# Patient Record
Sex: FEMALE | Race: WHITE | NOT HISPANIC OR LATINO
[De-identification: names, ages, dates, MRNs, and addresses within clinical notes are randomized per-mention and may not be internally consistent; named-entity substitution may affect disease eponyms.]

---

## 2017-02-13 ENCOUNTER — RX ONLY (OUTPATIENT)
Age: 62
Setting detail: RX ONLY
End: 2017-02-13

## 2017-02-20 ENCOUNTER — HOSPITAL ENCOUNTER (OUTPATIENT)
Facility: MEDICAL CENTER | Age: 62
End: 2017-02-20
Attending: OBSTETRICS & GYNECOLOGY
Payer: MEDICARE

## 2017-02-20 PROCEDURE — 87624 HPV HI-RISK TYP POOLED RSLT: CPT

## 2017-02-20 PROCEDURE — 88175 CYTOPATH C/V AUTO FLUID REDO: CPT

## 2017-02-22 LAB
CYTOLOGY REG CYTOL: NORMAL
HPV HR 12 DNA CVX QL NAA+PROBE: NEGATIVE
HPV16 DNA SPEC QL NAA+PROBE: NEGATIVE
HPV18 DNA SPEC QL NAA+PROBE: NEGATIVE
SPECIMEN SOURCE: NORMAL

## 2017-10-06 ENCOUNTER — HOSPITAL ENCOUNTER (OUTPATIENT)
Dept: RADIOLOGY | Facility: MEDICAL CENTER | Age: 62
End: 2017-10-06
Attending: OBSTETRICS & GYNECOLOGY
Payer: MEDICARE

## 2017-10-06 DIAGNOSIS — Z12.31 VISIT FOR SCREENING MAMMOGRAM: ICD-10-CM

## 2017-10-06 PROCEDURE — G0202 SCR MAMMO BI INCL CAD: HCPCS

## 2017-10-09 ENCOUNTER — APPOINTMENT (RX ONLY)
Dept: URBAN - METROPOLITAN AREA CLINIC 20 | Facility: CLINIC | Age: 62
Setting detail: DERMATOLOGY
End: 2017-10-09

## 2017-10-09 DIAGNOSIS — L81.4 OTHER MELANIN HYPERPIGMENTATION: ICD-10-CM

## 2017-10-09 DIAGNOSIS — L57.8 OTHER SKIN CHANGES DUE TO CHRONIC EXPOSURE TO NONIONIZING RADIATION: ICD-10-CM

## 2017-10-09 DIAGNOSIS — D22 MELANOCYTIC NEVI: ICD-10-CM

## 2017-10-09 DIAGNOSIS — L57.0 ACTINIC KERATOSIS: ICD-10-CM

## 2017-10-09 DIAGNOSIS — D18.0 HEMANGIOMA: ICD-10-CM

## 2017-10-09 DIAGNOSIS — L82.1 OTHER SEBORRHEIC KERATOSIS: ICD-10-CM

## 2017-10-09 PROBLEM — D22.5 MELANOCYTIC NEVI OF TRUNK: Status: ACTIVE | Noted: 2017-10-09

## 2017-10-09 PROBLEM — D18.01 HEMANGIOMA OF SKIN AND SUBCUTANEOUS TISSUE: Status: ACTIVE | Noted: 2017-10-09

## 2017-10-09 PROCEDURE — ? FOLLOW UP FOR NEXT VISIT

## 2017-10-09 PROCEDURE — ? COUNSELING

## 2017-10-09 PROCEDURE — 99213 OFFICE O/P EST LOW 20 MIN: CPT

## 2017-10-09 ASSESSMENT — LOCATION DETAILED DESCRIPTION DERM
LOCATION DETAILED: LEFT MEDIAL SUPERIOR CHEST
LOCATION DETAILED: RIGHT ANTERIOR DISTAL THIGH
LOCATION DETAILED: LEFT ANTERIOR PROXIMAL UPPER ARM
LOCATION DETAILED: RIGHT RADIAL DORSAL HAND
LOCATION DETAILED: RIGHT INFERIOR UPPER BACK
LOCATION DETAILED: RIGHT ANTERIOR PROXIMAL UPPER ARM
LOCATION DETAILED: LEFT ULNAR DORSAL HAND
LOCATION DETAILED: RIGHT SUPERIOR MEDIAL UPPER BACK
LOCATION DETAILED: RIGHT DISTAL DORSAL FOREARM
LOCATION DETAILED: LEFT PROXIMAL DORSAL FOREARM
LOCATION DETAILED: LEFT DISTAL POSTERIOR UPPER ARM
LOCATION DETAILED: RIGHT MID-UPPER BACK
LOCATION DETAILED: LEFT INFERIOR CENTRAL MALAR CHEEK
LOCATION DETAILED: LEFT ANTERIOR DISTAL THIGH
LOCATION DETAILED: RIGHT CENTRAL MALAR CHEEK
LOCATION DETAILED: NASAL DORSUM
LOCATION DETAILED: RIGHT PROXIMAL DORSAL FOREARM

## 2017-10-09 ASSESSMENT — LOCATION SIMPLE DESCRIPTION DERM
LOCATION SIMPLE: LEFT UPPER ARM
LOCATION SIMPLE: CHEST
LOCATION SIMPLE: RIGHT THIGH
LOCATION SIMPLE: RIGHT UPPER BACK
LOCATION SIMPLE: RIGHT FOREARM
LOCATION SIMPLE: LEFT THIGH
LOCATION SIMPLE: NOSE
LOCATION SIMPLE: LEFT POSTERIOR UPPER ARM
LOCATION SIMPLE: LEFT CHEEK
LOCATION SIMPLE: RIGHT HAND
LOCATION SIMPLE: LEFT HAND
LOCATION SIMPLE: RIGHT CHEEK
LOCATION SIMPLE: RIGHT UPPER ARM
LOCATION SIMPLE: LEFT FOREARM

## 2017-10-09 ASSESSMENT — LOCATION ZONE DERM
LOCATION ZONE: ARM
LOCATION ZONE: NOSE
LOCATION ZONE: FACE
LOCATION ZONE: TRUNK
LOCATION ZONE: LEG
LOCATION ZONE: HAND

## 2017-10-09 NOTE — PROCEDURE: FOLLOW UP FOR NEXT VISIT
Detail Level: Simple
Instructions (Optional): Use fluorouracil x 28 days
Instructions (Optional): Photo taken today. Call if any noticed changes.
Detail Level: Detailed

## 2017-11-15 ENCOUNTER — HOSPITAL ENCOUNTER (OUTPATIENT)
Dept: RADIOLOGY | Facility: MEDICAL CENTER | Age: 62
End: 2017-11-15
Attending: OBSTETRICS & GYNECOLOGY
Payer: MEDICARE

## 2017-11-15 DIAGNOSIS — N63.0 LUMP OR MASS IN BREAST: ICD-10-CM

## 2017-11-15 PROCEDURE — C8907 MRI W/O CONT, BREAST, BI: HCPCS

## 2017-11-28 ENCOUNTER — TELEPHONE (OUTPATIENT)
Dept: RADIOLOGY | Facility: MEDICAL CENTER | Age: 62
End: 2017-11-28

## 2017-11-29 ENCOUNTER — HOSPITAL ENCOUNTER (OUTPATIENT)
Dept: RADIOLOGY | Facility: MEDICAL CENTER | Age: 62
End: 2017-11-29
Attending: OBSTETRICS & GYNECOLOGY
Payer: MEDICARE

## 2017-11-29 DIAGNOSIS — R92.2 INCONCLUSIVE MAMMOGRAPHY: ICD-10-CM

## 2017-11-29 PROCEDURE — 76642 ULTRASOUND BREAST LIMITED: CPT | Mod: RT

## 2017-12-04 ENCOUNTER — HOSPITAL ENCOUNTER (OUTPATIENT)
Dept: RADIOLOGY | Facility: MEDICAL CENTER | Age: 62
End: 2017-12-04
Attending: NURSE PRACTITIONER
Payer: MEDICARE

## 2017-12-04 DIAGNOSIS — M25.551 RIGHT HIP PAIN: ICD-10-CM

## 2017-12-04 DIAGNOSIS — M54.5 LOW BACK PAIN, UNSPECIFIED BACK PAIN LATERALITY, UNSPECIFIED CHRONICITY, WITH SCIATICA PRESENCE UNSPECIFIED: ICD-10-CM

## 2017-12-04 PROCEDURE — 72100 X-RAY EXAM L-S SPINE 2/3 VWS: CPT

## 2017-12-04 PROCEDURE — 73501 X-RAY EXAM HIP UNI 1 VIEW: CPT | Mod: RT

## 2017-12-11 ENCOUNTER — APPOINTMENT (RX ONLY)
Dept: URBAN - METROPOLITAN AREA CLINIC 20 | Facility: CLINIC | Age: 62
Setting detail: DERMATOLOGY
End: 2017-12-11

## 2017-12-11 DIAGNOSIS — L57.0 ACTINIC KERATOSIS: ICD-10-CM

## 2017-12-11 DIAGNOSIS — B07.8 OTHER VIRAL WARTS: ICD-10-CM

## 2017-12-11 PROCEDURE — 99212 OFFICE O/P EST SF 10 MIN: CPT | Mod: 25

## 2017-12-11 PROCEDURE — ? PRESCRIPTION MEDICATION MANAGEMENT

## 2017-12-11 PROCEDURE — ? LIQUID NITROGEN (CM)

## 2017-12-11 PROCEDURE — 17110 DESTRUCTION B9 LES UP TO 14: CPT

## 2017-12-11 ASSESSMENT — LOCATION ZONE DERM
LOCATION ZONE: FACE
LOCATION ZONE: ARM
LOCATION ZONE: FINGER
LOCATION ZONE: HAND

## 2017-12-11 ASSESSMENT — LOCATION DETAILED DESCRIPTION DERM
LOCATION DETAILED: RIGHT RADIAL DORSAL HAND
LOCATION DETAILED: LEFT PROXIMAL DORSAL INDEX FINGER
LOCATION DETAILED: LEFT RADIAL DORSAL HAND
LOCATION DETAILED: LEFT ANTERIOR PROXIMAL UPPER ARM
LOCATION DETAILED: LEFT CENTRAL MANDIBULAR CHEEK
LOCATION DETAILED: LEFT CENTRAL ZYGOMA
LOCATION DETAILED: LEFT LATERAL MANDIBULAR CHEEK
LOCATION DETAILED: LEFT INFERIOR FOREHEAD

## 2017-12-11 ASSESSMENT — LOCATION SIMPLE DESCRIPTION DERM
LOCATION SIMPLE: LEFT UPPER ARM
LOCATION SIMPLE: LEFT HAND
LOCATION SIMPLE: LEFT INDEX FINGER
LOCATION SIMPLE: LEFT CHEEK
LOCATION SIMPLE: LEFT FOREHEAD
LOCATION SIMPLE: RIGHT HAND
LOCATION SIMPLE: LEFT ZYGOMA

## 2017-12-11 NOTE — PROCEDURE: LIQUID NITROGEN
Post-Care Instructions: I reviewed with the patient in detail post-care instructions. Patient is to wear sunprotection, and avoid picking at any of the treated lesions. Pt may apply Vaseline to crusted or scabbing areas.
Include Z78.9 (Other Specified Conditions Influencing Health Status) As An Associated Diagnosis?: No
Consent: The patient's consent was obtained including but not limited to risks of crusting, scabbing, blistering, scarring, darker or lighter pigmentary change, recurrence, incomplete removal and infection.
Medical Necessity Information: It is in your best interest to select a reason for this procedure from the list below. All of these items fulfill various CMS LCD requirements except the new and changing color options.
Medical Necessity Clause: This procedure was medically necessary because the lesions that were treated were:
Detail Level: Detailed
Duration Of Freeze Thaw-Cycle (Seconds): 0

## 2017-12-11 NOTE — HPI: FOLLOW-UP
What Is The Condition That You Are Returning For Follow-Up?: other
Additional History: Used 5FU, did not finish 28 days. Unsure of how many days actually used. No reaction from the 5FU.

## 2017-12-15 ENCOUNTER — HOSPITAL ENCOUNTER (OUTPATIENT)
Dept: RADIOLOGY | Facility: MEDICAL CENTER | Age: 62
End: 2017-12-15
Attending: NURSE PRACTITIONER
Payer: MEDICARE

## 2017-12-15 DIAGNOSIS — M12.9 ARTHRITIS INVOLVING MULTIPLE SITES: ICD-10-CM

## 2017-12-15 DIAGNOSIS — S33.100A SUBLUXATION OF LUMBAR VERTEBRA, INITIAL ENCOUNTER: ICD-10-CM

## 2017-12-15 PROCEDURE — 72100 X-RAY EXAM L-S SPINE 2/3 VWS: CPT

## 2018-03-26 ENCOUNTER — APPOINTMENT (RX ONLY)
Dept: URBAN - METROPOLITAN AREA CLINIC 4 | Facility: CLINIC | Age: 63
Setting detail: DERMATOLOGY
End: 2018-03-26

## 2018-03-26 DIAGNOSIS — L57.0 ACTINIC KERATOSIS: ICD-10-CM

## 2018-03-26 PROBLEM — E05.90 THYROTOXICOSIS, UNSPECIFIED WITHOUT THYROTOXIC CRISIS OR STORM: Status: ACTIVE | Noted: 2018-03-26

## 2018-03-26 PROCEDURE — 99212 OFFICE O/P EST SF 10 MIN: CPT

## 2018-03-26 PROCEDURE — ? COUNSELING

## 2018-03-26 PROCEDURE — ? PRESCRIPTION MEDICATION MANAGEMENT

## 2018-03-26 ASSESSMENT — LOCATION ZONE DERM
LOCATION ZONE: HAND
LOCATION ZONE: FACE
LOCATION ZONE: ARM

## 2018-03-26 ASSESSMENT — LOCATION DETAILED DESCRIPTION DERM
LOCATION DETAILED: 4TH WEB SPACE LEFT HAND
LOCATION DETAILED: LEFT INFERIOR CENTRAL MALAR CHEEK
LOCATION DETAILED: LEFT ANTERIOR PROXIMAL UPPER ARM
LOCATION DETAILED: LEFT RADIAL DORSAL HAND

## 2018-03-26 ASSESSMENT — LOCATION SIMPLE DESCRIPTION DERM
LOCATION SIMPLE: LEFT CHEEK
LOCATION SIMPLE: LEFT HAND
LOCATION SIMPLE: LEFT UPPER ARM

## 2018-06-01 ENCOUNTER — PATIENT OUTREACH (OUTPATIENT)
Dept: HEALTH INFORMATION MANAGEMENT | Facility: OTHER | Age: 63
End: 2018-06-01

## 2018-06-01 NOTE — PROGRESS NOTES
Outcome: Left Message    Please transfer to Patient Outreach Team at [phone] when patient returns call.      Attempt # 1

## 2018-06-13 NOTE — PROGRESS NOTES
1. Attempt #: 2    2. HealthConnect Verified: yes    3. Verify PCP: yes    4. Care Team Updated:       •   DME Company (gait device, O2, CPAP, etc.): N\A       •   Other Specialists (eye doctor, derm, GYN, cardiology, endo, etc): N\A    5.  Reviewed/Updated the following with patient:       •   Communication Preference Obtained? YES       •   Preferred Pharmacy? NO       •   Preferred Lab? NO       •   Family History (document living status of immediate family members and if + hx of cancer, diabetes, hypertension, hyperlipidemia, heart attack, stroke) NO    6. [employer] Activation: already active    7. Vanderd[employer] Carlitos: no    8. Annual Wellness Visit Scheduling  Scheduling Status:Not Scheduled. Patient states they have a visit not with PCP- Non [facility] PCP      9. Care Gap Scheduling (Attempt to Schedule EACH Overdue Care Gap!)     Health Maintenance Due   Topic Date Due   • Annual Wellness Visit  1955   • IMM DTaP/Tdap/Td Vaccine (1 - Tdap) 04/05/1974   • COLONOSCOPY  04/05/2005          10. Patient was NOT advised: “This is a free wellness visit. The provider will screen for medical conditions to help you stay healthy. If you have other concerns to address you may be asked to discuss these at a separate visit or there may be an additional fee.”     11. Patient was NOT informed to arrive 15 min prior to their scheduled appointment and bring in their medication bottles.

## 2018-08-28 ENCOUNTER — APPOINTMENT (RX ONLY)
Dept: URBAN - METROPOLITAN AREA CLINIC 20 | Facility: CLINIC | Age: 63
Setting detail: DERMATOLOGY
End: 2018-08-28

## 2018-08-28 DIAGNOSIS — L82.1 OTHER SEBORRHEIC KERATOSIS: ICD-10-CM

## 2018-08-28 DIAGNOSIS — D22 MELANOCYTIC NEVI: ICD-10-CM

## 2018-08-28 DIAGNOSIS — Z85.828 PERSONAL HISTORY OF OTHER MALIGNANT NEOPLASM OF SKIN: ICD-10-CM

## 2018-08-28 DIAGNOSIS — L81.4 OTHER MELANIN HYPERPIGMENTATION: ICD-10-CM

## 2018-08-28 DIAGNOSIS — Z87.2 PERSONAL HISTORY OF DISEASES OF THE SKIN AND SUBCUTANEOUS TISSUE: ICD-10-CM

## 2018-08-28 DIAGNOSIS — D18.0 HEMANGIOMA: ICD-10-CM

## 2018-08-28 DIAGNOSIS — L82.0 INFLAMED SEBORRHEIC KERATOSIS: ICD-10-CM

## 2018-08-28 DIAGNOSIS — L57.8 OTHER SKIN CHANGES DUE TO CHRONIC EXPOSURE TO NONIONIZING RADIATION: ICD-10-CM

## 2018-08-28 DIAGNOSIS — L57.0 ACTINIC KERATOSIS: ICD-10-CM

## 2018-08-28 PROBLEM — D18.01 HEMANGIOMA OF SKIN AND SUBCUTANEOUS TISSUE: Status: ACTIVE | Noted: 2018-08-28

## 2018-08-28 PROBLEM — D22.5 MELANOCYTIC NEVI OF TRUNK: Status: ACTIVE | Noted: 2018-08-28

## 2018-08-28 PROCEDURE — ? OBSERVATION

## 2018-08-28 PROCEDURE — ? COUNSELING

## 2018-08-28 PROCEDURE — ? LIQUID NITROGEN

## 2018-08-28 PROCEDURE — 99214 OFFICE O/P EST MOD 30 MIN: CPT | Mod: 25

## 2018-08-28 ASSESSMENT — LOCATION DETAILED DESCRIPTION DERM
LOCATION DETAILED: RIGHT ANTERIOR PROXIMAL THIGH
LOCATION DETAILED: LEFT SUPERIOR UPPER BACK
LOCATION DETAILED: RIGHT INFERIOR ANTERIOR NECK
LOCATION DETAILED: RIGHT RADIAL DORSAL HAND
LOCATION DETAILED: LEFT RADIAL DORSAL HAND
LOCATION DETAILED: LEFT INFERIOR UPPER BACK
LOCATION DETAILED: RIGHT INFERIOR MEDIAL UPPER BACK
LOCATION DETAILED: RIGHT SUPERIOR UPPER BACK
LOCATION DETAILED: RIGHT INFERIOR CENTRAL MALAR CHEEK
LOCATION DETAILED: LEFT CENTRAL EYEBROW
LOCATION DETAILED: LEFT PROXIMAL POSTERIOR UPPER ARM
LOCATION DETAILED: RIGHT POPLITEAL SKIN
LOCATION DETAILED: RIGHT CENTRAL ZYGOMA
LOCATION DETAILED: LEFT UPPER CUTANEOUS LIP

## 2018-08-28 ASSESSMENT — LOCATION ZONE DERM
LOCATION ZONE: NECK
LOCATION ZONE: ARM
LOCATION ZONE: LEG
LOCATION ZONE: FACE
LOCATION ZONE: HAND
LOCATION ZONE: TRUNK
LOCATION ZONE: LIP

## 2018-08-28 ASSESSMENT — LOCATION SIMPLE DESCRIPTION DERM
LOCATION SIMPLE: RIGHT UPPER BACK
LOCATION SIMPLE: RIGHT ZYGOMA
LOCATION SIMPLE: RIGHT HAND
LOCATION SIMPLE: RIGHT POPLITEAL SKIN
LOCATION SIMPLE: LEFT LIP
LOCATION SIMPLE: LEFT POSTERIOR UPPER ARM
LOCATION SIMPLE: LEFT HAND
LOCATION SIMPLE: RIGHT THIGH
LOCATION SIMPLE: RIGHT CHEEK
LOCATION SIMPLE: LEFT EYEBROW
LOCATION SIMPLE: LEFT UPPER BACK
LOCATION SIMPLE: RIGHT ANTERIOR NECK

## 2018-10-11 ENCOUNTER — PATIENT OUTREACH (OUTPATIENT)
Dept: HEALTH INFORMATION MANAGEMENT | Facility: OTHER | Age: 63
End: 2018-10-11

## 2018-10-11 NOTE — PROGRESS NOTES
Attempt #:Final    WebIZ Checked & Epic Updated: No records found in WEB IZ  HealthConnect Verified: yes  Verify PCP: Pt has an appt scheduled to establish care with Dr. Panchal.     Review Care Team: no/ Pt will complete info during her first appt.    Annual Wellness Visit Scheduling  1. Scheduling Status:Scheduled     Care Gap Scheduling (Attempt to Schedule EACH Overdue Care Gap!)  Health Maintenance Due   Topic Date Due   • Annual Wellness Visit  1955   • IMM DTaP/Tdap/Td Vaccine (1 - Tdap) 04/05/1974   • COLONOSCOPY  04/05/2005 / Pt stated that will bring records to her appt.   • IMM ZOSTER VACCINES (1 of 2) 04/05/2005   • IMM INFLUENZA (1) 09/01/2018 / Pt stated that will get vaccine at the pharmacy or, during appt.   • MAMMOGRAM  10/06/2018 / Will scheduled later on.       [employer] Activation: already active

## 2018-11-09 ENCOUNTER — HOSPITAL ENCOUNTER (OUTPATIENT)
Dept: RADIOLOGY | Facility: MEDICAL CENTER | Age: 63
End: 2018-11-09
Attending: INTERNAL MEDICINE
Payer: MEDICARE

## 2018-11-09 DIAGNOSIS — Z12.31 VISIT FOR SCREENING MAMMOGRAM: ICD-10-CM

## 2018-11-09 PROCEDURE — 77067 SCR MAMMO BI INCL CAD: CPT

## 2018-11-19 ENCOUNTER — OFFICE VISIT (OUTPATIENT)
Dept: MEDICAL GROUP | Facility: PHYSICIAN GROUP | Age: 63
End: 2018-11-19
Payer: MEDICARE

## 2018-11-19 VITALS
HEART RATE: 80 BPM | DIASTOLIC BLOOD PRESSURE: 82 MMHG | BODY MASS INDEX: 19.08 KG/M2 | RESPIRATION RATE: 12 BRPM | TEMPERATURE: 98.2 F | WEIGHT: 128.8 LBS | OXYGEN SATURATION: 100 % | HEIGHT: 69 IN | SYSTOLIC BLOOD PRESSURE: 110 MMHG

## 2018-11-19 DIAGNOSIS — M54.2 CERVICAL PAIN: ICD-10-CM

## 2018-11-19 DIAGNOSIS — E03.9 HYPOTHYROIDISM, UNSPECIFIED TYPE: ICD-10-CM

## 2018-11-19 DIAGNOSIS — J34.9 SINUS DISEASE: ICD-10-CM

## 2018-11-19 DIAGNOSIS — G25.81 RESTLESS LEGS: ICD-10-CM

## 2018-11-19 PROCEDURE — 99204 OFFICE O/P NEW MOD 45 MIN: CPT | Performed by: INTERNAL MEDICINE

## 2018-11-19 RX ORDER — CARISOPRODOL 350 MG/1
350 TABLET ORAL PRN
Refills: 3 | COMMUNITY
Start: 2018-10-22 | End: 2020-03-09 | Stop reason: SDUPTHER

## 2018-11-19 RX ORDER — LEVOTHYROXINE SODIUM 0.07 MG/1
TABLET ORAL
Refills: 3 | COMMUNITY
Start: 2018-10-18 | End: 2019-05-01

## 2018-11-19 RX ORDER — ROPINIROLE 0.5 MG/1
0.5 TABLET, FILM COATED ORAL DAILY
Refills: 0 | COMMUNITY
Start: 2018-10-01 | End: 2019-12-23

## 2018-11-19 RX ORDER — AZELASTINE 1 MG/ML
1 SPRAY, METERED NASAL 2 TIMES DAILY
Qty: 30 ML | Refills: 1 | Status: SHIPPED | OUTPATIENT
Start: 2018-11-19 | End: 2019-01-15 | Stop reason: SDUPTHER

## 2018-11-19 ASSESSMENT — PATIENT HEALTH QUESTIONNAIRE - PHQ9: CLINICAL INTERPRETATION OF PHQ2 SCORE: 0

## 2018-11-19 NOTE — ASSESSMENT & PLAN NOTE
Has rhinorrhea all day and then has plugging/congestion at night. Has tried sprays, saline, more hydration.

## 2018-11-19 NOTE — ASSESSMENT & PLAN NOTE
Had partial thyroidectomy in the 1970s and has been on replacement. Her gynecologist had palpated a mass so she had surgery, was non-cancerous.

## 2018-11-19 NOTE — PROGRESS NOTES
PRIMARY CARE CLINIC FOLLOW UP VISIT  Chief Complaint   Patient presents with   • Neck Pain     surgery 2000 and 2002   • Sinus Problem     x several yrs     History of Present Illness     Restless legs  Takes requip 0.5 mg at night, 1 tablet. Sometimes requires an additional dose.     Hypothyroidism  Had partial thyroidectomy in the 1970s and has been on replacement. Her gynecologist had palpated a mass so she had surgery, was non-cancerous.     Cervical pain  Takes 1/2 tablet of soma 350 mg tablet as needed. Taking gabapentin 300 mg capsule in the morning but doesn't take lunch or dinner doses consistently.     Sinus disease  Has rhinorrhea all day and then has plugging/congestion at night. Has tried sprays, saline, more hydration.     Current Outpatient Prescriptions   Medication Sig Dispense Refill   • Diclofenac Sodium 1 % Gel Apply  to skin as directed.     • ROPINIRole (REQUIP) 0.5 MG Tab Take 0.5 mg by mouth every day.  0   • azelastine (ASTELIN) 137 MCG/SPRAY nasal spray [place] 1 Spray in nose 2 times a day. 30 mL 1   • ibuprofen (MOTRIN) 600 MG TABS Take 600 mg by mouth every 6 hours as needed.     • gabapentin (NEURONTIN) 300 MG Cap Take  by mouth. 1 to 3 times a day     • multivitamin (THERAGRAN) TABS Take 1 Tab by mouth every day.     • POTASSIUM PO Take 1 Tab by mouth every day.     • docosahexanoic acid (OMEGA 3 FA) 1000 MG CAPS Take 1,000 mg by mouth every day.     • Calcium-Magnesium-Vitamin D (CALCIUM MAGNESIUM PO) Take 1 Tab by mouth every day.     • levothyroxine (SYNTHROID) 75 MCG Tab TAKE 1 TABLET BY MOUTH EVERY DAY FOR HYPOTHYROID DISEASE  3   • carisoprodol (SOMA) 350 MG Tab TAKE 1/2 TABLET ORALLY TWICE DAILY AS NEEDED FOR PAINFUL MUSCLE TENSION  3     No current facility-administered medications for this visit.      Past Medical History:   Diagnosis Date   • Cervical pain     neck   • Hypothyroidism 11/19/2018   • Restless legs 11/19/2018     Past Surgical History:   Procedure Laterality Date  "  • OTHER  2018    implant revision of bilateral breasts    • CATARACT PHACO WITH IOL  2014    Performed by Fadi Morales M.D. at SURGERY SAME DAY [facility] ORS   • PB ENLARGE BREAST WITH IMPLANT     • OTHER ORTHOPEDIC SURGERY  [id number]    neck fusion     Social History   Substance Use Topics   • Smoking status: Former Smoker     Years: 8.00   • Smokeless tobacco: Never Used      Comment: quit in her 20s    • Alcohol use Yes      Comment: 2-3     Social History     Social History Narrative    Retired dental hygiene      History reviewed. No pertinent family history.  Family Status   Relation Status   • Mo    • Fa Alive, age 89y     Allergies: Dilaudid [hydromorphone hcl]; Doxycycline; Fentanyl [fentanyl]; Other drug; Percocet [oxycodone-acetaminophen]; and Vicoprofen [hydrocodone-ibuprofen]    ROS  As per HPI above. All other systems reviewed and negative.        Objective   Blood pressure 110/82, pulse 80, temperature 36.8 °C (98.2 °F), temperature source Temporal, resp. rate 12, height 1.753 m (5' 9\"), weight 58.4 kg (128 lb 12.8 oz), SpO2 100 %, not currently breastfeeding. Body mass index is 19.02 kg/m².    General: alert and oriented, pleasant, cooperative  HEENT: Normocephalic, atraumatic. No thyroid masses. Oropharynx clear without exudate or injection.   Cardiovascular: regular rate and rhythm, normal S1/S2  Pulmonary: lungs clear to auscultation bilaterally  Gastrointestinal: no tenderness to palpation. No hepatosplenomegaly. Bowel sounds normoactive  Lymphatics: no cervical or supraclavicular lymphadenopathy   Skin: warm and dry, no lesions or rashes  Psychiatric: appropriate mood and affect. Good insight and appropriate judgment     Assessment and Plan   The following treatment plan was discussed     1. Restless legs  Taking requip 0.5 mg at night, sometimes an extra dose if needed. Check CBC for anemia as below.     2. Cervical pain  Taking soma and gabapentin as needed.     3. " Sinus disease  Will trial astelin and if no improvement then refer to an allergist.   - azelastine (ASTELIN) 137 MCG/SPRAY nasal spray; Spray 1 Spray in nose 2 times a day.  Dispense: 30 mL; Refill: 1    4. Hypothyroidism, unspecified type  Will check baseline labs, clinically stable on levothyroxine 75 mcg.   - COMP METABOLIC PANEL; Future  - CBC WITH DIFFERENTIAL; Future  - Lipid Profile; Future  - TSH WITH REFLEX TO FT4; Future      Healthcare maintenance     Health Maintenance Due   Topic Date Due   • Annual Wellness Visit  1955   • IMM DTaP/Tdap/Td Vaccine (1 - Tdap) 04/05/1974   • IMM ZOSTER VACCINES (2 of 3) 10/02/2013       Return in about 6 months (around 5/19/2019).    Lawrence Panchal MD  Internal Medicine  [facility]

## 2018-11-28 ENCOUNTER — APPOINTMENT (RX ONLY)
Dept: URBAN - METROPOLITAN AREA CLINIC 20 | Facility: CLINIC | Age: 63
Setting detail: DERMATOLOGY
End: 2018-11-28

## 2018-11-28 DIAGNOSIS — Z87.2 PERSONAL HISTORY OF DISEASES OF THE SKIN AND SUBCUTANEOUS TISSUE: ICD-10-CM

## 2018-11-28 DIAGNOSIS — L57.0 ACTINIC KERATOSIS: ICD-10-CM

## 2018-11-28 PROCEDURE — ? ADDITIONAL NOTES

## 2018-11-28 PROCEDURE — ? COUNSELING

## 2018-11-28 PROCEDURE — 99213 OFFICE O/P EST LOW 20 MIN: CPT

## 2018-11-28 ASSESSMENT — LOCATION SIMPLE DESCRIPTION DERM
LOCATION SIMPLE: RIGHT EYEBROW
LOCATION SIMPLE: LEFT LIP
LOCATION SIMPLE: CHEST
LOCATION SIMPLE: LEFT HAND
LOCATION SIMPLE: LEFT FOREHEAD
LOCATION SIMPLE: RIGHT HAND
LOCATION SIMPLE: RIGHT LIP

## 2018-11-28 ASSESSMENT — LOCATION DETAILED DESCRIPTION DERM
LOCATION DETAILED: LEFT UPPER CUTANEOUS LIP
LOCATION DETAILED: RIGHT MEDIAL SUPERIOR CHEST
LOCATION DETAILED: LEFT INFERIOR FOREHEAD
LOCATION DETAILED: RIGHT RADIAL DORSAL HAND
LOCATION DETAILED: RIGHT UPPER CUTANEOUS LIP
LOCATION DETAILED: LEFT ULNAR DORSAL HAND
LOCATION DETAILED: RIGHT LATERAL EYEBROW

## 2018-11-28 ASSESSMENT — LOCATION ZONE DERM
LOCATION ZONE: FACE
LOCATION ZONE: TRUNK
LOCATION ZONE: LIP
LOCATION ZONE: HAND

## 2018-11-28 NOTE — PROCEDURE: ADDITIONAL NOTES
Detail Level: Simple
Additional Notes: Patient will use 5FU on these sites. One area at a time. Keep sites covered from sun.

## 2019-01-15 DIAGNOSIS — J34.9 SINUS DISEASE: ICD-10-CM

## 2019-01-15 RX ORDER — AZELASTINE 1 MG/ML
1 SPRAY, METERED NASAL 2 TIMES DAILY
Qty: 3 BOTTLE | Refills: 3 | Status: SHIPPED | OUTPATIENT
Start: 2019-01-15 | End: 2020-02-28

## 2019-01-15 NOTE — TELEPHONE ENCOUNTER
*PHARMACY REQUESTING 90 DAYS SUPPLY*  Was the patient seen in the last year in this department? Yes    Does patient have an active prescription for medications requested? No     Received Request Via: Pharmacy      Pt met protocol?: Yes    LAST OV 11/19/2018

## 2019-01-18 ENCOUNTER — PATIENT OUTREACH (OUTPATIENT)
Dept: HEALTH INFORMATION MANAGEMENT | Facility: OTHER | Age: 64
End: 2019-01-18

## 2019-01-18 NOTE — PROGRESS NOTES
1. Attempt #:1    2. HealthConnect Verified: yes    3. Verify PCP: yes    4. Review Care Team: yes    5. WebIZ Checked & Epic Updated: Yes    6. Reviewed/Updated the following with patient:       •   Communication Preference Obtained? YES       •   Preferred Pharmacy? YES       •   Preferred Lab? YES       •   Family History (document living status of immediate family members and if + hx of cancer, diabetes, hypertension, hyperlipidemia, heart attack, stroke) YES    7. Annual Wellness Visit Scheduling  · Scheduling Status:Scheduled     8. Care Gap Scheduling (Attempt to Schedule EACH Overdue Care Gap!)     Health Maintenance Due   Topic Date Due   • Annual Wellness Visit  1955   • IMM DTaP/Tdap/Td Vaccine (1 - Tdap) 04/05/1974   • IMM ZOSTER VACCINES (2 of 3) 10/02/2013        Scheduled patient for Annual Wellness Visit     9. [employer] Activation: already active    10. [employer] Carlitos: no    11. Virtual Visits: no    12. Opt In to Text Messages: no    13. Patient was advised: “This is a free wellness visit. The provider will screen for medical conditions to help you stay healthy. If you have other concerns to address you may be asked to discuss these at a separate visit or there may be an additional fee.”     14. Patient was informed to arrive 15 min prior to their scheduled appointment and bring in their medication bottles.

## 2019-02-06 ENCOUNTER — RX ONLY (OUTPATIENT)
Age: 64
Setting detail: RX ONLY
End: 2019-02-06

## 2019-02-06 RX ORDER — FLUOROURACIL 50 MG/G
CREAM TOPICAL
Qty: 1 | Refills: 2 | Status: ERX | COMMUNITY
Start: 2019-02-06

## 2019-02-25 ENCOUNTER — TELEPHONE (OUTPATIENT)
Dept: MEDICAL GROUP | Facility: PHYSICIAN GROUP | Age: 64
End: 2019-02-25

## 2019-02-25 NOTE — TELEPHONE ENCOUNTER
Future Appointments       Provider Department Center    3/7/2019 9:15 AM Lawrence Panchal M.D.; [facility]        ANNUAL WELLNESS VISIT PRE-VISIT PLANNING WITH OUTREACH    1.  If any orders were placed at last visit, do we have Results/Consult Notes?        •  Labs - Labs ordered, NOT completed. Patient advised to complete prior to next appointment.       •  Imaging - Imaging was not ordered at last office visit.       •  Referrals - No referrals were ordered at last office visit.    2.  Immunizations were updated in Epic using WebIZ?:Yes       •  WebIZ Recommendations: TDAP and SHINGRIX (Shingles)       •  Is patient due for Tdap? NO       •  Is patient due for Shingrx? NO    3.  Patient has the following Care Path diagnoses on Problem List:  NONE    4.  Orders for overdue Health Maintenance topics pended in Pre-Charting? NO    2/25/2019- LVM for pt to get labs done.

## 2019-03-05 ENCOUNTER — HOSPITAL ENCOUNTER (OUTPATIENT)
Dept: LAB | Facility: MEDICAL CENTER | Age: 64
End: 2019-03-05
Attending: INTERNAL MEDICINE
Payer: MEDICARE

## 2019-03-05 DIAGNOSIS — E03.9 HYPOTHYROIDISM, UNSPECIFIED TYPE: ICD-10-CM

## 2019-03-05 LAB
ALBUMIN SERPL BCP-MCNC: 4.5 G/DL (ref 3.2–4.9)
ALBUMIN/GLOB SERPL: 1.9 G/DL
ALP SERPL-CCNC: 38 U/L (ref 30–99)
ALT SERPL-CCNC: 14 U/L (ref 2–50)
ANION GAP SERPL CALC-SCNC: 4 MMOL/L (ref 0–11.9)
AST SERPL-CCNC: 20 U/L (ref 12–45)
BASOPHILS # BLD AUTO: 2.1 % (ref 0–1.8)
BASOPHILS # BLD: 0.07 K/UL (ref 0–0.12)
BILIRUB SERPL-MCNC: 0.6 MG/DL (ref 0.1–1.5)
BUN SERPL-MCNC: 22 MG/DL (ref 8–22)
CALCIUM SERPL-MCNC: 10.1 MG/DL (ref 8.5–10.5)
CHLORIDE SERPL-SCNC: 109 MMOL/L (ref 96–112)
CHOLEST SERPL-MCNC: 227 MG/DL (ref 100–199)
CO2 SERPL-SCNC: 24 MMOL/L (ref 20–33)
CREAT SERPL-MCNC: 1.02 MG/DL (ref 0.5–1.4)
EOSINOPHIL # BLD AUTO: 0.1 K/UL (ref 0–0.51)
EOSINOPHIL NFR BLD: 3 % (ref 0–6.9)
ERYTHROCYTE [DISTWIDTH] IN BLOOD BY AUTOMATED COUNT: 49.6 FL (ref 35.9–50)
FASTING STATUS PATIENT QL REPORTED: NORMAL
GLOBULIN SER CALC-MCNC: 2.4 G/DL (ref 1.9–3.5)
GLUCOSE SERPL-MCNC: 91 MG/DL (ref 65–99)
HCT VFR BLD AUTO: 41.6 % (ref 37–47)
HDLC SERPL-MCNC: 84 MG/DL
HGB BLD-MCNC: 13.1 G/DL (ref 12–16)
IMM GRANULOCYTES # BLD AUTO: 0.01 K/UL (ref 0–0.11)
IMM GRANULOCYTES NFR BLD AUTO: 0.3 % (ref 0–0.9)
LDLC SERPL CALC-MCNC: 128 MG/DL
LYMPHOCYTES # BLD AUTO: 1.21 K/UL (ref 1–4.8)
LYMPHOCYTES NFR BLD: 36 % (ref 22–41)
MCH RBC QN AUTO: 33.6 PG (ref 27–33)
MCHC RBC AUTO-ENTMCNC: 31.5 G/DL (ref 33.6–35)
MCV RBC AUTO: 106.7 FL (ref 81.4–97.8)
MONOCYTES # BLD AUTO: 0.27 K/UL (ref 0–0.85)
MONOCYTES NFR BLD AUTO: 8 % (ref 0–13.4)
NEUTROPHILS # BLD AUTO: 1.7 K/UL (ref 2–7.15)
NEUTROPHILS NFR BLD: 50.6 % (ref 44–72)
NRBC # BLD AUTO: 0 K/UL
NRBC BLD-RTO: 0 /100 WBC
PLATELET # BLD AUTO: 194 K/UL (ref 164–446)
PMV BLD AUTO: 10.8 FL (ref 9–12.9)
POTASSIUM SERPL-SCNC: 3.7 MMOL/L (ref 3.6–5.5)
PROT SERPL-MCNC: 6.9 G/DL (ref 6–8.2)
RBC # BLD AUTO: 3.9 M/UL (ref 4.2–5.4)
SODIUM SERPL-SCNC: 137 MMOL/L (ref 135–145)
T4 FREE SERPL-MCNC: 1.03 NG/DL (ref 0.53–1.43)
TRIGL SERPL-MCNC: 75 MG/DL (ref 0–149)
TSH SERPL DL<=0.005 MIU/L-ACNC: 6.04 UIU/ML (ref 0.38–5.33)
WBC # BLD AUTO: 3.4 K/UL (ref 4.8–10.8)

## 2019-03-05 PROCEDURE — 36415 COLL VENOUS BLD VENIPUNCTURE: CPT

## 2019-03-05 PROCEDURE — 84439 ASSAY OF FREE THYROXINE: CPT

## 2019-03-05 PROCEDURE — 84443 ASSAY THYROID STIM HORMONE: CPT

## 2019-03-05 PROCEDURE — 80061 LIPID PANEL: CPT

## 2019-03-05 PROCEDURE — 80053 COMPREHEN METABOLIC PANEL: CPT

## 2019-03-05 PROCEDURE — 85025 COMPLETE CBC W/AUTO DIFF WBC: CPT

## 2019-03-07 ENCOUNTER — OFFICE VISIT (OUTPATIENT)
Dept: MEDICAL GROUP | Facility: PHYSICIAN GROUP | Age: 64
End: 2019-03-07
Payer: MEDICARE

## 2019-03-07 VITALS
OXYGEN SATURATION: 100 % | SYSTOLIC BLOOD PRESSURE: 115 MMHG | TEMPERATURE: 98.4 F | WEIGHT: 132.8 LBS | BODY MASS INDEX: 19.67 KG/M2 | RESPIRATION RATE: 16 BRPM | DIASTOLIC BLOOD PRESSURE: 72 MMHG | HEIGHT: 69 IN | HEART RATE: 90 BPM

## 2019-03-07 DIAGNOSIS — G25.81 RESTLESS LEGS: ICD-10-CM

## 2019-03-07 DIAGNOSIS — Z23 NEED FOR VACCINATION: ICD-10-CM

## 2019-03-07 DIAGNOSIS — E78.5 HYPERLIPIDEMIA, UNSPECIFIED HYPERLIPIDEMIA TYPE: ICD-10-CM

## 2019-03-07 DIAGNOSIS — E03.9 HYPOTHYROIDISM, UNSPECIFIED TYPE: ICD-10-CM

## 2019-03-07 PROCEDURE — 8041 PR SCP AHA: Performed by: INTERNAL MEDICINE

## 2019-03-07 PROCEDURE — 90715 TDAP VACCINE 7 YRS/> IM: CPT | Performed by: INTERNAL MEDICINE

## 2019-03-07 PROCEDURE — 90471 IMMUNIZATION ADMIN: CPT | Performed by: INTERNAL MEDICINE

## 2019-03-07 PROCEDURE — G0439 PPPS, SUBSEQ VISIT: HCPCS | Mod: 25 | Performed by: INTERNAL MEDICINE

## 2019-03-07 RX ORDER — FLUOROURACIL 50 MG/G
5 CREAM TOPICAL PRN
Refills: 2 | COMMUNITY
Start: 2019-02-06 | End: 2020-02-28

## 2019-03-07 ASSESSMENT — PATIENT HEALTH QUESTIONNAIRE - PHQ9: CLINICAL INTERPRETATION OF PHQ2 SCORE: 0

## 2019-03-07 ASSESSMENT — ACTIVITIES OF DAILY LIVING (ADL): BATHING_REQUIRES_ASSISTANCE: 0

## 2019-03-07 ASSESSMENT — ENCOUNTER SYMPTOMS: GENERAL WELL-BEING: GOOD

## 2019-03-07 NOTE — PATIENT INSTRUCTIONS
Today, your Healthcare Provider may have discussed the following recommendations:    1. Exercise and Physical Activity  According to the American Heart Association, it is recommended to engage in physical activity regularly and to aim for 150 minutes of moderate-intensity aerobic activity per week.  Your Healthcare Provider may have recommended taking the stairs instead of the elevator, starting or maintaining a walking program or strength-training program.    2. Emotional Well-being  Mental and emotional well-being is essential to overall health.  Your Healthcare Provider may have encouraged you to build strong, positive relationships with family and friends, become more involved in your community (by volunteering or joining a spiritual community), or focus on self-care.    3. Fall and Injury Prevention  To prevent falls and injuries and also improve your balance, your Healthcare Provider may have suggested that you use a cane or walker, start an exercise of physical therapy program, or have your vision and/or hearing tested.    4. Urinary Leakage (Urinary Incontinence)  To control or manage the leakage of urine, your Healthcare Provider may have recommended you start bladder training exercises (such as Kegel exercises), a trial of a medication or a referral to see a specialist to discuss surgical options.    Cooking Tips for Lowering Cholesterol  From the  American Heart Association    A heart-healthy eating plan can help you manage your blood cholesterol level and reduce your risk of heart disease and stroke. The simple cooking tips below will help you prepare tasty, heart-healthy meals that improve your cholesterol by reducing excess saturated fat and trans fat.    Reduce saturated fat in meat and poultry  The American Heart Association recommends a dietary pattern that emphasizes poultry and limits red meat. The amount of saturated fat in meats can vary widely, depending on the cut and how it's prepared. Here  "are some ways to reduce the saturated fat in meat:  · Select lean cuts of meat with minimal visible fat. Lean beef cuts include the round, andreea, sirloin or loin. Lean pork cuts include the tenderloin or loin chop. Lean lamb cuts come from the leg, arm and loin.  · Buy \"choice\" or \"select\" grades rather than \"prime.\" Select lean or extra lean ground beef.  · Trim all visible fat from meat before cooking.  · Broil rather than pan-haddad meats such as hamburger, lamb chops, pork chops and steak.  · Use a rack to drain off fat when broiling, roasting or baking. Instead of basting with drippings, keep meat moist with wine, fruit juices or an acceptable oil-based marinade.  · Cook a day ahead of time. Stews, boiled meat, soup stock or other dishes in which fat cooks into the liquid can be refrigerated. Then, remove the hardened fat from the top.  · When a recipe calls for sky the meat first, try sky it under the broiler instead of in a pan.  · Eat chicken and turkey rather than duck and goose, which are higher in fat. Choose white meat most often when eating poultry.  · Remove the skin from chicken or turkey before cooking. If your poultry dries out too much, first try basting with wine, fruit juices or an acceptable oil-based marinade. Or leave the skin on for cooking and remove it before eating.  · Limit processed meats such as sausage, bologna, salami and hot dogs. Many processed meats -- even those with \"reduced fat\" labels -- are high in calories and saturated fat. Often they’re high in sodium, too. Read labels carefully and choose processed meats only occasionally.  ·   Eat more fish   Fish can be fatty or lean, but it's still low in saturated fat. Choose oily fish such as salmon, trout and herring, which are high in omega-3 fatty acids. Prepare fish baked, broiled, grilled or boiled rather than breaded and fried, and without added salt, saturated fat and trans fat. Shrimp and crawfish have more cholesterol " than most other types of seafood, but they’re lower in total fat and saturated fat than most meats and poultry.    Eat less meat   Try meatless meals featuring vegetables or beans. For example, think eggplant lasagna, a big grilled portobello mushroom on a bun instead of a burger, or beans-n-weenies without the weenies and salt. Or think of meat as a condiment in casseroles, stews, low-sodium soups and spaghetti -- use it sparingly and just for flavor rather than as a main ingredient.    Cook fresh vegetables the heart-healthy way  Try cooking vegetables in a tiny bit of vegetable oil and add a little water during cooking if needed, or use a vegetable oil spray. Just 1 to 2 teaspoons of oil is enough for a package of plain frozen vegetables without sauce or seasonings that serves four. Place in a skillet with tight cover, season and cook over a very low heat until the vegetables are done.    Add herbs and spices to make vegetables even tastier. For example, these combinations add new and subtle flavors:  · Rosemary with peas, cauliflower and squash  · Oregano with zucchini  · Dill with green beans  · Marjoram with [place] sprouts, carrots and spinach  · Basil with tomatoes  Start with a small quantity (1/8 to 1/2 teaspoon to a package of frozen vegetables), then let your own and your family's taste be your guide. Chopped parsley and chives, sprinkled on just before serving, also enhance the flavor of many vegetables.    Use liquid vegetable oils in place of solid fats  Liquid vegetable oils such as canola, safflower, sunflower, soybean and olive can often be used instead of solid fats such as butter, lard or shortening. If you must use margarine, try the soft or liquid kind. Use a little liquid oil to:  · Pan-haddad fish and poultry.  · Saute vegetables.  · Make cream sauces and soups using low-fat or fat-free milk.  · Add to whipped or scalloped potatoes using low-fat or fat-free milk.  · Brown rice for [place],  curried or stir-fried rice.  · Cook dehydrated potatoes and other prepared foods that call for fat to be added.  · Make pancakes or waffles.  ·   Puree fruits and veggies for baking  You can replace the oil in muffin, cookie, cake and snack bar recipes with pureed fruits or veggies to give your treats an extra healthy boost. For many recipes, use the specified amount of puree instead of oil. Check the mix's package or your cookbook's substitutions page for other conversions. You can:  · Use applesauce in spice muffins or oatmeal cookies.  · Include bananas in breads and muffins.  · Try zucchini in brownies.  ·   Lower dairy fats  · Low-fat (1%) or fat-free (skim) milk can be used in many recipes in place of whole milk or half-and-half. Some dishes like puddings may result in a softer set. You can also use low-fat, low-sodium cottage cheese, part-skim milk mozzarella or ricotta and other low-fat, low-sodium cheeses with little or no change in consistency.  ·   Sauces and gravies  · Let your cooking liquid cool, then remove the hardened fat before making gravy. Or use a fat separator to pour off the good liquid from cooking stock, leaving the fat behind.  ·   Increase fiber and whole grains  · Toast and crush or cube fiber-rich whole-grain bread to make breadcrumbs, stuffing or croutons.  · Replace the breadcrumbs in your meatloaf with uncooked oatmeal.  · Serve whole fruit at breakfast in place of juice.  · Use brown rice instead of white rice and try whole grain pasta.  · Add lots of colorful veggies to your salad -- carrots, broccoli and cauliflower are high in fiber and give your salad a delicious crunch

## 2019-03-07 NOTE — ASSESSMENT & PLAN NOTE
Here to review her most recent lipid profile:     Lab Results   Component Value Date/Time    CHOLSTRLTOT 227 (H) 03/05/2019 07:18 AM     (H) 03/05/2019 07:18 AM    HDL 84 03/05/2019 07:18 AM    TRIGLYCERIDE 75 03/05/2019 07:18 AM     Her mother has a history of heart disease and she's concerned about her cardiovascular risk as well.

## 2019-03-07 NOTE — PROGRESS NOTES
Chief Complaint   Patient presents with   • Annual Wellness Visit         HPI:  Missy is a 63 y.o. here for Medicare Annual Wellness Visit      Patient Active Problem List    Diagnosis Date Noted   • Restless legs 11/19/2018   • Hypothyroidism 11/19/2018   • Sinus disease 11/19/2018   • Cervical pain        Current Outpatient Prescriptions   Medication Sig Dispense Refill   • fluorouracil (EFUDEX) 5 % cream Apply 5 Tubes to affected area(s) as needed.  2   • azelastine (ASTELIN) 137 MCG/SPRAY nasal spray [place] 1 Spray in nose 2 times a day. 3 Bottle 3   • Diclofenac Sodium 1 % Gel Apply  to skin as directed.     • ROPINIRole (REQUIP) 0.5 MG Tab Take 0.5 mg by mouth every day.  0   • levothyroxine (SYNTHROID) 75 MCG Tab TAKE 1 TABLET BY MOUTH EVERY DAY FOR HYPOTHYROID DISEASE  3   • carisoprodol (SOMA) 350 MG Tab TAKE 1/2 TABLET ORALLY TWICE DAILY AS NEEDED FOR PAINFUL MUSCLE TENSION  3   • ibuprofen (MOTRIN) 600 MG TABS Take 600 mg by mouth every 6 hours as needed.     • gabapentin (NEURONTIN) 300 MG Cap Take  by mouth. 1 to 3 times a day     • multivitamin (THERAGRAN) TABS Take 1 Tab by mouth every day.     • POTASSIUM PO Take 1 Tab by mouth every day.     • docosahexanoic acid (OMEGA 3 FA) 1000 MG CAPS Take 1,000 mg by mouth every day.     • Calcium-Magnesium-Vitamin D (CALCIUM MAGNESIUM PO) Take 1 Tab by mouth every day.       No current facility-administered medications for this visit.         Patient is taking medications as noted in medication list.  Current supplements as per medication list.     Allergies: Dilaudid [hydromorphone hcl]; Doxycycline; Fentanyl [fentanyl]; Other drug; Percocet [oxycodone-acetaminophen]; and Vicoprofen [hydrocodone-ibuprofen]    Current social contact/activities: Visiting with friends and family, Yazdan[employer]    Is patient current with immunizations? No, due for TDAP. Patient is interested in receiving NONE today.    She  reports that she has quit smoking. She quit after 8.00 years of  use. She has never used smokeless tobacco. She reports that she drinks alcohol. She reports that she does not use drugs.  Counseling given: Not Answered        DPA/Advanced directive: Patient has Advanced Directive, but it is not on file. Instructed to bring in a copy to scan into their chart.    ROS:    Gait: Uses no assistive device   Ostomy: No   Other tubes: No   Amputations: No   Chronic oxygen use No   Last eye exam 11/2018  Wears hearing aids: No   : Denies any urinary leakage during the last 6 months  Annual Health Assessment Questions:    1.  Are you currently engaging in any exercise or physical activity? No    2.  How would you describe your mood or emotional well-being today? good    3.  Have you had any falls in the last year? No    4.  Have you noticed any problems with your balance or had difficulty walking? No    5.  In the last six months have you experienced any leakage of urine? No    6. DPA/Advanced Directive: Patient has Advanced Directive, but it is not on file. Instructed to bring in a copy to scan into their chart.    Screening:        Depression Screening    Little interest or pleasure in doing things?  0 - not at all  Feeling down, depressed, or hopeless? 0 - not at all  Patient Health Questionnaire Score: 0    If depressive symptoms identified deferred to follow up visit unless specifically addressed in assessment and plan.    Interpretation of PHQ-9 Total Score   Score Severity   1-4 No Depression   5-9 Mild Depression   10-14 Moderate Depression   15-19 Moderately Severe Depression   20-27 Severe Depression    Screening for Cognitive Impairment    Three Minute Recall (leader, season, table)  3/3 Leader, season, table  Daren clock face with all 12 numbers and set the hands to show 10 past 11.  Yes 11:10 5/5  If cognitive concerns identified, deferred for follow up unless specifically addressed in assessment and plan.    Fall Risk Assessment    Has the patient had two or more falls in the  last year or any fall with injury in the last year?  No  If fall risk identified, deferred for follow up unless specifically addressed in assessment and plan.    Safety Assessment    Throw rugs on floor.  Yes  Handrails on all stairs.  Yes  Good lighting in all hallways.  Yes  Difficulty hearing.  No  Patient counseled about all safety risks that were identified.    Functional Assessment ADLs    Are there any barriers preventing you from cooking for yourself or meeting nutritional needs?  No.    Are there any barriers preventing you from driving safely or obtaining transportation?  No.    Are there any barriers preventing you from using a telephone or calling for help?  No.    Are there any barriers preventing you from shopping?  No.    Are there any barriers preventing you from taking care of your own finances?  No.    Are there any barriers preventing you from managing your medications?  No.    Are there any barriers preventing you from showering, bathing or dressing yourself?  No.    Are you currently engaging in any exercise or physical activity?  No.     What is your perception of your health?  Good.    Health Maintenance Summary                Annual Wellness Visit Overdue 1955     IMM DTaP/Tdap/Td Vaccine Overdue 4/5/1974     IMM ZOSTER VACCINES Overdue 10/2/2013      Done 8/7/2013 Imm Admin: Zoster Vaccine Live (ZVL) (Zostavax)    MAMMOGRAM Next Due 11/9/2019      Done 11/9/2018 MA-MAMMO SCREENING BILAT W/IMPLANTS W/RAVI W/CAD     Patient has more history with this topic...    PAP SMEAR Next Due 2/20/2020      Done 2/20/2017 PATHOLOGY GYN SPECIMEN    COLONOSCOPY Next Due 11/19/2023      Done 11/19/2013 REFERRAL TO GI FOR COLONOSCOPY          Patient Care Team:  Lawrence Panchal M.D. as PCP - General (Internal Medicine)  Amy Barrios O.D. as Consulting Physician (Optometry)  [facility] as Consulting Physician  [facility] as Consulting Physician  Mercedez Zelaya  "KATHY as Consulting Physician (Dermatology)    Social History   Substance Use Topics   • Smoking status: Former Smoker     Years: 8.00   • Smokeless tobacco: Never Used      Comment: quit in her 20s    • Alcohol use Yes      Comment: 2-3     Family History   Problem Relation Age of Onset   • Diabetes Mother    • Stroke Maternal Grandfather      She  has a past medical history of Cervical pain; Hypothyroidism (11/19/2018); and Restless legs (11/19/2018).   Past Surgical History:   Procedure Laterality Date   • OTHER  02/2018    implant revision of bilateral breasts    • CATARACT PHACO WITH IOL  5/21/2014    Performed by Fadi Morales M.D. at SURGERY SAME DAY [facility] ORS   • PB ENLARGE BREAST WITH IMPLANT  1979   • OTHER ORTHOPEDIC SURGERY  [id number]    neck fusion           Exam:     Blood pressure 115/72, pulse 90, temperature 36.9 °C (98.4 °F), temperature source Temporal, resp. rate 16, height 1.753 m (5' 9.02\"), weight 60.2 kg (132 lb 12.8 oz), SpO2 100 %, not currently breastfeeding. Body mass index is 19.6 kg/m².    Hearing good.    Dentition good  Alert, oriented in no acute distress.  Eye contact is good, speech goal directed, affect calm  CV: regular rate and rhythm, normal S1/S2  Pulmonary: clear to auscultation bilaterally   Extremities: no lower extremity edema  Abdomen: no tenderness to palpation. No hepatosplenomegaly     Assessment and Plan. The following treatment and monitoring plan is recommended:   Hyperlipidemia  Here to review her most recent lipid profile:     Lab Results   Component Value Date/Time    CHOLSTRLTOT 227 (H) 03/05/2019 07:18 AM     (H) 03/05/2019 07:18 AM    HDL 84 03/05/2019 07:18 AM    TRIGLYCERIDE 75 03/05/2019 07:18 AM     Her mother has a history of heart disease and she's concerned about her cardiovascular risk as well. We will consider her for a CT cardiac score test.     Hypothyroidism  Most recent TSH was around 6 but free T4 is normal. Continue levothyroxine " 75 mcg.     Restless legs  Takes 0.5 mg at night and sometimes takes an additional dose.     Services suggested: No services needed at this time  Health Care Screening recommendations as per orders if indicated.  Referrals offered: PT/OT/Nutrition counseling/Behavioral Health/Smoking cessation as per orders if indicated.    Discussion today about general wellness and lifestyle habits:    · Prevent falls and reduce trip hazards; Cautioned about securing or removing rugs.  · Have a working fire alarm and carbon monoxide detector;   · Engage in regular physical activity and social activities       Follow-up: No Follow-up on file.

## 2019-03-11 ENCOUNTER — APPOINTMENT (RX ONLY)
Dept: URBAN - METROPOLITAN AREA CLINIC 20 | Facility: CLINIC | Age: 64
Setting detail: DERMATOLOGY
End: 2019-03-11

## 2019-03-11 DIAGNOSIS — L81.4 OTHER MELANIN HYPERPIGMENTATION: ICD-10-CM

## 2019-03-11 DIAGNOSIS — Z85.828 PERSONAL HISTORY OF OTHER MALIGNANT NEOPLASM OF SKIN: ICD-10-CM

## 2019-03-11 DIAGNOSIS — L57.0 ACTINIC KERATOSIS: ICD-10-CM

## 2019-03-11 DIAGNOSIS — D18.0 HEMANGIOMA: ICD-10-CM

## 2019-03-11 DIAGNOSIS — L57.8 OTHER SKIN CHANGES DUE TO CHRONIC EXPOSURE TO NONIONIZING RADIATION: ICD-10-CM

## 2019-03-11 DIAGNOSIS — L82.1 OTHER SEBORRHEIC KERATOSIS: ICD-10-CM

## 2019-03-11 DIAGNOSIS — L20.89 OTHER ATOPIC DERMATITIS: ICD-10-CM

## 2019-03-11 DIAGNOSIS — D22 MELANOCYTIC NEVI: ICD-10-CM

## 2019-03-11 PROBLEM — L20.84 INTRINSIC (ALLERGIC) ECZEMA: Status: ACTIVE | Noted: 2019-03-11

## 2019-03-11 PROBLEM — D18.01 HEMANGIOMA OF SKIN AND SUBCUTANEOUS TISSUE: Status: ACTIVE | Noted: 2019-03-11

## 2019-03-11 PROBLEM — D22.5 MELANOCYTIC NEVI OF TRUNK: Status: ACTIVE | Noted: 2019-03-11

## 2019-03-11 PROCEDURE — ? COUNSELING

## 2019-03-11 PROCEDURE — 99214 OFFICE O/P EST MOD 30 MIN: CPT

## 2019-03-11 PROCEDURE — ? PRESCRIPTION MEDICATION MANAGEMENT

## 2019-03-11 PROCEDURE — ? PRESCRIPTION

## 2019-03-11 RX ORDER — TRIAMCINOLONE ACETONIDE 1 MG/G
CREAM TOPICAL BID
Qty: 1 | Refills: 2 | Status: ERX | COMMUNITY
Start: 2019-03-11

## 2019-03-11 RX ADMIN — TRIAMCINOLONE ACETONIDE: 1 CREAM TOPICAL at 00:00

## 2019-03-11 ASSESSMENT — LOCATION DETAILED DESCRIPTION DERM
LOCATION DETAILED: LEFT RADIAL DORSAL HAND
LOCATION DETAILED: RIGHT INFERIOR ANTERIOR NECK
LOCATION DETAILED: RIGHT POPLITEAL SKIN
LOCATION DETAILED: RIGHT RADIAL DORSAL HAND
LOCATION DETAILED: RIGHT INFERIOR MEDIAL UPPER BACK
LOCATION DETAILED: RIGHT INFERIOR CENTRAL MALAR CHEEK
LOCATION DETAILED: LEFT DORSAL RING METACARPOPHALANGEAL JOINT
LOCATION DETAILED: RIGHT SUPERIOR UPPER BACK
LOCATION DETAILED: LEFT SUPERIOR UPPER BACK
LOCATION DETAILED: LEFT PROXIMAL DORSAL MIDDLE FINGER
LOCATION DETAILED: LEFT POPLITEAL SKIN
LOCATION DETAILED: LEFT MIDDLE PROXIMAL INTERPHALANGEAL JOINT
LOCATION DETAILED: LEFT PROXIMAL DORSAL THUMB
LOCATION DETAILED: RIGHT DISTAL PRETIBIAL REGION

## 2019-03-11 ASSESSMENT — LOCATION SIMPLE DESCRIPTION DERM
LOCATION SIMPLE: RIGHT ANTERIOR NECK
LOCATION SIMPLE: LEFT MIDDLE FINGER
LOCATION SIMPLE: RIGHT POPLITEAL SKIN
LOCATION SIMPLE: RIGHT PRETIBIAL REGION
LOCATION SIMPLE: LEFT POPLITEAL SKIN
LOCATION SIMPLE: RIGHT HAND
LOCATION SIMPLE: LEFT THUMB
LOCATION SIMPLE: RIGHT UPPER BACK
LOCATION SIMPLE: RIGHT CHEEK
LOCATION SIMPLE: LEFT HAND
LOCATION SIMPLE: LEFT UPPER BACK

## 2019-03-11 ASSESSMENT — LOCATION ZONE DERM
LOCATION ZONE: HAND
LOCATION ZONE: LEG
LOCATION ZONE: TRUNK
LOCATION ZONE: NECK
LOCATION ZONE: FACE
LOCATION ZONE: FINGER

## 2019-03-11 NOTE — PROCEDURE: PRESCRIPTION MEDICATION MANAGEMENT
Initiate Treatment: Fluorouracil Cream 5% qhs x 28 days.
Detail Level: Zone
Render In Strict Bullet Format?: No

## 2019-03-23 ENCOUNTER — OFFICE VISIT (OUTPATIENT)
Dept: URGENT CARE | Facility: MEDICAL CENTER | Age: 64
End: 2019-03-23
Payer: MEDICARE

## 2019-03-23 VITALS
SYSTOLIC BLOOD PRESSURE: 120 MMHG | DIASTOLIC BLOOD PRESSURE: 76 MMHG | TEMPERATURE: 98.2 F | RESPIRATION RATE: 16 BRPM | WEIGHT: 132 LBS | HEIGHT: 69 IN | HEART RATE: 90 BPM | BODY MASS INDEX: 19.55 KG/M2 | OXYGEN SATURATION: 100 %

## 2019-03-23 DIAGNOSIS — R30.0 DYSURIA: ICD-10-CM

## 2019-03-23 DIAGNOSIS — R35.0 URINARY FREQUENCY: ICD-10-CM

## 2019-03-23 LAB
APPEARANCE UR: NORMAL
BILIRUB UR STRIP-MCNC: NEGATIVE MG/DL
COLOR UR AUTO: YELLOW
GLUCOSE UR STRIP.AUTO-MCNC: NEGATIVE MG/DL
KETONES UR STRIP.AUTO-MCNC: NEGATIVE MG/DL
LEUKOCYTE ESTERASE UR QL STRIP.AUTO: NEGATIVE
NITRITE UR QL STRIP.AUTO: NEGATIVE
PH UR STRIP.AUTO: 7.5 [PH] (ref 5–8)
PROT UR QL STRIP: NEGATIVE MG/DL
RBC UR QL AUTO: NEGATIVE
SP GR UR STRIP.AUTO: 1.01
UROBILINOGEN UR STRIP-MCNC: NEGATIVE MG/DL

## 2019-03-23 PROCEDURE — 81002 URINALYSIS NONAUTO W/O SCOPE: CPT | Performed by: NURSE PRACTITIONER

## 2019-03-23 PROCEDURE — 99214 OFFICE O/P EST MOD 30 MIN: CPT | Performed by: NURSE PRACTITIONER

## 2019-03-23 ASSESSMENT — ENCOUNTER SYMPTOMS
NAUSEA: 0
CHILLS: 0
FLANK PAIN: 0
SWEATS: 0
VOMITING: 0

## 2019-03-23 NOTE — PROGRESS NOTES
"Subjective:      Missy Stuart is a 63 y.o. female who presents with Dysuria (pain & pressure when urinating x 3 days)          Past Medical History:   Diagnosis Date   • Cervical pain     neck   • Hyperlipidemia 3/7/2019   • Hypothyroidism 11/19/2018   • Restless legs 11/19/2018   '  Past Surgical History:   Procedure Laterality Date   • OTHER  02/2018    implant revision of bilateral breasts    • CATARACT PHACO WITH IOL  5/21/2014    Performed by Fadi Morales M.D. at SURGERY SAME DAY [facility] ORS   • PB ENLARGE BREAST WITH IMPLANT  1979   • OTHER ORTHOPEDIC SURGERY  [id number]    neck fusion     Family History   Problem Relation Age of Onset   • Diabetes Mother    • Stroke Maternal Grandfather      Allergies   Allergen Reactions   • Dilaudid [Hydromorphone Hcl] Hives   • Doxycycline Hives   • Fentanyl [Fentanyl] Vomiting   • Other Drug      \"some narcotics\"   • Percocet [Oxycodone-Acetaminophen] Vomiting   • Vicoprofen [Hydrocodone-Ibuprofen]      sweating         Dysuria    This is a new problem. Episode onset: 2 days. The problem has been gradually improving. The pain is at a severity of 2/10. The pain is mild. There has been no fever. She is sexually active. There is no history of pyelonephritis. Associated symptoms include frequency and urgency. Pertinent negatives include no chills, discharge, flank pain, hematuria, hesitancy, nausea, possible pregnancy, sweats or vomiting. Associated symptoms comments: Denies vaginal discharge or odor  . She has tried increased fluids and NSAIDs for the symptoms. The treatment provided mild relief. There is no history of catheterization, kidney stones, recurrent UTIs, a single kidney, urinary stasis or a urological procedure.       Review of Systems   Constitutional: Negative for chills.   Gastrointestinal: Negative for nausea and vomiting.   Genitourinary: Positive for dysuria, frequency and urgency. Negative for flank pain, hematuria and hesitancy.          " "Objective:     /76   Pulse 90   Temp 36.8 °C (98.2 °F) (Temporal)   Resp 16   Ht 1.753 m (5' 9.02\")   Wt 59.9 kg (132 lb)   SpO2 100%   BMI 19.48 kg/m²      Physical Exam   Constitutional: Vital signs are normal. She appears well-developed and well-nourished. She is cooperative.  Non-toxic appearance. She does not appear ill. No distress.   HENT:   Head: Normocephalic.   Cardiovascular: Normal rate and regular rhythm.    Pulmonary/Chest: Effort normal and breath sounds normal.   Abdominal: Soft. Normal appearance and bowel sounds are normal. She exhibits no distension. There is no tenderness. There is no rigidity, no rebound, no guarding and no CVA tenderness.   Musculoskeletal:        Lumbar back: Normal.   Neurological: She is alert.   Skin: Skin is warm and dry. She is not diaphoretic.   Nursing note and vitals reviewed.         UA: negative      Assessment/Plan:     1. Dysuria     2. Urinary frequency       Keep well hydrated  nsaids of choice x 2 days.   FU prn new or worsening symptoms.   OTC AZO or cystex for dysuria. Dosage and directions per       "

## 2019-05-01 DIAGNOSIS — E03.9 HYPOTHYROIDISM, UNSPECIFIED TYPE: ICD-10-CM

## 2019-05-01 RX ORDER — LEVOTHYROXINE SODIUM 88 UG/1
88 TABLET ORAL
Qty: 60 TAB | Refills: 0 | Status: SHIPPED | OUTPATIENT
Start: 2019-05-01 | End: 2019-06-25 | Stop reason: SDUPTHER

## 2019-05-01 RX ORDER — LEVOTHYROXINE SODIUM 0.07 MG/1
TABLET ORAL
Qty: 90 TAB | Refills: 3 | Status: CANCELLED | OUTPATIENT
Start: 2019-05-01

## 2019-05-01 NOTE — TELEPHONE ENCOUNTER
Was the patient seen in the last year in this department? Yes    Does patient have an active prescription for medications requested? No     Received Request Via: Pharmacy      Patient is completely out

## 2019-05-01 NOTE — TELEPHONE ENCOUNTER
I had noted a result note that her recent TSH was a bit off, needs a slightly higher dose of her thyroid medicine. I have prescribed 88 mcg for her to take instead of the 75 mcg and then have her TSH rechecked after 6 weeks of being on the slightly higher dose. If she experiences palpitations, diarrhea while on the higher dose then she needs to let us know

## 2019-05-08 ENCOUNTER — HOSPITAL ENCOUNTER (OUTPATIENT)
Dept: RADIOLOGY | Facility: MEDICAL CENTER | Age: 64
End: 2019-05-08
Attending: NURSE PRACTITIONER
Payer: MEDICARE

## 2019-05-08 DIAGNOSIS — M47.812 CERVICAL SPONDYLOSIS WITHOUT MYELOPATHY: ICD-10-CM

## 2019-05-08 PROCEDURE — 72141 MRI NECK SPINE W/O DYE: CPT

## 2019-05-08 PROCEDURE — 72050 X-RAY EXAM NECK SPINE 4/5VWS: CPT

## 2019-06-25 NOTE — TELEPHONE ENCOUNTER
Was the patient seen in the last year in this department? Yes    Does patient have an active prescription for medications requested? No     Received Request Via: Pharmacy      Pt met protocol?: Yes, last ov 3/19  TSH   Date Value Ref Range Status   03/05/2019 6.040 (H) 0.380 - 5.330 uIU/mL Final     Comment:     Please note new reference ranges effective 12/14/2017 10:00 AM  Pregnant Females, 1st Trimester  0.050-3.700  Pregnant Females, 2nd Trimester  0.310-4.350  Pregnant Females, 3rd Trimester  0.410-5.180

## 2019-06-26 ENCOUNTER — PATIENT OUTREACH (OUTPATIENT)
Dept: HEALTH INFORMATION MANAGEMENT | Facility: OTHER | Age: 64
End: 2019-06-26

## 2019-06-26 RX ORDER — LEVOTHYROXINE SODIUM 88 UG/1
88 TABLET ORAL
Qty: 90 TAB | Refills: 1 | Status: SHIPPED | OUTPATIENT
Start: 2019-06-26 | End: 2019-12-23

## 2019-06-26 NOTE — PROGRESS NOTES
Pt called regarding a $15 bill she received after a neurologist visit. I looked at the claim and asked if this appt was just a routine fv. She said it was not and additional testing was done at the time of the appt. I let her know after paying her copay in the office, additional testing is billed after the time of the service. Pt understood and had no further questions.

## 2019-06-26 NOTE — TELEPHONE ENCOUNTER
Patient was last seen by PCP 5/19. Last TSH read 6.04 (3/19). Will refill for 6 months. TSH was high but T4 was normal.

## 2019-09-20 ENCOUNTER — HOSPITAL ENCOUNTER (OUTPATIENT)
Facility: MEDICAL CENTER | Age: 64
End: 2019-09-20
Attending: PHYSICIAN ASSISTANT
Payer: MEDICARE

## 2019-09-20 ENCOUNTER — OFFICE VISIT (OUTPATIENT)
Dept: URGENT CARE | Facility: PHYSICIAN GROUP | Age: 64
End: 2019-09-20
Payer: MEDICARE

## 2019-09-20 VITALS
DIASTOLIC BLOOD PRESSURE: 66 MMHG | WEIGHT: 127 LBS | HEIGHT: 69 IN | TEMPERATURE: 97.8 F | SYSTOLIC BLOOD PRESSURE: 126 MMHG | OXYGEN SATURATION: 94 % | HEART RATE: 84 BPM | RESPIRATION RATE: 16 BRPM | BODY MASS INDEX: 18.81 KG/M2

## 2019-09-20 DIAGNOSIS — N30.01 ACUTE CYSTITIS WITH HEMATURIA: ICD-10-CM

## 2019-09-20 DIAGNOSIS — R30.0 DYSURIA: ICD-10-CM

## 2019-09-20 LAB
APPEARANCE UR: CLEAR
BILIRUB UR STRIP-MCNC: NORMAL MG/DL
COLOR UR AUTO: NORMAL
GLUCOSE UR STRIP.AUTO-MCNC: 100 MG/DL
KETONES UR STRIP.AUTO-MCNC: NORMAL MG/DL
LEUKOCYTE ESTERASE UR QL STRIP.AUTO: NORMAL
NITRITE UR QL STRIP.AUTO: POSITIVE
PH UR STRIP.AUTO: 7 [PH] (ref 5–8)
PROT UR QL STRIP: NORMAL MG/DL
RBC UR QL AUTO: NORMAL
SP GR UR STRIP.AUTO: 1.01
UROBILINOGEN UR STRIP-MCNC: 1 MG/DL

## 2019-09-20 PROCEDURE — 87077 CULTURE AEROBIC IDENTIFY: CPT

## 2019-09-20 PROCEDURE — 87186 SC STD MICRODIL/AGAR DIL: CPT

## 2019-09-20 PROCEDURE — 81002 URINALYSIS NONAUTO W/O SCOPE: CPT | Performed by: PHYSICIAN ASSISTANT

## 2019-09-20 PROCEDURE — 87086 URINE CULTURE/COLONY COUNT: CPT

## 2019-09-20 PROCEDURE — 99214 OFFICE O/P EST MOD 30 MIN: CPT | Performed by: PHYSICIAN ASSISTANT

## 2019-09-20 RX ORDER — PHENAZOPYRIDINE HYDROCHLORIDE 200 MG/1
200 TABLET, FILM COATED ORAL 3 TIMES DAILY
Qty: 6 TAB | Refills: 0 | Status: SHIPPED | OUTPATIENT
Start: 2019-09-20 | End: 2019-09-22

## 2019-09-20 RX ORDER — SULFAMETHOXAZOLE AND TRIMETHOPRIM 800; 160 MG/1; MG/1
1 TABLET ORAL EVERY 12 HOURS
Qty: 10 TAB | Refills: 0 | Status: SHIPPED | OUTPATIENT
Start: 2019-09-20 | End: 2019-09-25

## 2019-09-20 NOTE — PROGRESS NOTES
Chief Complaint   Patient presents with   • UTI       HISTORY OF PRESENT ILLNESS: Patient is a 64 y.o. female who presents today because she has a 3 to 4-day history of increased urinary urgency, frequency, dysuria.  Denies any fevers, chills, nausea, vomiting or diarrhea, she has taken some over-the-counter Azo for her symptoms with minimal if any improvement.    Patient Active Problem List    Diagnosis Date Noted   • Hyperlipidemia 03/07/2019   • Restless legs 11/19/2018   • Hypothyroidism 11/19/2018   • Cervical pain        Allergies:Dilaudid [hydromorphone hcl]; Doxycycline; Fentanyl [fentanyl]; Other drug; Percocet [oxycodone-acetaminophen]; and Vicoprofen [hydrocodone-ibuprofen]    Current Outpatient Medications Ordered in Epic   Medication Sig Dispense Refill   • phenazopyridine (PYRIDIUM) 200 MG Tab Take 1 Tab by mouth 3 times a day for 2 days. 6 Tab 0   • sulfamethoxazole-trimethoprim (BACTRIM DS) 800-160 MG tablet Take 1 Tab by mouth every 12 hours for 5 days. 10 Tab 0   • levothyroxine (SYNTHROID) 88 MCG Tab TAKE 1 TAB BY MOUTH EVERY MORNING ON AN EMPTY STOMACH. 90 Tab 1   • fluorouracil (EFUDEX) 5 % cream Apply 5 Tubes to affected area(s) as needed.  2   • azelastine (ASTELIN) 137 MCG/SPRAY nasal spray [place] 1 Spray in nose 2 times a day. 3 Bottle 3   • Diclofenac Sodium 1 % Gel Apply  to skin as directed.     • ROPINIRole (REQUIP) 0.5 MG Tab Take 0.5 mg by mouth every day.  0   • carisoprodol (SOMA) 350 MG Tab TAKE 1/2 TABLET ORALLY TWICE DAILY AS NEEDED FOR PAINFUL MUSCLE TENSION  3   • ibuprofen (MOTRIN) 600 MG TABS Take 600 mg by mouth every 6 hours as needed.     • gabapentin (NEURONTIN) 300 MG Cap Take  by mouth. 1 to 3 times a day     • multivitamin (THERAGRAN) TABS Take 1 Tab by mouth every day.     • POTASSIUM PO Take 1 Tab by mouth every day.     • docosahexanoic acid (OMEGA 3 FA) 1000 MG CAPS Take 1,000 mg by mouth every day.     • Calcium-Magnesium-Vitamin D (CALCIUM MAGNESIUM PO) Take 1 Tab  "by mouth every day.       No current [facility]-ordered facility-administered medications on file.        Past Medical History:   Diagnosis Date   • Cervical pain     neck   • Hyperlipidemia 3/7/2019   • Hypothyroidism 2018   • Restless legs 2018       Social History     Tobacco Use   • Smoking status: Former Smoker     Years: 8.00   • Smokeless tobacco: Never Used   • Tobacco comment: quit in her 20s    Substance Use Topics   • Alcohol use: Yes     Comment: 2-3   • Drug use: No       Family Status   Relation Name Status   • Mo     • Fa  Alive, age 90y   • MGFa  (Not Specified)     Family History   Problem Relation Age of Onset   • Diabetes Mother    • Stroke Maternal Grandfather        ROS:  Review of Systems   Constitutional: Negative for fever, chills, weight loss and malaise/fatigue.   HENT: Negative for ear pain, nosebleeds, congestion, sore throat and neck pain.    Eyes: Negative for blurred vision.   Respiratory: Negative for cough, sputum production, shortness of breath and wheezing.    Cardiovascular: Negative for chest pain, palpitations, orthopnea and leg swelling.   Gastrointestinal: Negative for heartburn, nausea, vomiting and abdominal pain.   Genitourinary: Positive for dysuria, urgency and frequency.     Exam:  /66 (BP Location: Right arm, Patient Position: Sitting, BP Cuff Size: Small adult)   Pulse 84   Temp 36.6 °C (97.8 °F) (Temporal)   Resp 16   Ht 1.753 m (5' 9\")   Wt 57.6 kg (127 lb)   SpO2 94%   General:  Well nourished, well developed female in NAD  Head:Normocephalic, atraumatic  Eyes: PERRLA, EOM within normal limits, no conjunctival injection, no scleral icterus, visual fields and acuity grossly intact.  Nose: Symmetrical without tenderness, no discharge.  Mouth: reasonable hygiene, no erythema exudates or tonsillar enlargement.  Neck: no masses, range of motion within normal limits, no tracheal deviation. No obvious thyroid enlargement.  Extremities: no " clubbing, cyanosis, or edema.    Urinalysis has leuks, nitrates, protein    Please note that this dictation was created using voice recognition software. I have made every reasonable attempt to correct obvious errors, but I expect that there are errors of grammar and possibly content that I did not discover before finalizing the note.    Assessment/Plan:  1. Acute cystitis with hematuria  Urine Culture    sulfamethoxazole-trimethoprim (BACTRIM DS) 800-160 MG tablet   2. Dysuria  POCT Urinalysis    phenazopyridine (PYRIDIUM) 200 MG Tab   Increase p.o. fluids    Followup with primary care in the next 7-10 days if not significantly improving, return to the urgent care or go to the emergency room sooner for any worsening of symptoms.

## 2019-09-22 LAB
BACTERIA UR CULT: ABNORMAL
BACTERIA UR CULT: ABNORMAL
SIGNIFICANT IND 70042: ABNORMAL
SITE SITE: ABNORMAL
SOURCE SOURCE: ABNORMAL

## 2019-09-25 ENCOUNTER — OFFICE VISIT (OUTPATIENT)
Dept: URGENT CARE | Facility: MEDICAL CENTER | Age: 64
End: 2019-09-25
Payer: MEDICARE

## 2019-09-25 ENCOUNTER — HOSPITAL ENCOUNTER (OUTPATIENT)
Dept: RADIOLOGY | Facility: MEDICAL CENTER | Age: 64
End: 2019-09-25
Attending: NURSE PRACTITIONER
Payer: MEDICARE

## 2019-09-25 VITALS
SYSTOLIC BLOOD PRESSURE: 98 MMHG | DIASTOLIC BLOOD PRESSURE: 60 MMHG | OXYGEN SATURATION: 97 % | TEMPERATURE: 98.2 F | HEART RATE: 98 BPM

## 2019-09-25 DIAGNOSIS — S93.402A SPRAIN OF LEFT ANKLE, UNSPECIFIED LIGAMENT, INITIAL ENCOUNTER: Primary | ICD-10-CM

## 2019-09-25 DIAGNOSIS — S99.919A ANKLE INJURY, INITIAL ENCOUNTER: ICD-10-CM

## 2019-09-25 PROCEDURE — 73610 X-RAY EXAM OF ANKLE: CPT | Mod: LT

## 2019-09-25 PROCEDURE — 99214 OFFICE O/P EST MOD 30 MIN: CPT | Performed by: NURSE PRACTITIONER

## 2019-09-25 ASSESSMENT — ENCOUNTER SYMPTOMS
FALLS: 0
BACK PAIN: 0
CHILLS: 0
FOCAL WEAKNESS: 0
TINGLING: 0
FEVER: 0
SENSORY CHANGE: 0
NAUSEA: 0

## 2019-09-25 ASSESSMENT — LIFESTYLE VARIABLES: SUBSTANCE_ABUSE: 0

## 2019-09-25 NOTE — PROGRESS NOTES
"Subjective:      Missy Stuart is a 64 y.o. female who presents with Ankle Injury (x12 days ago possibly sprained left ankle)    Reviewed past medical, surgical and family history. Reviewed prescription and OTC medications with patient in electronic health record today      Allergies   Allergen Reactions   • Dilaudid [Hydromorphone Hcl] Hives   • Doxycycline Hives   • Fentanyl [Fentanyl] Vomiting   • Other Drug      \"some narcotics\"   • Percocet [Oxycodone-Acetaminophen] Vomiting   • Vicoprofen [Hydrocodone-Ibuprofen]      sweating             HPI this is a new problem. Missy is a 64-year-old female with complaint of left ankle injury 12 days ago..  Pain has persisted since that time.  Got up off of her couch when her shoe caught the rug and her foot rolled inward.  She has had persistent pain, swelling and ecchymosis since that time.  She has been able to bear weight but with some difficulty.  She is been taking Tylenol, ibuprofen.  She has been elevating her foot and using ice packs.  The pain occasionally wakes her up at night.  No other aggravating or alleviating factors.    Review of Systems   Constitutional: Negative for chills and fever.   Gastrointestinal: Negative for nausea.   Musculoskeletal: Positive for joint pain. Negative for back pain and falls.   Neurological: Negative for tingling, sensory change and focal weakness.   Psychiatric/Behavioral: Negative for substance abuse.          Objective:     BP (!) 98/60   Pulse 98   Temp 36.8 °C (98.2 °F) (Temporal)   SpO2 97%      Physical Exam   Constitutional: She is oriented to person, place, and time. Vital signs are normal. She appears well-developed and well-nourished. She is cooperative.  Non-toxic appearance. No distress.   HENT:   Head: Normocephalic.   Cardiovascular: Normal rate and regular rhythm.   Pulmonary/Chest: Effort normal and breath sounds normal.   Musculoskeletal:        Left foot: There is decreased range of motion, tenderness, bony " "tenderness and swelling. There is normal capillary refill, no crepitus, no deformity and no laceration.        Feet:    Neurological: She is alert and oriented to person, place, and time.   Skin: Skin is warm and dry. Capillary refill takes less than 2 seconds.   Nursing note and vitals reviewed.        XRAY of left ankle: No acute fracture dislocation by my read  9/25/2019 12:33 PM    HISTORY/REASON FOR EXAM:  Left ankle pain.    TECHNIQUE/EXAM DESCRIPTION AND NUMBER OF VIEWS: 3 views of the LEFT ankle.    COMPARISON: None    FINDINGS:  Bone mineralization is normal. There is no evidence of fracture or dislocation. There is no soft tissue swelling seen. The ankle mortise is well-maintained.        Impression       No evidence of acute fracture or dislocation.            Last Resulted: 09/25/19 12:49 PM             Assessment/Plan:     1. Sprain of left ankle, unspecified ligament, initial encounter     2. Ankle injury, initial encounter  DX-ANKLE 3+ VIEWS LEFT       \"P.R.I.C.E.\" discussed with pt (protection from further injury, rest, ice, compession and elevation).   OTC  analgesic of choice. Follow manufactures dosing and safety precautions.     ACE wrap prn     FU prn new or worsening symptoms or if not improving in 7-10 days.     "

## 2019-11-05 ENCOUNTER — OFFICE VISIT (OUTPATIENT)
Dept: MEDICAL GROUP | Facility: IMAGING CENTER | Age: 64
End: 2019-11-05
Payer: MEDICARE

## 2019-11-05 VITALS
HEART RATE: 103 BPM | WEIGHT: 126.2 LBS | BODY MASS INDEX: 18.07 KG/M2 | RESPIRATION RATE: 14 BRPM | DIASTOLIC BLOOD PRESSURE: 62 MMHG | SYSTOLIC BLOOD PRESSURE: 98 MMHG | TEMPERATURE: 97.9 F | OXYGEN SATURATION: 98 % | HEIGHT: 70 IN

## 2019-11-05 DIAGNOSIS — E86.1 HYPOTENSION DUE TO HYPOVOLEMIA: ICD-10-CM

## 2019-11-05 DIAGNOSIS — I95.89 HYPOTENSION DUE TO HYPOVOLEMIA: ICD-10-CM

## 2019-11-05 DIAGNOSIS — E03.9 HYPOTHYROIDISM, UNSPECIFIED TYPE: ICD-10-CM

## 2019-11-05 DIAGNOSIS — H69.91 DYSFUNCTION OF RIGHT EUSTACHIAN TUBE: ICD-10-CM

## 2019-11-05 PROCEDURE — 99214 OFFICE O/P EST MOD 30 MIN: CPT | Performed by: FAMILY MEDICINE

## 2019-11-05 ASSESSMENT — PATIENT HEALTH QUESTIONNAIRE - PHQ9: CLINICAL INTERPRETATION OF PHQ2 SCORE: 0

## 2019-11-05 NOTE — ASSESSMENT & PLAN NOTE
Elevated TSH a few months ago with an increase in levothyroxine to 88 mcg  We will follow-up with repeat thyroid function studies

## 2019-11-05 NOTE — ASSESSMENT & PLAN NOTE
I suspect her low blood pressure might be due to inadequate fluid intake.  We have discussed appropriate fluid intake for an average elevation of 4500 feet.  I recommend she increase to 16 see how she feels but she may need to drink up to 100 ounces daily the pending on physical activity, heat, and alcohol consumption versus diuretics like tea and coffee.  Also discussed adding salt to her diet.

## 2019-11-05 NOTE — PROGRESS NOTES
Subjective:     CC:    Chief Complaint   Patient presents with   • Blood Pressure Problem     concerns    • Ear Fullness   • Sternum Pain     when eating certain foods   • Ankle Pain     sprained ankle 7 weeks ago   • Referral Needed     gyn        HISTORY OF THE PRESENT ILLNESS: This pleasant 64 y.o. female is here to establish care and discuss several concerns.      Blood pressure concerns with low low blood pressure, dizziness in am intermittent in nature. In [place] 40 years. No coffee peer 4 alcohol drinks a week. Drinks about 40oz of carbonated water per day.     She reports several years of ear fullness especially on the right.  She has been to ear nose and throat without resolution.  She is taking azelastine which has reduced her symptoms slightly.  No hearing loss and no ringing in the ears.  No dizziness or vertigo.    She reports that she gets heartburn when she eats high glucose foods.  This does not bother her when she does not eat sugar.  She does not need any medication or take anything for it she just avoids sugar.    Has hypothyroidism. Tsh high in march 2019 increase levo to 88mcg.  Has not had follow-up blood work.  She has had removal of have her thyroid.  She said there was a concerning nodule there that turned out to be benign.    She originally asked for gynecology referral.  However she is happy to come here for her woman's wellness exam.    She was sprained her ankle 7 weeks ago we did not get to this issue she will have to follow-up for this  Allergies: Dilaudid [hydromorphone hcl]; Doxycycline; Fentanyl [fentanyl]; Other drug; Percocet [oxycodone-acetaminophen]; and Vicoprofen [hydrocodone-ibuprofen]    Current Outpatient Medications Ordered in Epic   Medication Sig Dispense Refill   • levothyroxine (SYNTHROID) 88 MCG Tab TAKE 1 TAB BY MOUTH EVERY MORNING ON AN EMPTY STOMACH. 90 Tab 1   • fluorouracil (EFUDEX) 5 % cream Apply 5 Tubes to affected area(s) as needed.  2   • azelastine (ASTELIN)  137 MCG/SPRAY nasal spray Nelson 1 Spray in nose 2 times a day. 3 Bottle 3   • Diclofenac Sodium 1 % Gel Apply  to skin as directed.     • ROPINIRole (REQUIP) 0.5 MG Tab Take 0.5 mg by mouth every day.  0   • carisoprodol (SOMA) 350 MG Tab TAKE 1/2 TABLET ORALLY TWICE DAILY AS NEEDED FOR PAINFUL MUSCLE TENSION  3   • ibuprofen (MOTRIN) 600 MG TABS Take 600 mg by mouth every 6 hours as needed.     • gabapentin (NEURONTIN) 300 MG Cap Take  by mouth. 1 to 3 times a day     • multivitamin (THERAGRAN) TABS Take 1 Tab by mouth every day.     • POTASSIUM PO Take 1 Tab by mouth every day.     • docosahexanoic acid (OMEGA 3 FA) 1000 MG CAPS Take 1,000 mg by mouth every day.     • Calcium-Magnesium-Vitamin D (CALCIUM MAGNESIUM PO) Take 1 Tab by mouth every day.       No current [facility]-ordered facility-administered medications on file.        Past Medical History:   Diagnosis Date   • Cervical pain     neck   • Hyperlipidemia 3/7/2019   • Hypothyroidism 11/19/2018   • Restless legs 11/19/2018       Past Surgical History:   Procedure Laterality Date   • OTHER  02/2018    implant revision of bilateral breasts    • CATARACT PHACO WITH IOL  5/21/2014    Performed by Fadi Morales M.D. at SURGERY SAME DAY [facility] ORS   • PB ENLARGE BREAST WITH IMPLANT  1979   • OTHER ORTHOPEDIC SURGERY  [id number]    neck fusion       Social History     Tobacco Use   • Smoking status: Former Smoker     Years: 8.00   • Smokeless tobacco: Never Used   • Tobacco comment: quit in her 20s    Substance Use Topics   • Alcohol use: Yes     Comment: 2-3   • Drug use: No       Social History     Patient does not qualify to have social determinant information on file (likely too young).   Social History Narrative    Retired dental hygiene        Family History   Problem Relation Age of Onset   • Diabetes Mother    • Stroke Maternal Grandfather        ROS:   Gen: no fevers/chills, no changes in weight  Eyes: no changes in vision  ENT: no sore throat, no  hearing loss, no bloody nose  Pulm: no sob, no cough  CV: no chest pain, no palpitations  GI: no nausea/vomiting, no diarrhea  : no dysuria  MSk: no myalgias  Skin: no rash  Neuro: no headaches, no numbness/tingling  Heme/Lymph: no easy bruising      Objective:     Exam: There were no vitals taken for this visit. There is no height or weight on file to calculate BMI.    General: Normal appearing. No distress.  HEENT: Normocephalic. Eyes conjunctiva clear lids without ptosis.  Bilateral tympanic membranes are clear, with good light reflex, without erythema, without fluid obvious behind the ear.  Canals are clear  bilaterally  Neck: Thyroid is not enlarged.  Pulmonary: Clear to ausculation.  Normal effort. No rales, ronchi, or wheezing.  Cardiovascular: Regular rate and rhythm without murmur. Carotid and radial pulses are intact and equal bilaterally.  Neurologic: No facial droop, she is alert oriented  Skin: Warm and dry.  No obvious lesions.  Musculoskeletal: Normal gait. No extremity cyanosis, clubbing, or edema.  Psych: Normal mood and affect.  Judgment and insight is normal.      Assessment & Plan:   64 y.o. female with the following -    Problem List Items Addressed This Visit     Hypothyroidism     Elevated TSH a few months ago with an increase in levothyroxine to 88 mcg  We will follow-up with repeat thyroid function studies         Relevant Orders    TSH WITH REFLEX TO FT4    Hypotension due to hypovolemia     I suspect her low blood pressure might be due to inadequate fluid intake.  We have discussed appropriate fluid intake for an average elevation of 4500 feet.  I recommend she increase to 16 see how she feels but she may need to drink up to 100 ounces daily the pending on physical activity, heat, and alcohol consumption versus diuretics like tea and coffee.  Also discussed adding salt to her diet.         Relevant Orders    CBC WITH DIFFERENTIAL    VITAMIN B12    FOLATE    HEP C VIRUS ANTIBODY    Comp  Metabolic Panel    Dysfunction of right eustachian tube        Problem   Hypotension Due to Hypovolemia   Dysfunction of Right Eustachian Tube    Discussed creating slight pressure in the eustachian tube to open them up and allow the ears to drain.  Discussed continuing nasal spray.  As well as adding an allergy medication such as loratadine to her regimen.  I did not see any fluid in the ear on exam but there still could be fluid in there.  We discussed all of this in the visit today.     Hypothyroidism    S/p partial thyroidectomy in 1970s       Return for annual.  Patient was seen for 25 minutes face to face of which > 50% of appointment time was spent on counseling and coordination of care regarding the above.    Please note that this dictation was created using voice recognition software. I have made every reasonable attempt to correct obvious errors, but I expect that there are errors of grammar and possibly content that I did not discover before finalizing the note.

## 2019-11-11 ENCOUNTER — TELEPHONE (OUTPATIENT)
Dept: MEDICAL GROUP | Facility: IMAGING CENTER | Age: 64
End: 2019-11-11

## 2019-11-11 NOTE — TELEPHONE ENCOUNTER
She should continue following with a neurologist for ropinirole.  I can take over thyroid medication

## 2019-11-11 NOTE — TELEPHONE ENCOUNTER
Patient calling in with questions on some of her medications. She is wondering if Dr. Andrade would take over her levothyroxine prescription and ropinirole prescription. Patient states she does not need a refill on either right now but would like to call our office when she does.

## 2019-11-12 NOTE — TELEPHONE ENCOUNTER
Received message from patient requesting a refill on her Voltaren (diclofenac sodium) gel 1% 100 g tube. She states she usually gets it from Dr. Amezquita but they will not refill it.

## 2019-11-13 DIAGNOSIS — Z79.899 MEDICATION MANAGEMENT: ICD-10-CM

## 2019-11-13 NOTE — PROGRESS NOTES
Sent the diclofenac however she does need to have blood test to monitor the use of this medication. I have placed the blood test in the system. This is an exception, in the future we will need an apt for this.    Please document patients awareness of this. Thank you.

## 2019-11-25 ENCOUNTER — APPOINTMENT (RX ONLY)
Dept: URBAN - METROPOLITAN AREA CLINIC 4 | Facility: CLINIC | Age: 64
Setting detail: DERMATOLOGY
End: 2019-11-25

## 2019-11-25 DIAGNOSIS — L57.0 ACTINIC KERATOSIS: ICD-10-CM

## 2019-11-25 DIAGNOSIS — L82.1 OTHER SEBORRHEIC KERATOSIS: ICD-10-CM

## 2019-11-25 DIAGNOSIS — L81.4 OTHER MELANIN HYPERPIGMENTATION: ICD-10-CM

## 2019-11-25 DIAGNOSIS — D22 MELANOCYTIC NEVI: ICD-10-CM

## 2019-11-25 DIAGNOSIS — Z85.828 PERSONAL HISTORY OF OTHER MALIGNANT NEOPLASM OF SKIN: ICD-10-CM

## 2019-11-25 DIAGNOSIS — D18.0 HEMANGIOMA: ICD-10-CM

## 2019-11-25 PROBLEM — D22.71 MELANOCYTIC NEVI OF RIGHT LOWER LIMB, INCLUDING HIP: Status: ACTIVE | Noted: 2019-11-25

## 2019-11-25 PROBLEM — D22.61 MELANOCYTIC NEVI OF RIGHT UPPER LIMB, INCLUDING SHOULDER: Status: ACTIVE | Noted: 2019-11-25

## 2019-11-25 PROBLEM — D22.5 MELANOCYTIC NEVI OF TRUNK: Status: ACTIVE | Noted: 2019-11-25

## 2019-11-25 PROBLEM — D22.72 MELANOCYTIC NEVI OF LEFT LOWER LIMB, INCLUDING HIP: Status: ACTIVE | Noted: 2019-11-25

## 2019-11-25 PROBLEM — D18.01 HEMANGIOMA OF SKIN AND SUBCUTANEOUS TISSUE: Status: ACTIVE | Noted: 2019-11-25

## 2019-11-25 PROBLEM — D22.62 MELANOCYTIC NEVI OF LEFT UPPER LIMB, INCLUDING SHOULDER: Status: ACTIVE | Noted: 2019-11-25

## 2019-11-25 PROCEDURE — ? PRESCRIPTION SAMPLES PROVIDED

## 2019-11-25 PROCEDURE — 17003 DESTRUCT PREMALG LES 2-14: CPT

## 2019-11-25 PROCEDURE — ? COUNSELING

## 2019-11-25 PROCEDURE — 99214 OFFICE O/P EST MOD 30 MIN: CPT | Mod: 25

## 2019-11-25 PROCEDURE — 17000 DESTRUCT PREMALG LESION: CPT

## 2019-11-25 PROCEDURE — ? LIQUID NITROGEN

## 2019-11-25 PROCEDURE — ? SUNSCREEN RECOMMENDATIONS

## 2019-11-25 ASSESSMENT — LOCATION ZONE DERM
LOCATION ZONE: NECK
LOCATION ZONE: FACE
LOCATION ZONE: ARM
LOCATION ZONE: NOSE
LOCATION ZONE: LEG
LOCATION ZONE: HAND
LOCATION ZONE: TRUNK

## 2019-11-25 ASSESSMENT — LOCATION SIMPLE DESCRIPTION DERM
LOCATION SIMPLE: LEFT POSTERIOR UPPER ARM
LOCATION SIMPLE: RIGHT FOREARM
LOCATION SIMPLE: LEFT ANTERIOR NECK
LOCATION SIMPLE: CHEST
LOCATION SIMPLE: UPPER BACK
LOCATION SIMPLE: RIGHT CHEEK
LOCATION SIMPLE: LEFT FOREARM
LOCATION SIMPLE: RIGHT HAND
LOCATION SIMPLE: RIGHT LOWER BACK
LOCATION SIMPLE: LEFT CHEEK
LOCATION SIMPLE: LEFT HAND
LOCATION SIMPLE: RIGHT THIGH
LOCATION SIMPLE: RIGHT POSTERIOR UPPER ARM
LOCATION SIMPLE: LEFT UPPER BACK
LOCATION SIMPLE: LEFT THIGH
LOCATION SIMPLE: LEFT CLAVICULAR SKIN
LOCATION SIMPLE: ABDOMEN
LOCATION SIMPLE: NOSE

## 2019-11-25 ASSESSMENT — LOCATION DETAILED DESCRIPTION DERM
LOCATION DETAILED: RIGHT RADIAL DORSAL HAND
LOCATION DETAILED: RIGHT ANTERIOR PROXIMAL THIGH
LOCATION DETAILED: SUPERIOR THORACIC SPINE
LOCATION DETAILED: RIGHT ULNAR DORSAL HAND
LOCATION DETAILED: LEFT ANTERIOR PROXIMAL THIGH
LOCATION DETAILED: LEFT MEDIAL SUPERIOR CHEST
LOCATION DETAILED: PERIUMBILICAL SKIN
LOCATION DETAILED: RIGHT SUPERIOR MEDIAL MIDBACK
LOCATION DETAILED: RIGHT DISTAL POSTERIOR UPPER ARM
LOCATION DETAILED: LEFT CLAVICULAR SKIN
LOCATION DETAILED: RIGHT CENTRAL MALAR CHEEK
LOCATION DETAILED: LEFT SUPERIOR MEDIAL UPPER BACK
LOCATION DETAILED: RIGHT RIB CAGE
LOCATION DETAILED: LEFT INFERIOR ANTERIOR NECK
LOCATION DETAILED: LEFT PROXIMAL POSTERIOR UPPER ARM
LOCATION DETAILED: LEFT RADIAL DORSAL HAND
LOCATION DETAILED: LEFT ULNAR DORSAL HAND
LOCATION DETAILED: LEFT CENTRAL MALAR CHEEK
LOCATION DETAILED: NASAL SUPRATIP
LOCATION DETAILED: LEFT PROXIMAL DORSAL FOREARM
LOCATION DETAILED: RIGHT PROXIMAL DORSAL FOREARM
LOCATION DETAILED: RIGHT DORSAL MIDDLE FINGER METACARPOPHALANGEAL JOINT

## 2019-11-25 NOTE — HPI: FULL BODY SKIN EXAMINATION
How Severe Are Your Spot(S)?: mild
What Type Of Note Output Would You Prefer (Optional)?: Standard Output
What Is The Reason For Today's Visit?: Full Body Skin Examination
What Is The Reason For Today's Visit? (Being Monitored For X): concerning skin lesions on an annual basis
Additional History: Full body exam. No concerns at this time. 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

## 2019-12-23 RX ORDER — LEVOTHYROXINE SODIUM 88 UG/1
88 TABLET ORAL
Qty: 90 TAB | Refills: 1 | Status: SHIPPED | OUTPATIENT
Start: 2019-12-23 | End: 2020-06-16

## 2019-12-23 RX ORDER — ROPINIROLE 0.5 MG/1
TABLET, FILM COATED ORAL
Qty: 180 TAB | Refills: 0 | Status: SHIPPED | OUTPATIENT
Start: 2019-12-23 | End: 2020-03-20

## 2020-01-02 ENCOUNTER — HOSPITAL ENCOUNTER (OUTPATIENT)
Dept: RADIOLOGY | Facility: MEDICAL CENTER | Age: 65
End: 2020-01-02
Attending: FAMILY MEDICINE
Payer: MEDICARE

## 2020-01-02 DIAGNOSIS — Z12.31 VISIT FOR SCREENING MAMMOGRAM: ICD-10-CM

## 2020-01-02 PROCEDURE — 77067 SCR MAMMO BI INCL CAD: CPT

## 2020-02-28 ENCOUNTER — HOSPITAL ENCOUNTER (OUTPATIENT)
Facility: MEDICAL CENTER | Age: 65
End: 2020-02-28
Attending: FAMILY MEDICINE
Payer: MEDICARE

## 2020-02-28 DIAGNOSIS — Z79.899 MEDICATION MANAGEMENT: ICD-10-CM

## 2020-02-28 DIAGNOSIS — Z01.812 PRE-OPERATIVE LABORATORY EXAMINATION: ICD-10-CM

## 2020-02-28 DIAGNOSIS — E86.1 HYPOTENSION DUE TO HYPOVOLEMIA: ICD-10-CM

## 2020-02-28 DIAGNOSIS — Z01.810 PRE-OPERATIVE CARDIOVASCULAR EXAMINATION: ICD-10-CM

## 2020-02-28 DIAGNOSIS — E03.9 HYPOTHYROIDISM, UNSPECIFIED TYPE: ICD-10-CM

## 2020-02-28 DIAGNOSIS — I95.89 HYPOTENSION DUE TO HYPOVOLEMIA: ICD-10-CM

## 2020-02-28 LAB
ALBUMIN SERPL BCP-MCNC: 4.4 G/DL (ref 3.2–4.9)
ALBUMIN/GLOB SERPL: 1.6 G/DL
ALP SERPL-CCNC: 43 U/L (ref 30–99)
ALT SERPL-CCNC: 14 U/L (ref 2–50)
ANION GAP SERPL CALC-SCNC: 7 MMOL/L (ref 0–11.9)
ANION GAP SERPL CALC-SCNC: 8 MMOL/L (ref 0–11.9)
AST SERPL-CCNC: 20 U/L (ref 12–45)
BASOPHILS # BLD AUTO: 0.7 % (ref 0–1.8)
BASOPHILS # BLD AUTO: 0.9 % (ref 0–1.8)
BASOPHILS # BLD: 0.03 K/UL (ref 0–0.12)
BASOPHILS # BLD: 0.04 K/UL (ref 0–0.12)
BILIRUB SERPL-MCNC: 0.9 MG/DL (ref 0.1–1.5)
BUN SERPL-MCNC: 19 MG/DL (ref 8–22)
BUN SERPL-MCNC: 19 MG/DL (ref 8–22)
CALCIUM SERPL-MCNC: 9.6 MG/DL (ref 8.5–10.5)
CALCIUM SERPL-MCNC: 9.7 MG/DL (ref 8.5–10.5)
CHLORIDE SERPL-SCNC: 105 MMOL/L (ref 96–112)
CHLORIDE SERPL-SCNC: 105 MMOL/L (ref 96–112)
CO2 SERPL-SCNC: 26 MMOL/L (ref 20–33)
CO2 SERPL-SCNC: 27 MMOL/L (ref 20–33)
CREAT SERPL-MCNC: 0.79 MG/DL (ref 0.5–1.4)
CREAT SERPL-MCNC: 0.83 MG/DL (ref 0.5–1.4)
EKG IMPRESSION: NORMAL
EOSINOPHIL # BLD AUTO: 0.03 K/UL (ref 0–0.51)
EOSINOPHIL # BLD AUTO: 0.04 K/UL (ref 0–0.51)
EOSINOPHIL NFR BLD: 0.7 % (ref 0–6.9)
EOSINOPHIL NFR BLD: 0.9 % (ref 0–6.9)
ERYTHROCYTE [DISTWIDTH] IN BLOOD BY AUTOMATED COUNT: 46.5 FL (ref 35.9–50)
ERYTHROCYTE [DISTWIDTH] IN BLOOD BY AUTOMATED COUNT: 46.7 FL (ref 35.9–50)
FOLATE SERPL-MCNC: >24 NG/ML
GLOBULIN SER CALC-MCNC: 2.8 G/DL (ref 1.9–3.5)
GLUCOSE SERPL-MCNC: 100 MG/DL (ref 65–99)
GLUCOSE SERPL-MCNC: 107 MG/DL (ref 65–99)
HCT VFR BLD AUTO: 41.5 % (ref 37–47)
HCT VFR BLD AUTO: 41.7 % (ref 37–47)
HCV AB SER QL: NEGATIVE
HGB BLD-MCNC: 13.9 G/DL (ref 12–16)
HGB BLD-MCNC: 13.9 G/DL (ref 12–16)
IMM GRANULOCYTES # BLD AUTO: 0 K/UL (ref 0–0.11)
IMM GRANULOCYTES # BLD AUTO: 0.01 K/UL (ref 0–0.11)
IMM GRANULOCYTES NFR BLD AUTO: 0 % (ref 0–0.9)
IMM GRANULOCYTES NFR BLD AUTO: 0.2 % (ref 0–0.9)
LYMPHOCYTES # BLD AUTO: 1.03 K/UL (ref 1–4.8)
LYMPHOCYTES # BLD AUTO: 1.09 K/UL (ref 1–4.8)
LYMPHOCYTES NFR BLD: 24.1 % (ref 22–41)
LYMPHOCYTES NFR BLD: 25.2 % (ref 22–41)
MCH RBC QN AUTO: 33 PG (ref 27–33)
MCH RBC QN AUTO: 33.3 PG (ref 27–33)
MCHC RBC AUTO-ENTMCNC: 33.3 G/DL (ref 33.6–35)
MCHC RBC AUTO-ENTMCNC: 33.5 G/DL (ref 33.6–35)
MCV RBC AUTO: 99 FL (ref 81.4–97.8)
MCV RBC AUTO: 99.3 FL (ref 81.4–97.8)
MONOCYTES # BLD AUTO: 0.22 K/UL (ref 0–0.85)
MONOCYTES # BLD AUTO: 0.24 K/UL (ref 0–0.85)
MONOCYTES NFR BLD AUTO: 5.1 % (ref 0–13.4)
MONOCYTES NFR BLD AUTO: 5.6 % (ref 0–13.4)
NEUTROPHILS # BLD AUTO: 2.93 K/UL (ref 2–7.15)
NEUTROPHILS # BLD AUTO: 2.94 K/UL (ref 2–7.15)
NEUTROPHILS NFR BLD: 67.9 % (ref 44–72)
NEUTROPHILS NFR BLD: 68.7 % (ref 44–72)
NRBC # BLD AUTO: 0 K/UL
NRBC # BLD AUTO: 0 K/UL
NRBC BLD-RTO: 0 /100 WBC
NRBC BLD-RTO: 0 /100 WBC
PLATELET # BLD AUTO: 202 K/UL (ref 164–446)
PLATELET # BLD AUTO: 205 K/UL (ref 164–446)
PMV BLD AUTO: 9.6 FL (ref 9–12.9)
PMV BLD AUTO: 9.7 FL (ref 9–12.9)
POTASSIUM SERPL-SCNC: 3.8 MMOL/L (ref 3.6–5.5)
POTASSIUM SERPL-SCNC: 3.8 MMOL/L (ref 3.6–5.5)
PROT SERPL-MCNC: 7.2 G/DL (ref 6–8.2)
RBC # BLD AUTO: 4.18 M/UL (ref 4.2–5.4)
RBC # BLD AUTO: 4.21 M/UL (ref 4.2–5.4)
SODIUM SERPL-SCNC: 138 MMOL/L (ref 135–145)
SODIUM SERPL-SCNC: 140 MMOL/L (ref 135–145)
TSH SERPL DL<=0.005 MIU/L-ACNC: 0.76 UIU/ML (ref 0.38–5.33)
VIT B12 SERPL-MCNC: 490 PG/ML (ref 211–911)
WBC # BLD AUTO: 4.3 K/UL (ref 4.8–10.8)
WBC # BLD AUTO: 4.3 K/UL (ref 4.8–10.8)

## 2020-02-28 PROCEDURE — 85025 COMPLETE CBC W/AUTO DIFF WBC: CPT | Mod: 91

## 2020-02-28 PROCEDURE — 82746 ASSAY OF FOLIC ACID SERUM: CPT

## 2020-02-28 PROCEDURE — 93010 ELECTROCARDIOGRAM REPORT: CPT | Performed by: INTERNAL MEDICINE

## 2020-02-28 PROCEDURE — 85025 COMPLETE CBC W/AUTO DIFF WBC: CPT

## 2020-02-28 PROCEDURE — 80048 BASIC METABOLIC PNL TOTAL CA: CPT

## 2020-02-28 PROCEDURE — 80053 COMPREHEN METABOLIC PANEL: CPT

## 2020-02-28 PROCEDURE — 86803 HEPATITIS C AB TEST: CPT

## 2020-02-28 PROCEDURE — 82607 VITAMIN B-12: CPT

## 2020-02-28 PROCEDURE — 84443 ASSAY THYROID STIM HORMONE: CPT

## 2020-02-28 PROCEDURE — 36415 COLL VENOUS BLD VENIPUNCTURE: CPT

## 2020-02-28 PROCEDURE — 93005 ELECTROCARDIOGRAM TRACING: CPT

## 2020-03-02 ENCOUNTER — ANESTHESIA (OUTPATIENT)
Dept: SURGERY | Facility: MEDICAL CENTER | Age: 65
End: 2020-03-02
Payer: MEDICARE

## 2020-03-02 ENCOUNTER — ANESTHESIA EVENT (OUTPATIENT)
Dept: SURGERY | Facility: MEDICAL CENTER | Age: 65
End: 2020-03-02
Payer: MEDICARE

## 2020-03-02 ENCOUNTER — HOSPITAL ENCOUNTER (OUTPATIENT)
Facility: MEDICAL CENTER | Age: 65
End: 2020-03-02
Attending: OPHTHALMOLOGY | Admitting: OPHTHALMOLOGY
Payer: MEDICARE

## 2020-03-02 VITALS
TEMPERATURE: 98.1 F | DIASTOLIC BLOOD PRESSURE: 74 MMHG | BODY MASS INDEX: 18.97 KG/M2 | OXYGEN SATURATION: 100 % | HEIGHT: 69 IN | RESPIRATION RATE: 16 BRPM | WEIGHT: 128.09 LBS | SYSTOLIC BLOOD PRESSURE: 113 MMHG | HEART RATE: 68 BPM

## 2020-03-02 LAB — PATHOLOGY CONSULT NOTE: NORMAL

## 2020-03-02 PROCEDURE — 160048 HCHG OR STATISTICAL LEVEL 1-5: Performed by: OPHTHALMOLOGY

## 2020-03-02 PROCEDURE — 160036 HCHG PACU - EA ADDL 30 MINS PHASE I: Performed by: OPHTHALMOLOGY

## 2020-03-02 PROCEDURE — 160028 HCHG SURGERY MINUTES - 1ST 30 MINS LEVEL 3: Performed by: OPHTHALMOLOGY

## 2020-03-02 PROCEDURE — 160025 RECOVERY II MINUTES (STATS): Performed by: OPHTHALMOLOGY

## 2020-03-02 PROCEDURE — 502000 HCHG MISC OR IMPLANTS RC 0278: Performed by: OPHTHALMOLOGY

## 2020-03-02 PROCEDURE — 700101 HCHG RX REV CODE 250: Performed by: OPHTHALMOLOGY

## 2020-03-02 PROCEDURE — 700105 HCHG RX REV CODE 258: Performed by: OPHTHALMOLOGY

## 2020-03-02 PROCEDURE — 160046 HCHG PACU - 1ST 60 MINS PHASE II: Performed by: OPHTHALMOLOGY

## 2020-03-02 PROCEDURE — 160009 HCHG ANES TIME/MIN: Performed by: OPHTHALMOLOGY

## 2020-03-02 PROCEDURE — 160039 HCHG SURGERY MINUTES - EA ADDL 1 MIN LEVEL 3: Performed by: OPHTHALMOLOGY

## 2020-03-02 PROCEDURE — 501838 HCHG SUTURE GENERAL: Performed by: OPHTHALMOLOGY

## 2020-03-02 PROCEDURE — 700101 HCHG RX REV CODE 250: Performed by: ANESTHESIOLOGY

## 2020-03-02 PROCEDURE — 160035 HCHG PACU - 1ST 60 MINS PHASE I: Performed by: OPHTHALMOLOGY

## 2020-03-02 PROCEDURE — 160002 HCHG RECOVERY MINUTES (STAT): Performed by: OPHTHALMOLOGY

## 2020-03-02 PROCEDURE — 88305 TISSUE EXAM BY PATHOLOGIST: CPT

## 2020-03-02 PROCEDURE — 700111 HCHG RX REV CODE 636 W/ 250 OVERRIDE (IP): Performed by: ANESTHESIOLOGY

## 2020-03-02 RX ORDER — HALOPERIDOL 5 MG/ML
1 INJECTION INTRAMUSCULAR
Status: DISCONTINUED | OUTPATIENT
Start: 2020-03-02 | End: 2020-03-02 | Stop reason: HOSPADM

## 2020-03-02 RX ORDER — ONDANSETRON 2 MG/ML
4 INJECTION INTRAMUSCULAR; INTRAVENOUS
Status: DISCONTINUED | OUTPATIENT
Start: 2020-03-02 | End: 2020-03-02 | Stop reason: HOSPADM

## 2020-03-02 RX ORDER — LIDOCAINE HYDROCHLORIDE 20 MG/ML
INJECTION, SOLUTION EPIDURAL; INFILTRATION; INTRACAUDAL; PERINEURAL PRN
Status: DISCONTINUED | OUTPATIENT
Start: 2020-03-02 | End: 2020-03-02 | Stop reason: SURG

## 2020-03-02 RX ORDER — ERYTHROMYCIN 5 MG/G
OINTMENT OPHTHALMIC
Status: DISCONTINUED | OUTPATIENT
Start: 2020-03-02 | End: 2020-03-02 | Stop reason: HOSPADM

## 2020-03-02 RX ORDER — TETRACAINE HYDROCHLORIDE 5 MG/ML
SOLUTION OPHTHALMIC
Status: DISCONTINUED
Start: 2020-03-02 | End: 2020-03-02 | Stop reason: HOSPADM

## 2020-03-02 RX ORDER — SODIUM CHLORIDE, SODIUM LACTATE, POTASSIUM CHLORIDE, CALCIUM CHLORIDE 600; 310; 30; 20 MG/100ML; MG/100ML; MG/100ML; MG/100ML
INJECTION, SOLUTION INTRAVENOUS CONTINUOUS
Status: DISCONTINUED | OUTPATIENT
Start: 2020-03-02 | End: 2020-03-02 | Stop reason: HOSPADM

## 2020-03-02 RX ORDER — DIPHENHYDRAMINE HYDROCHLORIDE 50 MG/ML
12.5 INJECTION INTRAMUSCULAR; INTRAVENOUS
Status: DISCONTINUED | OUTPATIENT
Start: 2020-03-02 | End: 2020-03-02 | Stop reason: HOSPADM

## 2020-03-02 RX ORDER — LIDOCAINE HYDROCHLORIDE AND EPINEPHRINE BITARTRATE 20; .01 MG/ML; MG/ML
INJECTION, SOLUTION SUBCUTANEOUS
Status: DISCONTINUED
Start: 2020-03-02 | End: 2020-03-02 | Stop reason: HOSPADM

## 2020-03-02 RX ORDER — BUPIVACAINE HYDROCHLORIDE AND EPINEPHRINE 5; 5 MG/ML; UG/ML
INJECTION, SOLUTION EPIDURAL; INTRACAUDAL; PERINEURAL
Status: DISCONTINUED | OUTPATIENT
Start: 2020-03-02 | End: 2020-03-02 | Stop reason: HOSPADM

## 2020-03-02 RX ORDER — ERYTHROMYCIN 5 MG/G
OINTMENT OPHTHALMIC
Status: DISCONTINUED
Start: 2020-03-02 | End: 2020-03-02 | Stop reason: HOSPADM

## 2020-03-02 RX ORDER — LIDOCAINE HYDROCHLORIDE AND EPINEPHRINE BITARTRATE 20; .01 MG/ML; MG/ML
INJECTION, SOLUTION SUBCUTANEOUS
Status: DISCONTINUED | OUTPATIENT
Start: 2020-03-02 | End: 2020-03-02 | Stop reason: HOSPADM

## 2020-03-02 RX ORDER — BUPIVACAINE HYDROCHLORIDE AND EPINEPHRINE 5; 5 MG/ML; UG/ML
INJECTION, SOLUTION EPIDURAL; INTRACAUDAL; PERINEURAL
Status: DISCONTINUED
Start: 2020-03-02 | End: 2020-03-02 | Stop reason: HOSPADM

## 2020-03-02 RX ADMIN — PROPOFOL 200 MG: 10 INJECTION, EMULSION INTRAVENOUS at 07:37

## 2020-03-02 RX ADMIN — LIDOCAINE HYDROCHLORIDE 100 MG: 20 INJECTION, SOLUTION EPIDURAL; INFILTRATION; INTRACAUDAL at 07:37

## 2020-03-02 RX ADMIN — SODIUM CHLORIDE, POTASSIUM CHLORIDE, SODIUM LACTATE AND CALCIUM CHLORIDE: 600; 310; 30; 20 INJECTION, SOLUTION INTRAVENOUS at 06:30

## 2020-03-02 NOTE — ANESTHESIA PROCEDURE NOTES
Airway  Date/Time: 3/2/2020 7:50 AM  Performed by: David Davis M.D.  Authorized by: David Davis M.D.     Location:  OR  Urgency:  Elective  Indications for Airway Management:  Anesthesia  Spontaneous Ventilation: absent    Sedation Level:  Deep  Preoxygenated: Yes    Final Airway Type:  Supraglottic airway  Final Supraglottic Airway:  Standard LMA  SGA Size:  3  Number of Attempts at Approach:  1

## 2020-03-02 NOTE — DISCHARGE INSTRUCTIONS
ACTIVITY: Rest and take it easy for the first 24 hours.  A responsible adult is recommended to remain with you during that time.  It is normal to feel sleepy.  We encourage you to not do anything that requires balance, judgment or coordination.    MILD FLU-LIKE SYMPTOMS ARE NORMAL. YOU MAY EXPERIENCE GENERALIZED MUSCLE ACHES, THROAT IRRITATION, HEADACHE AND/OR SOME NAUSEA.    FOR 24 HOURS DO NOT:  Drive, operate machinery or run household appliances.  Drink beer or alcoholic beverages.   Make important decisions or sign legal documents.    SPECIAL INSTRUCTIONS: ***    DIET: To avoid nausea, slowly advance diet as tolerated, avoiding spicy or greasy foods for the first day.  Add more substantial food to your diet according to your physician's instructions.  Babies can be fed formula or breast milk as soon as they are hungry.  INCREASE FLUIDS AND FIBER TO AVOID CONSTIPATION.    SURGICAL DRESSING/BATHING: ***    FOLLOW-UP APPOINTMENT:  A follow-up appointment should be arranged with your doctor in ***; call to schedule.    You should CALL YOUR PHYSICIAN if you develop:  Fever greater than 101 degrees F.  Pain not relieved by medication, or persistent nausea or vomiting.  Excessive bleeding (blood soaking through dressing) or unexpected drainage from the wound.  Extreme redness or swelling around the incision site, drainage of pus or foul smelling drainage.  Inability to urinate or empty your bladder within 8 hours.  Problems with breathing or chest pain.    You should call 911 if you develop problems with breathing or chest pain.  If you are unable to contact your doctor or surgical center, you should go to the nearest emergency room or urgent care center.  Physician's telephone #: ***    If any questions arise, call your doctor.  If your doctor is not available, please feel free to call the Surgical Center at {Surgical Dept Numbers:[id number]}.  The Center is open Monday through Friday from 7AM to 7PM.  You can also call  the HEALTH HOTLINE open 24 hours/day, 7 days/week and speak to a nurse at [phone], or toll free at [phone].    A registered nurse may call you a few days after your surgery to see how you are doing after your procedure.    MEDICATIONS: Resume taking daily medication.  Take prescribed pain medication with food.  If no medication is prescribed, you may take non-aspirin pain medication if needed.  PAIN MEDICATION CAN BE VERY CONSTIPATING.  Take a stool softener or laxative such as senokot, pericolace, or milk of magnesia if needed.    Prescription given for ***.  Last pain medication given at ***.    If your physician has prescribed pain medication that includes Acetaminophen (Tylenol), do not take additional Acetaminophen (Tylenol) while taking the prescribed medication.    Depression / Suicide Risk    As you are discharged from this [facility] facility, it is important to learn how to keep safe from harming yourself.    Recognize the warning signs:  · Abrupt changes in personality, positive or negative- including increase in energy   · Giving away possessions  · Change in eating patterns- significant weight changes-  positive or negative  · Change in sleeping patterns- unable to sleep or sleeping all the time   · Unwillingness or inability to communicate  · Depression  · Unusual sadness, discouragement and loneliness  · Talk of wanting to die  · Neglect of personal appearance   · Rebelliousness- reckless behavior  · Withdrawal from people/activities they love  · Confusion- inability to concentrate     If you or a loved one observes any of these behaviors or has concerns about self-harm, here's what you can do:  · Talk about it- your feelings and reasons for harming yourself  · Remove any means that you might use to hurt yourself (examples: pills, rope, extension cords, firearm)  · Get professional help from the community (Mental Health, Substance Abuse, psychological counseling)  · Do not be  alone:Call your Safe Contact- someone whom you trust who will be there for you.  · Call your local CRISIS HOTLINE 222-3206 or 107-260-9260  · Call your local Children's Mobile Crisis Response Team [place] (631) 602-6480 or www.DITTO.com  · Call the toll free National Suicide Prevention Hotlines   · National Suicide Prevention Lifeline 979-115-AKXZ (3297)  · National Air Intelligence Line Network 800-SUICIDE (454-6901)

## 2020-03-02 NOTE — DISCHARGE INSTR - OTHER INFO
OCULOPLASTICS POSTOP PROGRESS NOTE:     Discharge home when meets criteria.     Postop instructions:   1.  NO eye rubbing.   2.  Keep head elevated.   3.  NO heavy lifting, no straining, no bending over, no strenuous activity for 2 weeks.   4.  Keep eyelid area clean and dry.   5.  Wear eye shield at night.  Please provide Cooper shield for patient to the left    Postop Medications:   1.  Erythromycin ophthalmic ointment TID to wound of the RLL and in the OD.         Please call with any questions.      Thank you.

## 2020-03-02 NOTE — ANESTHESIA TIME REPORT
Anesthesia Start and Stop Event Times     Date Time Event    3/2/2020 0730 Anesthesia Start     0734 Ready for Procedure     0835 Anesthesia Stop        Responsible Staff  03/02/20    Name Role Begin End    David Davis M.D. Anesth 0730 0835        Preop Diagnosis (Free Text):  Pre-op Diagnosis     NEOPLASM        Preop Diagnosis (Codes):    Post op Diagnosis  Neoplasm      Premium Reason  Non-Premium    Comments:

## 2020-03-02 NOTE — OR SURGEON
Immediate Post OP Note    PreOp Diagnosis: left medial canthal lesion    PostOp Diagnosis:   1. left medial canthal lesion    Procedure(s):  EXCISION, LESION, EYE - UPPER LID W/POSS PLACEMENT OF MINI MONKA STENT   Left upper canaliculus probing without irrigation  Left upper plastic repair of canaliculus  Left probing with stent placement     - Wound Class: Clean    Surgeon(s):  Jaime Pastrana M.D.    Anesthesiologist/Type of Anesthesia:  Anesthesiologist: David Davis M.D./General    Surgical Staff:  Circulator: Rhianna Thomas R.N.; Radhika Whitley R.N.  Scrub Person: Brea Ingram    Specimens removed if any:  ID Type Source Tests Collected by Time Destination   A : Left Hidrocystoma Tissue Other PATHOLOGY SPECIMEN Jaime Pastrana M.D. 3/2/2020  8:00 AM        Estimated Blood Loss: less than 1 ml    Findings: as dictated    Complications: none        3/2/2020 8:27 AM Jaime Pastrana M.D.

## 2020-03-02 NOTE — ANESTHESIA POSTPROCEDURE EVALUATION
Patient: Missy Stuart    Procedure Summary     Date:  03/02/20 Room / Location:  [facility] ROOM 24 / SURGERY SAME DAY [facility]    Anesthesia Start:  0730 Anesthesia Stop:  0827    Procedure:  EXCISION, LESION, EYE - UPPER LID W/POSS PLACEMENT OF MINI MONKA STENT (Left Face) Diagnosis:  (NEOPLASM)    Surgeon:  Jaime Pastrana M.D. Responsible Provider:  David Davis M.D.    Anesthesia Type:  general ASA Status:  2          Final Anesthesia Type: general  Last vitals  BP   NIBP: 116/78    Temp   36.7 °C (98.1 °F)    Pulse   Pulse: 82   Resp   16    SpO2   100 %      Anesthesia Post Evaluation    Patient location during evaluation: PACU  Patient participation: complete - patient participated  Level of consciousness: awake and alert    Airway patency: patent  Anesthetic complications: no  Cardiovascular status: hemodynamically stable  Respiratory status: acceptable  Hydration status: euvolemic    PONV: none           Nurse Pain Score: 0 (NPRS)

## 2020-03-02 NOTE — ANESTHESIA QCDR
2019 [facility] Clinical Data Registry (for Quality Improvement)     Postoperative nausea/vomiting risk protocol (Adult = 18 yrs and Pediatric 3-17 yrs)- (430 and 463)  General inhalation anesthetic (NOT TIVA) with PONV risk factors: No  Provision of anti-emetic therapy with at least 2 different classes of agents: N/A  Patient DID NOT receive anti-emetic therapy and reason is documented in Medical Record: N/A    Multimodal Pain Management- (477)  Non-emergent surgery AND patient age >= 18: No  Use of Multimodal Pain Management, two or more drugs and/or interventions, NOT including systemic opioids:   Exception: Documented allergy to multiple classes of analgesics:     Smoking Abstinence (404)  Patient is current smoker (cigarette, pipe, e-cig, marijuanna): No  Elective Surgery:   Abstinence instructions provided prior to day of surgery:   Patient abstained from smoking on day of surgery:     Pre-Op Beta-Blocker in Isolated CABG (44)  Isolated CABG AND patient age >= 18: No  Beta-blocker admin within 24 hours of surgical incision:   Exception:of medical reason(s) for not administering beta blocker within 24 hours prior to surgical incision (e.g., not  indicated,other medical reason):     PACU assessment of acute postoperative pain prior to Anesthesia Care End- Applies to Patients Age = 18- (ABG7)  Initial PACU pain score is which of the following: < 7/10  Patient unable to report pain score: N/A    Post-anesthetic transfer of care checklist/protocol to PACU/ICU- (426 and 427)  Upon conclusion of case, patient transferred to which of the following locations: PACU/Non-ICU  Use of transfer checklist/protocol: Yes  Exclusion: Service Performed in Patient Hospital Room (and thus did not require transfer): N/A  Unplanned admission to ICU related to anesthesia service up through end of PACU care- (MD51)  Unplanned admission to ICU (not initially anticipated at anesthesia start time): No

## 2020-03-02 NOTE — OP REPORT
DATE OF SERVICE:  03/02/2020    PREOPERATIVE DIAGNOSIS:  Benign lesion, left medial canthus.      POSTOPERATIVE DIAGNOSIS:  Benign lesion, left medial canthus.      PROCEDURES PERFORMED:    1.  Excision of left medial canthal lesion.    2.  Probing of the left upper canaliculus without irrigation.    3.  Plastic repair of left upper canaliculus.    4.  Mini Monoka stent placement, left upper canaliculus.      SURGEON:  Jaime Pastrana MD    ASSISTANT:  None.      ANESTHESIA:  General.      ANESTHESIOLOGIST: Daivd Davis MD    SPECIMENS:  Left medial canthal lesion.      COMPLICATIONS:  None.      IMPLANTS:  Left upper canalicular Mini Monoka stent.      ESTIMATED BLOOD LOSS:  Less than 1 mL.     INDICATIONS:  The patient was referred to our office for approximately 8   months of enlarging left upper eyelid lesion.  It was noted that this lesion   is near to the canaliculus overriding and was approximately 5 mm x 3 mm in   size.  There was positive transillumination of this lesion.  It was discussed   with the patient that she would benefit from excisional biopsy of the left   upper eyelid lesion and given its proximity to the canaliculus, this will be   best performed in the operating room with the assistance of anesthesia.  It   was also discussed with the patient that possibility getting into the   canalicular system needing a stent.  The risks, benefits, and alternatives   were discussed with the patient at length prior to the surgery and she elected   to go ahead and schedule surgery.      The patient was met in the preoperative holding area where surgical sites were   marked, H and P updated, consent was obtained.      DESCRIPTION OF PROCEDURE:  The patient was taken to the operating room and   placed in supine position with the anesthesiologist, and attached the   necessary cardiopulmonary monitors.  General anesthesia was then delivered via   laryngeal mask airway.  The bed was then turned 90 degrees.  The  left medial   canthal lesion was then marked and injected with 1:1 ratio of 2% lidocaine,   1:100,000 epinephrine, 0.5% bupivacaine, 1:200,000 epinephrine.  The patient   was then prepped and draped in normal sterile fashion for oculoplastic surgery   following a timeout procedure.      A #15 C Bard-Isak blade was then used to incise the skin around the lesion.    Sharp dissection was then proceeded with Jonathan scissors to dissect the   lesion off of the skin in the deep surrounding tissues.  Approximately 50%   through the excision, the cyst popped.  The residual cyst wall was then taken   with some expected larger margins to ensure total of the cyst wall was   removed.  In this process, it was noted that the superior aspect of the left   upper canalicular system was entered.  The specimen was then sent in formalin   for pathological examination.      A punctal dilator was then used to dilate the left upper punctum.  Double 0   Ochoa probe was then placed through the upper canaliculus and noted through   the surgical wound that there was a hole through the canaliculus.  Three   interrupted 8-0 Vicryl sutures were then used to close the canaliculus with   the Ochoa probe in place.  The Ochoa probe was removed after noticing that   there was hard stop without complication.  The nasolacrimal duct was not   probed.  A lateral relaxing incision was then made with 1 snip of the left   upper punctum.  The Mini Monoka stent was then placed in the left upper   canaliculus and advanced through the portion of the plastic repair without   difficulty.  The Mini Monoka stent was then sutured in place with the anterior   lamella of the skin with a single interrupted 8-0 Vicryl suture.  The   excessive skin from the lesion excision site was then measured and cut with   Jonathan scissors.  The skin was then closed with a running 8-0 Vicryl suture.        The patient tolerated the procedure well.  Her face was cleaned and    erythromycin ophthalmic ointment was then placed on the wound.  The patient   was then taken to the PACU in stable condition where she was later discharged   home without event.      I, Dr. Jaime Pastrana, was present and performed the entirety of this procedure   without assistance.       ____________________________________     MD SHELIA Magallanes / NATALY    DD:  03/02/2020 08:47:35  DT:  03/02/2020 10:34:43    D#:  [id number]  Job#:  [id number]

## 2020-03-09 ENCOUNTER — OFFICE VISIT (OUTPATIENT)
Dept: MEDICAL GROUP | Facility: IMAGING CENTER | Age: 65
End: 2020-03-09
Payer: MEDICARE

## 2020-03-09 VITALS
OXYGEN SATURATION: 100 % | WEIGHT: 130 LBS | HEART RATE: 88 BPM | SYSTOLIC BLOOD PRESSURE: 102 MMHG | DIASTOLIC BLOOD PRESSURE: 62 MMHG | HEIGHT: 69 IN | RESPIRATION RATE: 13 BRPM | TEMPERATURE: 98.3 F | BODY MASS INDEX: 19.26 KG/M2

## 2020-03-09 DIAGNOSIS — D75.89 MACROCYTOSIS WITHOUT ANEMIA: ICD-10-CM

## 2020-03-09 DIAGNOSIS — M54.2 CERVICAL PAIN: ICD-10-CM

## 2020-03-09 PROCEDURE — 99214 OFFICE O/P EST MOD 30 MIN: CPT | Performed by: FAMILY MEDICINE

## 2020-03-09 RX ORDER — CARISOPRODOL 350 MG/1
350 TABLET ORAL PRN
Qty: 30 TAB | Refills: 3 | Status: SHIPPED | OUTPATIENT
Start: 2020-03-09 | End: 2020-03-30

## 2020-03-09 RX ORDER — GABAPENTIN 300 MG/1
300 CAPSULE ORAL PRN
Qty: 90 CAP | Refills: 0 | Status: SHIPPED | OUTPATIENT
Start: 2020-03-09 | End: 2020-04-14

## 2020-03-09 ASSESSMENT — PAIN SCALES - GENERAL: PAINLEVEL: NO PAIN

## 2020-03-09 ASSESSMENT — FIBROSIS 4 INDEX: FIB4 SCORE: 1.69

## 2020-03-09 ASSESSMENT — PATIENT HEALTH QUESTIONNAIRE - PHQ9: CLINICAL INTERPRETATION OF PHQ2 SCORE: 0

## 2020-03-09 NOTE — PROGRESS NOTES
Subjective:     CC:    Chief Complaint   Patient presents with   • Lab Results     dr abundio daly       HISTORY OF THE PRESENT ILLNESS: This pleasant 64 y.o. female is here to to follow-up after labs showed a macrocytosis.  B12 folate normal no anemia.  etoh  4 drinks per week  Takes the soma had one last week reports she does not take often. Jose 300 mg prn.   Recent change in thyroid medicaiton.  No thyroid symtoms at this time.  No fevers or chills or night sweats.  Allergies: Doxycycline; Codeine; Dilaudid [hydromorphone hcl]; Fentanyl; Percocet [oxycodone-acetaminophen]; and Vicoprofen [hydrocodone-ibuprofen]    Current Outpatient Medications Ordered in Epic   Medication Sig Dispense Refill   • levothyroxine (SYNTHROID) 88 MCG Tab Take 1 Tab by mouth Every morning on an empty stomach. 90 Tab 1   • ROPINIRole (REQUIP) 0.5 MG Tab TAKE 1 TO 3 TABS BY MOUTH AT BEDTIME AS NEEDED FOR  Tab 0   • Diclofenac Sodium 1 % Gel Apply 2 g to skin as directed 4 times a day as needed. 100 g 0   • Thiamine HCl (VITAMIN B-1 PO) Take  by mouth every day.     • Ascorbic Acid (VITAMIN C PO) Take  by mouth every day.     • Multiple Vitamins-Minerals (ZINC PO) Take 1 Tab by mouth every day.     • carisoprodol (SOMA) 350 MG Tab Take 350 mg by mouth as needed. Indications: Musculoskeletal Pain  3   • gabapentin (NEURONTIN) 300 MG Cap Take 300 mg by mouth as needed. 1 to 3 times a day     • POTASSIUM PO Take 1 Tab by mouth every day. OTC     • Calcium-Magnesium-Vitamin D (CALCIUM MAGNESIUM PO) Take 1 Tab by mouth every day.       No current [facility]-ordered facility-administered medications on file.        Past Medical History:   Diagnosis Date   • Cataract     removed bilat   • Cervical pain 02/28/2020    neck, 0-1/10   • Hyperlipidemia 02/28/2020    on no meds at this time   • Hypothyroidism 11/19/2018   • Restless legs 11/19/2018       Past Surgical History:   Procedure Laterality Date   • EYE LESION EXCISION Left 3/2/2020     "Procedure: EXCISION, LESION, EYE - UPPER LID W/POSS PLACEMENT OF MINI MONKA STENT;  Surgeon: Jaime Pastrana M.D.;  Location: SURGERY SAME DAY [facility];  Service: Ophthalmology   • CATARACT PHACO WITH IOL  2014    Performed by Fadi Morales M.D. at SURGERY SAME DAY [facility]   • TUBE & ECTOPIC PREG., REMOVAL     • THYROIDECTOMY      partial   • OTHER ORTHOPEDIC SURGERY  [id number]    neck fusion   • PB ENLARGE BREAST WITH IMPLANT   &        Social History     Tobacco Use   • Smoking status: Former Smoker     Packs/day: 0.50     Years: 8.00     Pack years: 4.00     Types: Cigarettes     Last attempt to quit: 1988     Years since quittin.2   • Smokeless tobacco: Never Used   • Tobacco comment: quit in her 20s    Substance Use Topics   • Alcohol use: Yes     Comment: reports 3/week   • Drug use: No       Social History     Social History Narrative    Retired dental hygiene        Family History   Problem Relation Age of Onset   • Diabetes Mother    • Stroke Maternal Grandfather        ROS:   Gen: no fevers/chills, no changes in weight  Eyes: no changes in vision  ENT: no sore throat, no hearing loss  Pulm: no sob, no cough  CV: no chest pain, no palpitations  GI: no nausea/vomiting, no diarrhea  : no dysuria  MSk: no myalgias  Skin: no rash  Neuro: no headaches, no numbness/tingling  Heme/Lymph: no easy bruising      Objective:     Exam: /62 (BP Location: Right arm, Patient Position: Sitting, BP Cuff Size: Adult)   Pulse 88   Temp 36.8 °C (98.3 °F) (Temporal)   Resp 13   Ht 1.753 m (5' 9\")   Wt 59 kg (130 lb)   SpO2 100%  Body mass index is 19.2 kg/m².    General: Normal appearing. No distress.  HEENT: Normocephalic. Eyes conjunctiva clear lids without ptosis, eomi  Neck: Thyroid is not enlarged.  Pulmonary: Clear to ausculation.  Normal effort. No rales, ronchi, or wheezing.  Cardiovascular: Regular rate and rhythm without murmur. Carotid and radial pulses are " intact and equal bilaterally.  Neurologic: No facial droop, normal gait, alert and oriented  Lymph: No cervical or supraclavicular lymph nodes are palpable  Skin: Warm and dry.  No obvious lesions.  Musculoskeletal: No extremity cyanosis, clubbing, or edema.  Psych: Normal mood and affect. Judgment and insight is normal.      Assessment & Plan:   64 y.o. female with the following -  narx reviewed with no aberrant behavior.  Patient is always recommended to review package insert for side effects.  As well as warnings.  Patient aware that these are controlled substances and can make her dizzy  Problem List Items Addressed This Visit     Cervical pain    Relevant Medications    gabapentin (NEURONTIN) 300 MG Cap    carisoprodol (SOMA) 350 MG Tab    Other Relevant Orders    Controlled Substance Treatment Agreement    Macrocytosis without anemia    Relevant Orders    RETICULOCYTES COUNT    CBC WITH DIFFERENTIAL        Problem   Macrocytosis Without Anemia    b12 folate normal  4 drinks per week  bmi 19         Return in about 3 months (around 6/9/2020).  Or for annual  Please note that this dictation was created using voice recognition software. I have made every reasonable attempt to correct obvious errors, but I expect that there are errors of grammar and possibly content that I did not discover before finalizing the note.

## 2020-03-10 ENCOUNTER — HOSPITAL ENCOUNTER (OUTPATIENT)
Facility: MEDICAL CENTER | Age: 65
End: 2020-03-10
Attending: FAMILY MEDICINE
Payer: MEDICARE

## 2020-03-10 ENCOUNTER — OFFICE VISIT (OUTPATIENT)
Dept: MEDICAL GROUP | Facility: IMAGING CENTER | Age: 65
End: 2020-03-10
Payer: MEDICARE

## 2020-03-10 VITALS
HEART RATE: 90 BPM | DIASTOLIC BLOOD PRESSURE: 76 MMHG | BODY MASS INDEX: 19.26 KG/M2 | WEIGHT: 130 LBS | OXYGEN SATURATION: 97 % | TEMPERATURE: 99.2 F | HEIGHT: 69 IN | SYSTOLIC BLOOD PRESSURE: 112 MMHG

## 2020-03-10 DIAGNOSIS — Z12.4 CERVICAL CANCER SCREENING: ICD-10-CM

## 2020-03-10 DIAGNOSIS — N95.2 ATROPHIC VAGINITIS: ICD-10-CM

## 2020-03-10 PROCEDURE — 88175 CYTOPATH C/V AUTO FLUID REDO: CPT

## 2020-03-10 PROCEDURE — G0101 CA SCREEN;PELVIC/BREAST EXAM: HCPCS | Performed by: FAMILY MEDICINE

## 2020-03-10 PROCEDURE — 87624 HPV HI-RISK TYP POOLED RSLT: CPT

## 2020-03-10 ASSESSMENT — FIBROSIS 4 INDEX: FIB4 SCORE: 1.69

## 2020-03-10 ASSESSMENT — PAIN SCALES - GENERAL: PAINLEVEL: NO PAIN

## 2020-03-10 NOTE — PROGRESS NOTES
Subjective:     CC:    Chief Complaint   Patient presents with   • Gynecologic Exam     HISTORY OF THE PRESENT ILLNESS: This pleasant 64 y.o. female is here to obtain cervical cancer screening. She does not have any body aches, fatigue, increased abdominal girth, or night sweats.     Allergies: Doxycycline; Codeine; Dilaudid [hydromorphone hcl]; Fentanyl; Percocet [oxycodone-acetaminophen]; and Vicoprofen [hydrocodone-ibuprofen]    Current Outpatient Medications Ordered in Epic   Medication Sig Dispense Refill   • gabapentin (NEURONTIN) 300 MG Cap Take 1 Cap by mouth as needed. 1 to 3 times a day 90 Cap 0   • carisoprodol (SOMA) 350 MG Tab Take 1 Tab by mouth as needed for Muscle Spasms for up to 21 days. Indications: Musculoskeletal Pain 30 Tab 3   • levothyroxine (SYNTHROID) 88 MCG Tab Take 1 Tab by mouth Every morning on an empty stomach. 90 Tab 1   • ROPINIRole (REQUIP) 0.5 MG Tab TAKE 1 TO 3 TABS BY MOUTH AT BEDTIME AS NEEDED FOR  Tab 0   • Diclofenac Sodium 1 % Gel Apply 2 g to skin as directed 4 times a day as needed. 100 g 0   • Thiamine HCl (VITAMIN B-1 PO) Take  by mouth every day.     • Ascorbic Acid (VITAMIN C PO) Take  by mouth every day.     • Multiple Vitamins-Minerals (ZINC PO) Take 1 Tab by mouth every day.     • POTASSIUM PO Take 1 Tab by mouth every day. OTC     • Calcium-Magnesium-Vitamin D (CALCIUM MAGNESIUM PO) Take 1 Tab by mouth every day.       No current [facility]-ordered facility-administered medications on file.        Past Medical History:   Diagnosis Date   • Cataract     removed bilat   • Cervical pain 02/28/2020    neck, 0-1/10   • Hyperlipidemia 02/28/2020    on no meds at this time   • Hypothyroidism 11/19/2018   • Restless legs 11/19/2018       Past Surgical History:   Procedure Laterality Date   • EYE LESION EXCISION Left 3/2/2020    Procedure: EXCISION, LESION, EYE - UPPER LID W/POSS PLACEMENT OF MINI MONKA STENT;  Surgeon: Jaime Pastrana M.D.;  Location: SURGERY SAME DAY  "[facility];  Service: Ophthalmology   • CATARACT PHACO WITH IOL  2014    Performed by Fadi Morales M.D. at SURGERY SAME DAY [facility]   • TUBE & ECTOPIC PREG., REMOVAL     • THYROIDECTOMY      partial   • OTHER ORTHOPEDIC SURGERY  [id number]    neck fusion   • PB ENLARGE BREAST WITH IMPLANT   &        Social History     Tobacco Use   • Smoking status: Former Smoker     Packs/day: 0.50     Years: 8.00     Pack years: 4.00     Types: Cigarettes     Last attempt to quit: 1988     Years since quittin.2   • Smokeless tobacco: Never Used   • Tobacco comment: quit in her 20s    Substance Use Topics   • Alcohol use: Yes     Comment: reports 3/week   • Drug use: No       Social History     Social History Narrative    Retired dental hygiene        Family History   Problem Relation Age of Onset   • Diabetes Mother    • Stroke Maternal Grandfather        ROS:   Gen: no fevers/chills, no changes in weight  Eyes: no changes in vision  ENT: no sore throat, no hearing loss  Pulm: no sob, no cough  CV: no chest pain, no palpitations  GI: no nausea/vomiting, no diarrhea  : no dysuria  MSk: no myalgias  Skin: no rash  Neuro: no headaches, no numbness/tingling  Heme/Lymph: no easy bruising      Objective:     Exam: /76   Pulse 90   Temp 37.3 °C (99.2 °F)   Ht 1.753 m (5' 9\")   Wt 59 kg (130 lb)   SpO2 97%  Body mass index is 19.2 kg/m².    General: Normal appearing. No distress.  HEENT: Normocephalic. Eyes conjunctiva clear lids without ptosis, eomi  Neck: Thyroid is not enlarged.  Pulmonary: Clear to ausculation.  Normal effort. No rales, ronchi, or wheezing.  Cardiovascular: Regular rate and rhythm without murmur. Carotid and radial pulses are intact and equal bilaterally.  Neurologic: No facial droop, normal gait, alert and oriented  Genital: external genitalia without lesion or concerns, cervix within normal limits. Vaginal mucosa atrophic and some petechia dorsal introitus.  "   Lymph: No cervical or supraclavicular lymph nodes are palpable  Skin: Warm and dry.  No obvious lesions.  Musculoskeletal: No extremity cyanosis, clubbing, or edema.  Psych: Normal mood and affect. Judgment and insight is normal.    Assessment & Plan:   64 y.o. female with the following -  Pap performed today with not issues. Discussed the petechia found at the introitus did a full visual inspection of vaginal mucosa with clear speculum. She has atrophic tissue though she prefers not to use estrogen cream and will continue with her lubricant.   Problem List Items Addressed This Visit     Atrophic vaginitis      Other Visit Diagnoses     Cervical cancer screening          return as needed  Problem   Atrophic Vaginitis    Patient does not want estrogen cream.        Return for annual.    Please note that this dictation was created using voice recognition software. I have made every reasonable attempt to correct obvious errors, but I expect that there are errors of grammar and possibly content that I did not discover before finalizing the note.

## 2020-03-11 DIAGNOSIS — Z12.4 CERVICAL CANCER SCREENING: ICD-10-CM

## 2020-03-17 PROBLEM — N95.2 ATROPHIC VAGINITIS: Status: ACTIVE | Noted: 2020-03-17

## 2020-03-18 ENCOUNTER — TELEPHONE (OUTPATIENT)
Dept: MEDICAL GROUP | Facility: IMAGING CENTER | Age: 65
End: 2020-03-18

## 2020-03-18 NOTE — TELEPHONE ENCOUNTER
Patient is also wondering about an estrogen cream Dr. Andrade mentioned at her last visit. Let patient know no cream was sent in to the pharmacy. Let patient know Dr. Andrade is currently out and I will speak with her on Friday. No further questions at this time.

## 2020-03-18 NOTE — TELEPHONE ENCOUNTER
"Patient calling stating she was told by pharmacy she needed a pre-auth on one of her prescriptions. Patients states she believes it was for her Soma.     Called and spoke with Lakshmi at the pharmacy and instructions just needed to be updated.     Patient states she takes prescription \"1 tab by mouth twice daily PRN for muscle spasms.\"    Updated with pharmacy and prescription will be ready this afternoon. Patient notified.   "

## 2020-03-20 ENCOUNTER — TELEPHONE (OUTPATIENT)
Dept: MEDICAL GROUP | Facility: IMAGING CENTER | Age: 65
End: 2020-03-20

## 2020-03-20 DIAGNOSIS — N95.2 ATROPHIC VAGINITIS: ICD-10-CM

## 2020-03-20 RX ORDER — CONJUGATED ESTROGENS 0.62 MG/G
CREAM VAGINAL
Qty: 1 TUBE | Refills: 1 | Status: SHIPPED | OUTPATIENT
Start: 2020-03-20 | End: 2021-04-02

## 2020-03-20 RX ORDER — ROPINIROLE 0.5 MG/1
TABLET, FILM COATED ORAL
Qty: 180 TAB | Refills: 0 | Status: SHIPPED | OUTPATIENT
Start: 2020-03-20 | End: 2020-05-18

## 2020-03-20 NOTE — TELEPHONE ENCOUNTER
We discussed estrogen cream per vagina for atrophic vaginitis at the time of the visit and she did not want to use at that time. Please let me know if this has changed

## 2020-03-20 NOTE — TELEPHONE ENCOUNTER
Pt called 3/20/2020 and stated she is interested in starting the estrogen cream at this time. Please send to pharmacy on file /kb

## 2020-04-14 DIAGNOSIS — M54.2 CERVICAL PAIN: ICD-10-CM

## 2020-04-14 RX ORDER — GABAPENTIN 300 MG/1
CAPSULE ORAL
Qty: 90 CAP | Refills: 0 | Status: SHIPPED | OUTPATIENT
Start: 2020-04-14 | End: 2020-04-29

## 2020-04-28 DIAGNOSIS — M54.2 CERVICAL PAIN: ICD-10-CM

## 2020-04-29 RX ORDER — GABAPENTIN 300 MG/1
CAPSULE ORAL
Qty: 270 CAP | Refills: 1 | Status: SHIPPED
Start: 2020-04-29 | End: 2020-12-11

## 2020-05-14 DIAGNOSIS — G25.81 RESTLESS LEGS: ICD-10-CM

## 2020-05-18 RX ORDER — ROPINIROLE 0.5 MG/1
0.5 TABLET, FILM COATED ORAL NIGHTLY PRN
Qty: 90 TAB | Refills: 1 | Status: SHIPPED | OUTPATIENT
Start: 2020-05-18 | End: 2020-09-15 | Stop reason: SDUPTHER

## 2020-06-16 RX ORDER — LEVOTHYROXINE SODIUM 88 UG/1
88 TABLET ORAL
Qty: 90 TAB | Refills: 1 | Status: SHIPPED | OUTPATIENT
Start: 2020-06-16 | End: 2020-12-30

## 2020-07-01 ENCOUNTER — APPOINTMENT (RX ONLY)
Dept: URBAN - METROPOLITAN AREA CLINIC 4 | Facility: CLINIC | Age: 65
Setting detail: DERMATOLOGY
End: 2020-07-01

## 2020-07-01 DIAGNOSIS — D22 MELANOCYTIC NEVI: ICD-10-CM

## 2020-07-01 DIAGNOSIS — L57.0 ACTINIC KERATOSIS: ICD-10-CM

## 2020-07-01 DIAGNOSIS — D18.0 HEMANGIOMA: ICD-10-CM

## 2020-07-01 DIAGNOSIS — L72.0 EPIDERMAL CYST: ICD-10-CM

## 2020-07-01 DIAGNOSIS — Z71.89 OTHER SPECIFIED COUNSELING: ICD-10-CM

## 2020-07-01 DIAGNOSIS — L82.1 OTHER SEBORRHEIC KERATOSIS: ICD-10-CM

## 2020-07-01 DIAGNOSIS — L81.4 OTHER MELANIN HYPERPIGMENTATION: ICD-10-CM

## 2020-07-01 PROBLEM — D18.01 HEMANGIOMA OF SKIN AND SUBCUTANEOUS TISSUE: Status: ACTIVE | Noted: 2020-07-01

## 2020-07-01 PROBLEM — D22.9 MELANOCYTIC NEVI, UNSPECIFIED: Status: ACTIVE | Noted: 2020-07-01

## 2020-07-01 PROCEDURE — ? LIQUID NITROGEN

## 2020-07-01 PROCEDURE — ? COUNSELING

## 2020-07-01 PROCEDURE — 17003 DESTRUCT PREMALG LES 2-14: CPT

## 2020-07-01 PROCEDURE — 99213 OFFICE O/P EST LOW 20 MIN: CPT | Mod: 25

## 2020-07-01 PROCEDURE — 17000 DESTRUCT PREMALG LESION: CPT

## 2020-07-01 PROCEDURE — ? SUNSCREEN RECOMMENDATIONS

## 2020-07-01 ASSESSMENT — LOCATION DETAILED DESCRIPTION DERM
LOCATION DETAILED: RIGHT LOWER CUTANEOUS LIP
LOCATION DETAILED: RIGHT PROXIMAL DORSAL RING FINGER
LOCATION DETAILED: RIGHT DORSAL MIDDLE METACARPOPHALANGEAL JOINT
LOCATION DETAILED: RIGHT RADIAL DORSAL HAND
LOCATION DETAILED: LEFT DISTAL POSTERIOR UPPER ARM
LOCATION DETAILED: RIGHT PROXIMAL DORSAL MIDDLE FINGER
LOCATION DETAILED: LEFT RADIAL DORSAL HAND
LOCATION DETAILED: LEFT PROXIMAL POSTERIOR UPPER ARM

## 2020-07-01 ASSESSMENT — LOCATION ZONE DERM
LOCATION ZONE: FINGER
LOCATION ZONE: HAND
LOCATION ZONE: LIP
LOCATION ZONE: ARM

## 2020-07-01 ASSESSMENT — LOCATION SIMPLE DESCRIPTION DERM
LOCATION SIMPLE: RIGHT MIDDLE FINGER
LOCATION SIMPLE: RIGHT RING FINGER
LOCATION SIMPLE: LEFT POSTERIOR UPPER ARM
LOCATION SIMPLE: RIGHT HAND
LOCATION SIMPLE: RIGHT LIP
LOCATION SIMPLE: LEFT HAND

## 2020-07-01 NOTE — PROCEDURE: LIQUID NITROGEN
Post-Care Instructions: I reviewed with the patient in detail post-care instructions. Patient is to wear sunprotection, and avoid picking at any of the treated lesions. Pt may apply Vaseline to crusted or scabbing areas.
Number Of Freeze-Thaw Cycles: 2 freeze-thaw cycles
Render Note In Bullet Format When Appropriate: No
Duration Of Freeze Thaw-Cycle (Seconds): 2
Detail Level: Simple
Consent: The patient's consent was obtained including but not limited to risks of crusting, scabbing, blistering, scarring, darker or lighter pigmentary change, recurrence, incomplete removal and infection.

## 2020-08-24 ENCOUNTER — OFFICE VISIT (OUTPATIENT)
Dept: MEDICAL GROUP | Facility: IMAGING CENTER | Age: 65
End: 2020-08-24
Payer: MEDICARE

## 2020-08-24 VITALS
OXYGEN SATURATION: 96 % | RESPIRATION RATE: 12 BRPM | BODY MASS INDEX: 18.81 KG/M2 | TEMPERATURE: 97.6 F | HEART RATE: 80 BPM | WEIGHT: 127 LBS | DIASTOLIC BLOOD PRESSURE: 60 MMHG | HEIGHT: 69 IN | SYSTOLIC BLOOD PRESSURE: 100 MMHG

## 2020-08-24 DIAGNOSIS — N95.2 ATROPHIC VAGINITIS: ICD-10-CM

## 2020-08-24 DIAGNOSIS — Z83.79 FAMILY HISTORY OF EOSINOPHILIC ESOPHAGITIS: ICD-10-CM

## 2020-08-24 DIAGNOSIS — M54.2 CERVICAL PAIN: ICD-10-CM

## 2020-08-24 PROCEDURE — 99213 OFFICE O/P EST LOW 20 MIN: CPT | Performed by: FAMILY MEDICINE

## 2020-08-24 SDOH — HEALTH STABILITY: MENTAL HEALTH: HOW OFTEN DO YOU HAVE A DRINK CONTAINING ALCOHOL?: 2-3 TIMES A WEEK

## 2020-08-24 ASSESSMENT — ENCOUNTER SYMPTOMS
ABDOMINAL PAIN: 0
WHEEZING: 0
DIZZINESS: 0
CHILLS: 0
VOMITING: 0
PALPITATIONS: 0
SHORTNESS OF BREATH: 0
COUGH: 0
FEVER: 0
DOUBLE VISION: 0
EYE PAIN: 0
DIARRHEA: 0
NAUSEA: 0
MYALGIAS: 0
HEADACHES: 0

## 2020-08-24 ASSESSMENT — PAIN SCALES - GENERAL: PAINLEVEL: 1=MINIMAL PAIN

## 2020-08-24 ASSESSMENT — FIBROSIS 4 INDEX: FIB4 SCORE: 1.72

## 2020-08-24 NOTE — ASSESSMENT & PLAN NOTE
I recommend removing the midday gabapentin dose for a week then if she continues with no pain she can remove the morning gabapentin dose for a week.  And so on

## 2020-08-24 NOTE — PROGRESS NOTES
"Chief Complaint   Patient presents with   • Abdominal Pain     hx of ee, triggered by sugar     HPI:  64yo female  here with concerns about abdominal pain most associated with eating sugar. Would like to review medications and endoscopy report.   H/o eosinophilic esophagitis would like to discuss.   Started premarin getting of the year. Used 21 days staighted then stopped the restarted 6 days ago for return of symptoms.  He just realized there were instructions for taper on the bottle.  Reluctant to eat acidic foods recommended by gi. She gets a low burning pain right at the LES.   Cervical injections may1 and feels much better very little pain.  During she should still take the gabapentin.  She takes 300 g 3 times a day.  Allergies   Allergen Reactions   • Doxycycline      \"stiff joints\"   • Codeine Vomiting and Nausea   • Dilaudid [Hydromorphone Hcl] Hives   • Fentanyl Vomiting and Nausea   • Percocet [Oxycodone-Acetaminophen] Vomiting   • Vicoprofen [Hydrocodone-Ibuprofen]      sweating     Current Outpatient Medications   Medication Sig Dispense Refill   • levothyroxine (SYNTHROID) 88 MCG Tab TAKE 1 TAB BY MOUTH EVERY MORNING ON AN EMPTY STOMACH. 90 Tab 1   • ROPINIRole (REQUIP) 0.5 MG Tab Take 1 Tab by mouth at bedtime as needed for up to 180 days. 90 Tab 1   • gabapentin (NEURONTIN) 300 MG Cap TAKE 1 CAPSULE ORALLY THREE TIMES DAILY AS NEEDED 270 Cap 1   • Diclofenac Sodium 1 % Gel APPLY 2 G TO SKIN AS DIRECTED 4 TIMES A DAY AS NEEDED. 100 g 0   • estrogens, conjugated (PREMARIN) 0.625 MG/GM Cream Apply 1 pea sized amount PV daily for two weeks, then every other day for one week, then 2 times per week for a week, then once weekly 1 Tube 1   • Thiamine HCl (VITAMIN B-1 PO) Take  by mouth every day.     • Ascorbic Acid (VITAMIN C PO) Take  by mouth every day.     • Multiple Vitamins-Minerals (ZINC PO) Take 1 Tab by mouth every day.     • POTASSIUM PO Take 1 Tab by mouth every day. OTC     • " "Calcium-Magnesium-Vitamin D (CALCIUM MAGNESIUM PO) Take 1 Tab by mouth every day.       No current facility-administered medications for this visit.      Social History     Tobacco Use   • Smoking status: Former Smoker     Packs/day: 0.50     Years: 8.00     Pack years: 4.00     Types: Cigarettes     Quit date: 1988     Years since quittin.6   • Smokeless tobacco: Never Used   • Tobacco comment: quit in her 20s    Substance Use Topics   • Alcohol use: Yes     Comment: reports 3/week   • Drug use: No     Family History   Problem Relation Age of Onset   • Diabetes Mother    • Stroke Maternal Grandfather        /60   Pulse 80   Temp 36.4 °C (97.6 °F)   Resp 12   Ht 1.753 m (5' 9\")   Wt 57.6 kg (127 lb)   SpO2 96%  Body mass index is 18.75 kg/m².  Review of Systems   Constitutional: Negative for chills, fever and malaise/fatigue.   HENT: Negative for hearing loss and tinnitus.    Eyes: Negative for double vision and pain.   Respiratory: Negative for cough, shortness of breath and wheezing.    Cardiovascular: Negative for chest pain, palpitations and leg swelling.   Gastrointestinal: Negative for abdominal pain, diarrhea, nausea and vomiting.   Genitourinary: Negative for dysuria and frequency.   Musculoskeletal: Negative for joint pain and myalgias.   Skin: Negative for itching and rash.   Neurological: Negative for dizziness and headaches.     Physical Exam   Constitutional: She is well-developed, well-nourished, and in no distress. No distress.   Exam is limited due to COVID-19 pandemic   HENT:   Head: Normocephalic and atraumatic.   Eyes: Pupils are equal, round, and reactive to light. Conjunctivae and EOM are normal. Right eye exhibits no discharge. Left eye exhibits no discharge. No scleral icterus.   Neck: No thyromegaly present.   Pulmonary/Chest: Effort normal. No respiratory distress.   Abdominal: She exhibits no distension.   Musculoskeletal:         General: No edema.   Neurological: She " "is alert.   Skin: Skin is warm and dry. She is not diaphoretic.   Psychiatric: Affect and judgment normal.         All labs (last 1 month):   No visits with results within 1 Month(s) from this visit.   Latest known visit with results is:   Hospital Outpatient Visit on 03/10/2020   Component Date Value Ref Range Status   • Cytology Reg 03/10/2020 See Path Report   Final    Comment: A separate pathology report will follow after the Cytology  specimen has been received by the laboratory and the results  are signed out by a pathologist.  Please see \"Pathology GYN Specimen\" order for these results.     • Source 03/10/2020 Vaginal   Final   • HPV Genotype 16 03/10/2020 Negative  Negative Final   • HPV Genotype 18 03/10/2020 Negative  Negative Final   • HPV Other High Risk Genotypes 03/10/2020 Negative  Negative Final    Comment: This test detects the high-risk HPV types 16, 18, 31, 33,  35, 39, 45, 51, 52, 56, 58, 59, 66, and 68. It is intended  for use in women 21 years and older with ASC-US cervical  cytology results and in women 30 years and older with  positive high-risk HPV results. Sensitivity may be affected  by specimen collection methods, stage of infection, and the  presence of interfering substances. Results should be  interpreted in conjunction with other available laboratory  and clinical data.         Lipids:   Lab Results   Component Value Date/Time    CHOLSTRLTOT 227 (H) 03/05/2019 07:18 AM    TRIGLYCERIDE 75 03/05/2019 07:18 AM    HDL 84 03/05/2019 07:18 AM     (H) 03/05/2019 07:18 AM       Imaging: No results found.    A/P    Return if symptoms worsen or fail to improve.    Problem List Items Addressed This Visit     Cervical pain     I recommend removing the midday gabapentin dose for a week then if she continues with no pain she can remove the morning gabapentin dose for a week.  And so on         Atrophic vaginitis     Patient does want estrogen cream.  She is using it now for 6 days.  She will " follow the taper on the bottle and we have also we reviewed that today in the office.  We discussed lowest effective dose and getting to that point.  She will get to a once weekly dose and then follow-up with me.         Family history of eosinophilic esophagitis     Informed on endoscopy 2013.  Patient reports that her symptoms have returned.  She was reluctant to eat things like her sauerkraut and other acidic foods.  This was recommended by GI.  I have encouraged her to eat low inflammatory diet by reducing animal products including dairy products.  And increasing foods like sauerkraut into her diet.  She can then follow-up with gastroenterology if she has continued symptoms.               Portions of this note may be dictated using Dragon NaturallySpeaking voice recognition software.  Variances in spelling and vocabulary are possible and unintentional.  Not all areas may be caught/corrected.  Please notify me if any discrepancies are noted or if the meaning of any statement is not correct/clear.

## 2020-08-24 NOTE — ASSESSMENT & PLAN NOTE
Informed on endoscopy 2013.  Patient reports that her symptoms have returned.  She was reluctant to eat things like her sauerkraut and other acidic foods.  This was recommended by GI.  I have encouraged her to eat low inflammatory diet by reducing animal products including dairy products.  And increasing foods like sauerkraut into her diet.  She can then follow-up with gastroenterology if she has continued symptoms.

## 2020-08-24 NOTE — ASSESSMENT & PLAN NOTE
Patient does want estrogen cream.  She is using it now for 6 days.  She will follow the taper on the bottle and we have also we reviewed that today in the office.  We discussed lowest effective dose and getting to that point.  She will get to a once weekly dose and then follow-up with me.

## 2020-09-08 RX ORDER — CARISOPRODOL 350 MG/1
TABLET ORAL
Qty: 30 TAB | Refills: 0 | OUTPATIENT
Start: 2020-09-08 | End: 2020-09-29

## 2020-09-15 ENCOUNTER — TELEPHONE (OUTPATIENT)
Dept: MEDICAL GROUP | Facility: IMAGING CENTER | Age: 65
End: 2020-09-15

## 2020-09-15 ENCOUNTER — TELEMEDICINE (OUTPATIENT)
Dept: MEDICAL GROUP | Facility: IMAGING CENTER | Age: 65
End: 2020-09-15
Payer: MEDICARE

## 2020-09-15 VITALS
BODY MASS INDEX: 18.81 KG/M2 | SYSTOLIC BLOOD PRESSURE: 101 MMHG | HEIGHT: 69 IN | HEART RATE: 85 BPM | TEMPERATURE: 98 F | DIASTOLIC BLOOD PRESSURE: 71 MMHG | WEIGHT: 127 LBS

## 2020-09-15 DIAGNOSIS — G25.81 RESTLESS LEGS: ICD-10-CM

## 2020-09-15 DIAGNOSIS — M54.2 NECK PAIN: ICD-10-CM

## 2020-09-15 PROCEDURE — 99213 OFFICE O/P EST LOW 20 MIN: CPT | Mod: 95,CR | Performed by: FAMILY MEDICINE

## 2020-09-15 RX ORDER — CARISOPRODOL 350 MG/1
TABLET ORAL
Qty: 30 TAB | Refills: 1 | Status: SHIPPED | OUTPATIENT
Start: 2020-10-09 | End: 2020-10-28

## 2020-09-15 RX ORDER — CARISOPRODOL 350 MG/1
TABLET ORAL
COMMUNITY
Start: 2020-06-22 | End: 2020-09-15 | Stop reason: SDUPTHER

## 2020-09-15 RX ORDER — ROPINIROLE 0.5 MG/1
1 TABLET, FILM COATED ORAL NIGHTLY PRN
Qty: 180 TAB | Refills: 1 | Status: SHIPPED | OUTPATIENT
Start: 2020-09-15 | End: 2021-03-08

## 2020-09-15 ASSESSMENT — FIBROSIS 4 INDEX: FIB4 SCORE: 1.72

## 2020-09-15 NOTE — PROGRESS NOTES
"Telemedicine Visit: New Patient     This encounter was conducted via Zoom.   Verbal consent was obtained. Patient's identity was verified. Patient aware this will be   billed the same as an in person evaluation.  Patient in home, I am in office at [address] [place] [address]  Subjective:     Chief Complaint   Patient presents with   • Medication Refill     Soma       Missy Stuart is a 65 y.o. female with history of RLS on virtual visit to discuss rls medications and soma muscle relaxer's.   Takes the some for muscle tension in her neck. Shes half of the 350mg when she gets tensions from chronic neck pain from two neck surgeries 6/22 script sent has 14 tabs left after 30 tabs sent. So almost three months with 15 tabs. She has tried and failed physical therapy. She still sess [facility] for injections in the neck.   She has tried flexeril with no improvement in pain.     ROS  See HPI  Constitutional: Negative for fever, chills and malaise/fatigue.   HENT: Negative for congestion, itchy watery eyes  Eyes: Negative for pain or sudden changes in vision  Respiratory: Negative for cough and shortness of breath.    Cardiovascular: Negative for leg swelling or chest pain  Gastrointestinal: Negative for nausea, vomiting, abdominal pain and diarrhea.   Genitourinary: Negative for dysuria and hematuria.   Skin: Negative for rash or concerning moles   Neurological: Negative for dizziness, focal weakness   Psychiatric/Behavioral: Negative for depression.  The patient is not nervous/anxious.    Musculoskeletal: no weakness or joint stiffness    Allergies   Allergen Reactions   • Doxycycline      \"stiff joints\"   • Codeine Vomiting and Nausea   • Dilaudid [Hydromorphone Hcl] Hives   • Fentanyl Vomiting and Nausea   • Percocet [Oxycodone-Acetaminophen] Vomiting   • Vicoprofen [Hydrocodone-Ibuprofen]      sweating       Current medicines (including changes today)  Current Outpatient Medications   Medication Sig Dispense Refill "   • B Complex Vitamins (B COMPLEX PO) Take  by mouth.     • Multiple Vitamins-Minerals (MULTIVITAMIN ADULT PO) Take  by mouth.     • levothyroxine (SYNTHROID) 88 MCG Tab TAKE 1 TAB BY MOUTH EVERY MORNING ON AN EMPTY STOMACH. 90 Tab 1   • ROPINIRole (REQUIP) 0.5 MG Tab Take 1 Tab by mouth at bedtime as needed for up to 180 days. 90 Tab 1   • gabapentin (NEURONTIN) 300 MG Cap TAKE 1 CAPSULE ORALLY THREE TIMES DAILY AS NEEDED 270 Cap 1   • Diclofenac Sodium 1 % Gel APPLY 2 G TO SKIN AS DIRECTED 4 TIMES A DAY AS NEEDED. 100 g 0   • estrogens, conjugated (PREMARIN) 0.625 MG/GM Cream Apply 1 pea sized amount PV daily for two weeks, then every other day for one week, then 2 times per week for a week, then once weekly 1 Tube 1   • Ascorbic Acid (VITAMIN C PO) Take  by mouth every day.     • Multiple Vitamins-Minerals (ZINC PO) Take 1 Tab by mouth every day.     • POTASSIUM PO Take 1 Tab by mouth every day. OTC     • Calcium-Magnesium-Vitamin D (CALCIUM MAGNESIUM PO) Take 1 Tab by mouth every day.     • carisoprodol (SOMA) 350 MG Tab TAKE 1 TABLET BY MOUTH TWO TIMES A DAY AS NEEDED FOR MUSCLE SPASM 21 DAY SUPPLY M54.2       No current facility-administered medications for this visit.        She  has a past medical history of Cataract, Cervical pain (2020), Hyperlipidemia (2020), Hypothyroidism (2018), and Restless legs (2018).  She  has a past surgical history that includes pr enlarge breast with implant ( & ); cataract phaco with iol (2014); other orthopedic surgery (4871-0010); thyroidectomy (); tube & ectopic preg., removal (); and eye lesion excision (Left, 3/2/2020).      Family History   Problem Relation Age of Onset   • Diabetes Mother    • Stroke Maternal Grandfather      Family Status   Relation Name Status   • Mo     • Fa  Alive, age 91y   • MGFa  (Not Specified)       Patient Active Problem List    Diagnosis Date Noted   • Family history of eosinophilic  "esophagitis 08/24/2020   • Atrophic vaginitis 03/17/2020   • Macrocytosis without anemia 03/09/2020   • Hypotension due to hypovolemia 11/05/2019   • Dysfunction of right eustachian tube 11/05/2019   • Hyperlipidemia 03/07/2019   • Restless legs 11/19/2018   • Hypothyroidism 11/19/2018   • Cervical pain           Objective:   Vitals obtained by patient:  /71 Comment: pt stated  Pulse 85   Temp 36.7 °C (98 °F)   Ht 1.753 m (5' 9\")   Wt 57.6 kg (127 lb)   LMP  (LMP Unknown)   BMI 18.75 kg/m²     Physical Exam:  Constitutional: Alert, no distress, well-groomed.  Skin: No rashes in visible areas.  Eye: Round. Conjunctiva clear, lids normal. No icterus.   ENMT: Lips pink without lesions, good dentition, moist mucous membranes. Phonation normal.  Neck: No masses, no thyromegaly. Moves freely without pain.  CV: Pulse as reported by patient  Respiratory: Unlabored respiratory effort, no cough or audible wheeze  Psych: Alert and oriented x3, normal affect and mood.   Neuro: symmetric face. Alert and oriented. Follows commands. No aphasia or dysarthria.    Labs:  No visits with results within 1 Month(s) from this visit.   Latest known visit with results is:   Hospital Outpatient Visit on 03/10/2020   Component Date Value Ref Range Status   • Cytology Reg 03/10/2020 See Path Report   Final    Comment: A separate pathology report will follow after the Cytology  specimen has been received by the laboratory and the results  are signed out by a pathologist.  Please see \"Pathology GYN Specimen\" order for these results.     • Source 03/10/2020 Vaginal   Final   • HPV Genotype 16 03/10/2020 Negative  Negative Final   • HPV Genotype 18 03/10/2020 Negative  Negative Final   • HPV Other High Risk Genotypes 03/10/2020 Negative  Negative Final    Comment: This test detects the high-risk HPV types 16, 18, 31, 33,  35, 39, 45, 51, 52, 56, 58, 59, 66, and 68. It is intended  for use in women 21 years and older with ASC-US " cervical  cytology results and in women 30 years and older with  positive high-risk HPV results. Sensitivity may be affected  by specimen collection methods, stage of infection, and the  presence of interfering substances. Results should be  interpreted in conjunction with other available laboratory  and clinical data.         Imaging:   No results found.    Assessment and Plan:   The following treatment plan was discussed:   Problem List Items Addressed This Visit     Restless legs    Relevant Medications    ROPINIRole (REQUIP) 0.5 MG Tab      Other Visit Diagnoses     Neck pain        Relevant Medications    carisoprodol (SOMA) 350 MG Tab (Start on 10/9/2020)        Follow-up: Return if symptoms worsen or fail to improve.        Portions of this note may be dictated using Dragon NaturallySpeaking voice recognition software.  Variances in spelling and vocabulary are possible and unintentional.  Not all areas may be caught/corrected.  Please notify me if any discrepancies are noted or if the meaning of any statement is not correct/clear.

## 2020-09-15 NOTE — TELEPHONE ENCOUNTER
Regarding: FW: refill reuest  We did not talk about that medication and it is not in her medication list in that visit. It is a scheduled 4 drug and the law. It can be virtual visit. I will not be able to cover everything at one visit and it is common with multiple concerns to need follow up visits.    ----- Message -----  From: Lucia Mata  Sent: 9/15/2020   8:38 AM PDT  To: Daksha Andrade M.D.  Subject: refill reuest                                    ----- Message from Lucia Mata sent at 9/15/2020  8:38 AM PDT -----  Patient saw you on August 24th for follow-up. She does not want to do another one.     ----- Message sent from Lucia Mata to Missy Stuart at 9/8/2020  3:13 PM -----   Myron Louis,    We received a refill request for you carisoprodol prescription. Since this is a controlled substance, Dr. Andrade would need you to schedule an appointment with her.   Please give us a call at [phone] to schedule.     Lucia Kuhn MA

## 2020-09-15 NOTE — TELEPHONE ENCOUNTER
Dr. Andrade requesting patient to have virtual appointment for refill of Soma.     Notified patient. Scheduled today at 2:40pm.

## 2020-10-14 DIAGNOSIS — J34.9 SINUS DISEASE: ICD-10-CM

## 2020-10-14 RX ORDER — AZELASTINE 1 MG/ML
1 SPRAY, METERED NASAL 2 TIMES DAILY
Qty: 30 ML | Refills: 3 | Status: SHIPPED | OUTPATIENT
Start: 2020-10-14 | End: 2021-02-10 | Stop reason: SDUPTHER

## 2020-10-14 NOTE — TELEPHONE ENCOUNTER
Received message from patient regarding new prescription for azelastine nasal spray. Patient states she uses nasal spray as needed and her current prescription is going to  soon. She would like a new one sent in to [facility].

## 2020-11-12 ENCOUNTER — HOSPITAL ENCOUNTER (OUTPATIENT)
Dept: LAB | Facility: MEDICAL CENTER | Age: 65
End: 2020-11-12
Attending: SPECIALIST
Payer: MEDICARE

## 2020-11-12 LAB — CREAT SERPL-MCNC: 0.65 MG/DL (ref 0.5–1.4)

## 2020-11-12 PROCEDURE — 36415 COLL VENOUS BLD VENIPUNCTURE: CPT

## 2020-11-12 PROCEDURE — 82565 ASSAY OF CREATININE: CPT

## 2020-11-14 ENCOUNTER — HOSPITAL ENCOUNTER (OUTPATIENT)
Dept: RADIOLOGY | Facility: MEDICAL CENTER | Age: 65
End: 2020-11-14
Attending: SPECIALIST
Payer: MEDICARE

## 2020-11-14 DIAGNOSIS — M96.1 CERVICAL POSTLAMINECTOMY SYNDROME: ICD-10-CM

## 2020-11-14 PROCEDURE — A9576 INJ PROHANCE MULTIPACK: HCPCS

## 2020-11-14 PROCEDURE — 72156 MRI NECK SPINE W/O & W/DYE: CPT

## 2020-11-14 PROCEDURE — 700117 HCHG RX CONTRAST REV CODE 255

## 2020-11-14 RX ADMIN — GADOTERIDOL 10 ML: 279.3 INJECTION, SOLUTION INTRAVENOUS at 08:00

## 2020-12-11 ENCOUNTER — TELEMEDICINE (OUTPATIENT)
Dept: MEDICAL GROUP | Facility: IMAGING CENTER | Age: 65
End: 2020-12-11
Payer: MEDICARE

## 2020-12-11 VITALS
DIASTOLIC BLOOD PRESSURE: 84 MMHG | HEIGHT: 69 IN | BODY MASS INDEX: 18.96 KG/M2 | HEART RATE: 90 BPM | SYSTOLIC BLOOD PRESSURE: 117 MMHG | WEIGHT: 128 LBS

## 2020-12-11 DIAGNOSIS — R55 VASOVAGAL SYNCOPE: ICD-10-CM

## 2020-12-11 DIAGNOSIS — K20.0 EOSINOPHILIC ESOPHAGITIS: ICD-10-CM

## 2020-12-11 DIAGNOSIS — M54.2 CERVICAL PAIN: ICD-10-CM

## 2020-12-11 PROBLEM — Z83.79 FAMILY HISTORY OF EOSINOPHILIC ESOPHAGITIS: Status: RESOLVED | Noted: 2020-08-24 | Resolved: 2020-12-11

## 2020-12-11 PROCEDURE — 99214 OFFICE O/P EST MOD 30 MIN: CPT | Mod: 95,CR | Performed by: FAMILY MEDICINE

## 2020-12-11 RX ORDER — GABAPENTIN 300 MG/1
CAPSULE ORAL
Qty: 270 CAP | Refills: 1 | Status: CANCELLED
Start: 2020-12-11

## 2020-12-11 RX ORDER — CARISOPRODOL 350 MG/1
TABLET ORAL
COMMUNITY
Start: 2020-11-18 | End: 2021-01-25

## 2020-12-11 RX ORDER — GABAPENTIN 100 MG/1
300 CAPSULE ORAL 3 TIMES DAILY
Qty: 810 CAP | Refills: 1 | Status: SHIPPED | OUTPATIENT
Start: 2020-12-11 | End: 2021-08-04 | Stop reason: SDUPTHER

## 2020-12-11 SDOH — HEALTH STABILITY: MENTAL HEALTH: HOW OFTEN DO YOU HAVE A DRINK CONTAINING ALCOHOL?: NOT ASKED

## 2020-12-11 ASSESSMENT — FIBROSIS 4 INDEX: FIB4 SCORE: 1.72

## 2020-12-11 ASSESSMENT — PAIN SCALES - GENERAL: PAINLEVEL: NO PAIN

## 2020-12-11 NOTE — PROGRESS NOTES
"Telemedicine Visit: New Patient     This encounter was conducted via Zoom.   Verbal consent was obtained. Patient's identity was verified. Patient aware this will be   billed the same as an in person evaluation.  Patient in home, I am in office at [address] [place] [address]  Subjective:     Chief Complaint   Patient presents with   • Medication Management   • Syncope     2 times, related to neck pain?       Missy Stuart is a 65 y.o. female with history of cervical pain, rls, hypothyroidism, hld, hypotension on virtual visit to discuss gabapentin refill and premarin cream wants to taper off.   She does have two syncope episodes one of them she had a loss of consciousness. Previously she would get dizzy and sit on the floor until it passed. She says since she got neck injections for her neck pain the syncope and dizziness has not occurred. She feels much better.   Stomach upset she says she takes omeprazole. She is on day 11 now and reports this time it is not helping. She has confirmed Eosinophilic esophagitis.she gets heart burn symptoms.     ROS  See HPI  Constitutional: Negative for fever, chills and malaise/fatigue.   HENT: Negative for congestion, itchy watery eyes  Eyes: Negative for pain or sudden changes in vision  Respiratory: Negative for cough and shortness of breath.    Cardiovascular: Negative for leg swelling or chest pain  Gastrointestinal: Negative for nausea, vomiting, abdominal pain and diarrhea.   Genitourinary: Negative for dysuria and hematuria.   Skin: Negative for rash or concerning moles   Neurological: Negative for dizziness, focal weakness   Psychiatric/Behavioral: Negative for depression.  The patient is not nervous/anxious.    Musculoskeletal: no weakness or joint stiffness    Allergies   Allergen Reactions   • Doxycycline      \"stiff joints\"   • Codeine Vomiting and Nausea   • Dilaudid [Hydromorphone Hcl] Hives   • Fentanyl Vomiting and Nausea   • Percocet " [Oxycodone-Acetaminophen] Vomiting   • Vicoprofen [Hydrocodone-Ibuprofen]      sweating       Current medicines (including changes today)  Current Outpatient Medications   Medication Sig Dispense Refill   • carisoprodol (SOMA) 350 MG Tab TAKE 1 TABLET BY MOUTH TWO TIMES A DAY AS NEEDED FOR MUSCLE SPASM 21 DAY SUPPLY M54.2     • OMEPRAZOLE PO Take  by mouth.     • gabapentin (NEURONTIN) 100 MG Cap Take 3 Caps by mouth 3 times a day. 810 Cap 1   • azelastine (ASTELIN) 137 MCG/SPRAY nasal spray [place] 1 Spray in nose 2 times a day. 30 mL 3   • Diclofenac Sodium 1 % Gel APPLY 2 G TO SKIN AS DIRECTED 4 TIMES A DAY AS NEEDED. 100 g 0   • ROPINIRole (REQUIP) 0.5 MG Tab Take 2 Tabs by mouth at bedtime as needed for up to 180 days. 180 Tab 1   • B Complex Vitamins (B COMPLEX PO) Take  by mouth.     • Multiple Vitamins-Minerals (MULTIVITAMIN ADULT PO) Take  by mouth.     • levothyroxine (SYNTHROID) 88 MCG Tab TAKE 1 TAB BY MOUTH EVERY MORNING ON AN EMPTY STOMACH. 90 Tab 1   • estrogens, conjugated (PREMARIN) 0.625 MG/GM Cream Apply 1 pea sized amount PV daily for two weeks, then every other day for one week, then 2 times per week for a week, then once weekly 1 Tube 1   • Ascorbic Acid (VITAMIN C PO) Take  by mouth every day.     • Multiple Vitamins-Minerals (ZINC PO) Take 1 Tab by mouth every day.     • POTASSIUM PO Take 1 Tab by mouth every day. OTC     • Calcium-Magnesium-Vitamin D (CALCIUM MAGNESIUM PO) Take 1 Tab by mouth every day.       No current facility-administered medications for this visit.        She  has a past medical history of Cataract, Cervical pain (02/28/2020), Hyperlipidemia (02/28/2020), Hypothyroidism (11/19/2018), and Restless legs (11/19/2018).  She  has a past surgical history that includes pr enlarge breast with implant (1979 & 2018); cataract phaco with iol (5/21/2014); other orthopedic surgery (7980-2373); thyroidectomy (1983); tube & ectopic preg., removal (1987); and eye lesion excision (Left,  "3/2/2020).      Family History   Problem Relation Age of Onset   • Diabetes Mother    • Stroke Maternal Grandfather      Family Status   Relation Name Status   • Mo     • Fa  Alive, age 92y   • MGFa  (Not Specified)       Patient Active Problem List    Diagnosis Date Noted   • Eosinophilic esophagitis 2020   • Atrophic vaginitis 2020   • Macrocytosis without anemia 2020   • Hypotension due to hypovolemia 2019   • Dysfunction of right eustachian tube 2019   • Hyperlipidemia 2019   • Restless legs 2018   • Hypothyroidism 2018   • Cervical pain           Objective:   Vitals obtained by patient:  /84 Comment: pt reported  Pulse 90   Ht 1.753 m (5' 9\")   Wt 58.1 kg (128 lb)   LMP  (LMP Unknown)   BMI 18.90 kg/m²     Physical Exam:  Constitutional: Alert, no distress, well-groomed.  Skin: No rashes in visible areas.  Eye: Round. Conjunctiva clear, lids normal. No icterus.   ENMT: Lips pink without lesions, good dentition, moist mucous membranes. Phonation normal.  Neck: No masses, no thyromegaly. Moves freely without pain.  CV: Pulse as reported by patient  Respiratory: Unlabored respiratory effort, no cough or audible wheeze  Psych: Alert and oriented x3, normal affect and mood.   Neuro: symmetric face. Alert and oriented. Follows commands. No aphasia or dysarthria.    Labs:  Hospital Outpatient Visit on 2020   Component Date Value Ref Range Status   • Creatinine 2020 0.65  0.50 - 1.40 mg/dL Final   • GFR If  2020 >60  >60 mL/min/1.73 m 2 Final   • GFR If Non  2020 >60  >60 mL/min/1.73 m 2 Final       Imaging:   Mr-cervical Spine-with & W/o    Result Date: 11/15/2020  2020 7:27 AM HISTORY/REASON FOR EXAM:  Chronic neck pain. TECHNIQUE/EXAM DESCRIPTION: MRI of the cervical spine without and with contrast. The study was performed on a [employer] 1.5 Sophia MRI scanner. T1 sagittal, T2 fast " spin-echo sagittal, and gradient echo axial images were obtained of the cervical spine. T1 post-contrast fat suppressed sagittal images were obtained of the cervical spine. Optional T1 post-contrast axial images may be obtained. 10 mL ProHance contrast was administered intravenously. COMPARISON: 5/8/2019 FINDINGS: There are postsurgical changes as evidenced by anterior fusion of C4, C5 and C6 vertebral bodies. The craniovertebral junction is well aligned. At the level of C2-3, there is no spinal or neural foraminal stenosis. At the level of C3-4, there is mild diffuse disc bulge without significant spinal or neural foraminal stenosis. At the level of C4-5, there is no spinal or neural foraminal stenosis. At the level of C5-6, there is no spinal or neural foraminal stenosis. At the level of C6-7, there is no spinal or neural foraminal stenosis. At the level of C7-T1, there is no spinal or neural foraminal stenosis. The visualized posterior fossa structures appear normal within limits.  The cervical spinal cord does not demonstrate any mass or abnormal T2 signal intensity. The visualized pre-and paraspinal soft tissues appear normal within limits. There is no abnormal contrast enhancement.     Postsurgical and minimal degenerative disease in the cervical spine as described above. There is no significant spinal or neural foraminal stenosis. There has been no significant interval change.      Assessment and Plan:   The following treatment plan was discussed:   -Now that cervical neck pain improved with injections. Will send aaron in 100mg tabs. She can trial 200mg tid if she feels comfortable. Currently on 300mg tid.   -will monitor syncopal episodes which have resolved with the dizziness. She will reach out to me if she starts to feel dizzy again.   -encouraged to eat acidic foods again. She is again reluctant to do this. Though after discussion about this at prior visit she did try eating acidic foods and reports that  it did help. Encouraged to do this again. Then if no improvement will follow up with her GI.   -syncopal episodes at this time thought to be due to pain from the neck since it has completely resolved after neck injections.   Problem List Items Addressed This Visit     Cervical pain    Relevant Medications    gabapentin (NEURONTIN) 100 MG Cap    Eosinophilic esophagitis      Other Visit Diagnoses     Vasovagal syncope              Follow-up: Return for annual.        Portions of this note may be dictated using Dragon NaturallySpeaAvancert voice recognition software.  Variances in spelling and vocabulary are possible and unintentional.  Not all areas may be caught/corrected.  Please notify me if any discrepancies are noted or if the meaning of any statement is not correct/clear.

## 2020-12-16 ENCOUNTER — TELEPHONE (OUTPATIENT)
Dept: MEDICAL GROUP | Facility: IMAGING CENTER | Age: 65
End: 2020-12-16

## 2020-12-16 NOTE — TELEPHONE ENCOUNTER
Patient calling in with questions regarding gabapentin. Patient states she was taking 300 mg up to three times a day but her new prescription is for 100 mg tablets. Patient states she can not remember why Dr. Andrade lowered her dose.  Notified patient based on her note, Dr. Andrade noted patients cervical neck pain was improved with injections. Would like patient to try a trail of lowering dose to 200 mg three times a day and possibly down to 100 mg.   No further questions at this time.

## 2020-12-30 RX ORDER — LEVOTHYROXINE SODIUM 88 UG/1
88 TABLET ORAL
Qty: 90 TAB | Refills: 0 | Status: SHIPPED | OUTPATIENT
Start: 2020-12-30 | End: 2021-04-02

## 2021-01-28 ENCOUNTER — HOSPITAL ENCOUNTER (OUTPATIENT)
Dept: RADIOLOGY | Facility: MEDICAL CENTER | Age: 66
End: 2021-01-28
Attending: FAMILY MEDICINE
Payer: MEDICARE

## 2021-01-28 DIAGNOSIS — Z12.31 VISIT FOR SCREENING MAMMOGRAM: ICD-10-CM

## 2021-01-28 PROCEDURE — 77063 BREAST TOMOSYNTHESIS BI: CPT

## 2021-02-10 DIAGNOSIS — J34.9 SINUS DISEASE: ICD-10-CM

## 2021-02-10 RX ORDER — AZELASTINE 1 MG/ML
1 SPRAY, METERED NASAL 2 TIMES DAILY
Qty: 30 ML | Refills: 3 | Status: SHIPPED | OUTPATIENT
Start: 2021-02-10 | End: 2021-04-21

## 2021-03-03 DIAGNOSIS — Z23 NEED FOR VACCINATION: ICD-10-CM

## 2021-03-08 DIAGNOSIS — G25.81 RESTLESS LEGS: ICD-10-CM

## 2021-03-08 RX ORDER — ROPINIROLE 0.5 MG/1
TABLET, FILM COATED ORAL
Qty: 90 TABLET | Refills: 1 | Status: SHIPPED | OUTPATIENT
Start: 2021-03-08 | End: 2021-06-21 | Stop reason: SDUPTHER

## 2021-03-26 ENCOUNTER — OFFICE VISIT (OUTPATIENT)
Dept: MEDICAL GROUP | Facility: IMAGING CENTER | Age: 66
End: 2021-03-26
Payer: MEDICARE

## 2021-03-26 VITALS
SYSTOLIC BLOOD PRESSURE: 96 MMHG | TEMPERATURE: 97.3 F | WEIGHT: 128 LBS | HEIGHT: 69 IN | BODY MASS INDEX: 18.96 KG/M2 | DIASTOLIC BLOOD PRESSURE: 60 MMHG | OXYGEN SATURATION: 98 % | RESPIRATION RATE: 12 BRPM | HEART RATE: 88 BPM

## 2021-03-26 DIAGNOSIS — Z78.0 POSTMENOPAUSAL STATUS (AGE-RELATED) (NATURAL): ICD-10-CM

## 2021-03-26 DIAGNOSIS — R55 SYNCOPE, UNSPECIFIED SYNCOPE TYPE: ICD-10-CM

## 2021-03-26 DIAGNOSIS — D75.89 MACROCYTOSIS WITHOUT ANEMIA: ICD-10-CM

## 2021-03-26 DIAGNOSIS — Z13.220 SCREENING CHOLESTEROL LEVEL: ICD-10-CM

## 2021-03-26 DIAGNOSIS — I95.2 HYPOTENSION DUE TO DRUGS: ICD-10-CM

## 2021-03-26 DIAGNOSIS — N95.1 MENOPAUSAL STATE: ICD-10-CM

## 2021-03-26 DIAGNOSIS — E78.5 HYPERLIPIDEMIA, UNSPECIFIED HYPERLIPIDEMIA TYPE: ICD-10-CM

## 2021-03-26 DIAGNOSIS — E03.9 HYPOTHYROIDISM, UNSPECIFIED TYPE: ICD-10-CM

## 2021-03-26 DIAGNOSIS — Z13.1 DIABETES MELLITUS SCREENING: ICD-10-CM

## 2021-03-26 PROCEDURE — 99213 OFFICE O/P EST LOW 20 MIN: CPT | Performed by: FAMILY MEDICINE

## 2021-03-26 ASSESSMENT — ENCOUNTER SYMPTOMS
HEADACHES: 0
COUGH: 0
NAUSEA: 0
ABDOMINAL PAIN: 0
FEVER: 0
GENERAL WELL-BEING: GOOD
SHORTNESS OF BREATH: 0
DIARRHEA: 0
WHEEZING: 0
MYALGIAS: 0
VOMITING: 0
DIZZINESS: 1
CHILLS: 0
PALPITATIONS: 0
EYE PAIN: 0
DOUBLE VISION: 0

## 2021-03-26 ASSESSMENT — FIBROSIS 4 INDEX: FIB4 SCORE: 1.72

## 2021-03-26 ASSESSMENT — ACTIVITIES OF DAILY LIVING (ADL): BATHING_REQUIRES_ASSISTANCE: 1

## 2021-03-26 ASSESSMENT — PATIENT HEALTH QUESTIONNAIRE - PHQ9: CLINICAL INTERPRETATION OF PHQ2 SCORE: 0

## 2021-03-26 ASSESSMENT — PAIN SCALES - GENERAL: PAINLEVEL: NO PAIN

## 2021-03-26 NOTE — ASSESSMENT & PLAN NOTE
Possible secondary to polypharmacy. We have done her annual today as well as syncopal workup. Pt to return to office after workup to discuss medications. Medications of concern soma, gabapentin, and ropinirole.

## 2021-03-26 NOTE — PROGRESS NOTES
Subjective:     CC:   Chief Complaint   Patient presents with   • Annual Exam       HPI:   Missy Stuart is a 65 y.o. female who presents for annual exam. She is feeling well and denies any complaints.    Ob-Gyn/ History:    Patient has GYN provider: {YES/NO:63}  Last Pap Smear:  ***. *** history of abnormal pap smears.  Gyn Surgery:  ***.  Current Contraceptive Method:  ***. *** currently sexually active.  No vaginal discharge  Post-menopausal bleeding: ***  Urinary incontinence: ***  Folate intake: *** {F childbearing age folate 400-800 mcg daily. USPSTF: A}  Interested in STI screening less than 25yo or at risk behavior: no  Safe in relationship.     Health Maintenance  Advanced directive: *** {age >=65}  Osteoporosis Screen/ DEXA: *** {once 65 + or risk factors, frax tool}  PT/vit D for falls prevention: *** {65+ if higher risk for falls}  Aspirin Use: ***  The 10-year ASCVD risk score (Karen HUMBERTO Jr., et al., 2013) is: 4.2%  {50-59 for the primary prevention of cardiovascular disease (CVD) and colorectal cancer (CRC) if: 10% or greater 10-year CVD risk, not at increased risk for bleeding, life expectancy >10 years, and willing to take low-dose aspirin daily for 10 years. New uspstf rec nov 2019 age 50-59 most beneficial for primary. 60-69 discussion with primary for primary. USPSTF: B ACC  consider no use in primary prevention, consider primary use with higher ascvd age 40-71yo without increased bleeding risk. Do no use as primary in those over 71yo, avoid in those who have h/o gi bleed, pud, over 71yo, thrombocytopenia, coagulopathy, ckd, those with nsaid, steroids, anticoagulant use}   Diet: moderately healthy  Exercise: active  Substance Abuse: *** {counseling if alcohol misuse. USPSTF: B}  Seat belts, bike helmet, gun safety discussed.  Sun protection used.    Cancer screening  Colorectal Cancer Screening: ***  {45-75 yearly FIT testing, colonoscopy every 10 years, flex sig every 5 years, Cologuard 3  years , virtual ct colonography 5yrs, USPSTF: (new 46yo from [facility]COG) A. 45-75 if /. PN Policy}  Lung Cancer Screening: ***  {55-80 yearly screen with 30 pack-years, currently smoke or quit <15 years. USPSTF: B}  Breast Cancer Screening: *** {50-75 mammography every 2 years. USPSTF: B.  For age 40-50: out of 1000 women, 1 with BC saved.  2 will be dx/tx that wouldn't have caused problems or needed tx. 67 with normal bx. 576 with false positive mammo}    Infectious disease screening/Immunizations  --STI Screening: ***   --Practices safe sex.  --HIV Screening: ***   --Immunizations:    ***    She  has a past medical history of Cataract, Cervical pain (2020), Hyperlipidemia (2020), Hypothyroidism (2018), and Restless legs (2018).  She  has a past surgical history that includes pr breast augmentation with implant ( & ); cataract phaco with iol (2014); other orthopedic surgery (1641-8217); thyroidectomy (); tube & ectopic preg., removal (); and eye lesion excision (Left, 3/2/2020).    Family History   Problem Relation Age of Onset   • Diabetes Mother    • Stroke Maternal Grandfather        Social History     Socioeconomic History   • Marital status:      Spouse name: Not on file   • Number of children: Not on file   • Years of education: Not on file   • Highest education level: Not on file   Occupational History   • Not on file   Tobacco Use   • Smoking status: Former Smoker     Packs/day: 0.50     Years: 8.00     Pack years: 4.00     Types: Cigarettes     Quit date: 1988     Years since quittin.2   • Smokeless tobacco: Never Used   • Tobacco comment: quit in her 20s    Substance and Sexual Activity   • Alcohol use: Not Currently     Comment: reports 3/week   • Drug use: No   • Sexual activity: Yes   Other Topics Concern   • Not on file   Social History Narrative    Retired dental hygiene      Social Determinants of Health      Financial Resource Strain:    • Difficulty of Paying Living Expenses:    Food Insecurity:    • Worried About Running Out of Food in the Last Year:    • Ran Out of Food in the Last Year:    Transportation Needs:    • Lack of Transportation (Medical):    • Lack of Transportation (Non-Medical):    Physical Activity:    • Days of Exercise per Week:    • Minutes of Exercise per Session:    Stress:    • Feeling of Stress :    Social Connections:    • Frequency of Communication with Friends and Family:    • Frequency of Social Gatherings with Friends and Family:    • Attends Islam Services:    • Active Member of Clubs or Organizations:    • Attends Club or Organization Meetings:    • Marital Status:    Intimate Partner Violence:    • Fear of Current or Ex-Partner:    • Emotionally Abused:    • Physically Abused:    • Sexually Abused:        Patient Active Problem List    Diagnosis Date Noted   • Eosinophilic esophagitis 12/11/2020   • Atrophic vaginitis 03/17/2020   • Macrocytosis without anemia 03/09/2020   • Hypotension due to hypovolemia 11/05/2019   • Dysfunction of right eustachian tube 11/05/2019   • Hyperlipidemia 03/07/2019   • Restless legs 11/19/2018   • Hypothyroidism 11/19/2018   • Cervical pain          Current Outpatient Medications   Medication Sig Dispense Refill   • ROPINIRole (REQUIP) 0.5 MG Tab TAKE 1 TAB BY MOUTH AT BEDTIME AS NEEDED FOR UP  DAYS. 90 tablet 1   • azelastine (ASTELIN) 137 MCG/SPRAY nasal spray Administer 1 Spray into affected nostril(S) 2 times a day. 30 mL 3   • diclofenac sodium 1 % Gel APPLY 2 G TO SKIN AS DIRECTED 4 TIMES A DAY AS NEEDED. 100 g 0   • levothyroxine (SYNTHROID) 88 MCG Tab TAKE 1 TAB BY MOUTH EVERY MORNING ON AN EMPTY STOMACH. 90 Tab 0   • OMEPRAZOLE PO Take  by mouth.     • gabapentin (NEURONTIN) 100 MG Cap Take 3 Caps by mouth 3 times a day. 810 Cap 1   • B Complex Vitamins (B COMPLEX PO) Take  by mouth.     • Multiple Vitamins-Minerals (MULTIVITAMIN  "ADULT PO) Take  by mouth.     • estrogens, conjugated (PREMARIN) 0.625 MG/GM Cream Apply 1 pea sized amount PV daily for two weeks, then every other day for one week, then 2 times per week for a week, then once weekly 1 Tube 1   • Ascorbic Acid (VITAMIN C PO) Take  by mouth every day.     • Multiple Vitamins-Minerals (ZINC PO) Take 1 Tab by mouth every day.     • POTASSIUM PO Take 1 Tab by mouth every day. OTC     • Calcium-Magnesium-Vitamin D (CALCIUM MAGNESIUM PO) Take 1 Tab by mouth every day.       No current facility-administered medications for this visit.     Allergies   Allergen Reactions   • Doxycycline      \"stiff joints\"   • Codeine Vomiting and Nausea   • Dilaudid [Hydromorphone Hcl] Hives   • Fentanyl Vomiting and Nausea   • Percocet [Oxycodone-Acetaminophen] Vomiting   • Vicoprofen [Hydrocodone-Ibuprofen]      sweating       Review of Systems ***  Constitutional: Negative for fever, chills, unexplained weight loss, night sweats  HENT: Negative for congestion.    Eyes: Negative for pain or sudden vision changes   Respiratory: Negative for cough and shortness of breath.    Cardiovascular: Negative for leg swelling or chest pain  Gastrointestinal: Negative for nausea, vomiting, abdominal pain and diarrhea.   Genitourinary: Negative for dysuria and hematuria.   Skin: Negative for rash or concerning moles   Neurological: Negative for dizziness, focal weakness and headaches.   Psychiatric/Behavioral: Negative for depression.  The patient is not nervous/anxious.      Objective:     LMP  (LMP Unknown)   There is no height or weight on file to calculate BMI.  Wt Readings from Last 4 Encounters:   12/11/20 58.1 kg (128 lb)   09/15/20 57.6 kg (127 lb)   08/24/20 57.6 kg (127 lb)   03/10/20 59 kg (130 lb)       Physical Exam:  Constitutional: Well-developed and well-nourished. Not diaphoretic. No distress.   Skin: Skin is warm and dry. No rash noted.  Head: Atraumatic without lesions.  Eyes: Conjunctivae and " extraocular motions are normal. Pupils are equal, round, and reactive to light and accomodation. No scleral icterus.   Ears:  External ears unremarkable b/l. Tympanic membranes clear and intact b/l  Mouth/Throat: deferred during covid 19 pandemic asked patient if any concerns lesions or questions  Neck: Supple, trachea midline. Normal range of motion. No thyromegaly present. No lymphadenopathy--cervical or supraclavicular.  Cardiovascular: Regular rate and rhythm, S1 and S2 without murmur, rubs, or gallops.  Lungs: Normal inspiratory effort, CTA bilaterally, no wheezes/rhonchi/rales  Breast: Breasts examined seated and supine. No skin changes, peau d'orange or nipple retraction. No discharge. No axillary or supraclavicular adenopathy. No masses or nodularity palpable. ***  Abdomen: Soft, non tender, and without distention. Active normal bowel sounds. No rebound, guarding, masses or HSM.  :Perineum and external genitalia normal without rash. Vagina with {GYN VAGINAL DISCHARGE:721} discharge. Cervix without visible lesions or discharge. pelvic exam without adnexal masses or cervical motion tenderness.***  Extremities: No cyanosis, clubbing, erythema, nor edema. Distal pulses intact and symmetric.   Musculoskeletal: All major joints AROM full in all directions without pain.  Neurological: Alert and oriented x 3.  Psychiatric:  Behavior, mood, and affect are appropriate.    A chaperone was offered to the patient during today's exam. {CHAPERONE:68570}    Assessment and Plan:     No problems updated.  Problem List Items Addressed This Visit     None          Follow-up: PRN concerns and for annual screenings once per year    -Smoking cessation discussed if smoking and encouraged to come to office if quit and tempted or restarts smoking if pertinent to this patient. We discourage use of electronic smoking devises.   -Discussed healthy drinking habits if over 7 for females, 14 for males per week or more than 4 in one  day.  -Discussed healthy eating habits, exercise, being physically active, healthy bmi below 25  -patient to provide ages of family members when they were diagnosed with cancer , especially breast and ovarian, pancreatic cancers.  -Reviewed pap history in female patients, if they have a gynecologist they are encouraged to follow up with that doctor for annual pelvic and breast exams. If they prefer to see me for women's health, I will perform pap smear with hpv dna testing, pelvic, and breast exam, these and male genital exams are always done in the presence of a medical assistant and with verbal permission from the patient.   -Colonoscopy history reviewed with those over 46yo or those with early family h/o colon cancer. If patient is due we provide them with various colon cancer screen modalities and relevant information to help the patient decide which is best for them.   -Mammograms recommended yearly for women over 41yo. Risks and benefits are reviewed and discussed with the patient and mammogram script provided.   -PSA discussed with males with family history of prostate cancer or those concerned. The decision to order this test is made with the patient.   -If patient prescribed medicines then told to review package insert for any warnings, side effects, contra-indications and medication vs medication reactions.   -STD testing added to lab work due to patients age per guideline recommendations  -Patients screened for anxiety and depression. If positive screening patients are offered behavioral health services, medications, and tools to improve mood.   -to improve bone health take calcium and vitamin D, perform weight-bearing exercise, in addition we can discuss additional medications if needed including bisphosphonates, parathyroid hormone, and raloxifene.  Esophageal irritation can occur with bisphosphonate therapy this can be reduced by not laying down for 30 min after taking and taking with a full glass of  water  -if wearing nail polish on toes or hands asked to rto if there are any dark brown or black areas under the nails  If lab tests ordered, then patient instructed to go to lab/location/plan  If imaging tests ordered, then patient instructed to go to radiology/location/plan  If medicines ordered, then patient instructed to go to pharmacy/location/plan  Health maintenance I reviewed both men and women's health maintenance  Leading causes of death are motor vehicle accidents, cardiovascular disease, malignant tumors, and HIV.   Breast and ovarian cancer mutation screening was suggested if there was an increased risk for the patient based on [place] scoring.   -A general visit to see the eye doctor every one to two years was thought appropriate. Dentist ever 6-12 months.  -Immunization suggestions: Tetanus shot every 10 years, Influenza immunization pneumococcal- anyone with chronic illnesses

## 2021-03-26 NOTE — PROGRESS NOTES
Chief Complaint   Patient presents with   • Syncope     passed out in chair at home, occasional dizziness    • Medication Management     alternative for soma      HPI:  Missy is a 65 y.o. here for Medicare Annual Wellness Visit  -Near syncope and one syncopal episode while sitting over the last year. With low blood pressure readings.  No post ictal confusion.  No loss of urine or bowel.  No shaking of limbs prior to syncope.  Mild tremor after awakening. No chest pain, pressure, arm pain, jaw pain, or cold sweats. Other wise she feels fine.   Review of Systems   Constitutional: Negative for chills, fever and malaise/fatigue.   HENT: Negative for hearing loss and tinnitus.    Eyes: Negative for double vision and pain.   Respiratory: Negative for cough, shortness of breath and wheezing.    Cardiovascular: Negative for chest pain, palpitations and leg swelling.   Gastrointestinal: Negative for abdominal pain, diarrhea, nausea and vomiting.   Genitourinary: Negative for dysuria and frequency.   Musculoskeletal: Negative for joint pain and myalgias.   Skin: Negative for itching and rash.   Neurological: Positive for dizziness. Negative for headaches.       Patient Active Problem List    Diagnosis Date Noted   • Eosinophilic esophagitis 12/11/2020   • Atrophic vaginitis 03/17/2020   • Macrocytosis without anemia 03/09/2020   • Hypotension due to drugs 11/05/2019   • Dysfunction of right eustachian tube 11/05/2019   • Hyperlipidemia 03/07/2019   • Restless legs 11/19/2018   • Hypothyroidism 11/19/2018   • Cervical pain        Current Outpatient Medications   Medication Sig Dispense Refill   • ROPINIRole (REQUIP) 0.5 MG Tab TAKE 1 TAB BY MOUTH AT BEDTIME AS NEEDED FOR UP  DAYS. 90 tablet 1   • azelastine (ASTELIN) 137 MCG/SPRAY nasal spray Administer 1 Spray into affected nostril(S) 2 times a day. 30 mL 3   • diclofenac sodium 1 % Gel APPLY 2 G TO SKIN AS DIRECTED 4 TIMES A DAY AS NEEDED. 100 g 0   • levothyroxine  (SYNTHROID) 88 MCG Tab TAKE 1 TAB BY MOUTH EVERY MORNING ON AN EMPTY STOMACH. 90 Tab 0   • OMEPRAZOLE PO Take 20 mg by mouth.     • gabapentin (NEURONTIN) 100 MG Cap Take 3 Caps by mouth 3 times a day. 810 Cap 1   • B Complex Vitamins (B COMPLEX PO) Take  by mouth.     • Multiple Vitamins-Minerals (MULTIVITAMIN ADULT PO) Take  by mouth.     • Ascorbic Acid (VITAMIN C PO) Take  by mouth every day.     • Multiple Vitamins-Minerals (ZINC PO) Take 1 Tab by mouth every day.     • POTASSIUM PO Take 1 Tab by mouth every day. OTC     • Calcium-Magnesium-Vitamin D (CALCIUM MAGNESIUM PO) Take 1 Tab by mouth every day.     • estrogens, conjugated (PREMARIN) 0.625 MG/GM Cream Apply 1 pea sized amount PV daily for two weeks, then every other day for one week, then 2 times per week for a week, then once weekly (Patient not taking: Reported on 3/26/2021) 1 Tube 1     No current facility-administered medications for this visit.        Patient is taking medications as noted in medication list.  Current supplements as per medication list.     Allergies: Doxycycline, Codeine, Dilaudid [hydromorphone hcl], Fentanyl, Percocet [oxycodone-acetaminophen], and Vicoprofen [hydrocodone-ibuprofen]    Current social contact/activities: Yes     Is patient current with immunizations? No, due for covid. Patient is interested in receiving covid today.    She  reports that she quit smoking about 33 years ago. Her smoking use included cigarettes. She has a 4.00 pack-year smoking history. She has never used smokeless tobacco. She reports previous alcohol use. She reports that she does not use drugs.  Counseling given: Not Answered  Comment: quit in her 20s     DPA/Advanced directive: Patient has Advanced Directive on file.     ROS:    Gait: Uses no assistive device   Ostomy: No   Other tubes: No   Amputations: No   Chronic oxygen use No   Last eye exam 06/2020  Wears hearing aids: No   : Denies any urinary leakage during the last 6  months    Screening:    See orders    Depression Screening    Little interest or pleasure in doing things?  0 - not at all  Feeling down, depressed, or hopeless? 0 - not at all  Patient Health Questionnaire Score: 0    If depressive symptoms identified deferred to follow up visit unless specifically addressed in assessment and plan.    Interpretation of PHQ-9 Total Score   Score Severity   1-4 No Depression   5-9 Mild Depression   10-14 Moderate Depression   15-19 Moderately Severe Depression   20-27 Severe Depression    Screening for Cognitive Impairment    Three Minute Recall (river, kelsey, finger)  3/3    Daren clock face with all 12 numbers and set the hands to show 10 past 11.  Yes    If cognitive concerns identified, deferred for follow up unless specifically addressed in assessment and plan.    Fall Risk Assessment    Has the patient had two or more falls in the last year or any fall with injury in the last year?  No  If fall risk identified, deferred for follow up unless specifically addressed in assessment and plan.    Safety Assessment    Throw rugs on floor.  Yes  Handrails on all stairs.  Yes  Good lighting in all hallways.  Yes  Difficulty hearing.  No  Patient counseled about all safety risks that were identified.    Functional Assessment ADLs    Are there any barriers preventing you from cooking for yourself or meeting nutritional needs?  No.    Are there any barriers preventing you from driving safely or obtaining transportation?  No.    Are there any barriers preventing you from using a telephone or calling for help?  No.    Are there any barriers preventing you from shopping?  No.    Are there any barriers preventing you from taking care of your own finances?  No.    Are there any barriers preventing you from managing your medications?  No.    Are there any barriers preventing you from showering, bathing or dressing yourself?  Yes.    Are you currently engaging in any exercise or physical activity?   Yes.     What is your perception of your health?  Good.    Health Maintenance Summary                COVID-19 Vaccine Overdue 1971     BONE DENSITY Overdue 2020      Done 2011 DS-BONE DENSITY STUDY (DEXA)     Patient has more history with this topic...    IMM PNEUMOCOCCAL VACCINE: 65+ Years Overdue 2020     MAMMOGRAM Next Due 2022      Done 2021 MA-SCREENING MAMMO BILAT W/IMPLANTS W/RAVI W/CAD     Patient has more history with this topic...    Annual Wellness Visit Next Due 3/27/2022      Done 3/26/2021      Patient has more history with this topic...    PAP SMEAR Next Due 3/10/2023      Done 3/10/2020 THINPREP PAP WITH HPV     Patient has more history with this topic...    COLONOSCOPY Next Due 2023      Done 2013 REFERRAL TO GI FOR COLONOSCOPY    IMM DTaP/Tdap/Td Vaccine Next Due 3/7/2029      Done 3/7/2019 Imm Admin: Tdap Vaccine          Patient Care Team:  Daksha Andrade M.D. as PCP - General (Family Medicine)  Amy Barrios O.D. as Consulting Physician (Optometry)  [facility] (Inactive) as Consulting Physician  [facility] (Inactive) as Consulting Physician  Mercedez Zelaya P.A.-C. as Consulting Physician (Dermatology)  [facility] (Inactive) as      Social History     Tobacco Use   • Smoking status: Former Smoker     Packs/day: 0.50     Years: 8.00     Pack years: 4.00     Types: Cigarettes     Quit date: 1988     Years since quittin.2   • Smokeless tobacco: Never Used   • Tobacco comment: quit in her 20s    Substance Use Topics   • Alcohol use: Not Currently   • Drug use: No     Family History   Problem Relation Age of Onset   • Diabetes Mother    • Stroke Maternal Grandfather      She  has a past medical history of Cataract, Cervical pain (2020), Hyperlipidemia (2020), Hypothyroidism (2018), and Restless legs (2018).   Past Surgical History:  "  Procedure Laterality Date   • EYE LESION EXCISION Left 3/2/2020    Procedure: EXCISION, LESION, EYE - UPPER LID W/POSS PLACEMENT OF MINI MONKA STENT;  Surgeon: Jaime Pastrana M.D.;  Location: SURGERY SAME DAY [facility];  Service: Ophthalmology   • CATARACT PHACO WITH IOL  5/21/2014    Performed by Fadi Morales M.D. at SURGERY SAME DAY [facility]   • TUBE & ECTOPIC PREG., REMOVAL  1987   • THYROIDECTOMY  1983    partial   • OTHER ORTHOPEDIC SURGERY  [id number]    neck fusion   • ME BREAST AUGMENTATION WITH IMPLANT  1979 & 2018       Exam:     BP (!) 96/60   Pulse 88   Temp 36.3 °C (97.3 °F)   Resp 12   Ht 1.753 m (5' 9\")   Wt 58.1 kg (128 lb)   SpO2 98%  Body mass index is 18.9 kg/m².    Hearing excellent.    Dentition good  Alert, oriented in no acute distress.  Eye contact is good, speech goal directed, affect calm  Physical Exam   Constitutional: She is oriented to person, place, and time and well-developed, well-nourished, and in no distress. No distress.   HENT:   Head: Normocephalic and atraumatic.   Eyes: Pupils are equal, round, and reactive to light. Conjunctivae are normal. Right eye exhibits no discharge. Left eye exhibits no discharge. No scleral icterus.   Cardiovascular: Normal rate, regular rhythm and normal heart sounds. Exam reveals no gallop and no friction rub.   No murmur heard.  Pulmonary/Chest: Effort normal and breath sounds normal. No respiratory distress. She has no wheezes. She has no rales.   Musculoskeletal:         General: No edema.   Neurological: She is alert and oriented to person, place, and time. No cranial nerve deficit. She exhibits normal muscle tone. Gait normal.   Rigidity none  Palm open close without delay. strok of palm does not elicit muscle contraction of chin  No tremor     Skin: Skin is warm and dry. She is not diaphoretic.       Assessment and Plan. The following treatment and monitoring plan is recommended:  -discussed ekg sug rah vs post infarct. I do " not see depression or elevation of st. Will follow up after echo  -hypotension syncope possible secondary to polypharmacy vs cardiac. We have done her annual today as well as syncopal workup. Pt to return to office after workup to discuss medications. Medications of concern soma, gabapentin, and ropinirole.   -Static blood pressures normal.  Laying down 100/80 heart rate 94.  Sitting 100/70 heart rate 96.  Standing 94/71 heart rate 108  1. Hypothyroidism, unspecified type  TSH WITH REFLEX TO FT4   2. Hyperlipidemia, unspecified hyperlipidemia type  Lipid Profile   3. Macrocytosis without anemia  CBC WITH DIFFERENTIAL   4. Screening cholesterol level     5. Diabetes mellitus screening  Comp Metabolic Panel   6. Postmenopausal status (age-related) (natural)  DS-BONE DENSITY STUDY (DEXA)   7. Menopausal state  DS-BONE DENSITY STUDY (DEXA)   8. Syncope, unspecified syncope type  EC-ECHOCARDIOGRAM COMPLETE W/O CONT    EKG - Clinic Performed    REFERRAL TO CARDIOLOGY    MAGNESIUM    PHOSPHORUS   9. Hypotension due to drugs         Services suggested: No services needed at this time  Health Care Screening recommendations as per orders if indicated.  Referrals offered: PT/OT/Nutrition counseling/Behavioral Health/Smoking cessation as per orders if indicated.    Discussion today about general wellness and lifestyle habits:    · Prevent falls and reduce trip hazards; Cautioned about securing or removing rugs.  · Have a working fire alarm and carbon monoxide detector;   · Engage in regular physical activity and social activities       Follow-up: Return in about 1 month (around 4/26/2021), or syncope.

## 2021-03-30 DIAGNOSIS — N95.2 ATROPHIC VAGINITIS: ICD-10-CM

## 2021-04-02 RX ORDER — CONJUGATED ESTROGENS 0.62 MG/G
CREAM VAGINAL
Qty: 30 G | Refills: 1 | Status: SHIPPED | OUTPATIENT
Start: 2021-04-02 | End: 2022-05-03 | Stop reason: SDUPTHER

## 2021-04-02 RX ORDER — LEVOTHYROXINE SODIUM 88 UG/1
88 TABLET ORAL
Qty: 90 TABLET | Refills: 0 | Status: SHIPPED | OUTPATIENT
Start: 2021-04-02 | End: 2021-07-12

## 2021-04-06 ENCOUNTER — OFFICE VISIT (OUTPATIENT)
Dept: URGENT CARE | Facility: PHYSICIAN GROUP | Age: 66
End: 2021-04-06
Payer: MEDICARE

## 2021-04-06 ENCOUNTER — HOSPITAL ENCOUNTER (OUTPATIENT)
Facility: MEDICAL CENTER | Age: 66
End: 2021-04-06
Attending: FAMILY MEDICINE
Payer: MEDICARE

## 2021-04-06 VITALS
WEIGHT: 129.2 LBS | HEIGHT: 69 IN | RESPIRATION RATE: 12 BRPM | SYSTOLIC BLOOD PRESSURE: 102 MMHG | OXYGEN SATURATION: 97 % | HEART RATE: 86 BPM | DIASTOLIC BLOOD PRESSURE: 62 MMHG | TEMPERATURE: 97.9 F | BODY MASS INDEX: 19.13 KG/M2

## 2021-04-06 DIAGNOSIS — R30.0 DYSURIA: ICD-10-CM

## 2021-04-06 DIAGNOSIS — N30.01 ACUTE CYSTITIS WITH HEMATURIA: ICD-10-CM

## 2021-04-06 LAB
APPEARANCE UR: CLEAR
BILIRUB UR STRIP-MCNC: NORMAL MG/DL
COLOR UR AUTO: YELLOW
GLUCOSE UR STRIP.AUTO-MCNC: NORMAL MG/DL
KETONES UR STRIP.AUTO-MCNC: NORMAL MG/DL
LEUKOCYTE ESTERASE UR QL STRIP.AUTO: NORMAL
NITRITE UR QL STRIP.AUTO: NORMAL
PH UR STRIP.AUTO: 5 [PH] (ref 5–8)
PROT UR QL STRIP: NORMAL MG/DL
RBC UR QL AUTO: NORMAL
SP GR UR STRIP.AUTO: >=1.03
UROBILINOGEN UR STRIP-MCNC: 0.2 MG/DL

## 2021-04-06 PROCEDURE — 87086 URINE CULTURE/COLONY COUNT: CPT

## 2021-04-06 PROCEDURE — 87186 SC STD MICRODIL/AGAR DIL: CPT

## 2021-04-06 PROCEDURE — 81002 URINALYSIS NONAUTO W/O SCOPE: CPT | Performed by: FAMILY MEDICINE

## 2021-04-06 PROCEDURE — 87077 CULTURE AEROBIC IDENTIFY: CPT

## 2021-04-06 PROCEDURE — 99213 OFFICE O/P EST LOW 20 MIN: CPT | Performed by: FAMILY MEDICINE

## 2021-04-06 RX ORDER — SULFAMETHOXAZOLE AND TRIMETHOPRIM 800; 160 MG/1; MG/1
1 TABLET ORAL 2 TIMES DAILY
Qty: 10 TABLET | Refills: 0 | Status: SHIPPED | OUTPATIENT
Start: 2021-04-06 | End: 2021-04-11

## 2021-04-06 RX ORDER — PHENAZOPYRIDINE HYDROCHLORIDE 200 MG/1
200 TABLET, FILM COATED ORAL 3 TIMES DAILY
Qty: 6 TABLET | Refills: 0 | Status: SHIPPED | OUTPATIENT
Start: 2021-04-06 | End: 2021-04-08

## 2021-04-06 ASSESSMENT — ENCOUNTER SYMPTOMS
CHILLS: 0
DIZZINESS: 0
VOMITING: 0
FEVER: 0
COUGH: 0
SHORTNESS OF BREATH: 0
MYALGIAS: 0
FLANK PAIN: 0
SORE THROAT: 0
NAUSEA: 0

## 2021-04-06 ASSESSMENT — FIBROSIS 4 INDEX: FIB4 SCORE: 1.75

## 2021-04-06 NOTE — PROGRESS NOTES
Subjective:   Missy Stuart is a 66 y.o. female who presents for UTI (since this morning, burning, frequency )        UTI  This is a new problem. The current episode started today. Associated symptoms include urinary symptoms. Pertinent negatives include no chills, coughing, fever, myalgias, nausea, rash, sore throat or vomiting. Nothing aggravates the symptoms. She has tried rest and drinking for the symptoms. The treatment provided no relief.     PMH:  has a past medical history of Cataract, Cervical pain (02/28/2020), Hyperlipidemia (02/28/2020), Hypothyroidism (11/19/2018), and Restless legs (11/19/2018).  MEDS:   Current Outpatient Medications:   •  phenazopyridine (PYRIDIUM) 200 MG Tab, Take 1 tablet by mouth 3 times a day for 2 days., Disp: 6 tablet, Rfl: 0  •  sulfamethoxazole-trimethoprim (BACTRIM DS) 800-160 MG tablet, Take 1 tablet by mouth 2 times a day for 5 days., Disp: 10 tablet, Rfl: 0  •  estrogens, conjugated (PREMARIN) 0.625 MG/GM Cream, APPLY 1 PEA SIZED AMOUNT VAGINALLY DAILY X2 WEEKS, EVERY OTHER DAY X1WEEK, 2 TIMES PER WEEK X1WK, THEN ONCE WEEKLY, Disp: 30 g, Rfl: 1  •  levothyroxine (SYNTHROID) 88 MCG Tab, TAKE 1 TAB BY MOUTH EVERY MORNING ON AN EMPTY STOMACH., Disp: 90 tablet, Rfl: 0  •  ROPINIRole (REQUIP) 0.5 MG Tab, TAKE 1 TAB BY MOUTH AT BEDTIME AS NEEDED FOR UP  DAYS., Disp: 90 tablet, Rfl: 1  •  azelastine (ASTELIN) 137 MCG/SPRAY nasal spray, Administer 1 Spray into affected nostril(S) 2 times a day., Disp: 30 mL, Rfl: 3  •  diclofenac sodium 1 % Gel, APPLY 2 G TO SKIN AS DIRECTED 4 TIMES A DAY AS NEEDED., Disp: 100 g, Rfl: 0  •  OMEPRAZOLE PO, Take 20 mg by mouth., Disp: , Rfl:   •  gabapentin (NEURONTIN) 100 MG Cap, Take 3 Caps by mouth 3 times a day., Disp: 810 Cap, Rfl: 1  •  B Complex Vitamins (B COMPLEX PO), Take  by mouth., Disp: , Rfl:   •  Multiple Vitamins-Minerals (MULTIVITAMIN ADULT PO), Take  by mouth., Disp: , Rfl:   •  Ascorbic Acid (VITAMIN C PO), Take  by  "mouth every day., Disp: , Rfl:   •  Multiple Vitamins-Minerals (ZINC PO), Take 1 Tab by mouth every day., Disp: , Rfl:   •  POTASSIUM PO, Take 1 Tab by mouth every day. OTC, Disp: , Rfl:   •  Calcium-Magnesium-Vitamin D (CALCIUM MAGNESIUM PO), Take 1 Tab by mouth every day., Disp: , Rfl:   ALLERGIES:   Allergies   Allergen Reactions   • Doxycycline      \"stiff joints\"   • Codeine Vomiting and Nausea   • Dilaudid [Hydromorphone Hcl] Hives   • Fentanyl Vomiting and Nausea   • Percocet [Oxycodone-Acetaminophen] Vomiting   • Vicoprofen [Hydrocodone-Ibuprofen]      sweating     SURGHX:   Past Surgical History:   Procedure Laterality Date   • EYE LESION EXCISION Left 3/2/2020    Procedure: EXCISION, LESION, EYE - UPPER LID W/POSS PLACEMENT OF MINI MONKA STENT;  Surgeon: Jaime Pastrana M.D.;  Location: SURGERY SAME DAY [facility] ORS;  Service: Ophthalmology   • CATARACT PHACO WITH IOL  5/21/2014    Performed by Fadi Morales M.D. at SURGERY SAME DAY [facility] ORS   • TUBE & ECTOPIC PREG., REMOVAL  1987   • THYROIDECTOMY  1983    partial   • OTHER ORTHOPEDIC SURGERY  [id number]    neck fusion   • [place] BREAST AUGMENTATION WITH IMPLANT  1979 & 2018     SOCHX:  reports that she quit smoking about 33 years ago. Her smoking use included cigarettes. She has a 4.00 pack-year smoking history. She has never used smokeless tobacco. She reports previous alcohol use. She reports that she does not use drugs.  FH:   Family History   Problem Relation Age of Onset   • Diabetes Mother    • Stroke Maternal Grandfather      Review of Systems   Constitutional: Negative for chills and fever.   HENT: Negative for sore throat.    Respiratory: Negative for cough and shortness of breath.    Gastrointestinal: Negative for nausea and vomiting.   Genitourinary: Positive for dysuria and frequency. Negative for flank pain.   Musculoskeletal: Negative for myalgias.   Skin: Negative for rash.   Neurological: Negative for dizziness.        Objective:   BP " "102/62 (BP Location: Right arm, Patient Position: Sitting, BP Cuff Size: Adult)   Pulse 86   Temp 36.6 °C (97.9 °F) (Temporal)   Resp 12   Ht 1.753 m (5' 9\")   Wt 58.6 kg (129 lb 3.2 oz)   LMP  (LMP Unknown)   SpO2 97%   BMI 19.08 kg/m²   Physical Exam  Vitals and nursing note reviewed.   Constitutional:       General: She is not in acute distress.     Appearance: She is well-developed.   HENT:      Head: Normocephalic and atraumatic.      Right Ear: External ear normal.      Left Ear: External ear normal.      Nose: Nose normal.      Mouth/Throat:      Mouth: Mucous membranes are moist.   Eyes:      Conjunctiva/sclera: Conjunctivae normal.   Cardiovascular:      Rate and Rhythm: Normal rate and regular rhythm.   Pulmonary:      Effort: Pulmonary effort is normal. No respiratory distress.      Breath sounds: Normal breath sounds.   Abdominal:      General: There is no distension.      Tenderness: There is no abdominal tenderness. There is no right CVA tenderness or left CVA tenderness.   Musculoskeletal:         General: Normal range of motion.   Skin:     General: Skin is warm and dry.   Neurological:      General: No focal deficit present.      Mental Status: She is alert and oriented to person, place, and time. Mental status is at baseline.      Gait: Gait (gait at baseline) normal.   Psychiatric:         Judgment: Judgment normal.           Assessment/Plan:   1. Acute cystitis with hematuria  - POCT Urinalysis  - Urine Culture; Future  - sulfamethoxazole-trimethoprim (BACTRIM DS) 800-160 MG tablet; Take 1 tablet by mouth 2 times a day for 5 days.  Dispense: 10 tablet; Refill: 0    2. Dysuria  - POCT Urinalysis  - Urine Culture; Future  - phenazopyridine (PYRIDIUM) 200 MG Tab; Take 1 tablet by mouth 3 times a day for 2 days.  Dispense: 6 tablet; Refill: 0        Medical Decision Making/Course:  In the course of preparing for this visit with review of the pertinent past medical history, recent and past " clinic visits, current medications, and in the further course of obtaining the current history pertinent to the clinic visit today, performing an exam and evaluation, ordering and independently evaluating labs, tests, and/or procedures including point-of-care urinalysis and urine culture, prescribing any recommended new medications as noted above, providing any pertinent counseling and education and recommending further coordination of care, at least 20 minutes of total time were spent during this encounter.      Discussed close monitoring, return precautions, and supportive measures of maintaining adequate fluid hydration and caloric intake, relative rest and symptom management as needed for pain and/or fever.    Differential diagnosis, natural history, supportive care, and indications for immediate follow-up discussed.     Advised the patient to follow-up with the primary care physician for recheck, reevaluation, and consideration of further management.    Please note that this dictation was created using voice recognition software. I have worked with consultants from the vendor as well as technical experts from [facility] to optimize the interface. I have made every reasonable attempt to correct obvious errors, but I expect that there are errors of grammar and possibly content that I did not discover before finalizing the note.

## 2021-04-08 ENCOUNTER — TELEPHONE (OUTPATIENT)
Dept: MEDICAL GROUP | Facility: IMAGING CENTER | Age: 66
End: 2021-04-08

## 2021-04-08 NOTE — TELEPHONE ENCOUNTER
Received fax from [facility] stating that the patient's medication Premarin 0.625 MG/G cream with applicator is not covered by her insurance.     Insurance is asking for an alternative which is on their formulary to be prescribed.     Prior Auth has been submitted today.

## 2021-04-12 NOTE — TELEPHONE ENCOUNTER
"Received following notice regarding Prior Auth for medication:    \"This request has been approved using information available on the patient's profile. CaseId:[id number];Status:Approved;Review Type:Prior Auth;Coverage Start Date:03/09/2021;Coverage End Date:04/08/2022;\"  "

## 2021-04-21 ENCOUNTER — OFFICE VISIT (OUTPATIENT)
Dept: CARDIOLOGY | Facility: MEDICAL CENTER | Age: 66
End: 2021-04-21
Payer: MEDICARE

## 2021-04-21 VITALS
DIASTOLIC BLOOD PRESSURE: 56 MMHG | HEART RATE: 82 BPM | SYSTOLIC BLOOD PRESSURE: 90 MMHG | HEIGHT: 69 IN | WEIGHT: 128 LBS | BODY MASS INDEX: 18.96 KG/M2 | OXYGEN SATURATION: 99 %

## 2021-04-21 DIAGNOSIS — E78.5 DYSLIPIDEMIA: ICD-10-CM

## 2021-04-21 DIAGNOSIS — R55 SYNCOPE, UNSPECIFIED SYNCOPE TYPE: ICD-10-CM

## 2021-04-21 PROCEDURE — 99204 OFFICE O/P NEW MOD 45 MIN: CPT | Performed by: STUDENT IN AN ORGANIZED HEALTH CARE EDUCATION/TRAINING PROGRAM

## 2021-04-21 RX ORDER — METHOCARBAMOL 500 MG/1
500 TABLET, FILM COATED ORAL
COMMUNITY
Start: 2021-02-04 | End: 2021-06-22

## 2021-04-21 RX ORDER — OMEPRAZOLE 20 MG/1
20 CAPSULE, DELAYED RELEASE ORAL
COMMUNITY
Start: 2021-03-02 | End: 2021-12-07 | Stop reason: SDUPTHER

## 2021-04-21 ASSESSMENT — ENCOUNTER SYMPTOMS
PND: 0
COUGH: 0
WHEEZING: 0
VOMITING: 0
FEVER: 0
ABDOMINAL PAIN: 0
PALPITATIONS: 0
DYSPNEA ON EXERTION: 0
DIARRHEA: 0
NEAR-SYNCOPE: 0
DIZZINESS: 0
NAUSEA: 0
IRREGULAR HEARTBEAT: 0
ORTHOPNEA: 0
NIGHT SWEATS: 0
SHORTNESS OF BREATH: 0
FOCAL WEAKNESS: 0
WEAKNESS: 0
SYNCOPE: 0

## 2021-04-21 ASSESSMENT — FIBROSIS 4 INDEX: FIB4 SCORE: 1.75

## 2021-04-21 NOTE — PROGRESS NOTES
Cardiology Initial Consultation Note    Date of note:    4/21/2021    Primary Care Provider: Daksha Andrade M.D.  Referring Provider:  Daksha Andrade M.D.    Patient Name: Missy Stuart   YOB: 1955  MRN:              [id number]    Chief Complaint: Syncope    History of Present Illness: Ms. Missy Stuart is a 66 y.o. female whose current medical problems include dyslipidemia, and hypothyroidism who is here for cardiac consultation for syncope.    Patient was last seen in primary care clinic for an episode of syncope on 3/26/2021.  The patient reports that she had a single episode of syncope when she was stretching herself in her chair and fainted in her chair. The patient woke up afterwards, possibly after a minute or so, and no symptoms afterwards.  No palpitations prior.  No chest pain or prodromal symptoms prior to the episode.  The patient believes that she pinched her nerves while stretching.  She is pretty active, and no symptoms when walking or hiking.  No orthopnea, PND, or leg swelling.  No palpitations.  No recurrent syncope or presyncopal episodes.    Cardiovascular Risk Factors:  1. Smoking status: Former smoker  2. Type II Diabetes Mellitus: None/not recently checked  3. Hypertension: None  4. Dyslipidemia: Diet controlled  Cholesterol,Tot   Date Value Ref Range Status   03/05/2019 227 (H) 100 - 199 mg/dL Final     LDL   Date Value Ref Range Status   03/05/2019 128 (H) <100 mg/dL Final     HDL   Date Value Ref Range Status   03/05/2019 84 >=40 mg/dL Final     Triglycerides   Date Value Ref Range Status   03/05/2019 75 0 - 149 mg/dL Final     5. Family history of early Coronary Artery Disease in a first degree relative (Male less than 55 years of age; Female less than 65 years of age): None  6.  Obesity and/or Metabolic Syndrome: BMI 18.9  7. Sedentary lifestyle: Pt walks and hikes    Review of Systems   Constitution: Negative for fever, malaise/fatigue and night sweats.  "  Cardiovascular: Negative for chest pain, dyspnea on exertion, irregular heartbeat, leg swelling, near-syncope, orthopnea, palpitations, paroxysmal nocturnal dyspnea and syncope.   Respiratory: Negative for cough, shortness of breath and wheezing.    Gastrointestinal: Negative for abdominal pain, diarrhea, nausea and vomiting.   Neurological: Negative for dizziness, focal weakness and weakness.       All other systems reviewed and are negative.       Current Outpatient Medications   Medication Sig Dispense Refill   • estrogens, conjugated (PREMARIN) 0.625 MG/GM Cream APPLY 1 PEA SIZED AMOUNT VAGINALLY DAILY X2 WEEKS, EVERY OTHER DAY X1WEEK, 2 TIMES PER WEEK X1WK, THEN ONCE WEEKLY 30 g 1   • levothyroxine (SYNTHROID) 88 MCG Tab TAKE 1 TAB BY MOUTH EVERY MORNING ON AN EMPTY STOMACH. 90 tablet 0   • ROPINIRole (REQUIP) 0.5 MG Tab TAKE 1 TAB BY MOUTH AT BEDTIME AS NEEDED FOR UP  DAYS. 90 tablet 1   • diclofenac sodium 1 % Gel APPLY 2 G TO SKIN AS DIRECTED 4 TIMES A DAY AS NEEDED. 100 g 0   • OMEPRAZOLE PO Take 20 mg by mouth.     • gabapentin (NEURONTIN) 100 MG Cap Take 3 Caps by mouth 3 times a day. 810 Cap 1   • B Complex Vitamins (B COMPLEX PO) Take  by mouth.     • Multiple Vitamins-Minerals (MULTIVITAMIN ADULT PO) Take  by mouth.     • Ascorbic Acid (VITAMIN C PO) Take  by mouth every day.     • Multiple Vitamins-Minerals (ZINC PO) Take 1 Tab by mouth every day.     • POTASSIUM PO Take 1 Tab by mouth every day. OTC     • Calcium-Magnesium-Vitamin D (CALCIUM MAGNESIUM PO) Take 1 Tab by mouth every day.       No current facility-administered medications for this visit.         Allergies   Allergen Reactions   • Doxycycline      \"stiff joints\"   • Codeine Vomiting and Nausea   • Dilaudid [Hydromorphone Hcl] Hives   • Fentanyl Vomiting and Nausea   • Percocet [Oxycodone-Acetaminophen] Vomiting   • Vicoprofen [Hydrocodone-Ibuprofen]      sweating       Physical Exam:  Ambulatory Vitals  BP (!) 90/56 (BP Location: " "Left arm, Patient Position: Sitting)   Pulse 82   Ht 1.753 m (5' 9\")   Wt 58.1 kg (128 lb)   SpO2 99%    Oxygen Therapy:  Pulse Oximetry: 99 %  BP Readings from Last 4 Encounters:   04/21/21 (!) 90/56   04/06/21 102/62   03/26/21 (!) 96/60   12/11/20 117/84       Weight/BMI: Body mass index is 18.9 kg/m².  Wt Readings from Last 4 Encounters:   04/21/21 58.1 kg (128 lb)   04/06/21 58.6 kg (129 lb 3.2 oz)   03/26/21 58.1 kg (128 lb)   12/11/20 58.1 kg (128 lb)       General: Well appearing and in no apparent distress  Eyes: nl conjunctiva, no icteric sclera  ENT: wearing a mask, normal external appearance of ears  Neck: no visible JVP,  no carotid bruits  Lungs: normal respiratory effort, CTAB  Heart: RRR, no murmurs, no rubs or gallops,  no edema bilateral lower extremities. No LV/RV heave on cardiac palpatation. + bilateral radial pulses.  + bilateral dp pulses.   Abdomen: soft, non tender, non distended, no masses, normal bowel sounds.  No HSM.  Extremities/MSK: no clubbing, no cyanosis  Neurological: No focal sensory deficits  Psychiatric: Appropriate affect, A/O x 3, intact judgement and insight  Skin: Warm extremities      Lab Data Review:  Lab Results   Component Value Date/Time    CHOLSTRLTOT 227 (H) 03/05/2019 07:18 AM     (H) 03/05/2019 07:18 AM    HDL 84 03/05/2019 07:18 AM    TRIGLYCERIDE 75 03/05/2019 07:18 AM       Lab Results   Component Value Date/Time    SODIUM 140 02/28/2020 10:16 AM    POTASSIUM 3.8 02/28/2020 10:16 AM    CHLORIDE 105 02/28/2020 10:16 AM    CO2 27 02/28/2020 10:16 AM    GLUCOSE 100 (H) 02/28/2020 10:16 AM    BUN 19 02/28/2020 10:16 AM    CREATININE 0.65 11/12/2020 09:32 AM     Lab Results   Component Value Date/Time    ALKPHOSPHAT 43 02/28/2020 10:16 AM    ASTSGOT 20 02/28/2020 10:16 AM    ALTSGPT 14 02/28/2020 10:16 AM    TBILIRUBIN 0.9 02/28/2020 10:16 AM      Lab Results   Component Value Date/Time    WBC 4.3 (L) 02/28/2020 10:16 AM     No results found for: " HBA1C      Cardiac Imaging and Procedures Review:    EKG dated 3/26/2021: My personal interpretation is sinus rhythm, PAC    Echocardiogram pending      Assessment & Plan     1. Syncope, unspecified syncope type  CANCELED: [facility] Treadmill Stress   2. Dyslipidemia           Shared Medical Decision Making:    Syncope  Unclear etiology.  No preceding symptoms or palpitations prior.  Possibly related to positional changes.  -Obtain echocardiogram to assess LVEF and any structural abnormalities.  -No cardiac symptoms when she is active, less likely related to ischemia.  Discussed possibly doing a treadmill EKG, but the patient reports that she is not supposed to run on treadmill due to her neck issues.  Can consider nuclear stress test if echo abnormal and/or recurrent symptoms    Dyslipidemia  -Patient reports controlling with diet.  Repeat lipid pending.    All of the patient's excellent questions were answered to the best of my knowledge and to her satisfaction.  It was a pleasure seeing Ms. Missy Stuart in my clinic today. Return in about 6 weeks (around 6/2/2021). Patient is aware to call the cardiology clinic with any questions or concerns.      Robbie Dawn MD  [facility]  [facility], [facility]  [address] [address]  Phone: [phone]  Fax: [phone]

## 2021-04-22 ENCOUNTER — HOSPITAL ENCOUNTER (OUTPATIENT)
Dept: LAB | Facility: MEDICAL CENTER | Age: 66
End: 2021-04-22
Attending: FAMILY MEDICINE
Payer: MEDICARE

## 2021-04-22 DIAGNOSIS — D75.89 MACROCYTOSIS WITHOUT ANEMIA: ICD-10-CM

## 2021-04-22 DIAGNOSIS — E78.5 HYPERLIPIDEMIA, UNSPECIFIED HYPERLIPIDEMIA TYPE: ICD-10-CM

## 2021-04-22 DIAGNOSIS — Z13.1 DIABETES MELLITUS SCREENING: ICD-10-CM

## 2021-04-22 DIAGNOSIS — E03.9 HYPOTHYROIDISM, UNSPECIFIED TYPE: ICD-10-CM

## 2021-04-22 LAB
ALBUMIN SERPL BCP-MCNC: 4.2 G/DL (ref 3.2–4.9)
ALBUMIN/GLOB SERPL: 1.8 G/DL
ALP SERPL-CCNC: 53 U/L (ref 30–99)
ALT SERPL-CCNC: 15 U/L (ref 2–50)
ANION GAP SERPL CALC-SCNC: 9 MMOL/L (ref 7–16)
AST SERPL-CCNC: 24 U/L (ref 12–45)
BASOPHILS # BLD AUTO: 1.6 % (ref 0–1.8)
BASOPHILS # BLD: 0.04 K/UL (ref 0–0.12)
BILIRUB SERPL-MCNC: 0.7 MG/DL (ref 0.1–1.5)
BUN SERPL-MCNC: 14 MG/DL (ref 8–22)
CALCIUM SERPL-MCNC: 9.2 MG/DL (ref 8.4–10.2)
CHLORIDE SERPL-SCNC: 106 MMOL/L (ref 96–112)
CHOLEST SERPL-MCNC: 205 MG/DL (ref 100–199)
CO2 SERPL-SCNC: 26 MMOL/L (ref 20–33)
CREAT SERPL-MCNC: 0.68 MG/DL (ref 0.5–1.4)
EOSINOPHIL # BLD AUTO: 0.12 K/UL (ref 0–0.51)
EOSINOPHIL NFR BLD: 4.8 % (ref 0–6.9)
ERYTHROCYTE [DISTWIDTH] IN BLOOD BY AUTOMATED COUNT: 51.1 FL (ref 35.9–50)
FASTING STATUS PATIENT QL REPORTED: NORMAL
GLOBULIN SER CALC-MCNC: 2.3 G/DL (ref 1.9–3.5)
GLUCOSE SERPL-MCNC: 90 MG/DL (ref 65–99)
HCT VFR BLD AUTO: 38.3 % (ref 37–47)
HDLC SERPL-MCNC: 98 MG/DL
HGB BLD-MCNC: 11.9 G/DL (ref 12–16)
IMM GRANULOCYTES # BLD AUTO: 0 K/UL (ref 0–0.11)
IMM GRANULOCYTES NFR BLD AUTO: 0 % (ref 0–0.9)
LDLC SERPL CALC-MCNC: 98 MG/DL
LYMPHOCYTES # BLD AUTO: 0.9 K/UL (ref 1–4.8)
LYMPHOCYTES NFR BLD: 35.7 % (ref 22–41)
MCH RBC QN AUTO: 31.8 PG (ref 27–33)
MCHC RBC AUTO-ENTMCNC: 31.1 G/DL (ref 33.6–35)
MCV RBC AUTO: 102.4 FL (ref 81.4–97.8)
MONOCYTES # BLD AUTO: 0.24 K/UL (ref 0–0.85)
MONOCYTES NFR BLD AUTO: 9.5 % (ref 0–13.4)
NEUTROPHILS # BLD AUTO: 1.22 K/UL (ref 2–7.15)
NEUTROPHILS NFR BLD: 48.4 % (ref 44–72)
NRBC # BLD AUTO: 0 K/UL
NRBC BLD-RTO: 0 /100 WBC
PLATELET # BLD AUTO: 236 K/UL (ref 164–446)
PMV BLD AUTO: 9.7 FL (ref 9–12.9)
POTASSIUM SERPL-SCNC: 4.1 MMOL/L (ref 3.6–5.5)
PROT SERPL-MCNC: 6.5 G/DL (ref 6–8.2)
RBC # BLD AUTO: 3.74 M/UL (ref 4.2–5.4)
SODIUM SERPL-SCNC: 141 MMOL/L (ref 135–145)
TRIGL SERPL-MCNC: 46 MG/DL (ref 0–149)
TSH SERPL DL<=0.005 MIU/L-ACNC: 2.01 UIU/ML (ref 0.38–5.33)
WBC # BLD AUTO: 2.6 K/UL (ref 4.8–10.8)

## 2021-04-22 PROCEDURE — 80053 COMPREHEN METABOLIC PANEL: CPT

## 2021-04-22 PROCEDURE — 84443 ASSAY THYROID STIM HORMONE: CPT

## 2021-04-22 PROCEDURE — 85025 COMPLETE CBC W/AUTO DIFF WBC: CPT

## 2021-04-22 PROCEDURE — 80061 LIPID PANEL: CPT

## 2021-04-22 PROCEDURE — 36415 COLL VENOUS BLD VENIPUNCTURE: CPT

## 2021-04-23 ENCOUNTER — APPOINTMENT (OUTPATIENT)
Dept: RADIOLOGY | Facility: MEDICAL CENTER | Age: 66
End: 2021-04-23
Attending: FAMILY MEDICINE
Payer: MEDICARE

## 2021-05-04 ENCOUNTER — IMMUNIZATION (OUTPATIENT)
Dept: FAMILY PLANNING/WOMEN'S HEALTH CLINIC | Facility: IMMUNIZATION CENTER | Age: 66
End: 2021-05-04
Payer: MEDICARE

## 2021-05-04 DIAGNOSIS — Z23 ENCOUNTER FOR VACCINATION: Primary | ICD-10-CM

## 2021-05-04 PROCEDURE — 91300 PFIZER SARS-COV-2 VACCINE: CPT

## 2021-05-04 PROCEDURE — 0001A PFIZER SARS-COV-2 VACCINE: CPT

## 2021-05-10 ENCOUNTER — OFFICE VISIT (OUTPATIENT)
Dept: URGENT CARE | Facility: CLINIC | Age: 66
End: 2021-05-10
Payer: MEDICARE

## 2021-05-10 ENCOUNTER — HOSPITAL ENCOUNTER (OUTPATIENT)
Facility: MEDICAL CENTER | Age: 66
End: 2021-05-10
Attending: FAMILY MEDICINE
Payer: MEDICARE

## 2021-05-10 VITALS
OXYGEN SATURATION: 99 % | BODY MASS INDEX: 19.11 KG/M2 | HEIGHT: 69 IN | DIASTOLIC BLOOD PRESSURE: 60 MMHG | RESPIRATION RATE: 15 BRPM | WEIGHT: 129 LBS | HEART RATE: 93 BPM | TEMPERATURE: 98.4 F | SYSTOLIC BLOOD PRESSURE: 112 MMHG

## 2021-05-10 DIAGNOSIS — N30.00 ACUTE CYSTITIS WITHOUT HEMATURIA: ICD-10-CM

## 2021-05-10 LAB
APPEARANCE UR: NORMAL
BILIRUB UR STRIP-MCNC: NORMAL MG/DL
COLOR UR AUTO: NORMAL
GLUCOSE UR STRIP.AUTO-MCNC: NORMAL MG/DL
KETONES UR STRIP.AUTO-MCNC: NORMAL MG/DL
LEUKOCYTE ESTERASE UR QL STRIP.AUTO: NORMAL
NITRITE UR QL STRIP.AUTO: NORMAL
PH UR STRIP.AUTO: 7 [PH] (ref 5–8)
PROT UR QL STRIP: 100 MG/DL
RBC UR QL AUTO: NORMAL
SP GR UR STRIP.AUTO: 1.02
UROBILINOGEN UR STRIP-MCNC: 1 MG/DL

## 2021-05-10 PROCEDURE — 99213 OFFICE O/P EST LOW 20 MIN: CPT | Performed by: FAMILY MEDICINE

## 2021-05-10 PROCEDURE — 81002 URINALYSIS NONAUTO W/O SCOPE: CPT | Performed by: FAMILY MEDICINE

## 2021-05-10 PROCEDURE — 87086 URINE CULTURE/COLONY COUNT: CPT

## 2021-05-10 RX ORDER — CEFDINIR 300 MG/1
300 CAPSULE ORAL EVERY 12 HOURS
Qty: 10 CAPSULE | Refills: 0 | Status: SHIPPED | OUTPATIENT
Start: 2021-05-10 | End: 2021-05-15

## 2021-05-10 RX ORDER — PHENAZOPYRIDINE HYDROCHLORIDE 200 MG/1
200 TABLET, FILM COATED ORAL 3 TIMES DAILY
Qty: 6 TABLET | Refills: 0 | Status: SHIPPED | OUTPATIENT
Start: 2021-05-10 | End: 2021-05-12

## 2021-05-10 ASSESSMENT — FIBROSIS 4 INDEX: FIB4 SCORE: 1.73

## 2021-05-10 ASSESSMENT — ENCOUNTER SYMPTOMS
VOMITING: 0
COUGH: 0
SORE THROAT: 0
FEVER: 0

## 2021-05-10 NOTE — PROGRESS NOTES
Subjective:     Missy Stuart is a 66 y.o. female who presents for Cystitis (urgency, burning while urinating)    HPI  Pt presents for evaluation of an acute problem  Patient with urgency and dysuria for the past day  Symptoms started suddenly and have been worsening  Last urine culture 1 month ago showed ,000 E. coli with resistance only to ampicillin  Treated with Bactrim and symptoms fully resolved  Patient was feeling well until symptoms recurred just recently  No fevers, abdominal pain, or back pain    Review of Systems   Constitutional: Negative for fever.   HENT: Negative for sore throat.    Respiratory: Negative for cough.    Gastrointestinal: Negative for vomiting.   Skin: Negative for rash.       PMH:  has a past medical history of Cataract, Cervical pain (02/28/2020), Hyperlipidemia (02/28/2020), Hypothyroidism (11/19/2018), and Restless legs (11/19/2018).  MEDS:   Current Outpatient Medications:   •  methocarbamol (ROBAXIN) 500 MG Tab, Take 500 mg by mouth., Disp: , Rfl:   •  omeprazole (PRILOSEC) 20 MG delayed-release capsule, Take 20 mg by mouth every day., Disp: , Rfl:   •  estrogens, conjugated (PREMARIN) 0.625 MG/GM Cream, APPLY 1 PEA SIZED AMOUNT VAGINALLY DAILY X2 WEEKS, EVERY OTHER DAY X1WEEK, 2 TIMES PER WEEK X1WK, THEN ONCE WEEKLY, Disp: 30 g, Rfl: 1  •  levothyroxine (SYNTHROID) 88 MCG Tab, TAKE 1 TAB BY MOUTH EVERY MORNING ON AN EMPTY STOMACH., Disp: 90 tablet, Rfl: 0  •  ROPINIRole (REQUIP) 0.5 MG Tab, TAKE 1 TAB BY MOUTH AT BEDTIME AS NEEDED FOR UP  DAYS., Disp: 90 tablet, Rfl: 1  •  diclofenac sodium 1 % Gel, APPLY 2 G TO SKIN AS DIRECTED 4 TIMES A DAY AS NEEDED., Disp: 100 g, Rfl: 0  •  OMEPRAZOLE PO, Take 20 mg by mouth., Disp: , Rfl:   •  gabapentin (NEURONTIN) 100 MG Cap, Take 3 Caps by mouth 3 times a day., Disp: 810 Cap, Rfl: 1  •  B Complex Vitamins (B COMPLEX PO), Take  by mouth., Disp: , Rfl:   •  Multiple Vitamins-Minerals (MULTIVITAMIN ADULT PO), Take  by mouth.,  "Disp: , Rfl:   •  Ascorbic Acid (VITAMIN C PO), Take  by mouth every day., Disp: , Rfl:   •  Multiple Vitamins-Minerals (ZINC PO), Take 1 Tab by mouth every day., Disp: , Rfl:   •  POTASSIUM PO, Take 1 Tab by mouth every day. OTC, Disp: , Rfl:   •  Calcium-Magnesium-Vitamin D (CALCIUM MAGNESIUM PO), Take 1 Tab by mouth every day., Disp: , Rfl:   ALLERGIES:   Allergies   Allergen Reactions   • Doxycycline      \"stiff joints\"   • Codeine Vomiting and Nausea   • Dilaudid [Hydromorphone Hcl] Hives   • Fentanyl Vomiting and Nausea   • Percocet [Oxycodone-Acetaminophen] Vomiting   • Vicoprofen [Hydrocodone-Ibuprofen]      sweating     SURGHX:   Past Surgical History:   Procedure Laterality Date   • EYE LESION EXCISION Left 3/2/2020    Procedure: EXCISION, LESION, EYE - UPPER LID W/POSS PLACEMENT OF MINI MONKA STENT;  Surgeon: Jaime Pastrana M.D.;  Location: SURGERY SAME DAY [facility] ORS;  Service: Ophthalmology   • CATARACT PHACO WITH IOL  5/21/2014    Performed by Fadi Morales M.D. at SURGERY SAME DAY [facility] ORS   • TUBE & ECTOPIC PREG., REMOVAL  1987   • THYROIDECTOMY  1983    partial   • OTHER ORTHOPEDIC SURGERY  [id number]    neck fusion   • [place] BREAST AUGMENTATION WITH IMPLANT  1979 & 2018     SOCHX:  reports that she quit smoking about 33 years ago. Her smoking use included cigarettes. She has a 4.00 pack-year smoking history. She has never used smokeless tobacco. She reports previous alcohol use. She reports that she does not use drugs.     Objective:   /60 (BP Location: Right arm, Patient Position: Sitting, BP Cuff Size: Adult)   Pulse 93   Temp 36.9 °C (98.4 °F) (Temporal)   Resp 15   Ht 1.753 m (5' 9\")   Wt 58.5 kg (129 lb)   LMP  (LMP Unknown)   SpO2 99%   BMI 19.05 kg/m²     Physical Exam  Constitutional:       General: She is not in acute distress.     Appearance: She is well-developed. She is not diaphoretic.   HENT:      Head: Normocephalic and atraumatic.   Pulmonary:      Effort: " Pulmonary effort is normal.   Abdominal:      General: Abdomen is flat. Bowel sounds are normal. There is no distension.      Palpations: Abdomen is soft.      Tenderness: There is no abdominal tenderness. There is no right CVA tenderness, left CVA tenderness, guarding or rebound.   Skin:     General: Skin is warm and dry.      Findings: No erythema.   Neurological:      Mental Status: She is alert.         Assessment/Plan:   Assessment    1. Acute cystitis without hematuria  - POCT Urinalysis  - Urine Culture; Future  - cefdinir (OMNICEF) 300 MG Cap; Take 1 capsule by mouth every 12 hours for 5 days.  Dispense: 10 capsule; Refill: 0  - phenazopyridine (PYRIDIUM) 200 MG Tab; Take 1 tablet by mouth 3 times a day for 2 days.  Dispense: 6 tablet; Refill: 0    Patient with acute cystitis.  Did have recent occurrence of cystitis a month ago which fully resolved with Bactrim.  Will treat with Omnicef this round and send urine for culture.  This is second UTI this year.  Advised that we may need to consider urology referral if having another one in the next few months.  Reviewed preventative measures and follow-up precautions.  Follow-up as needed.

## 2021-05-12 LAB
BACTERIA UR CULT: NORMAL
SIGNIFICANT IND 70042: NORMAL
SITE SITE: NORMAL
SOURCE SOURCE: NORMAL

## 2021-05-17 ENCOUNTER — HOSPITAL ENCOUNTER (OUTPATIENT)
Dept: CARDIOLOGY | Facility: MEDICAL CENTER | Age: 66
End: 2021-05-17
Attending: FAMILY MEDICINE
Payer: MEDICARE

## 2021-05-24 ENCOUNTER — PATIENT OUTREACH (OUTPATIENT)
Dept: SCHEDULING | Facility: IMAGING CENTER | Age: 66
End: 2021-05-24

## 2021-05-24 NOTE — PROGRESS NOTES
Outreach:  Covid Vaccine 2nd Dose   Outcome: stated she is out of town and wasnt not sure when she will return [place]

## 2021-05-26 ENCOUNTER — PATIENT OUTREACH (OUTPATIENT)
Dept: SCHEDULING | Facility: IMAGING CENTER | Age: 66
End: 2021-05-26

## 2021-05-26 NOTE — PROGRESS NOTES
Attempt #1    Outreach for COVID vaccine      Outreach Outcome LVM    Transfer to [phone] if patient calls back to schedule appointment.

## 2021-06-02 ENCOUNTER — APPOINTMENT (OUTPATIENT)
Dept: CARDIOLOGY | Facility: MEDICAL CENTER | Age: 66
End: 2021-06-02
Payer: MEDICARE

## 2021-06-20 DIAGNOSIS — G25.81 RESTLESS LEGS: ICD-10-CM

## 2021-06-21 RX ORDER — ROPINIROLE 0.5 MG/1
TABLET, FILM COATED ORAL
Qty: 180 TABLET | Refills: 1 | Status: SHIPPED | OUTPATIENT
Start: 2021-06-21 | End: 2022-02-10 | Stop reason: SDUPTHER

## 2021-06-22 ENCOUNTER — OFFICE VISIT (OUTPATIENT)
Dept: MEDICAL GROUP | Facility: IMAGING CENTER | Age: 66
End: 2021-06-22
Payer: MEDICARE

## 2021-06-22 VITALS
HEART RATE: 96 BPM | HEIGHT: 69 IN | BODY MASS INDEX: 18.37 KG/M2 | TEMPERATURE: 98 F | OXYGEN SATURATION: 98 % | WEIGHT: 124 LBS | DIASTOLIC BLOOD PRESSURE: 68 MMHG | RESPIRATION RATE: 12 BRPM | SYSTOLIC BLOOD PRESSURE: 100 MMHG

## 2021-06-22 DIAGNOSIS — D53.9 MACROCYTIC ANEMIA: ICD-10-CM

## 2021-06-22 DIAGNOSIS — D72.810 LYMPHOPENIA: ICD-10-CM

## 2021-06-22 DIAGNOSIS — M54.2 CERVICAL PAIN: ICD-10-CM

## 2021-06-22 DIAGNOSIS — D70.8 OTHER NEUTROPENIA (HCC): ICD-10-CM

## 2021-06-22 DIAGNOSIS — E03.9 HYPOTHYROIDISM, UNSPECIFIED TYPE: ICD-10-CM

## 2021-06-22 PROCEDURE — 99214 OFFICE O/P EST MOD 30 MIN: CPT | Performed by: FAMILY MEDICINE

## 2021-06-22 RX ORDER — CARISOPRODOL 350 MG/1
350 TABLET ORAL
Qty: 30 TABLET | Refills: 0 | Status: SHIPPED | OUTPATIENT
Start: 2021-06-22 | End: 2021-11-30 | Stop reason: SDUPTHER

## 2021-06-22 ASSESSMENT — ENCOUNTER SYMPTOMS
SHORTNESS OF BREATH: 0
DIARRHEA: 0
EYE PAIN: 0
WHEEZING: 0
PALPITATIONS: 0
DIZZINESS: 0
HEADACHES: 0
ABDOMINAL PAIN: 0
CHILLS: 0
MYALGIAS: 0
NAUSEA: 0
DOUBLE VISION: 0
VOMITING: 0
FEVER: 0
COUGH: 0

## 2021-06-22 ASSESSMENT — PAIN SCALES - GENERAL: PAINLEVEL: NO PAIN

## 2021-06-22 ASSESSMENT — FIBROSIS 4 INDEX: FIB4 SCORE: 1.73

## 2021-06-22 NOTE — ASSESSMENT & PLAN NOTE
Meds: Antivirals, cephalosporins, chlorpromazine, chlor pro to mild, cimetidine, methimazole, penicillin, phenytoin, procainamide, rituximab, sulfonamides: none    Refer to heme if persistently below 1000, or blasts, schistocytes, dysplastic granulocytes, teardrop cells, hydrops cells, smudge cells,    Initial evaluation: Evaluate for nutritional deficiency, viral syndromes, medications, alcohol benign ethnic neutropenia. May start with repeat cbc.  Additional work-up EBV, HTLV, HIV, H. pylori, chlamydia, Campylobacter, Borrelia, hep C.

## 2021-06-22 NOTE — ASSESSMENT & PLAN NOTE
Continue with pain management for neck injections  PDMP reviewed and Soma sent.  Patient aware of risk of fall and addiction.  Patient is aware this is a controlled substance.  She will always have to have an appointment for refills.  Though she is gone through 25 tabs in about 6 months.  Only takes it intermittently between her neck injections.

## 2021-06-22 NOTE — PROGRESS NOTES
"Chief Complaint   Patient presents with   • Lab Results   • Dysuria     3 in the last year, new issue    • Other     discuss covid vaccine      HPI:  66-year-old female with a history of cervical pain, restless leg, hypothyroidism, hyperlipidemia, history of hypotension due to medications, macrocytosis, atrophic vaginitis, eosinophilic esophagitis here to review labs and to discuss recurrent dysuria.  As well as discussed Covid vaccine.     Recurrent UTI concern: she took ciprofloxacin in may. She took bactrim in April around the time she did the labs. She reports that the second round helped and she denies dysuria, polyuria, or urgency.     Macrocytosis: Etoh: with camping 3 drinks for 5 days in the week. Normally though three drinks per week. She rarely drinks like the camping trip.     No syncopal episodes  Premarin 370$ with insurance. She will look at good rx.   Echo rescheduled    Leukopenia and neutropenia: Sulfonamide around the time of the labs. Denies fevers chills night sweats weight loss masses family history of lymphomas, chronic infections, alcohol abuse smoking, obesity, RA, SLE, Sjogren, dermatomyositis, celiac,pesticide exposure. Does have breast implants.     Has neck pain. Gets neck injections. Does not need the soma often with injections. Though methocarbamol does not help. She has tried flexeril which does not work. She picked up 30 tabs jan 27, 2021 and has 5 tabs left.         Allergies   Allergen Reactions   • Doxycycline      \"stiff joints\"   • Codeine Vomiting and Nausea   • Dilaudid [Hydromorphone Hcl] Hives   • Fentanyl Vomiting and Nausea   • Percocet [Oxycodone-Acetaminophen] Vomiting   • Vicoprofen [Hydrocodone-Ibuprofen]      sweating     Current Outpatient Medications   Medication Sig Dispense Refill   • carisoprodol (SOMA) 350 MG Tab Take 1 tablet by mouth 1 time a day as needed for Muscle Spasms for up to 30 days. 30 tablet 0   • ROPINIRole (REQUIP) 0.5 MG Tab TAKE 2 TABS BY MOUTH " "AT BEDTIME AS NEEDED FOR UP  DAYS. 180 tablet 1   • omeprazole (PRILOSEC) 20 MG delayed-release capsule Take 20 mg by mouth every day.     • levothyroxine (SYNTHROID) 88 MCG Tab TAKE 1 TAB BY MOUTH EVERY MORNING ON AN EMPTY STOMACH. 90 tablet 0   • diclofenac sodium 1 % Gel APPLY 2 G TO SKIN AS DIRECTED 4 TIMES A DAY AS NEEDED. 100 g 0   • gabapentin (NEURONTIN) 100 MG Cap Take 3 Caps by mouth 3 times a day. 810 Cap 1   • B Complex Vitamins (B COMPLEX PO) Take  by mouth.     • Multiple Vitamins-Minerals (MULTIVITAMIN ADULT PO) Take  by mouth.     • Ascorbic Acid (VITAMIN C PO) Take  by mouth every day.     • Multiple Vitamins-Minerals (ZINC PO) Take 1 Tab by mouth every day.     • POTASSIUM PO Take 1 Tab by mouth every day. OTC     • Calcium-Magnesium-Vitamin D (CALCIUM MAGNESIUM PO) Take 1 Tab by mouth every day.     • estrogens, conjugated (PREMARIN) 0.625 MG/GM Cream APPLY 1 PEA SIZED AMOUNT VAGINALLY DAILY X2 WEEKS, EVERY OTHER DAY X1WEEK, 2 TIMES PER WEEK X1WK, THEN ONCE WEEKLY (Patient not taking: Reported on 2021) 30 g 1     No current facility-administered medications for this visit.     Social History     Tobacco Use   • Smoking status: Former Smoker     Packs/day: 0.50     Years: 8.00     Pack years: 4.00     Types: Cigarettes     Quit date: 1988     Years since quittin.4   • Smokeless tobacco: Never Used   • Tobacco comment: quit in her 20s    Vaping Use   • Vaping Use: Never used   Substance Use Topics   • Alcohol use: Yes     Alcohol/week: 1.2 - 3.0 oz     Types: 2 - 5 Standard drinks or equivalent per week   • Drug use: No     Family History   Problem Relation Age of Onset   • Diabetes Mother    • Stroke Maternal Grandfather        /68   Pulse 96   Temp 36.7 °C (98 °F)   Resp 12   Ht 1.753 m (5' 9\")   Wt 56.2 kg (124 lb)   SpO2 98%  Body mass index is 18.31 kg/m².  Review of Systems   Constitutional: Negative for chills, fever and malaise/fatigue.   HENT: Negative for " hearing loss and tinnitus.    Eyes: Negative for double vision and pain.   Respiratory: Negative for cough, shortness of breath and wheezing.    Cardiovascular: Negative for chest pain, palpitations and leg swelling.   Gastrointestinal: Negative for abdominal pain, diarrhea, nausea and vomiting.   Genitourinary: Negative for dysuria and frequency.   Musculoskeletal: Negative for joint pain and myalgias.   Skin: Negative for itching and rash.   Neurological: Negative for dizziness and headaches.     Physical Exam  Constitutional:       General: She is not in acute distress.     Appearance: She is not diaphoretic.   HENT:      Head: Normocephalic and atraumatic.   Eyes:      General: No scleral icterus.        Right eye: No discharge.         Left eye: No discharge.      Conjunctiva/sclera: Conjunctivae normal.      Pupils: Pupils are equal, round, and reactive to light.   Cardiovascular:      Rate and Rhythm: Normal rate and regular rhythm.      Heart sounds: Normal heart sounds. No murmur heard.   No friction rub. No gallop.    Pulmonary:      Effort: Pulmonary effort is normal. No respiratory distress.      Breath sounds: Normal breath sounds. No wheezing or rales.   Skin:     General: Skin is warm and dry.   Neurological:      Mental Status: She is alert and oriented to person, place, and time.           All labs (last 1 month):   No visits with results within 1 Month(s) from this visit.   Latest known visit with results is:   Hospital Outpatient Visit on 05/10/2021   Component Date Value Ref Range Status   • Significant Indicator 05/10/2021 NEG   Final   • Source 05/10/2021 UR   Final   • Site 05/10/2021 -   Final   • Culture Result 05/10/2021 Usual skin daniel ,000 cfu/mL   Final       Lipids:   Lab Results   Component Value Date/Time    CHOLSTRLTOT 205 (H) 04/22/2021 07:28 AM    TRIGLYCERIDE 46 04/22/2021 07:28 AM    HDL 98 04/22/2021 07:28 AM    LDL 98 04/22/2021 07:28 AM       Imaging: No results  found.    A/P  · Encouraged to obtain her second covid vaccine after we recheck her wbc if returned back to her baseline.   · Lymphopenia and neutropenia likely secondary to either UTI or from taking a sulfa medication.  They both correlate to the time she had her labs done we will just repeat the labs.  · Macrocytosis 102. Anemia 11.9. platelets normal.     Return in about 6 months (around 12/22/2021).    Problem List Items Addressed This Visit     Cervical pain     Continue with pain management for neck injections  PDMP reviewed and Soma sent.  Patient aware of risk of fall and addiction.  Patient is aware this is a controlled substance.  She will always have to have an appointment for refills.  Though she is gone through 25 tabs in about 6 months.  Only takes it intermittently between her neck injections.         Relevant Medications    carisoprodol (SOMA) 350 MG Tab    Hypothyroidism     stable         Lymphopenia    Relevant Orders    CBC WITH DIFFERENTIAL    Other neutropenia (HCC)     Meds: Antivirals, cephalosporins, chlorpromazine, chlor pro to mild, cimetidine, methimazole, penicillin, phenytoin, procainamide, rituximab, sulfonamides: none    Refer to heme if persistently below 1000, or blasts, schistocytes, dysplastic granulocytes, teardrop cells, hydrops cells, smudge cells,    Initial evaluation: Evaluate for nutritional deficiency, viral syndromes, medications, alcohol benign ethnic neutropenia. May start with repeat cbc.  Additional work-up EBV, HTLV, HIV, H. pylori, chlamydia, Campylobacter, Borrelia, hep C.                          Relevant Orders    CBC WITH DIFFERENTIAL    Macrocytic anemia    Relevant Orders    CBC WITH DIFFERENTIAL    IRON/TOTAL IRON BIND    VITAMIN B12    FOLATE          Portions of this note may be dictated using Dragon NaturallySpeaking voice recognition software.  Variances in spelling and vocabulary are possible and unintentional.  Not all areas may be caught/corrected.   Please notify me if any discrepancies are noted or if the meaning of any statement is not correct/clear.

## 2021-06-25 ENCOUNTER — HOSPITAL ENCOUNTER (OUTPATIENT)
Dept: CARDIOLOGY | Facility: MEDICAL CENTER | Age: 66
End: 2021-06-25
Attending: FAMILY MEDICINE
Payer: MEDICARE

## 2021-06-25 DIAGNOSIS — R55 SYNCOPE, UNSPECIFIED SYNCOPE TYPE: ICD-10-CM

## 2021-06-25 LAB
LV EJECT FRACT  99904: 65
LV EJECT FRACT MOD 2C 99903: 64.3
LV EJECT FRACT MOD 4C 99902: 69.81
LV EJECT FRACT MOD BP 99901: 68.53

## 2021-06-25 PROCEDURE — 93306 TTE W/DOPPLER COMPLETE: CPT

## 2021-06-25 PROCEDURE — 93306 TTE W/DOPPLER COMPLETE: CPT | Mod: 26 | Performed by: INTERNAL MEDICINE

## 2021-07-02 ENCOUNTER — TELEPHONE (OUTPATIENT)
Dept: MEDICAL GROUP | Facility: IMAGING CENTER | Age: 66
End: 2021-07-02

## 2021-07-02 NOTE — TELEPHONE ENCOUNTER
Received notice from patient's insurance regarding patient medication Carisoprodol 350 MG tab is not on insurance formulary and is not covered.     Listed alternative is Tizanidine HCL    Insurance requesting provider to switch patient to formulary medication. See Notice saved to patient media.

## 2021-07-14 ENCOUNTER — APPOINTMENT (RX ONLY)
Dept: URBAN - METROPOLITAN AREA CLINIC 4 | Facility: CLINIC | Age: 66
Setting detail: DERMATOLOGY
End: 2021-07-14

## 2021-07-14 DIAGNOSIS — Z71.89 OTHER SPECIFIED COUNSELING: ICD-10-CM

## 2021-07-14 DIAGNOSIS — D18.0 HEMANGIOMA: ICD-10-CM

## 2021-07-14 DIAGNOSIS — D22 MELANOCYTIC NEVI: ICD-10-CM

## 2021-07-14 DIAGNOSIS — L57.0 ACTINIC KERATOSIS: ICD-10-CM

## 2021-07-14 DIAGNOSIS — L81.4 OTHER MELANIN HYPERPIGMENTATION: ICD-10-CM

## 2021-07-14 DIAGNOSIS — Z85.828 PERSONAL HISTORY OF OTHER MALIGNANT NEOPLASM OF SKIN: ICD-10-CM

## 2021-07-14 DIAGNOSIS — L82.1 OTHER SEBORRHEIC KERATOSIS: ICD-10-CM

## 2021-07-14 PROBLEM — D22.9 MELANOCYTIC NEVI, UNSPECIFIED: Status: ACTIVE | Noted: 2021-07-14

## 2021-07-14 PROBLEM — D18.01 HEMANGIOMA OF SKIN AND SUBCUTANEOUS TISSUE: Status: ACTIVE | Noted: 2021-07-14

## 2021-07-14 PROCEDURE — 17000 DESTRUCT PREMALG LESION: CPT

## 2021-07-14 PROCEDURE — 17003 DESTRUCT PREMALG LES 2-14: CPT

## 2021-07-14 PROCEDURE — ? LIQUID NITROGEN

## 2021-07-14 PROCEDURE — 99213 OFFICE O/P EST LOW 20 MIN: CPT | Mod: 25

## 2021-07-14 PROCEDURE — ? COUNSELING

## 2021-07-14 PROCEDURE — ? SUNSCREEN RECOMMENDATIONS

## 2021-07-14 ASSESSMENT — LOCATION SIMPLE DESCRIPTION DERM
LOCATION SIMPLE: LEFT POPLITEAL SKIN
LOCATION SIMPLE: LEFT UPPER BACK
LOCATION SIMPLE: LEFT LOWER LIP
LOCATION SIMPLE: CHEST
LOCATION SIMPLE: RIGHT HAND
LOCATION SIMPLE: LEFT POSTERIOR THIGH

## 2021-07-14 ASSESSMENT — LOCATION DETAILED DESCRIPTION DERM
LOCATION DETAILED: LEFT INFERIOR VERMILION BORDER
LOCATION DETAILED: LEFT DISTAL POSTERIOR THIGH
LOCATION DETAILED: LEFT INFERIOR LATERAL UPPER BACK
LOCATION DETAILED: UPPER STERNUM
LOCATION DETAILED: RIGHT MEDIAL SUPERIOR CHEST
LOCATION DETAILED: RIGHT ULNAR DORSAL HAND
LOCATION DETAILED: LEFT POPLITEAL SKIN

## 2021-07-14 ASSESSMENT — LOCATION ZONE DERM
LOCATION ZONE: TRUNK
LOCATION ZONE: LIP
LOCATION ZONE: LEG
LOCATION ZONE: HAND

## 2021-07-14 NOTE — PROCEDURE: LIQUID NITROGEN
Show Applicator Variable?: Yes
Duration Of Freeze Thaw-Cycle (Seconds): 2
Render Note In Bullet Format When Appropriate: No
Consent: The patient's consent was obtained including but not limited to risks of crusting, scabbing, blistering, scarring, darker or lighter pigmentary change, recurrence, incomplete removal and infection.
Detail Level: Simple
Number Of Freeze-Thaw Cycles: 2 freeze-thaw cycles
Post-Care Instructions: I reviewed with the patient in detail post-care instructions. Patient is to wear sunprotection, and avoid picking at any of the treated lesions. Pt may apply Vaseline to crusted or scabbing areas.

## 2021-07-14 NOTE — PROCEDURE: MIPS QUALITY
Quality 431: Preventive Care And Screening: Unhealthy Alcohol Use - Screening: Patient screened for unhealthy alcohol use using a single question and scores less than 2 times per year
Detail Level: Detailed
Quality 111:Pneumonia Vaccination Status For Older Adults: Pneumococcal Vaccination Previously Received
Quality 130: Documentation Of Current Medications In The Medical Record: Current Medications Documented
Quality 226: Preventive Care And Screening: Tobacco Use: Screening And Cessation Intervention: Patient screened for tobacco use and is an ex/non-smoker
Quality 110: Preventive Care And Screening: Influenza Immunization: Influenza immunization was not ordered or administered, reason not given

## 2021-08-04 DIAGNOSIS — M54.2 CERVICAL PAIN: ICD-10-CM

## 2021-08-04 RX ORDER — GABAPENTIN 300 MG/1
300 CAPSULE ORAL 3 TIMES DAILY
Qty: 270 CAPSULE | Refills: 0 | Status: SHIPPED | OUTPATIENT
Start: 2021-08-04 | End: 2021-11-23

## 2021-08-04 NOTE — TELEPHONE ENCOUNTER
Received request via: Patient    Was the patient seen in the last year in this department? Yes    Does the patient have an active prescription (recently filled or refills available) for medication(s) requested? No       Patient requesting 300 mg tablets.

## 2021-11-20 DIAGNOSIS — M54.2 CERVICAL PAIN: ICD-10-CM

## 2021-11-23 RX ORDER — GABAPENTIN 300 MG/1
300 CAPSULE ORAL 3 TIMES DAILY
Qty: 270 CAPSULE | Refills: 0 | Status: SHIPPED | OUTPATIENT
Start: 2021-11-23 | End: 2022-02-14

## 2021-11-24 ENCOUNTER — HOSPITAL ENCOUNTER (OUTPATIENT)
Dept: LAB | Facility: MEDICAL CENTER | Age: 66
End: 2021-11-24
Attending: FAMILY MEDICINE
Payer: MEDICARE

## 2021-11-24 DIAGNOSIS — D72.810 LYMPHOPENIA: ICD-10-CM

## 2021-11-24 DIAGNOSIS — R55 SYNCOPE, UNSPECIFIED SYNCOPE TYPE: ICD-10-CM

## 2021-11-24 DIAGNOSIS — D70.8 OTHER NEUTROPENIA (HCC): ICD-10-CM

## 2021-11-24 DIAGNOSIS — D53.9 MACROCYTIC ANEMIA: ICD-10-CM

## 2021-11-24 LAB
BASOPHILS # BLD AUTO: 0.7 % (ref 0–1.8)
BASOPHILS # BLD: 0.02 K/UL (ref 0–0.12)
EOSINOPHIL # BLD AUTO: 0.14 K/UL (ref 0–0.51)
EOSINOPHIL NFR BLD: 5 % (ref 0–6.9)
ERYTHROCYTE [DISTWIDTH] IN BLOOD BY AUTOMATED COUNT: 48.7 FL (ref 35.9–50)
FOLATE SERPL-MCNC: 28.5 NG/ML
HCT VFR BLD AUTO: 43.5 % (ref 37–47)
HGB BLD-MCNC: 13.9 G/DL (ref 12–16)
IMM GRANULOCYTES # BLD AUTO: 0 K/UL (ref 0–0.11)
IMM GRANULOCYTES NFR BLD AUTO: 0 % (ref 0–0.9)
IRON SATN MFR SERPL: 34 % (ref 15–55)
IRON SERPL-MCNC: 98 UG/DL (ref 40–170)
LYMPHOCYTES # BLD AUTO: 0.93 K/UL (ref 1–4.8)
LYMPHOCYTES NFR BLD: 33.2 % (ref 22–41)
MAGNESIUM SERPL-MCNC: 2.2 MG/DL (ref 1.5–2.5)
MCH RBC QN AUTO: 32.3 PG (ref 27–33)
MCHC RBC AUTO-ENTMCNC: 32 G/DL (ref 33.6–35)
MCV RBC AUTO: 100.9 FL (ref 81.4–97.8)
MONOCYTES # BLD AUTO: 0.28 K/UL (ref 0–0.85)
MONOCYTES NFR BLD AUTO: 10 % (ref 0–13.4)
NEUTROPHILS # BLD AUTO: 1.43 K/UL (ref 2–7.15)
NEUTROPHILS NFR BLD: 51.1 % (ref 44–72)
NRBC # BLD AUTO: 0 K/UL
NRBC BLD-RTO: 0 /100 WBC
PHOSPHATE SERPL-MCNC: 3.1 MG/DL (ref 2.5–4.5)
PLATELET # BLD AUTO: 215 K/UL (ref 164–446)
PMV BLD AUTO: 8.8 FL (ref 9–12.9)
RBC # BLD AUTO: 4.31 M/UL (ref 4.2–5.4)
TIBC SERPL-MCNC: 286 UG/DL (ref 250–450)
UIBC SERPL-MCNC: 188 UG/DL (ref 110–370)
VIT B12 SERPL-MCNC: 888 PG/ML (ref 211–911)
WBC # BLD AUTO: 2.8 K/UL (ref 4.8–10.8)

## 2021-11-24 PROCEDURE — 85025 COMPLETE CBC W/AUTO DIFF WBC: CPT

## 2021-11-24 PROCEDURE — 82607 VITAMIN B-12: CPT

## 2021-11-24 PROCEDURE — 82746 ASSAY OF FOLIC ACID SERUM: CPT

## 2021-11-24 PROCEDURE — 83540 ASSAY OF IRON: CPT

## 2021-11-24 PROCEDURE — 84100 ASSAY OF PHOSPHORUS: CPT

## 2021-11-24 PROCEDURE — 36415 COLL VENOUS BLD VENIPUNCTURE: CPT

## 2021-11-24 PROCEDURE — 83735 ASSAY OF MAGNESIUM: CPT

## 2021-11-24 PROCEDURE — 83550 IRON BINDING TEST: CPT

## 2021-11-30 ENCOUNTER — OFFICE VISIT (OUTPATIENT)
Dept: MEDICAL GROUP | Facility: IMAGING CENTER | Age: 66
End: 2021-11-30
Payer: MEDICARE

## 2021-11-30 VITALS
HEIGHT: 69 IN | TEMPERATURE: 97.1 F | WEIGHT: 130 LBS | HEART RATE: 82 BPM | SYSTOLIC BLOOD PRESSURE: 110 MMHG | RESPIRATION RATE: 12 BRPM | BODY MASS INDEX: 19.26 KG/M2 | DIASTOLIC BLOOD PRESSURE: 64 MMHG | OXYGEN SATURATION: 99 %

## 2021-11-30 DIAGNOSIS — D72.810 LYMPHOPENIA: ICD-10-CM

## 2021-11-30 DIAGNOSIS — M54.2 CERVICAL PAIN: ICD-10-CM

## 2021-11-30 DIAGNOSIS — D70.9 NEUTROPENIA, UNSPECIFIED TYPE (HCC): ICD-10-CM

## 2021-11-30 DIAGNOSIS — D53.9 MACROCYTIC ANEMIA: ICD-10-CM

## 2021-11-30 PROCEDURE — 99214 OFFICE O/P EST MOD 30 MIN: CPT | Performed by: FAMILY MEDICINE

## 2021-11-30 RX ORDER — CARISOPRODOL 350 MG/1
350 TABLET ORAL
Qty: 30 TABLET | Refills: 0 | Status: SHIPPED | OUTPATIENT
Start: 2021-11-30 | End: 2021-12-30

## 2021-11-30 ASSESSMENT — PAIN SCALES - GENERAL: PAINLEVEL: 2=MINIMAL-SLIGHT

## 2021-11-30 ASSESSMENT — FIBROSIS 4 INDEX: FIB4 SCORE: 1.9

## 2021-11-30 NOTE — ASSESSMENT & PLAN NOTE
Trending down. Will cut out etoh completely and obtain repeat cbc. If it has not improved will do full workup and referral to heme.

## 2021-12-06 ENCOUNTER — PATIENT MESSAGE (OUTPATIENT)
Dept: MEDICAL GROUP | Facility: IMAGING CENTER | Age: 66
End: 2021-12-06

## 2021-12-07 RX ORDER — OMEPRAZOLE 20 MG/1
20 CAPSULE, DELAYED RELEASE ORAL
Qty: 90 CAPSULE | Refills: 3 | Status: SHIPPED | OUTPATIENT
Start: 2021-12-07 | End: 2022-11-28 | Stop reason: SDUPTHER

## 2021-12-07 NOTE — TELEPHONE ENCOUNTER
From: Missy Stuart  To: Physician Daksha Andrade  Sent: 12/6/2021 8:06 PM PST  Subject: Refill for Omeprazol 20mg    To whom it may concern;  I was wondering if you had a chance to refill my Omerprazole prescription. Last filled on 8/27/2021 qty 90. [phone]  Thank you,  Missy

## 2021-12-16 ENCOUNTER — TELEPHONE (OUTPATIENT)
Dept: MEDICAL GROUP | Facility: IMAGING CENTER | Age: 66
End: 2021-12-16

## 2021-12-16 DIAGNOSIS — R09.81 CHRONIC NASAL CONGESTION: ICD-10-CM

## 2021-12-17 NOTE — TELEPHONE ENCOUNTER
----- Message from Sakina Renteria sent at 12/16/2021 10:27 AM PST -----  Regarding: Urgent Referral  Pt called requesting a referral to an ENT: Aung Lentz fax: [phone]. She states she already told the provider that one would be needed.

## 2021-12-17 NOTE — TELEPHONE ENCOUNTER
Spoke with pt regarding need for referral. Requesting for evaluation of chronic congestion at night. Reports she has discussed this with Dr. Andrade in the past

## 2022-01-10 RX ORDER — LEVOTHYROXINE SODIUM 88 UG/1
88 TABLET ORAL
Qty: 90 TABLET | Refills: 1 | Status: SHIPPED | OUTPATIENT
Start: 2022-01-10 | End: 2022-06-27

## 2022-01-31 ENCOUNTER — APPOINTMENT (OUTPATIENT)
Dept: RADIOLOGY | Facility: MEDICAL CENTER | Age: 67
End: 2022-01-31
Attending: FAMILY MEDICINE
Payer: MEDICARE

## 2022-02-03 DIAGNOSIS — G25.81 RESTLESS LEGS: ICD-10-CM

## 2022-02-03 DIAGNOSIS — M54.2 CERVICAL PAIN: ICD-10-CM

## 2022-02-07 DIAGNOSIS — M54.2 CERVICAL PAIN: ICD-10-CM

## 2022-02-10 RX ORDER — ROPINIROLE 0.5 MG/1
1 TABLET, FILM COATED ORAL NIGHTLY
Qty: 90 TABLET | Refills: 1 | Status: SHIPPED | OUTPATIENT
Start: 2022-02-10 | End: 2022-06-27

## 2022-02-10 RX ORDER — CARISOPRODOL 350 MG/1
TABLET ORAL
Qty: 30 TABLET | Refills: 0 | OUTPATIENT
Start: 2022-02-10 | End: 2022-03-12

## 2022-02-14 RX ORDER — IPRATROPIUM BROMIDE 42 UG/1
SPRAY, METERED NASAL
COMMUNITY
Start: 2022-01-17 | End: 2022-09-25

## 2022-02-14 RX ORDER — GABAPENTIN 300 MG/1
300 CAPSULE ORAL 3 TIMES DAILY
Qty: 270 CAPSULE | Refills: 0 | Status: SHIPPED | OUTPATIENT
Start: 2022-02-14 | End: 2022-05-15

## 2022-02-22 DIAGNOSIS — M54.2 CERVICAL PAIN: ICD-10-CM

## 2022-02-23 RX ORDER — CARISOPRODOL 350 MG/1
350 TABLET ORAL
Qty: 15 TABLET | Refills: 0 | Status: SHIPPED | OUTPATIENT
Start: 2022-02-23 | End: 2022-03-10

## 2022-03-31 ENCOUNTER — HOSPITAL ENCOUNTER (OUTPATIENT)
Dept: RADIOLOGY | Facility: MEDICAL CENTER | Age: 67
End: 2022-03-31
Attending: FAMILY MEDICINE
Payer: MEDICARE

## 2022-03-31 DIAGNOSIS — Z12.31 VISIT FOR SCREENING MAMMOGRAM: ICD-10-CM

## 2022-03-31 PROCEDURE — 77063 BREAST TOMOSYNTHESIS BI: CPT

## 2022-04-28 ENCOUNTER — HOSPITAL ENCOUNTER (OUTPATIENT)
Dept: LAB | Facility: MEDICAL CENTER | Age: 67
End: 2022-04-28
Attending: FAMILY MEDICINE
Payer: MEDICARE

## 2022-04-28 DIAGNOSIS — D70.9 NEUTROPENIA, UNSPECIFIED TYPE (HCC): ICD-10-CM

## 2022-04-28 DIAGNOSIS — D72.810 LYMPHOPENIA: ICD-10-CM

## 2022-04-28 DIAGNOSIS — D53.9 MACROCYTIC ANEMIA: ICD-10-CM

## 2022-04-28 LAB
BASOPHILS # BLD AUTO: 1 % (ref 0–1.8)
BASOPHILS # BLD: 0.03 K/UL (ref 0–0.12)
EOSINOPHIL # BLD AUTO: 0.07 K/UL (ref 0–0.51)
EOSINOPHIL NFR BLD: 2 % (ref 0–6.9)
ERYTHROCYTE [DISTWIDTH] IN BLOOD BY AUTOMATED COUNT: 44.5 FL (ref 35.9–50)
HCT VFR BLD AUTO: 40.5 % (ref 37–47)
HGB BLD-MCNC: 13.6 G/DL (ref 12–16)
HGB RETIC QN AUTO: 35.1 PG/CELL (ref 29–35)
IMM RETICS NFR: 4.3 % (ref 9.3–17.4)
LDH SERPL L TO P-CCNC: 191 U/L (ref 107–266)
LYMPHOCYTES # BLD AUTO: 1.29 K/UL (ref 1–4.8)
LYMPHOCYTES NFR BLD: 38 % (ref 22–41)
MANUAL DIFF BLD: ABNORMAL
MCH RBC QN AUTO: 33.4 PG (ref 27–33)
MCHC RBC AUTO-ENTMCNC: 33.6 G/DL (ref 33.6–35)
MCV RBC AUTO: 99.5 FL (ref 81.4–97.8)
MONOCYTES # BLD AUTO: 0.14 K/UL (ref 0–0.85)
MONOCYTES NFR BLD AUTO: 4 % (ref 0–13.4)
NEUTROPHILS # BLD AUTO: 1.87 K/UL (ref 2–7.15)
NEUTROPHILS NFR BLD: 55 % (ref 44–72)
PLATELET # BLD AUTO: 262 K/UL (ref 164–446)
PMV BLD AUTO: 9.1 FL (ref 9–12.9)
QORDR QORDR: NORMAL
RBC # BLD AUTO: 4.07 M/UL (ref 4.2–5.4)
RETICS # AUTO: 0.04 M/UL (ref 0.04–0.06)
RETICS/RBC NFR: 1 % (ref 0.8–2.1)
WBC # BLD AUTO: 3.4 K/UL (ref 4.8–10.8)

## 2022-04-28 PROCEDURE — 84165 PROTEIN E-PHORESIS SERUM: CPT

## 2022-04-28 PROCEDURE — 84155 ASSAY OF PROTEIN SERUM: CPT

## 2022-04-28 PROCEDURE — 86663 EPSTEIN-BARR ANTIBODY: CPT

## 2022-04-28 PROCEDURE — 86664 EPSTEIN-BARR NUCLEAR ANTIGEN: CPT

## 2022-04-28 PROCEDURE — 83615 LACTATE (LD) (LDH) ENZYME: CPT

## 2022-04-28 PROCEDURE — 86665 EPSTEIN-BARR CAPSID VCA: CPT

## 2022-04-28 PROCEDURE — 36415 COLL VENOUS BLD VENIPUNCTURE: CPT

## 2022-04-28 PROCEDURE — 86038 ANTINUCLEAR ANTIBODIES: CPT

## 2022-04-28 PROCEDURE — 85027 COMPLETE CBC AUTOMATED: CPT

## 2022-04-28 PROCEDURE — 83010 ASSAY OF HAPTOGLOBIN QUANT: CPT

## 2022-04-28 PROCEDURE — 85046 RETICYTE/HGB CONCENTRATE: CPT

## 2022-04-28 PROCEDURE — 82525 ASSAY OF COPPER: CPT

## 2022-04-28 PROCEDURE — 85007 BL SMEAR W/DIFF WBC COUNT: CPT

## 2022-04-29 LAB
EBV EA-D IGG SER-ACNC: >150 U/ML (ref 0–10.9)
EBV NA IGG SER IA-ACNC: 190 U/ML (ref 0–21.9)
EBV VCA IGG SER IA-ACNC: >750 U/ML (ref 0–21.9)
HAPTOGLOB SERPL-MCNC: 82 MG/DL (ref 30–200)
NUCLEAR IGG SER QL IA: NORMAL

## 2022-05-01 LAB
ALBUMIN SERPL ELPH-MCNC: 4.25 G/DL (ref 3.75–5.01)
ALPHA1 GLOB SERPL ELPH-MCNC: 0.28 G/DL (ref 0.19–0.46)
ALPHA2 GLOB SERPL ELPH-MCNC: 0.73 G/DL (ref 0.48–1.05)
B-GLOBULIN SERPL ELPH-MCNC: 0.66 G/DL (ref 0.48–1.1)
COPPER SERPL-MCNC: 99.6 UG/DL (ref 80–155)
GAMMA GLOB SERPL ELPH-MCNC: 0.78 G/DL (ref 0.62–1.51)
INTERPRETATION SERPL IFE-IMP: NORMAL
MONOCLON BAND OBS SERPL: NORMAL
PATHOLOGY STUDY: NORMAL
PROT SERPL-MCNC: 6.7 G/DL (ref 6.3–8.2)

## 2022-05-03 ENCOUNTER — OFFICE VISIT (OUTPATIENT)
Dept: MEDICAL GROUP | Facility: IMAGING CENTER | Age: 67
End: 2022-05-03
Payer: MEDICARE

## 2022-05-03 VITALS
DIASTOLIC BLOOD PRESSURE: 52 MMHG | HEIGHT: 69 IN | SYSTOLIC BLOOD PRESSURE: 100 MMHG | OXYGEN SATURATION: 98 % | WEIGHT: 127 LBS | BODY MASS INDEX: 18.81 KG/M2 | HEART RATE: 83 BPM | TEMPERATURE: 97.2 F

## 2022-05-03 DIAGNOSIS — D75.89 MACROCYTOSIS WITHOUT ANEMIA: ICD-10-CM

## 2022-05-03 DIAGNOSIS — M54.2 CERVICAL PAIN: ICD-10-CM

## 2022-05-03 DIAGNOSIS — D70.8 OTHER NEUTROPENIA (HCC): ICD-10-CM

## 2022-05-03 DIAGNOSIS — Z11.4 ENCOUNTER FOR SCREENING FOR HIV: ICD-10-CM

## 2022-05-03 DIAGNOSIS — N95.2 ATROPHIC VAGINITIS: ICD-10-CM

## 2022-05-03 PROCEDURE — 99215 OFFICE O/P EST HI 40 MIN: CPT | Performed by: FAMILY MEDICINE

## 2022-05-03 RX ORDER — CARISOPRODOL 350 MG/1
350 TABLET ORAL
Qty: 30 TABLET | Refills: 0 | Status: SHIPPED | OUTPATIENT
Start: 2022-05-03 | End: 2022-07-18

## 2022-05-03 RX ORDER — CONJUGATED ESTROGENS 0.62 MG/G
CREAM VAGINAL
Qty: 30 G | Refills: 1 | Status: SHIPPED | OUTPATIENT
Start: 2022-05-03 | End: 2022-09-25

## 2022-05-03 ASSESSMENT — PAIN SCALES - GENERAL: PAINLEVEL: 3=SLIGHT PAIN

## 2022-05-03 ASSESSMENT — FIBROSIS 4 INDEX: FIB4 SCORE: 1.58

## 2022-05-03 ASSESSMENT — PATIENT HEALTH QUESTIONNAIRE - PHQ9: CLINICAL INTERPRETATION OF PHQ2 SCORE: 0

## 2022-05-03 NOTE — ASSESSMENT & PLAN NOTE
Patient had mostly been abstinent from alcohol for 2-1/2 months prior to obtaining the last labs.  She did have a slight improvement in WBC neutrophils and her MCV went down to around 99.  Her SPEP, reticulocyte count, B12, copper, folate, or enzymes, kidney function are all normal.  So we will monitor her CBC.  We will repeat in 3 to 6 months after continued abstinence from alcohol.  Patient will schedule her right upper quadrant ultrasound.

## 2022-05-03 NOTE — PROGRESS NOTES
"Chief Complaint   Patient presents with   • Follow-Up   • Lab Results     HPI:   With a history of   Patient Active Problem List   Diagnosis   • Cervical pain   • Restless legs   • Hypothyroidism   • Hyperlipidemia   • Hypotension due to drugs   • Dysfunction of right eustachian tube   • Macrocytosis without anemia   • Atrophic vaginitis   • Eosinophilic esophagitis   • Lymphopenia   • Other neutropenia (HCC)   • Macrocytic anemia   Here for follow-up after work-up for macrocytosis and neutropenia.  Also would like to discuss her neck pain.  Patient was drinking up to 16 alcoholic beverages per week.  We had discussed cutting alcohol out for a few months and then repeating labs.  She did this for 2-1/2 months and repeat labs showed improvement in WBC and a decrease in MCV though both still abnormal.  Reports feeling healthier with more energy and is sleeping better.    Patient also has atrophic vaginitis.  Our last pelvic exam was in March 2020.  Patient is very happy with the Premarin cream.    Continues with neck pain. Follows with [facility] recent neck injections. They think they got the wrong level of the cervical spine.  She still having pain.  She takes once per day two tabs of tylenol (500Mg) and 4 tabs of advil and not every day. Plus gabapentin 300mg tid. Very sparingly she uses the soma.  She prefers for me to manage her prescription medications and will continue with Amezquita for procedures.  Allergies   Allergen Reactions   • Doxycycline      \"stiff joints\"   • Codeine Vomiting and Nausea   • Dilaudid [Hydromorphone Hcl] Hives   • Fentanyl Vomiting and Nausea   • Percocet [Oxycodone-Acetaminophen] Vomiting   • Vicoprofen [Hydrocodone-Ibuprofen]      sweating     Current Outpatient Medications   Medication Sig Dispense Refill   • Fluticasone Propionate (FLONASE NA)      • estrogens, conjugated (PREMARIN) 0.625 MG/GM Cream APPLY 1 PEA SIZED AMOUNT VAGINALLY ONCE WEEKLY 30 g 1   • carisoprodol (SOMA) 350 MG " "Tab Take 1 Tablet by mouth one time as needed for Muscle Spasms for up to 30 doses. 30 Tablet 0   • gabapentin (NEURONTIN) 300 MG Cap TAKE 1 CAPSULE BY MOUTH 3 TIMES A DAY FOR 90 DAYS. 270 Capsule 0   • ipratropium (ATROVENT) 0.06 % Solution      • ROPINIRole (REQUIP) 0.5 MG Tab Take 2 Tablets by mouth every evening. 90 Tablet 1   • levothyroxine (SYNTHROID) 88 MCG Tab TAKE 1 TAB BY MOUTH EVERY MORNING ON AN EMPTY STOMACH. 90 Tablet 1   • omeprazole (PRILOSEC) 20 MG delayed-release capsule Take 1 Capsule by mouth every day. 90 Capsule 3   • diclofenac sodium (VOLTAREN) 1 % Gel APPLY 2 G TO SKIN AS DIRECTED 4 TIMES A DAY AS NEEDED. 100 g 0   • B Complex Vitamins (B COMPLEX PO) Take  by mouth.     • Multiple Vitamins-Minerals (MULTIVITAMIN ADULT PO) Take  by mouth.     • Multiple Vitamins-Minerals (ZINC PO) Take 1 Tab by mouth every day.     • POTASSIUM PO Take 1 Tab by mouth every day. OTC     • Calcium-Magnesium-Vitamin D (CALCIUM MAGNESIUM PO) Take 1 Tab by mouth every day.       No current facility-administered medications for this visit.     Social History     Tobacco Use   • Smoking status: Former Smoker     Packs/day: 0.50     Years: 8.00     Pack years: 4.00     Types: Cigarettes     Quit date: 1988     Years since quittin.3   • Smokeless tobacco: Never Used   • Tobacco comment: quit in her 20s    Vaping Use   • Vaping Use: Never used   Substance Use Topics   • Alcohol use: Yes     Alcohol/week: 1.2 - 3.0 oz     Types: 2 - 5 Standard drinks or equivalent per week   • Drug use: No     Family History   Problem Relation Age of Onset   • Diabetes Mother    • Stroke Maternal Grandfather        /52 (BP Location: Left arm, Patient Position: Sitting, BP Cuff Size: Adult)   Pulse 83   Temp 36.2 °C (97.2 °F) (Temporal)   Ht 1.753 m (5' 9\")   Wt 57.6 kg (127 lb)   SpO2 98%  Body mass index is 18.75 kg/m².  Review of Systems   Constitutional: Negative for chills, fever and malaise/fatigue.   HENT: " Negative for hearing loss and tinnitus.    Eyes: Negative for double vision and pain.   Respiratory: Negative for cough, shortness of breath and wheezing.    Cardiovascular: Negative for chest pain, palpitations and leg swelling.   Gastrointestinal: Negative for abdominal pain, diarrhea, nausea and vomiting.   Genitourinary: Negative for dysuria and frequency.   Musculoskeletal: Negative for joint pain and myalgias.   Skin: Negative for itching and rash.   Neurological: Negative for dizziness and headaches.     Physical Exam  Constitutional:       General: He is not in acute distress.     Appearance: He is not diaphoretic.   HENT:      Head: Normocephalic and atraumatic.   Eyes:      General: No scleral icterus.        Right eye: No discharge.         Left eye: No discharge.      Conjunctiva/sclera: Conjunctivae normal.      Pupils: Pupils are equal, round, and reactive to light.   Neck:      Thyroid: No thyromegaly.   Pulmonary:      Effort: Pulmonary effort is normal. No respiratory distress.   Abdominal:      General: There is no distension.   Skin:     General: Skin is warm and dry.   Neurological:      Mental Status: He is alert.   Psychiatric:         Mood and Affect: Affect normal.         Judgment: Judgment normal.         All labs (last 1 month):   Hospital Outpatient Visit on 04/28/2022   Component Date Value Ref Range Status   • Order 04/28/2022 See reflex test   Final   • Haptoglobin 04/28/2022 82  30 - 200 mg/dL Final    Comment: Performed By: [facility]  [address] [place] [id number]  : Barbara Calvert MD     • Copper Serum 04/28/2022 99.6  80.0 - 155.0 ug/dL Final    Comment: INTERPRETIVE INFORMATION: Copper, Serum or Plasma  Elevated results may be due to skin or collection-related  contamination, including the use of a noncertified metal-free  collection/transport tube. If contamination concerns exist due to  elevated levels of serum/plasma copper, confirmation  with a second  specimen collected in a certified metal-free tube is recommended.  Serum copper may be elevated with infection, inflammation, stress,  and copper supplementation. In females, elevated copper may also  be caused by oral contraceptives and pregnancy (concentrations may  be elevated up to 3 times normal during the third trimester).  This test was developed and its performance characteristics  determined by [facility]. It has not been cleared or  approved by the US Food and Drug Administration. This test was  performed in a CLIA certified laboratory and is intended for  clinical purposes.  Performed By: [facility]  [address] [place] [id number]  Laboratory [facility]                           tor: Barbara Calvert MD     • Antinuclear Antibody 04/28/2022 None Detected  None Detected Final    Comment: If suspicion of connective tissue disease is strong and JOSEFINA EIA is  negative, consider testing for JOSEFINA by IFA ([id number]).    No antibodies to Anti-Nuclear Antibodies (JOSEFINA) detected.  The  Extractable Nuclear Antigen Antibodies (RNP, Lim, SSA 52, SSA  60, Scleroderma, Juanita-1 and SSB) and Double Stranded DNA (dsDNA)  Antibody, IgG will not be performed.  INTERPRETIVE INFORMATION: Anti-Nuclear Antibodies (JOSEFINA), IgG by  ROCAEL  Antinuclear Antibodies (JOSEFINA), IgG by ROCAEL: JOSEFINA specimens are  screened using enzyme-linked immunosorbent assay (ROCAEL)  methodology. All ROCAEL results reported as Detected are further  tested by indirect fluorescent assay (IFA) using HEp-2 substrate  with an IgG-specific conjugate. The JOSEFINA ROCAEL screen is designed  to detect antibodies against dsDNA, histones, SS-A (Ro), SS-B  (La), Lim, Lim/RNP, Scl-70, Juanita-1, centromeric proteins, other  antigens extracted from the HEp-2 cell nucleus. JOSEFINA ROCAEL assays  have been reported to have lower sensitivities than JOSEFINA IFA                            for  systemic autoimmune rheumatic diseases (SARD).  Negative results do not  necessarily rule out SARD.  Performed By: [facility]  [address] [place] [id number]  : Barbara Calvert MD     • LDH Total 04/28/2022 191  107 - 266 U/L Final   • Albumin 04/28/2022 4.25  3.75 - 5.01 g/dL Final   • Alpha-1 Globulin 04/28/2022 0.28  0.19 - 0.46 g/dL Final   • Alpha-2 Globulin 04/28/2022 0.73  0.48 - 1.05 g/dL Final   • Beta Globulin 04/28/2022 0.66  0.48 - 1.10 g/dL Final   • Gamma Globulin 04/28/2022 0.78  0.62 - 1.51 g/dL Final   • Interpretation 04/28/2022 See Note   Final    Normal SPEP pattern.   • MIR Reflex 04/28/2022 Not Done   Final    Comment: Caution: Reflex to Immunofixation electrophoresis (MIR)is not  indicated per results of protein electrophoresis. Immunofixation  electrophoresis is a more sensitive technique than serum protein  electrophoresis for the identification of small M-proteins found  in patients with amyloidosis, early or treated myeloma and  macroglobulinemia, MGUS, solitary plasmacytoma,and extramedullary  plasmacytoma.     • Total Protein, Serum 04/28/2022 6.7  6.3 - 8.2 g/dL Final   • EER Serum Prot. Electro. Reflex 04/28/2022 See Note   Final    Comment: Access [facility] Enhanced Report using the link below:    -Direct access: [email or web address]  Performed By: [facility]  [address] [place] [id number]  : Barbara Calvert MD     • Ebv Ab Vca, Igg 04/28/2022 >750.0 (A) 0.0 - 21.9 U/mL Final    Comment: INTERPRETIVE INFORMATION: Aftab-Barr Virus Antibody to  Viral Capsid Antigen, IgG  17.9 U/mL or less.......Not Detected  18.0-21.9 U/mL..........Indeterminate - Repeat testing in  10-14 days may be helpful.  22.0 U/mL or greater....Detected  Performed By: [facility]  [address] [place] [address]  : Barbara Calvert MD     • Ebv Na-Abs 04/28/2022 190.0 (A) 0.0 - 21.9 U/mL Final    Comment: INTERPRETIVE INFORMATION: Aftab-Barr Virus  Antibody to  Nuclear Antigen, IgG  17.9 U/mL or less.......Not Detected  18.0-21.9 U/mL..........Indeterminate - Repeat testing in  10-14 days may be helpful.  22.0 U/mL or greater....Detected  Performed By: [facility]  [address] [place] [address]  : Barbara Calvert MD     • Ebv Ea-Igg 04/28/2022 >150.0 (A) 0.0 - 10.9 U/mL Final    Comment: Cross-reactivity between EBV EA(D) and Human Immunodeficiency  Virus (HIV), Hepatitis A, B, and C has been documented.  INTERPRETIVE INFORMATION: Aftab-Barr Virus Antibody to  Early D Antigen (EA-D), IgG  8.9 U/mL or less........Not Detected  9.0-10.9 U/mL...........Indeterminate - Repeat testing in  10-14 days may be helpful.  11.0 U/mL or greater....Detected  Performed By: [facility]  [address] [place] [address]  : Barbara Calvert MD     • Reticulocyte Count 04/28/2022 1.0  0.8 - 2.1 % Final   • Retic, Absolute 04/28/2022 0.04  0.04 - 0.06 M/uL Final   • Imm. Reticulocyte Fraction 04/28/2022 4.3 (A) 9.3 - 17.4 % Final   • Retic Hgb Equivalent 04/28/2022 35.1 (A) 29.0 - 35.0 pg/cell Final   • WBC 04/28/2022 3.4 (A) 4.8 - 10.8 K/uL Final   • RBC 04/28/2022 4.07 (A) 4.20 - 5.40 M/uL Final   • Hemoglobin 04/28/2022 13.6  12.0 - 16.0 g/dL Final   • Hematocrit 04/28/2022 40.5  37.0 - 47.0 % Final   • MCV 04/28/2022 99.5 (A) 81.4 - 97.8 fL Final   • MCH 04/28/2022 33.4 (A) 27.0 - 33.0 pg Final   • MCHC 04/28/2022 33.6  33.6 - 35.0 g/dL Final   • RDW 04/28/2022 44.5  35.9 - 50.0 fL Final   • Platelet Count 04/28/2022 262  164 - 446 K/uL Final   • MPV 04/28/2022 9.1  9.0 - 12.9 fL Final   • Neutrophils-Polys 04/28/2022 55.00  44.00 - 72.00 % Final   • Lymphocytes 04/28/2022 38.00  22.00 - 41.00 % Final   • Monocytes 04/28/2022 4.00  0.00 - 13.40 % Final   • Eosinophils 04/28/2022 2.00  0.00 - 6.90 % Final   • Basophils 04/28/2022 1.00  0.00 - 1.80 % Final   • Neutrophils (Absolute) 04/28/2022 1.87 (A)  2.00 - 7.15 K/uL Final    Includes immature neutrophils, if present.   • Lymphs (Absolute) 04/28/2022 1.29  1.00 - 4.80 K/uL Final   • Monos (Absolute) 04/28/2022 0.14  0.00 - 0.85 K/uL Final   • Eos (Absolute) 04/28/2022 0.07  0.00 - 0.51 K/uL Final   • Baso (Absolute) 04/28/2022 0.03  0.00 - 0.12 K/uL Final   • Manual Diff Status 04/28/2022 PERFORMED   Final       Lipids:   Lab Results   Component Value Date/Time    CHOLSTRLTOT 205 (H) 04/22/2021 07:28 AM    TRIGLYCERIDE 46 04/22/2021 07:28 AM    HDL 98 04/22/2021 07:28 AM    LDL 98 04/22/2021 07:28 AM       Imaging: No results found.    A/P  pdmp reviewed and normal  Opiate screening tool 3  Return for annual due for pelvic check last one March 2020.    Problem List Items Addressed This Visit     Cervical pain    Relevant Medications    carisoprodol (SOMA) 350 MG Tab    Other Relevant Orders    Controlled Substance Treatment Agreement    Macrocytosis without anemia     Patient had mostly been abstinent from alcohol for 2-1/2 months prior to obtaining the last labs.  She did have a slight improvement in WBC neutrophils and her MCV went down to around 99.  Her SPEP, reticulocyte count, B12, copper, folate, or enzymes, kidney function are all normal.  So we will monitor her CBC.  We will repeat in 3 to 6 months after continued abstinence from alcohol.  Patient will schedule her right upper quadrant ultrasound.         Atrophic vaginitis    Relevant Medications    estrogens, conjugated (PREMARIN) 0.625 MG/GM Cream    Other neutropenia (HCC)     Patient had mostly been abstinent from alcohol for 2-1/2 months prior to obtaining the last labs.  She did have a slight improvement in WBC neutrophils and her MCV went down to around 99.  Her SPEP, reticulocyte count, B12, copper, folate, or enzymes, kidney function are all normal.  So we will monitor her CBC.  We will repeat in 3 to 6 months after continued abstinence from alcohol.  Patient will schedule her right upper  quadrant ultrasound.           Other Visit Diagnoses     Encounter for screening for HIV        Relevant Orders    HIV AG/AB COMBO ASSAY SCREENING      Patient was seen for 40 minutes face to face of which > 50% of appointment time was spent on counseling and coordination of care regarding the above.      Portions of this note may be dictated using Dragon NaturallySpeaking voice recognition software.  Variances in spelling and vocabulary are possible and unintentional.  Not all areas may be caught/corrected.  Please notify me if any discrepancies are noted or if the meaning of any statement is not correct/clear.

## 2022-06-13 ENCOUNTER — HOSPITAL ENCOUNTER (OUTPATIENT)
Dept: RADIOLOGY | Facility: MEDICAL CENTER | Age: 67
End: 2022-06-13
Attending: SPECIALIST
Payer: MEDICARE

## 2022-06-13 DIAGNOSIS — M47.812 CERVICAL SPONDYLOSIS WITHOUT MYELOPATHY: ICD-10-CM

## 2022-06-13 DIAGNOSIS — M54.12 BRACHIAL NEURITIS: ICD-10-CM

## 2022-06-13 DIAGNOSIS — M96.1 POSTLAMINECTOMY SYNDROME, CERVICAL REGION: ICD-10-CM

## 2022-06-13 DIAGNOSIS — M50.30 DEGENERATION OF CERVICAL INTERVERTEBRAL DISC: ICD-10-CM

## 2022-06-13 PROCEDURE — 72040 X-RAY EXAM NECK SPINE 2-3 VW: CPT

## 2022-07-14 DIAGNOSIS — M54.2 CERVICAL PAIN: ICD-10-CM

## 2022-07-18 RX ORDER — CARISOPRODOL 350 MG/1
350 TABLET ORAL
Qty: 30 TABLET | Refills: 0 | Status: SHIPPED | OUTPATIENT
Start: 2022-07-18 | End: 2022-08-17

## 2022-08-23 ENCOUNTER — APPOINTMENT (RX ONLY)
Dept: URBAN - METROPOLITAN AREA CLINIC 4 | Facility: CLINIC | Age: 67
Setting detail: DERMATOLOGY
End: 2022-08-23

## 2022-08-23 DIAGNOSIS — L90.5 SCAR CONDITIONS AND FIBROSIS OF SKIN: ICD-10-CM

## 2022-08-23 DIAGNOSIS — L85.3 XEROSIS CUTIS: ICD-10-CM

## 2022-08-23 DIAGNOSIS — L57.0 ACTINIC KERATOSIS: ICD-10-CM

## 2022-08-23 DIAGNOSIS — L82.1 OTHER SEBORRHEIC KERATOSIS: ICD-10-CM

## 2022-08-23 DIAGNOSIS — D18.0 HEMANGIOMA: ICD-10-CM

## 2022-08-23 DIAGNOSIS — L81.4 OTHER MELANIN HYPERPIGMENTATION: ICD-10-CM

## 2022-08-23 DIAGNOSIS — Z71.89 OTHER SPECIFIED COUNSELING: ICD-10-CM

## 2022-08-23 DIAGNOSIS — D22 MELANOCYTIC NEVI: ICD-10-CM

## 2022-08-23 PROBLEM — D18.01 HEMANGIOMA OF SKIN AND SUBCUTANEOUS TISSUE: Status: ACTIVE | Noted: 2022-08-23

## 2022-08-23 PROBLEM — D22.5 MELANOCYTIC NEVI OF TRUNK: Status: ACTIVE | Noted: 2022-08-23

## 2022-08-23 PROCEDURE — ? ADDITIONAL NOTES

## 2022-08-23 PROCEDURE — 99213 OFFICE O/P EST LOW 20 MIN: CPT

## 2022-08-23 PROCEDURE — ? COUNSELING

## 2022-08-23 ASSESSMENT — LOCATION SIMPLE DESCRIPTION DERM
LOCATION SIMPLE: ABDOMEN
LOCATION SIMPLE: LEFT UPPER BACK
LOCATION SIMPLE: CHEST
LOCATION SIMPLE: RIGHT UPPER BACK

## 2022-08-23 ASSESSMENT — LOCATION DETAILED DESCRIPTION DERM
LOCATION DETAILED: LEFT RIB CAGE
LOCATION DETAILED: LEFT MEDIAL SUPERIOR CHEST
LOCATION DETAILED: RIGHT MEDIAL SUPERIOR CHEST
LOCATION DETAILED: LEFT SUPERIOR MEDIAL UPPER BACK
LOCATION DETAILED: RIGHT SUPERIOR MEDIAL UPPER BACK
LOCATION DETAILED: RIGHT INFERIOR UPPER BACK
LOCATION DETAILED: RIGHT MEDIAL UPPER BACK
LOCATION DETAILED: EPIGASTRIC SKIN
LOCATION DETAILED: MIDDLE STERNUM

## 2022-08-23 ASSESSMENT — LOCATION ZONE DERM: LOCATION ZONE: TRUNK

## 2022-08-23 NOTE — PROCEDURE: ADDITIONAL NOTES
Render Risk Assessment In Note?: no
Detail Level: Detailed
Additional Notes: Advised patient red light PDT is an option to treat the actinic changes on her face. She does not present with big actinic changes and cryotherapy could cause scarring. Patient will consider this option and will call office if she would like to move forward with this.
Additional Notes: Patient has a tube of Triamcinolone. Recommended patient use this sparingly and try to stay consistent with the application of Cerave cream.

## 2022-08-23 NOTE — PROCEDURE: COUNSELING
Moisturizer Recommendations: La Roche Posay double repair for face and Cerave cream for body twice a day
Detail Level: Generalized
Sunscreen Recommendations: Scalp sunscreen and UltaMD
Detail Level: Detailed

## 2022-08-30 NOTE — TELEPHONE ENCOUNTER
Received message from patient requesting refill of medications prior to Dr. Raymond george.    Called and spoke with patient. Patient states she does have refills available at the pharmacy. No refills needed at this time. Notified patient how to schedule visit with new PCP. No further questions at this time.

## 2022-09-25 ENCOUNTER — HOSPITAL ENCOUNTER (EMERGENCY)
Facility: MEDICAL CENTER | Age: 67
End: 2022-09-25
Attending: EMERGENCY MEDICINE
Payer: MEDICARE

## 2022-09-25 VITALS
WEIGHT: 128.31 LBS | DIASTOLIC BLOOD PRESSURE: 87 MMHG | BODY MASS INDEX: 19 KG/M2 | OXYGEN SATURATION: 98 % | RESPIRATION RATE: 16 BRPM | HEIGHT: 69 IN | TEMPERATURE: 98.6 F | SYSTOLIC BLOOD PRESSURE: 135 MMHG | HEART RATE: 73 BPM

## 2022-09-25 DIAGNOSIS — N30.01 ACUTE CYSTITIS WITH HEMATURIA: Primary | ICD-10-CM

## 2022-09-25 DIAGNOSIS — R30.0 DYSURIA: ICD-10-CM

## 2022-09-25 LAB
APPEARANCE UR: CLEAR
BACTERIA #/AREA URNS HPF: ABNORMAL /HPF
BILIRUB UR QL STRIP.AUTO: NEGATIVE
COLOR UR: ABNORMAL
EPI CELLS #/AREA URNS HPF: ABNORMAL /HPF
GLUCOSE UR STRIP.AUTO-MCNC: 100 MG/DL
KETONES UR STRIP.AUTO-MCNC: NEGATIVE MG/DL
LEUKOCYTE ESTERASE UR QL STRIP.AUTO: ABNORMAL
MICRO URNS: ABNORMAL
NITRITE UR QL STRIP.AUTO: POSITIVE
PH UR STRIP.AUTO: 5 [PH] (ref 5–8)
PROT UR QL STRIP: 30 MG/DL
RBC # URNS HPF: ABNORMAL /HPF
RBC UR QL AUTO: ABNORMAL
SP GR UR STRIP.AUTO: <=1.005
WBC #/AREA URNS HPF: ABNORMAL /HPF

## 2022-09-25 PROCEDURE — 87077 CULTURE AEROBIC IDENTIFY: CPT

## 2022-09-25 PROCEDURE — 87186 SC STD MICRODIL/AGAR DIL: CPT

## 2022-09-25 PROCEDURE — 81001 URINALYSIS AUTO W/SCOPE: CPT

## 2022-09-25 PROCEDURE — 700102 HCHG RX REV CODE 250 W/ 637 OVERRIDE(OP): Performed by: EMERGENCY MEDICINE

## 2022-09-25 PROCEDURE — 87086 URINE CULTURE/COLONY COUNT: CPT

## 2022-09-25 PROCEDURE — A9270 NON-COVERED ITEM OR SERVICE: HCPCS | Performed by: EMERGENCY MEDICINE

## 2022-09-25 PROCEDURE — 99283 EMERGENCY DEPT VISIT LOW MDM: CPT

## 2022-09-25 RX ORDER — CEPHALEXIN 500 MG/1
500 CAPSULE ORAL 2 TIMES DAILY
Qty: 10 CAPSULE | Refills: 0 | Status: SHIPPED | OUTPATIENT
Start: 2022-09-25 | End: 2022-09-30

## 2022-09-25 RX ORDER — CEPHALEXIN 250 MG/1
500 CAPSULE ORAL ONCE
Status: COMPLETED | OUTPATIENT
Start: 2022-09-25 | End: 2022-09-25

## 2022-09-25 RX ADMIN — CEPHALEXIN 500 MG: 250 CAPSULE ORAL at 17:45

## 2022-09-25 ASSESSMENT — FIBROSIS 4 INDEX: FIB4 SCORE: 1.58

## 2022-09-25 NOTE — ED PROVIDER NOTES
ED Provider Note      Means of Arrival: POV  History obtained from: patient    CHIEF COMPLAINT  Chief Complaint   Patient presents with    Dysuria       HPI  Missy Stuart is a 67 y.o. female who presents with several days of dysuria, urinary frequency and urgency. Patient took one Azo at home with minimal relief. Denies fever, chills, nausea, vomiting, chest pain, shortness of breath, back pain, abdominal pain, or blood in her urine.  She has not taken an biotics in the past 3 months.  She denies any antibiotic allergies.  Denies diabetes.  She does not have urinary tract infections frequently but this feels similar to past UTIs.     REVIEW OF SYSTEMS  As documented in HPI      See HPI for further details.   All other systems are negative.     PAST MEDICAL HISTORY  Past Medical History:   Diagnosis Date    Cataract     removed bilat    Cervical pain 02/28/2020    neck, 0-1/10    Hyperlipidemia 02/28/2020    on no meds at this time    Hypothyroidism 11/19/2018    Restless legs 11/19/2018       FAMILY HISTORY  Family History   Problem Relation Age of Onset    Diabetes Mother     Stroke Maternal Grandfather        SOCIAL HISTORY   reports that she quit smoking about 34 years ago. Her smoking use included cigarettes. She has a 4.00 pack-year smoking history. She has never used smokeless tobacco. She reports current alcohol use of about 1.8 oz per week. She reports that she does not use drugs.    SURGICAL HISTORY  Past Surgical History:   Procedure Laterality Date    EYE LESION EXCISION Left 3/2/2020    Procedure: EXCISION, LESION, EYE - UPPER LID W/POSS PLACEMENT OF MINI MONKA STENT;  Surgeon: Jaime Pastrana M.D.;  Location: SURGERY [facility];  Service: Ophthalmology    CATARACT PHACO WITH IOL  5/21/2014    Performed by Fadi Morales M.D. at SURGERY SAME DAY [facility]    TUBE & ECTOPIC PREG., REMOVAL  1987    THYROIDECTOMY  1983    partial    OTHER ORTHOPEDIC SURGERY  [id number]    neck fusion     "KS BREAST AUGMENTATION WITH IMPLANT  1979 & 2018       CURRENT MEDICATIONS  Home Medications       Reviewed by Victorina Gonzales PhT (Pharmacy Tech) on 09/25/22 at 1644  Med List Status: Complete     Medication Last Dose Status   B Complex Vitamins (B COMPLEX PO) 9/25/2022 Active   Calcium-Magnesium-Vitamin D (CALCIUM MAGNESIUM PO) 9/25/2022 Active   Cholecalciferol (VITAMIN D3 PO) 9/25/2022 Active   levothyroxine (SYNTHROID) 88 MCG Tab 9/25/2022 Active   MAGNESIUM PO 9/25/2022 Active   Multiple Vitamins-Minerals (MULTIVITAMIN ADULT PO) 9/25/2022 Active   Multiple Vitamins-Minerals (ZINC PO) 9/25/2022 Active   Omega-3 Fatty Acids (OMEGA 3 PO) 9/25/2022 Active   omeprazole (PRILOSEC) 20 MG delayed-release capsule 9/25/2022 Active   POTASSIUM PO 9/25/2022 Active   ROPINIRole (REQUIP) 0.5 MG Tab 9/24/2022 Active                    ALLERGIES  Allergies   Allergen Reactions    Doxycycline      \"stiff joints\"    Codeine Vomiting and Nausea    Dilaudid [Hydromorphone Hcl] Hives    Fentanyl Vomiting and Nausea    Percocet [Oxycodone-Acetaminophen] Vomiting    Vicoprofen [Hydrocodone-Ibuprofen]      sweating       PHYSICAL EXAM  VITAL SIGNS: BP (!) 144/92   Pulse 71   Temp 37 °C (98.6 °F) (Temporal)   Resp 16   Ht 1.753 m (5' 9\")   Wt 58.2 kg (128 lb 4.9 oz)   LMP  (LMP Unknown)   SpO2 99%   BMI 18.95 kg/m²    Gen: Alert  HENT: ATNC  Eyes: Normal conjunctiva  Neck: trachea midline  Resp: no respiratory distress, CTAB  CV: No JVD, RRR  Abd: non-distended, soft, non-tender, no rebound or guarding, no CVA tenderness  Ext: No deformities  Psych: normal mood  Neuro: speech fluent       RADIOLOGY/PROCEDURES  No orders to display       LABS  Labs Reviewed   URINALYSIS - Abnormal; Notable for the following components:       Result Value    Color Orange (*)     Glucose 100 (*)     Protein 30 (*)     Nitrite Positive (*)     Leukocyte Esterase Large (*)     Occult Blood Large (*)     All other components within normal limits "   URINE MICROSCOPIC (W/UA) - Abnormal; Notable for the following components:    RBC 2-5 (*)     Bacteria Moderate (*)     All other components within normal limits        EKG  Results for orders placed or performed in visit on 20   ECG   Result Value Ref Range    Report       [facility]    Test Date:  2020  Pt Name:    SHALINI STUART                   Department: [facility]  MRN:        [id number]                      Room:  Gender:     Female                       Technician: MRB  :        1955                   Requested By:MARINA GARVEY  Order #:    [id number]                    Reading MD: Buster Dawson MD    Measurements  Intervals                                Axis  Rate:       69                           P:          53  DE:         168                          QRS:        17  QRSD:       94                           T:          52  QT:         420  QTc:        450    Interpretive Statements  SINUS RHYTHM  RSR' IN V1 OR V2, RIGHT VCD OR RVH  Compared to ECG 2012 01:50:08  Right ventricular hypertrophy now present  RSR' in V1 or V2 now present  Electronically Signed On 2020 14:57:48 PST by Buster Dawson MD          COURSE & MEDICAL DECISION MAKING  Pertinent Labs & Imaging studies reviewed. (See chart for details)    Shalini Stuart is a 67 y.o. female who presents with several days of dysuria, urinary frequency and urgency.  Differential diagnosis includes: UTI, pyelonephritis, urinary tract obstruction, ureteral stone, bowel obstruction, cecal volvulus, constipation, sepsis, bacteremia, noninfectious cystitis.     Afebrile, vital signs reassuring, exam as above with minimal discomfort to the suprapubic area.  No rebound or guarding.  No CVA tenderness.  Urinalysis suggestive of infection.  Patient will be started on Keflex for UTI.  Referral to [facility] Primary care has been ordered. Patient is informed of the results, comfortable plan with outpatient follow-up and discharged in  condition with strict ER to precautions.    Appropriate PPE were worn at this encounter.     FINAL IMPRESSION  1. Acute cystitis with hematuria Acute   2. Dysuria Acute       This dictation was created using voice recognition software. The accuracy of the dictation is limited to the abilities of the software. I expect there may be some errors of grammar and possibly content. The nursing notes were reviewed and certain aspects of this information were incorporated into this note.

## 2022-09-25 NOTE — ED NOTES
Pt informed of plan of care, denies questions or needs at this time. Respirations even, unlabored, no acute distress. Call light within reach.

## 2022-09-25 NOTE — ED TRIAGE NOTES
Chief Complaint   Patient presents with    Dysuria      Burning with urination, urgency and frequency. Had AZO at home today.

## 2022-09-26 NOTE — ED NOTES
Pt discharged home in stable condition. VSS. Given prescriptions and denies questions. Follow-up care reviewed, all questions answered. Pt ambulatory out of ER with steady gait. States safe ride home.

## 2022-09-27 ENCOUNTER — TELEPHONE (OUTPATIENT)
Dept: MEDICAL GROUP | Facility: LAB | Age: 67
End: 2022-09-27
Payer: MEDICARE

## 2022-09-27 RX ORDER — GABAPENTIN 300 MG/1
300 CAPSULE ORAL 3 TIMES DAILY
COMMUNITY
Start: 2022-08-24

## 2022-09-27 RX ORDER — BACLOFEN 10 MG/1
10-20 TABLET ORAL
COMMUNITY
Start: 2022-08-24

## 2022-09-27 NOTE — TELEPHONE ENCOUNTER
Left message for patient to call back regarding pre-visit planning. Please transfer call to [phone].

## 2022-09-27 NOTE — TELEPHONE ENCOUNTER
NEW PATIENT VISIT PRE-VISIT PLANNING    1.  [employer] Patient is checked in Patient Demographics?Yes    2.  Immunizations were updated in Epic using Reconcile Outside Information activity? Yes         3.  Is this appointment scheduled as a Hospital Follow-Up? Yes, visit was at [facility].     4.  Patient is due for the following Health Maintenance Topics:   Health Maintenance Due   Topic Date Due    IMM HEP B VACCINE (1 of 3 - 3-dose series) Never done    BONE DENSITY  12/22/2016    IMM PNEUMOCOCCAL VACCINE: 65+ Years (1 - PCV) Never done    COVID-19 Vaccine (3 - Booster for Pfizer series) 12/07/2021    Annual Wellness Visit  03/27/2022    IMM INFLUENZA (1) 09/01/2022   5.  Reviewed/Updated the following with patient:          Preferred Pharmacy? Yes             Preferred Lab? Yes           Preferred Communication? Yes          Allergies? Yes          Medications? Yes   6.  Updated Care Team?          DME Company (gait device, O2, CPAP, etc.) N/A           Other Specialists (eye doctor, derm, GYN, cardiology, endo, etc): Yes     7.  AHA (Puls[employer]) form printed for Provider? N/A

## 2022-10-04 ENCOUNTER — OFFICE VISIT (OUTPATIENT)
Dept: MEDICAL GROUP | Facility: LAB | Age: 67
End: 2022-10-04
Attending: EMERGENCY MEDICINE
Payer: MEDICARE

## 2022-10-04 VITALS
WEIGHT: 129.6 LBS | HEIGHT: 69 IN | HEART RATE: 80 BPM | DIASTOLIC BLOOD PRESSURE: 74 MMHG | RESPIRATION RATE: 14 BRPM | SYSTOLIC BLOOD PRESSURE: 126 MMHG | OXYGEN SATURATION: 98 % | BODY MASS INDEX: 19.2 KG/M2 | TEMPERATURE: 97.7 F

## 2022-10-04 DIAGNOSIS — M54.2 CERVICAL PAIN: ICD-10-CM

## 2022-10-04 DIAGNOSIS — Z78.0 ENCOUNTER FOR OSTEOPOROSIS SCREENING IN ASYMPTOMATIC POSTMENOPAUSAL PATIENT: ICD-10-CM

## 2022-10-04 DIAGNOSIS — G25.81 RESTLESS LEGS: ICD-10-CM

## 2022-10-04 DIAGNOSIS — R73.09 ELEVATED RANDOM BLOOD GLUCOSE LEVEL: ICD-10-CM

## 2022-10-04 DIAGNOSIS — Z13.820 ENCOUNTER FOR OSTEOPOROSIS SCREENING IN ASYMPTOMATIC POSTMENOPAUSAL PATIENT: ICD-10-CM

## 2022-10-04 DIAGNOSIS — E03.9 HYPOTHYROIDISM, UNSPECIFIED TYPE: ICD-10-CM

## 2022-10-04 DIAGNOSIS — Z23 NEED FOR VACCINATION: ICD-10-CM

## 2022-10-04 DIAGNOSIS — D75.89 MACROCYTOSIS WITHOUT ANEMIA: ICD-10-CM

## 2022-10-04 DIAGNOSIS — E78.5 HYPERLIPIDEMIA, UNSPECIFIED HYPERLIPIDEMIA TYPE: ICD-10-CM

## 2022-10-04 DIAGNOSIS — D70.8 OTHER NEUTROPENIA (HCC): ICD-10-CM

## 2022-10-04 PROBLEM — K20.0 EOSINOPHILIC ESOPHAGITIS: Status: RESOLVED | Noted: 2020-12-11 | Resolved: 2022-10-04

## 2022-10-04 PROBLEM — H69.91 DYSFUNCTION OF RIGHT EUSTACHIAN TUBE: Status: RESOLVED | Noted: 2019-11-05 | Resolved: 2022-10-04

## 2022-10-04 PROCEDURE — 90677 PCV20 VACCINE IM: CPT | Performed by: NURSE PRACTITIONER

## 2022-10-04 PROCEDURE — G0009 ADMIN PNEUMOCOCCAL VACCINE: HCPCS | Performed by: NURSE PRACTITIONER

## 2022-10-04 PROCEDURE — 99214 OFFICE O/P EST MOD 30 MIN: CPT | Mod: 25 | Performed by: NURSE PRACTITIONER

## 2022-10-04 PROCEDURE — 90662 IIV NO PRSV INCREASED AG IM: CPT | Performed by: NURSE PRACTITIONER

## 2022-10-04 PROCEDURE — G0008 ADMIN INFLUENZA VIRUS VAC: HCPCS | Performed by: NURSE PRACTITIONER

## 2022-10-04 ASSESSMENT — FIBROSIS 4 INDEX: FIB4 SCORE: 1.58

## 2022-10-04 NOTE — ASSESSMENT & PLAN NOTE
This is a chronic condition, new to me.   Currently followed by pain management, Dr. Amezquita.  She is taking gabapentin 300 mg TID, baclofen 10 mg prn, voltaren gel prn.   She also has neck injections, last was in August.   Pain is well controlled.

## 2022-10-04 NOTE — PROGRESS NOTES
"Subjective:     CC:    Chief Complaint   Patient presents with    Establish Care    Orders Needed     Bone density test      HISTORY OF THE PRESENT ILLNESS: Patient is a 67 y.o. female. This pleasant patient is here today to establish care and discuss the following:    Cervical pain  This is a chronic condition, new to me.   Currently followed by pain management, Dr. Amezquita.  She is taking gabapentin 300 mg TID, baclofen 10 mg prn, voltaren gel prn.   She also has neck injections, last was in August.   Pain is well controlled.     Restless legs  This is a chronic condition, new to me.   Patient currently taking ropinirole 0.5 mg nightly with relief.   No complaints.     Hypothyroidism  Chronic, stable condition.  Labwork due. Taking medicine as directed. Denies palpitations, skin changes, temperature intolerance, changes in bowel habits.    Hyperlipidemia  Chronic, stable condition.  Due for labs. Currently managing with lifestyle modifications. Denies chest pain, heart palpitations, shortness of breath.     ROS:   Gen: no fevers/chills, no changes in weight  Eyes: no changes in vision  ENT: no sore throat, no hearing loss, no bloody nose  Pulm: no sob, no cough  CV: no chest pain, no palpitations  GI: no nausea/vomiting, no diarrhea  : no dysuria  MSk: no myalgias  Skin: no rash  Neuro: no headaches, no numbness/tingling  Heme/Lymph: no easy bruising        - NOTE: All other systems reviewed and are negative, except as in HPI.      Objective:     Exam: /74 (BP Location: Right arm, Patient Position: Sitting, BP Cuff Size: Adult)   Pulse 80   Temp 36.5 °C (97.7 °F)   Resp 14   Ht 1.753 m (5' 9\")   Wt 58.8 kg (129 lb 9.6 oz)   SpO2 98%  Body mass index is 19.14 kg/m².    Constitutional: Alert, no distress, well-groomed.  Skin: Warm, dry, good turgor, no rashes in visible areas.  Eye: Equal, round and reactive, conjunctiva clear, lids normal.  ENMT: Lips without lesions, good dentition, moist mucous " membranes.  Neck: Trachea midline, no masses, no thyromegaly.  Respiratory: Unlabored respiratory effort, no cough. Clear to ausculation. No rales, ronchi, or wheezing.  Cardiovascular: Regular rate and rhythm without murmur. Carotid and radial pulses are intact and equal bilaterally.  MSK: Normal gait, moves all extremities.  Neuro: Grossly non-focal.   Psych: Alert and oriented x3, normal affect and mood.    Assessment & Plan:   67 y.o. female with the following -    1. Encounter for osteoporosis screening in asymptomatic postmenopausal patient  Dexa ordered.   - DS-BONE DENSITY STUDY (DEXA); Future    2. Cervical pain  Continue medications as prescribed. Continue to follow-up with pain management.    3. Restless legs  Chronic, stable condition.  Patient to continue medication as prescribed.    4. Hypothyroidism, unspecified type  Continue thyroid medication.  Instructed patient to take on empty stomach with glass of water, 30 minutes prior to food or other medications.  Labs as indicated.  - TSH WITH REFLEX TO FT4; Future    5. Hyperlipidemia, unspecified hyperlipidemia type  Chronic, stable condition. Labs as indicated.  Continue lifestyle modifications.  - Lipid Profile; Future    6. Macrocytosis without anemia  Labs ordered.   - CBC WITH DIFFERENTIAL; Future    7. Other neutropenia (HCC)  Labs ordered.   - CBC WITH DIFFERENTIAL; Future    8. Elevated random blood glucose level  Labs ordered. Recommend to reduce sugar/carbohydrate/alcohol, eat more vegetables and lean meats such as fish/chicken/turkey. Recommend 30 minutes of cardiovascular exercise most days of the week.   - Comp Metabolic Panel; Future  - VITAMIN D,25 HYDROXY (DEFICIENCY); Future  - HEMOGLOBIN A1C; Future    9. Need for vaccination  Patient was agreeable to receiving the flu and pneumonia vaccines in clinic today after discussion of potential risks, benefits, and side effects. Vaccines administered without adverse effects.  - INFLUENZA  VACCINE, HIGH DOSE (65+ ONLY)  - Pneumococcal Conjugate Vaccine 20-Valent (19 yrs+)    Please note that this dictation was created using voice recognition software. I have made every reasonable attempt to correct obvious errors, but I expect that there are errors of grammar and possibly content that I did not discover before finalizing the note.

## 2022-10-04 NOTE — ASSESSMENT & PLAN NOTE
Chronic, stable condition.  Labwork due. Taking medicine as directed. Denies palpitations, skin changes, temperature intolerance, changes in bowel habits.

## 2022-10-04 NOTE — ASSESSMENT & PLAN NOTE
Chronic, stable condition.  Due for labs. Currently managing with lifestyle modifications. Denies chest pain, heart palpitations, shortness of breath.

## 2022-10-04 NOTE — ASSESSMENT & PLAN NOTE
This is a chronic condition, new to me.   Patient currently taking ropinirole 0.5 mg nightly with relief.   No complaints.

## 2022-10-17 ENCOUNTER — HOSPITAL ENCOUNTER (EMERGENCY)
Facility: MEDICAL CENTER | Age: 67
End: 2022-10-17
Attending: EMERGENCY MEDICINE
Payer: MEDICARE

## 2022-10-17 VITALS
TEMPERATURE: 98.4 F | DIASTOLIC BLOOD PRESSURE: 69 MMHG | RESPIRATION RATE: 18 BRPM | WEIGHT: 127.43 LBS | HEART RATE: 90 BPM | BODY MASS INDEX: 18.24 KG/M2 | SYSTOLIC BLOOD PRESSURE: 101 MMHG | OXYGEN SATURATION: 95 % | HEIGHT: 70 IN

## 2022-10-17 DIAGNOSIS — N30.01 ACUTE CYSTITIS WITH HEMATURIA: ICD-10-CM

## 2022-10-17 LAB
APPEARANCE UR: ABNORMAL
BACTERIA #/AREA URNS HPF: ABNORMAL /HPF
BILIRUB UR QL STRIP.AUTO: NEGATIVE
COLOR UR: YELLOW
EPI CELLS #/AREA URNS HPF: ABNORMAL /HPF
GLUCOSE UR STRIP.AUTO-MCNC: NEGATIVE MG/DL
KETONES UR STRIP.AUTO-MCNC: NEGATIVE MG/DL
LEUKOCYTE ESTERASE UR QL STRIP.AUTO: ABNORMAL
MICRO URNS: ABNORMAL
NITRITE UR QL STRIP.AUTO: NEGATIVE
PH UR STRIP.AUTO: 6 [PH] (ref 5–8)
PROT UR QL STRIP: NEGATIVE MG/DL
RBC # URNS HPF: ABNORMAL /HPF
RBC UR QL AUTO: ABNORMAL
SP GR UR STRIP.AUTO: 1.02
UNIDENT CRYS URNS QL MICRO: ABNORMAL /HPF
WBC #/AREA URNS HPF: ABNORMAL /HPF

## 2022-10-17 PROCEDURE — 87086 URINE CULTURE/COLONY COUNT: CPT

## 2022-10-17 PROCEDURE — 87077 CULTURE AEROBIC IDENTIFY: CPT

## 2022-10-17 PROCEDURE — 99283 EMERGENCY DEPT VISIT LOW MDM: CPT

## 2022-10-17 PROCEDURE — A9270 NON-COVERED ITEM OR SERVICE: HCPCS | Performed by: EMERGENCY MEDICINE

## 2022-10-17 PROCEDURE — 87186 SC STD MICRODIL/AGAR DIL: CPT

## 2022-10-17 PROCEDURE — 81001 URINALYSIS AUTO W/SCOPE: CPT

## 2022-10-17 PROCEDURE — 700102 HCHG RX REV CODE 250 W/ 637 OVERRIDE(OP): Performed by: EMERGENCY MEDICINE

## 2022-10-17 RX ORDER — NITROFURANTOIN 25; 75 MG/1; MG/1
100 CAPSULE ORAL 2 TIMES DAILY
Qty: 10 CAPSULE | Refills: 0 | Status: SHIPPED | OUTPATIENT
Start: 2022-10-17 | End: 2022-10-22

## 2022-10-17 RX ORDER — NITROFURANTOIN 25; 75 MG/1; MG/1
100 CAPSULE ORAL ONCE
Status: COMPLETED | OUTPATIENT
Start: 2022-10-17 | End: 2022-10-17

## 2022-10-17 RX ADMIN — NITROFURANTOIN MONOHYDRATE/MACROCRYSTALLINE 100 MG: 25; 75 CAPSULE ORAL at 21:26

## 2022-10-17 ASSESSMENT — LIFESTYLE VARIABLES
TOTAL SCORE: 0
TOTAL SCORE: 0
EVER HAD A DRINK FIRST THING IN THE MORNING TO STEADY YOUR NERVES TO GET RID OF A HANGOVER: NO
EVER FELT BAD OR GUILTY ABOUT YOUR DRINKING: NO
TOTAL SCORE: 0
HAVE YOU EVER FELT YOU SHOULD CUT DOWN ON YOUR DRINKING: NO
CONSUMPTION TOTAL: NEGATIVE
HOW MANY TIMES IN THE PAST YEAR HAVE YOU HAD 5 OR MORE DRINKS IN A DAY: 0
ON A TYPICAL DAY WHEN YOU DRINK ALCOHOL HOW MANY DRINKS DO YOU HAVE: 0
HAVE PEOPLE ANNOYED YOU BY CRITICIZING YOUR DRINKING: NO
AVERAGE NUMBER OF DAYS PER WEEK YOU HAVE A DRINK CONTAINING ALCOHOL: 0
DO YOU DRINK ALCOHOL: NO

## 2022-10-17 ASSESSMENT — FIBROSIS 4 INDEX: FIB4 SCORE: 1.58

## 2022-10-17 ASSESSMENT — PAIN DESCRIPTION - PAIN TYPE: TYPE: ACUTE PAIN

## 2022-10-18 NOTE — ED PROVIDER NOTES
ED Provider Note        Means of Arrival: Private vehicle  History obtained from: Patient, medical record    CHIEF COMPLAINT  Chief Complaint   Patient presents with    UTI     States she has been having urgency to urinate since this morning. States she gets UTI frequently        HPI  Missy Stuart is a 67 y.o. female who presents with urinary frequency and urgency.  She was recently seen for urinary tract infection, started on cephalexin and took this medication.  Her symptoms returned this morning.  She denies any other intimate contact or other high risk events for recurrence of her UTI.  She denies any fevers, flank pain, abdominal pain.      REVIEW OF SYSTEMS    CONSTITUTIONAL:  No fever.  CARDIOVASCULAR:  No chest discomfort.  RESPIRATORY:  No pleuritic chest pain.  GASTROINTESTINAL:  No abdominal pain.    See HPI for further details.       PAST MEDICAL HISTORY  Past Medical History:   Diagnosis Date    Cataract     removed bilat    Cervical pain 02/28/2020    neck, 0-1/10    Dysfunction of right eustachian tube 11/5/2019    Discussed creating slight pressure in the eustachian tube to open them up and allow the ears to drain.  Discussed continuing nasal spray.  As well as adding an allergy medication such as loratadine to her regimen.  I did not see any fluid in the ear on exam but there still could be fluid in there.  We discussed all of this in the visit today.    Eosinophilic esophagitis 12/11/2020    Hyperlipidemia 02/28/2020    on no meds at this time    Hypothyroidism 11/19/2018    Restless legs 11/19/2018       FAMILY HISTORY  Family History   Problem Relation Age of Onset    Diabetes Mother     Stroke Maternal Grandfather        SOCIAL HISTORY   reports that she quit smoking about 34 years ago. Her smoking use included cigarettes. She has a 4.00 pack-year smoking history. She has never used smokeless tobacco. She reports current alcohol use of about 1.8 oz per week. She reports that she does not use  "drugs.    SURGICAL HISTORY  Past Surgical History:   Procedure Laterality Date    EYE LESION EXCISION Left 3/2/2020    Procedure: EXCISION, LESION, EYE - UPPER LID W/POSS PLACEMENT OF MINI MONKA STENT;  Surgeon: Jaime Pastrana M.D.;  Location: SURGERY SAME DAY [facility];  Service: Ophthalmology    CATARACT PHACO WITH IOL  5/21/2014    Performed by Fadi Morales M.D. at SURGERY SAME DAY [facility]    TUBE & ECTOPIC PREG., REMOVAL  1987    THYROIDECTOMY  1983    partial    OTHER ORTHOPEDIC SURGERY  [id number]    neck fusion    [place] BREAST AUGMENTATION WITH IMPLANT  1979 & 2018       CURRENT MEDICATIONS  Home Medications       Reviewed by Moshe Rehman R.N. (Registered Nurse) on 10/17/22 at 1911  Med List Status: Not Addressed     Medication Last Dose Status   B Complex Vitamins (B COMPLEX PO)  Active   baclofen (LIORESAL) 10 MG Tab  Active   Calcium-Magnesium-Vitamin D (CALCIUM MAGNESIUM PO)  Active   Cholecalciferol (VITAMIN D3 PO)  Active   gabapentin (NEURONTIN) 300 MG Cap  Active   levothyroxine (SYNTHROID) 88 MCG Tab  Active   MAGNESIUM PO  Active   Multiple Vitamins-Minerals (MULTIVITAMIN ADULT PO)  Active   Multiple Vitamins-Minerals (ZINC PO)  Active   Omega-3 Fatty Acids (OMEGA 3 PO)  Active   omeprazole (PRILOSEC) 20 MG delayed-release capsule  Active   POTASSIUM PO  Active   ROPINIRole (REQUIP) 0.5 MG Tab  Active                    ALLERGIES  Allergies   Allergen Reactions    Doxycycline      \"stiff joints\"    Codeine Vomiting and Nausea    Dilaudid [Hydromorphone Hcl] Hives    Fentanyl Vomiting and Nausea    Percocet [Oxycodone-Acetaminophen] Vomiting    Vicoprofen [Hydrocodone-Ibuprofen]      sweating       PHYSICAL EXAM  VITAL SIGNS: /69   Pulse 90   Temp 36.9 °C (98.4 °F)   Resp 18   Ht 1.778 m (5' 10\")   Wt 57.8 kg (127 lb 6.8 oz)   LMP  (LMP Unknown)   SpO2 95%   BMI 18.28 kg/m²    Gen: alert, no acute distress  HENT: ATNC  Eyes: normal conjunctiva  Resp: No respiratory " distress.   CV: No JVD, RRR  Abd: Non-distended, nontender  : No CVA tenderness  Extremities: No deformity      LABS  Labs Reviewed   URINALYSIS - Abnormal; Notable for the following components:       Result Value    Character Sl Cloudy (*)     Leukocyte Esterase Large (*)     Occult Blood Large (*)     All other components within normal limits   URINE MICROSCOPIC (W/UA) - Abnormal; Notable for the following components:    -150 (*)     RBC 20-50 (*)     Bacteria Moderate (*)     All other components within normal limits   URINE CULTURE-EXISTING-LESS THAN 48 HOURS    Narrative:     Indication for culture:->Patient WITHOUT an indwelling Candelario  catheter in place with new onset of Dysuria, Frequency,  Urgency, and/or Suprapubic pain        COURSE & MEDICAL DECISION MAKING  Pertinent Labs & Imaging studies reviewed. (See chart for details)    Patient presents with recurrent cystitis.  Review medical record demonstrates she was recently treated with cephalexin, however only twice daily dosing, and the culture is sensitive to first generation cephalosporins.  She currently demonstrates no signs of complicated UTI or pyelonephritis.  Review of her most recent labs demonstrates normal GFR.  We will treat with Macrobid as this appears to be a straightforward cystitis with normal renal function.  She was given return precautions, anticipatory guidance.    Appropriate PPE were worn during this encounter.    FINAL IMPRESSION  1. Acute cystitis with hematuria         DISPOSITION:  Patient will be discharged home in stable condition.    FOLLOW UP:  Mariela Pulido, RAVEN.P.R.N.  [address] [id number] [phone]    Schedule an appointment as soon as possible for a visit       [facility], Emergency Dept  [address]  [facility] [id number] [phone]    If symptoms worsen      OUTPATIENT MEDICATIONS:  Discharge Medication List as of 10/17/2022  9:24 PM        START  taking these medications    Details   nitrofurantoin (MACROBID) 100 MG Cap Take 1 Capsule by mouth 2 times a day for 5 days., Disp-10 Capsule, R-0, Normal                This dictation was created using voice recognition software. The accuracy of the dictation is limited to the abilities of the software. I expect there may be some errors of grammar and possibly content. The nursing notes were reviewed and certain aspects of this information were incorporated into this note.

## 2022-10-18 NOTE — DISCHARGE INSTRUCTIONS
Your urinalysis demonstrated a urinary tract infection. You are being sent home with a medication for this. A culture was sent to ensure that you were given the correct medication. Please take this as prescribed and follow up with your regular doctor.     Please return to the ED if you develop fever, flank pain, or other concerning findings.

## 2022-11-07 ENCOUNTER — PATIENT MESSAGE (OUTPATIENT)
Dept: HEALTH INFORMATION MANAGEMENT | Facility: OTHER | Age: 67
End: 2022-11-07

## 2022-11-17 ENCOUNTER — TELEPHONE (OUTPATIENT)
Dept: MEDICAL GROUP | Facility: LAB | Age: 67
End: 2022-11-17
Payer: MEDICARE

## 2022-11-18 ENCOUNTER — OFFICE VISIT (OUTPATIENT)
Dept: URGENT CARE | Facility: CLINIC | Age: 67
End: 2022-11-18
Payer: MEDICARE

## 2022-11-18 ENCOUNTER — HOSPITAL ENCOUNTER (OUTPATIENT)
Facility: MEDICAL CENTER | Age: 67
End: 2022-11-18
Payer: MEDICARE

## 2022-11-18 VITALS
DIASTOLIC BLOOD PRESSURE: 62 MMHG | WEIGHT: 130 LBS | HEIGHT: 69 IN | RESPIRATION RATE: 16 BRPM | TEMPERATURE: 99.9 F | SYSTOLIC BLOOD PRESSURE: 110 MMHG | HEART RATE: 84 BPM | OXYGEN SATURATION: 99 % | BODY MASS INDEX: 19.26 KG/M2

## 2022-11-18 DIAGNOSIS — N95.1 MENOPAUSAL VAGINAL DRYNESS: ICD-10-CM

## 2022-11-18 DIAGNOSIS — R39.9 UTI SYMPTOMS: ICD-10-CM

## 2022-11-18 LAB
APPEARANCE UR: NORMAL
BILIRUB UR STRIP-MCNC: NEGATIVE MG/DL
COLOR UR AUTO: YELLOW
GLUCOSE UR STRIP.AUTO-MCNC: NEGATIVE MG/DL
KETONES UR STRIP.AUTO-MCNC: NEGATIVE MG/DL
LEUKOCYTE ESTERASE UR QL STRIP.AUTO: NORMAL
NITRITE UR QL STRIP.AUTO: NEGATIVE
PH UR STRIP.AUTO: 6 [PH] (ref 5–8)
PROT UR QL STRIP: NEGATIVE MG/DL
RBC UR QL AUTO: NORMAL
SP GR UR STRIP.AUTO: 1.01
UROBILINOGEN UR STRIP-MCNC: 0.2 MG/DL

## 2022-11-18 PROCEDURE — 81002 URINALYSIS NONAUTO W/O SCOPE: CPT

## 2022-11-18 PROCEDURE — 87086 URINE CULTURE/COLONY COUNT: CPT

## 2022-11-18 PROCEDURE — 87077 CULTURE AEROBIC IDENTIFY: CPT

## 2022-11-18 PROCEDURE — 87186 SC STD MICRODIL/AGAR DIL: CPT

## 2022-11-18 PROCEDURE — 99213 OFFICE O/P EST LOW 20 MIN: CPT

## 2022-11-18 RX ORDER — ESTRADIOL 0.1 MG/G
CREAM VAGINAL
Qty: 42.5 G | Refills: 0 | Status: SHIPPED | OUTPATIENT
Start: 2022-11-18 | End: 2023-02-16 | Stop reason: SDUPTHER

## 2022-11-18 RX ORDER — NITROFURANTOIN 25; 75 MG/1; MG/1
100 CAPSULE ORAL 2 TIMES DAILY
Qty: 10 CAPSULE | Refills: 0 | Status: SHIPPED | OUTPATIENT
Start: 2022-11-18 | End: 2022-11-23

## 2022-11-18 ASSESSMENT — FIBROSIS 4 INDEX: FIB4 SCORE: 1.58

## 2022-11-18 NOTE — PROGRESS NOTES
"Subjective:   Missy Stuart is a 67 y.o. female who presents for Urinary Frequency (X1 day, \"Intermittent pain with urination.\")      HPI:    Patient presents to urgent care with complaints of dysuria, urinary frequency.   Onset was one day ago.   Mild improvement with increased fluid intake.   Patient reports increased incidence of UTIs after sexual intercourse.   Denies fever, chills, night sweats, nausea, vomiting, diarrhea. Denies back pain. Denies vaginal discharge.    Secondary to the above, she is also concerned about vaginal dryness and pain with sex after menopause. She states she used to use estrogen cream but the medication is no longer a covered benefit and her OOP cost was $200+ per month. Patient is requesting to resume medication use. She is established with PCP services.    ROS As above in HPI    Medications:    Current Outpatient Medications on File Prior to Visit   Medication Sig Dispense Refill    baclofen (LIORESAL) 10 MG Tab       gabapentin (NEURONTIN) 300 MG Cap       Omega-3 Fatty Acids (OMEGA 3 PO) Take 1 Capsule by mouth every day.      MAGNESIUM PO Take 1 Tablet by mouth every day.      Cholecalciferol (VITAMIN D3 PO) Take 1 Tablet by mouth every day.      ROPINIRole (REQUIP) 0.5 MG Tab TAKE 2 TABLETS BY MOUTH EVERY EVENING 180 Tablet 1    levothyroxine (SYNTHROID) 88 MCG Tab TAKE 1 TABLET BY MOUTH EVERY MORNING ON AN EMPTY STOMACH (Patient taking differently: Take 1 Tablet by mouth every morning on an empty stomach.) 90 Tablet 1    omeprazole (PRILOSEC) 20 MG delayed-release capsule Take 1 Capsule by mouth every day. 90 Capsule 3    B Complex Vitamins (B COMPLEX PO) Take 1 Tablet by mouth every day.      Multiple Vitamins-Minerals (MULTIVITAMIN ADULT PO) Take 1 Tablet by mouth every day.      Multiple Vitamins-Minerals (ZINC PO) Take 1 Tab by mouth every day.      POTASSIUM PO Take 1 Tab by mouth every day. OTC      Calcium-Magnesium-Vitamin D (CALCIUM MAGNESIUM PO) Take 1 Tab by mouth " "every day.       No current facility-administered medications on file prior to visit.        Allergies:   Doxycycline, Codeine, Dilaudid [hydromorphone hcl], Fentanyl, Percocet [oxycodone-acetaminophen], and Vicoprofen [hydrocodone-ibuprofen]    Problem List:   Patient Active Problem List   Diagnosis    Cervical pain    Restless legs    Hypothyroidism    Hyperlipidemia    Hypotension due to drugs    Macrocytosis without anemia    Atrophic vaginitis    Lymphopenia    Other neutropenia (HCC)    Macrocytic anemia        Surgical History:  Past Surgical History:   Procedure Laterality Date    EYE LESION EXCISION Left 3/2/2020    Procedure: EXCISION, LESION, EYE - UPPER LID W/POSS PLACEMENT OF MINI MONKA STENT;  Surgeon: Jaime Pastrana M.D.;  Location: SURGERY SAME DAY [facility];  Service: Ophthalmology    CATARACT PHACO WITH IOL  2014    Performed by Fadi Morales M.D. at SURGERY SAME DAY [facility]    TUBE & ECTOPIC PREG., REMOVAL      THYROIDECTOMY      partial    OTHER ORTHOPEDIC SURGERY  [id number]    neck fusion    [place] BREAST AUGMENTATION WITH IMPLANT   &        Past Social Hx:   Social History     Tobacco Use    Smoking status: Former     Packs/day: 0.50     Years: 8.00     Pack years: 4.00     Types: Cigarettes     Quit date: 1988     Years since quittin.9    Smokeless tobacco: Never    Tobacco comments:     quit in her 20s    Vaping Use    Vaping Use: Never used   Substance Use Topics    Alcohol use: Yes     Alcohol/week: 1.8 oz     Types: 3 Standard drinks or equivalent per week    Drug use: No          Problem list, medications, and allergies reviewed by myself today in Epic.     Objective:     /62 (BP Location: Right arm, Patient Position: Sitting, BP Cuff Size: Adult)   Pulse 84   Temp 37.7 °C (99.9 °F) (Temporal)   Resp 16   Ht 1.753 m (5' 9\")   Wt 59 kg (130 lb)   LMP  (LMP Unknown)   SpO2 99%   BMI 19.20 kg/m²     Physical Exam  Vitals and nursing note " reviewed.   Constitutional:       Appearance: Normal appearance.   HENT:      Head: Normocephalic and atraumatic.   Eyes:      Conjunctiva/sclera: Conjunctivae normal.      Pupils: Pupils are equal, round, and reactive to light.   Cardiovascular:      Rate and Rhythm: Normal rate and regular rhythm.      Heart sounds: Normal heart sounds.   Pulmonary:      Effort: Pulmonary effort is normal.      Breath sounds: Normal breath sounds.   Abdominal:      General: Bowel sounds are normal.      Palpations: Abdomen is soft.      Tenderness: There is no right CVA tenderness or left CVA tenderness.   Genitourinary:     Vagina: No vaginal discharge.   Neurological:      Mental Status: She is alert and oriented to person, place, and time.       Assessment/Plan:       Results for orders placed or performed in visit on 11/18/22   POCT Urinalysis   Result Value Ref Range    POC Color Yellow Negative    POC Appearance Cloudy Negative    POC Leukocyte Esterase Large Negative    POC Nitrites Negative Negative    POC Urobiligen 0.2 Negative (0.2) mg/dL    POC Protein Negative Negative mg/dL    POC Urine PH 6.0 5.0 - 8.0    POC Blood Moderate Negative    POC Specific Gravity 1.010 <1.005 - >1.030    POC Ketones Negative Negative mg/dL    POC Bilirubin Negative Negative mg/dL    POC Glucose Negative Negative mg/dL       Diagnosis and associated orders:   1. UTI symptoms  - POCT Urinalysis  - URINE CULTURE(NEW); Future  - nitrofurantoin (MACROBID) 100 MG Cap; Take 1 Capsule by mouth 2 times a day for 5 days.  Dispense: 10 Capsule; Refill: 0    2. Menopausal vaginal dryness  - estradiol (ESTRACE) 0.1 MG/GM vaginal cream; 0.5 g of cream intravaginally administered daily for 2 weeks, then reduce to twice weekly  Dispense: 42.5 g; Refill: 0      Comments/MDM:     UA is abnormal in office, will send out for culture and start patient on Macrobid.  Supportive measures encouraged: Rest, increased oral hydration, Tylenol as needed per package  instructions.   Will notify patient of culture results when available and modify plan as needed.  Estrace ordered, reviewed application and instructed patient to follow up with PCP for medication changes.   Return to urgent care prn if new or worsening sx or if there is no improvement in condition prn.    Educated in Red flags and indications to immediately call 911 or present to the Emergency Department.   Advised the patient to follow-up with the primary care physician for recheck, reevaluation, and consideration of further management.  Patient verbalized understanding and consented to plan of care.       Pt is clinically stable at today's acute urgent care visit.  No acute distress noted. Appropriate for outpatient management at this time.       Discussed DDx, management options (risks,benefits, and alternatives to planned treatment), natural progression and supportive care.  Expressed understanding and the treatment plan was agreed upon. Questions were encouraged and answered     Please note that this dictation was created using voice recognition software. I have made a reasonable attempt to correct obvious errors, but I expect that there are errors of grammar and possibly content that I did not discover before finalizing the note.    This note was electronically signed by Radha Calderón, LEIGHTON

## 2022-11-29 RX ORDER — OMEPRAZOLE 20 MG/1
20 CAPSULE, DELAYED RELEASE ORAL
Qty: 90 CAPSULE | Refills: 3 | Status: SHIPPED | OUTPATIENT
Start: 2022-11-29 | End: 2023-11-27

## 2022-11-30 ENCOUNTER — HOSPITAL ENCOUNTER (OUTPATIENT)
Dept: RADIOLOGY | Facility: MEDICAL CENTER | Age: 67
End: 2022-11-30
Attending: NURSE PRACTITIONER
Payer: MEDICARE

## 2022-11-30 DIAGNOSIS — Z13.820 ENCOUNTER FOR OSTEOPOROSIS SCREENING IN ASYMPTOMATIC POSTMENOPAUSAL PATIENT: ICD-10-CM

## 2022-11-30 DIAGNOSIS — Z78.0 ENCOUNTER FOR OSTEOPOROSIS SCREENING IN ASYMPTOMATIC POSTMENOPAUSAL PATIENT: ICD-10-CM

## 2022-11-30 PROBLEM — M85.89 OSTEOPENIA OF MULTIPLE SITES: Status: ACTIVE | Noted: 2022-11-30

## 2022-11-30 PROCEDURE — 77080 DXA BONE DENSITY AXIAL: CPT

## 2022-12-16 RX ORDER — LEVOTHYROXINE SODIUM 88 UG/1
88 TABLET ORAL
Qty: 90 TABLET | Refills: 1 | Status: SHIPPED | OUTPATIENT
Start: 2022-12-16 | End: 2023-06-21

## 2022-12-16 NOTE — TELEPHONE ENCOUNTER
Received request via: Patient    Was the patient seen in the last year in this department? Yes  LOV: 10/4/2022  Does the patient have an active prescription (recently filled or refills available) for medication(s) requested? No    Does the patient have [facility] and need 100 day supply (blood pressure, diabetes and cholesterol meds only)? Patient does not have SCP

## 2022-12-30 ENCOUNTER — APPOINTMENT (OUTPATIENT)
Dept: MEDICAL GROUP | Facility: LAB | Age: 67
End: 2022-12-30
Payer: MEDICARE

## 2023-01-04 ENCOUNTER — APPOINTMENT (OUTPATIENT)
Dept: MEDICAL GROUP | Facility: LAB | Age: 68
End: 2023-01-04
Payer: MEDICARE

## 2023-02-16 DIAGNOSIS — N95.1 MENOPAUSAL VAGINAL DRYNESS: ICD-10-CM

## 2023-02-16 RX ORDER — ESTRADIOL 0.1 MG/G
CREAM VAGINAL
Qty: 42.5 G | Refills: 0 | Status: SHIPPED | OUTPATIENT
Start: 2023-02-16 | End: 2024-01-19

## 2023-02-16 NOTE — TELEPHONE ENCOUNTER
Received request via: Patient    Was the patient seen in the last year in this department? Yes  10/4/22  Does the patient have an active prescription (recently filled or refills available) for medication(s) requested? No    Does the patient have group home Plus and need 100 day supply (blood pressure, diabetes and cholesterol meds only)? Medication is not for cholesterol, blood pressure or diabetes

## 2023-04-20 ENCOUNTER — TELEPHONE (OUTPATIENT)
Dept: HEALTH INFORMATION MANAGEMENT | Facility: OTHER | Age: 68
End: 2023-04-20

## 2023-05-03 DIAGNOSIS — G25.81 RESTLESS LEGS: ICD-10-CM

## 2023-05-03 RX ORDER — ROPINIROLE 0.5 MG/1
1 TABLET, FILM COATED ORAL NIGHTLY
Qty: 180 TABLET | Refills: 1 | Status: SHIPPED | OUTPATIENT
Start: 2023-05-03 | End: 2024-01-19

## 2023-06-21 RX ORDER — LEVOTHYROXINE SODIUM 88 UG/1
TABLET ORAL
Qty: 90 TABLET | Refills: 1 | Status: SHIPPED | OUTPATIENT
Start: 2023-06-21 | End: 2024-01-09

## 2023-06-21 NOTE — TELEPHONE ENCOUNTER
Received request via: Pharmacy    Was the patient seen in the last year in this department? Yes  LOV : 10/4/2022   Does the patient have an active prescription (recently filled or refills available) for medication(s) requested? No    Does the patient have group home Plus and need 100 day supply (blood pressure, diabetes and cholesterol meds only)? Patient does not have SCP

## 2023-08-24 ENCOUNTER — APPOINTMENT (RX ONLY)
Dept: URBAN - METROPOLITAN AREA CLINIC 4 | Facility: CLINIC | Age: 68
Setting detail: DERMATOLOGY
End: 2023-08-24

## 2023-08-24 DIAGNOSIS — D22 MELANOCYTIC NEVI: ICD-10-CM

## 2023-08-24 DIAGNOSIS — Z85.828 PERSONAL HISTORY OF OTHER MALIGNANT NEOPLASM OF SKIN: ICD-10-CM

## 2023-08-24 DIAGNOSIS — L57.0 ACTINIC KERATOSIS: ICD-10-CM

## 2023-08-24 DIAGNOSIS — Z71.89 OTHER SPECIFIED COUNSELING: ICD-10-CM

## 2023-08-24 DIAGNOSIS — L81.4 OTHER MELANIN HYPERPIGMENTATION: ICD-10-CM

## 2023-08-24 DIAGNOSIS — D18.0 HEMANGIOMA: ICD-10-CM

## 2023-08-24 DIAGNOSIS — L82.1 OTHER SEBORRHEIC KERATOSIS: ICD-10-CM

## 2023-08-24 PROBLEM — D18.01 HEMANGIOMA OF SKIN AND SUBCUTANEOUS TISSUE: Status: ACTIVE | Noted: 2023-08-24

## 2023-08-24 PROBLEM — D22.9 MELANOCYTIC NEVI, UNSPECIFIED: Status: ACTIVE | Noted: 2023-08-24

## 2023-08-24 PROCEDURE — ? COUNSELING

## 2023-08-24 PROCEDURE — 99213 OFFICE O/P EST LOW 20 MIN: CPT | Mod: 25

## 2023-08-24 PROCEDURE — 17000 DESTRUCT PREMALG LESION: CPT

## 2023-08-24 PROCEDURE — ? LIQUID NITROGEN

## 2023-08-24 PROCEDURE — ? SUNSCREEN RECOMMENDATIONS

## 2023-08-24 PROCEDURE — 17003 DESTRUCT PREMALG LES 2-14: CPT

## 2023-08-24 ASSESSMENT — LOCATION DETAILED DESCRIPTION DERM
LOCATION DETAILED: MIDDLE STERNUM
LOCATION DETAILED: RIGHT PROXIMAL POSTERIOR UPPER ARM
LOCATION DETAILED: LEFT INFERIOR LATERAL UPPER BACK
LOCATION DETAILED: LEFT ULNAR DORSAL HAND
LOCATION DETAILED: RIGHT NASAL DORSUM
LOCATION DETAILED: LEFT RADIAL DORSAL HAND
LOCATION DETAILED: RIGHT ULNAR DORSAL HAND

## 2023-08-24 ASSESSMENT — LOCATION ZONE DERM
LOCATION ZONE: NOSE
LOCATION ZONE: TRUNK
LOCATION ZONE: ARM
LOCATION ZONE: HAND

## 2023-08-24 ASSESSMENT — LOCATION SIMPLE DESCRIPTION DERM
LOCATION SIMPLE: LEFT UPPER BACK
LOCATION SIMPLE: LEFT HAND
LOCATION SIMPLE: NOSE
LOCATION SIMPLE: CHEST
LOCATION SIMPLE: RIGHT POSTERIOR UPPER ARM
LOCATION SIMPLE: RIGHT HAND

## 2023-08-24 NOTE — PROCEDURE: LIQUID NITROGEN
Show Applicator Variable?: Yes
Duration Of Freeze Thaw-Cycle (Seconds): 2
Post-Care Instructions: I reviewed with the patient in detail post-care instructions. Patient is to wear sunprotection, and avoid picking at any of the treated lesions. Pt may apply Vaseline to crusted or scabbing areas.
Render Post-Care Instructions In Note?: no
Number Of Freeze-Thaw Cycles: 2 freeze-thaw cycles
Consent: The patient's consent was obtained including but not limited to risks of crusting, scabbing, blistering, scarring, darker or lighter pigmentary change, recurrence, incomplete removal and infection.
Detail Level: Simple

## 2023-08-24 NOTE — PROCEDURE: SUNSCREEN RECOMMENDATIONS
Procedure(s):  LAPAROSCOPIC SLEEVE GASTRECTOMY WITH,  LIVER WEDGE BIOPSY AND INTRAOPERATIVE ENDOSCOPY WITH BIOPSY \"SPEC POP\". general    Anesthesia Post Evaluation        Comments: Post-Anesthesia Evaluation and Assessment    Cardiovascular Function/Vital Signs  BP (!) 145/85   Pulse 82   Temp 36.7 °C (98.1 °F)   Resp 18   Ht 5' 8\" (1.727 m)   Wt 114.5 kg (252 lb 6.4 oz)   SpO2 95%   BMI 38.38 kg/m²     Patient is status post Procedure(s):  LAPAROSCOPIC SLEEVE GASTRECTOMY WITH,  LIVER WEDGE BIOPSY AND INTRAOPERATIVE ENDOSCOPY WITH BIOPSY \"SPEC POP\". Nausea/Vomiting: Controlled. Postoperative hydration reviewed and adequate. Pain:  Pain Scale 1: FLACC (05/06/22 0943)  Pain Intensity 1: 0 (05/06/22 0943)   Managed. Neurological Status:   Neuro (WDL): Exceptions to WDL (05/06/22 1009)   At baseline. Mental Status and Level of Consciousness: Arousable. Pulmonary Status:   O2 Device: Nasal cannula (05/06/22 0943)   Adequate oxygenation and airway patent. Complications related to anesthesia: None    Post-anesthesia assessment completed. No concerns. Patient has met all discharge requirements. Signed By: Antonio Sánchez MD    May 6, 2022                   INITIAL Post-op Vital signs:   Vitals Value Taken Time   /85 05/06/22 1017   Temp 36.7 °C (98.1 °F) 05/06/22 0915   Pulse 82 05/06/22 1017   Resp 15 05/06/22 1017   SpO2 95 % 05/06/22 1017   Vitals shown include unvalidated device data.
Detail Level: Detailed
General Sunscreen Counseling: I recommended a broad spectrum sunscreen with a SPF of 30 or higher.  I explained that SPF 30 sunscreens block approximately 97 percent of the sun's harmful rays.  Sunscreens should be applied at least 15 minutes prior to expected sun exposure and then every 2 hours after that as long as sun exposure continues. If swimming or exercising sunscreen should be reapplied every 45 minutes to an hour after getting wet or sweating.  One ounce, or the equivalent of a shot glass full of sunscreen, is adequate to protect the skin not covered by a bathing suit. I also recommended a lip balm with a sunscreen as well. Sun protective clothing can be used in lieu of sunscreen but must be worn the entire time you are exposed to the sun's rays.

## 2023-10-13 ENCOUNTER — OFFICE VISIT (OUTPATIENT)
Dept: MEDICAL GROUP | Facility: LAB | Age: 68
End: 2023-10-13
Payer: MEDICARE

## 2023-10-13 VITALS
SYSTOLIC BLOOD PRESSURE: 96 MMHG | BODY MASS INDEX: 18.19 KG/M2 | OXYGEN SATURATION: 100 % | WEIGHT: 122.8 LBS | DIASTOLIC BLOOD PRESSURE: 58 MMHG | TEMPERATURE: 96.8 F | HEART RATE: 86 BPM | HEIGHT: 69 IN

## 2023-10-13 DIAGNOSIS — R63.6 UNDERWEIGHT: ICD-10-CM

## 2023-10-13 DIAGNOSIS — R71.8 RBC ABNORMALITY: ICD-10-CM

## 2023-10-13 DIAGNOSIS — D72.819 LEUKOPENIA, UNSPECIFIED TYPE: ICD-10-CM

## 2023-10-13 DIAGNOSIS — E78.5 HYPERLIPIDEMIA, UNSPECIFIED HYPERLIPIDEMIA TYPE: ICD-10-CM

## 2023-10-13 DIAGNOSIS — Z23 NEED FOR VACCINATION: ICD-10-CM

## 2023-10-13 DIAGNOSIS — E03.9 HYPOTHYROIDISM, UNSPECIFIED TYPE: ICD-10-CM

## 2023-10-13 DIAGNOSIS — R10.13 EPIGASTRIC ABDOMINAL PAIN: ICD-10-CM

## 2023-10-13 DIAGNOSIS — K20.0 EOSINOPHILIC ESOPHAGITIS: ICD-10-CM

## 2023-10-13 PROCEDURE — 99214 OFFICE O/P EST MOD 30 MIN: CPT | Mod: 25 | Performed by: STUDENT IN AN ORGANIZED HEALTH CARE EDUCATION/TRAINING PROGRAM

## 2023-10-13 PROCEDURE — 3078F DIAST BP <80 MM HG: CPT | Performed by: STUDENT IN AN ORGANIZED HEALTH CARE EDUCATION/TRAINING PROGRAM

## 2023-10-13 PROCEDURE — G0008 ADMIN INFLUENZA VIRUS VAC: HCPCS | Performed by: STUDENT IN AN ORGANIZED HEALTH CARE EDUCATION/TRAINING PROGRAM

## 2023-10-13 PROCEDURE — 90662 IIV NO PRSV INCREASED AG IM: CPT | Performed by: STUDENT IN AN ORGANIZED HEALTH CARE EDUCATION/TRAINING PROGRAM

## 2023-10-13 PROCEDURE — 3074F SYST BP LT 130 MM HG: CPT | Performed by: STUDENT IN AN ORGANIZED HEALTH CARE EDUCATION/TRAINING PROGRAM

## 2023-10-13 RX ORDER — CARISOPRODOL 350 MG/1
350 TABLET ORAL
COMMUNITY
Start: 2023-09-11

## 2023-10-13 ASSESSMENT — ENCOUNTER SYMPTOMS
WEIGHT LOSS: 0
PALPITATIONS: 1
DIZZINESS: 0
SHORTNESS OF BREATH: 0
FEVER: 0
CONSTIPATION: 0
BLOOD IN STOOL: 0
COUGH: 0
VOMITING: 0
DIARRHEA: 0
ABDOMINAL PAIN: 0
CHILLS: 0
NAUSEA: 0

## 2023-10-13 ASSESSMENT — PATIENT HEALTH QUESTIONNAIRE - PHQ9: CLINICAL INTERPRETATION OF PHQ2 SCORE: 0

## 2023-10-13 NOTE — PROGRESS NOTES
"Subjective:     Chief Complaint   Patient presents with    GI Problem     Caused by sugar and pepper      HPI:   Missy presents today with gastrointestinal complaints. PCP unavailable.     Epigastric abdominal pain  Chronic since 2013. Worse recently. Triggered by certain foods (sweets, pollock). States the pain is sharp, if triggered then will last all day. No pain yet today, but getting most days. Has tried praveen and peppermint tea. Read that apple cider vinegar would help so tried but not helpful.    Improves with laying down. No nausea, vomiting or blood in the stool. Hasn't note any weight loss.    In 2013 was diagnosed with eosinophilic esophagitis. Last endoscopy was 2021. Taking omeprazole 20mg daily.  Had not been following diet recommended by gastroenterology. Denies early satiety, dysphagia or poor appetite.  Does not drink coffee.  Stopped drinking alcohol with improvement.    Patient has not completed lab work ordered by PCP last year in October.    Review of Systems   Constitutional:  Negative for chills, fever, malaise/fatigue and weight loss.   Respiratory:  Negative for cough and shortness of breath.    Cardiovascular:  Positive for palpitations (rare). Negative for chest pain.   Gastrointestinal:  Negative for abdominal pain, blood in stool, constipation, diarrhea, melena, nausea and vomiting.   Neurological:  Negative for dizziness.     Objective:     Exam:  BP 96/58 (BP Location: Left arm, Patient Position: Sitting, BP Cuff Size: Adult)   Pulse 86   Temp 36 °C (96.8 °F) (Temporal)   Ht 1.753 m (5' 9\")   Wt 55.7 kg (122 lb 12.8 oz)   LMP  (LMP Unknown)   SpO2 100%   BMI 18.13 kg/m²  Body mass index is 18.13 kg/m².    Physical Exam  Vitals reviewed.   Constitutional:       General: She is not in acute distress.     Appearance: Normal appearance. She is not ill-appearing.   HENT:      Head: Normocephalic and atraumatic.      Mouth/Throat:      Mouth: Mucous membranes are moist.   Eyes:      " General: No scleral icterus.  Cardiovascular:      Rate and Rhythm: Normal rate and regular rhythm.      Heart sounds: Normal heart sounds.   Pulmonary:      Effort: Pulmonary effort is normal.      Breath sounds: Normal breath sounds.   Abdominal:      General: Abdomen is flat. Bowel sounds are normal. There is no distension.      Palpations: Abdomen is soft. There is no mass.      Tenderness: There is no abdominal tenderness. There is no guarding or rebound.   Skin:     General: Skin is warm and dry.      Coloration: Skin is not jaundiced.   Neurological:      General: No focal deficit present.      Mental Status: She is alert and oriented to person, place, and time.   Psychiatric:         Mood and Affect: Mood normal.         Behavior: Behavior normal.         Thought Content: Thought content normal.       Labs: Reviewed from 4/22/2021, 4/28/2022    Assessment & Plan:     68 y.o. female with the following -     1. Eosinophilic esophagitis  2. Epigastric abdominal pain  Acute on chronic epigastric abdominal pain in this patient with history of eosinophilic esophagitis.  Exam is benign.  Patient to increase omeprazole from 20 to 40 mg daily.  Labs as below, follow-up with gastroenterology.  Discussed ways to prevent by avoiding triggers.  - Referral to Gastroenterology  - Comp Metabolic Panel; Future  - VITAMIN D,25 HYDROXY (DEFICIENCY); Future  - CBC WITH DIFFERENTIAL; Future  - FERRITIN; Future  - IRON/TOTAL IRON BIND; Future    3. Hypothyroidism, unspecified type  Chronic, over due to trend. Continue levothyroxine as below for now pending results.  - TSH WITH REFLEX TO FT4; Future    levothyroxine (SYNTHROID) 88 MCG Tab, TAKE 1 TABLET BY MOUTH EVERY DAY IN THE MORNING ON AN EMPTY STOMACH, Disp: 90 Tablet, Rfl: 1    4. Underweight  5. BMI less than 19,adult  Patient denies any acute weight loss and GI complaints other than as above, has lost weight since appointment last year.  Discussed underweight status with  patient.  Evaluate further with lab work and referred back to gastroenterology.  Trend at follow-up, consider nutrition referral.  - Referral to Gastroenterology  - Comp Metabolic Panel; Future  - TSH WITH REFLEX TO FT4; Future  - CBC WITH DIFFERENTIAL; Future    6. Leukopenia, unspecified type  - CBC WITH DIFFERENTIAL; Future  - VITAMIN B12; Future  - FOLATE; Future  7. RBC abnormality  - FERRITIN; Future  - IRON/TOTAL IRON BIND; Future  Chronic, overdue to trend.  Plan pending results.    8. Hyperlipidemia, unspecified hyperlipidemia type  Noted prior, overdue to trend.  Plan pending results.  - Comp Metabolic Panel; Future  - Lipid Profile; Future    9. Need for vaccination  - INFLUENZA VACCINE, HIGH DOSE (65+ ONLY)    I spent a total of 30 minutes with record review, exam, communication with the patient, and documentation of this encounter.    Return if symptoms worsen or fail to improve, for labs.    Please note that this dictation was created using voice recognition software. I have made every reasonable attempt to correct obvious errors, but I expect that there are errors of grammar and possibly content that I did not discover before finalizing the note.

## 2023-10-13 NOTE — ASSESSMENT & PLAN NOTE
Chronic since 2013. Worse recently. Triggered by certain foods (sweets, pollock). States the pain is sharp, if triggered then will last all day. No pain yet today, but getting most days. Has tried praveen and peppermint tea. Read that apple cider vinegar would help so tried but not helpful.    Improves with laying down. No nausea, vomiting or blood in the stool. Hasn't note any weight loss.    In 2013 was diagnosed with eosinophilic esophagitis. Last endoscopy was 2021. Taking omeprazole 20mg daily. Not following diet recommended. Denies early satiety, dysphagia or poor appetite.

## 2023-11-15 ENCOUNTER — HOSPITAL ENCOUNTER (OUTPATIENT)
Dept: LAB | Facility: MEDICAL CENTER | Age: 68
End: 2023-11-15
Attending: STUDENT IN AN ORGANIZED HEALTH CARE EDUCATION/TRAINING PROGRAM
Payer: MEDICARE

## 2023-11-15 DIAGNOSIS — E78.5 HYPERLIPIDEMIA, UNSPECIFIED HYPERLIPIDEMIA TYPE: ICD-10-CM

## 2023-11-15 DIAGNOSIS — R63.6 UNDERWEIGHT: ICD-10-CM

## 2023-11-15 DIAGNOSIS — E03.9 HYPOTHYROIDISM, UNSPECIFIED TYPE: ICD-10-CM

## 2023-11-15 DIAGNOSIS — K20.0 EOSINOPHILIC ESOPHAGITIS: ICD-10-CM

## 2023-11-15 DIAGNOSIS — R71.8 RBC ABNORMALITY: ICD-10-CM

## 2023-11-15 DIAGNOSIS — D72.819 LEUKOPENIA, UNSPECIFIED TYPE: ICD-10-CM

## 2023-11-15 LAB
ALBUMIN SERPL BCP-MCNC: 4.2 G/DL (ref 3.2–4.9)
ALBUMIN/GLOB SERPL: 1.6 G/DL
ALP SERPL-CCNC: 55 U/L (ref 30–99)
ALT SERPL-CCNC: 17 U/L (ref 2–50)
ANION GAP SERPL CALC-SCNC: 12 MMOL/L (ref 7–16)
AST SERPL-CCNC: 27 U/L (ref 12–45)
BASOPHILS # BLD AUTO: 1.3 % (ref 0–1.8)
BASOPHILS # BLD: 0.04 K/UL (ref 0–0.12)
BILIRUB SERPL-MCNC: 0.8 MG/DL (ref 0.1–1.5)
BUN SERPL-MCNC: 11 MG/DL (ref 8–22)
CALCIUM ALBUM COR SERPL-MCNC: 9 MG/DL (ref 8.5–10.5)
CALCIUM SERPL-MCNC: 9.2 MG/DL (ref 8.4–10.2)
CHLORIDE SERPL-SCNC: 103 MMOL/L (ref 96–112)
CHOLEST SERPL-MCNC: 239 MG/DL (ref 100–199)
CO2 SERPL-SCNC: 25 MMOL/L (ref 20–33)
CREAT SERPL-MCNC: 0.65 MG/DL (ref 0.5–1.4)
EOSINOPHIL # BLD AUTO: 0.02 K/UL (ref 0–0.51)
EOSINOPHIL NFR BLD: 0.6 % (ref 0–6.9)
ERYTHROCYTE [DISTWIDTH] IN BLOOD BY AUTOMATED COUNT: 53.8 FL (ref 35.9–50)
FASTING STATUS PATIENT QL REPORTED: NORMAL
FERRITIN SERPL-MCNC: 58.6 NG/ML (ref 10–291)
FOLATE SERPL-MCNC: 27.6 NG/ML
GFR SERPLBLD CREATININE-BSD FMLA CKD-EPI: 95 ML/MIN/1.73 M 2
GLOBULIN SER CALC-MCNC: 2.7 G/DL (ref 1.9–3.5)
GLUCOSE SERPL-MCNC: 119 MG/DL (ref 65–99)
HCT VFR BLD AUTO: 37.7 % (ref 37–47)
HDLC SERPL-MCNC: 129 MG/DL
HGB BLD-MCNC: 12.1 G/DL (ref 12–16)
IMM GRANULOCYTES # BLD AUTO: 0 K/UL (ref 0–0.11)
IMM GRANULOCYTES NFR BLD AUTO: 0 % (ref 0–0.9)
IRON SATN MFR SERPL: 28 % (ref 15–55)
IRON SERPL-MCNC: 89 UG/DL (ref 40–170)
LDLC SERPL CALC-MCNC: 101 MG/DL
LYMPHOCYTES # BLD AUTO: 0.83 K/UL (ref 1–4.8)
LYMPHOCYTES NFR BLD: 26.1 % (ref 22–41)
MCH RBC QN AUTO: 34.1 PG (ref 27–33)
MCHC RBC AUTO-ENTMCNC: 32.1 G/DL (ref 32.2–35.5)
MCV RBC AUTO: 106.2 FL (ref 81.4–97.8)
MONOCYTES # BLD AUTO: 0.29 K/UL (ref 0–0.85)
MONOCYTES NFR BLD AUTO: 9.1 % (ref 0–13.4)
NEUTROPHILS # BLD AUTO: 2 K/UL (ref 1.82–7.42)
NEUTROPHILS NFR BLD: 62.9 % (ref 44–72)
NRBC # BLD AUTO: 0 K/UL
NRBC BLD-RTO: 0 /100 WBC (ref 0–0.2)
PLATELET # BLD AUTO: 316 K/UL (ref 164–446)
PMV BLD AUTO: 9.2 FL (ref 9–12.9)
POTASSIUM SERPL-SCNC: 4.2 MMOL/L (ref 3.6–5.5)
PROT SERPL-MCNC: 6.9 G/DL (ref 6–8.2)
RBC # BLD AUTO: 3.55 M/UL (ref 4.2–5.4)
SODIUM SERPL-SCNC: 140 MMOL/L (ref 135–145)
TIBC SERPL-MCNC: 317 UG/DL (ref 250–450)
TRIGL SERPL-MCNC: 44 MG/DL (ref 0–149)
TSH SERPL DL<=0.005 MIU/L-ACNC: 1.19 UIU/ML (ref 0.38–5.33)
UIBC SERPL-MCNC: 228 UG/DL (ref 110–370)
VIT B12 SERPL-MCNC: 530 PG/ML (ref 211–911)
WBC # BLD AUTO: 3.2 K/UL (ref 4.8–10.8)

## 2023-11-15 PROCEDURE — 83550 IRON BINDING TEST: CPT

## 2023-11-15 PROCEDURE — 84443 ASSAY THYROID STIM HORMONE: CPT

## 2023-11-15 PROCEDURE — 82728 ASSAY OF FERRITIN: CPT

## 2023-11-15 PROCEDURE — 80053 COMPREHEN METABOLIC PANEL: CPT

## 2023-11-15 PROCEDURE — 82607 VITAMIN B-12: CPT

## 2023-11-15 PROCEDURE — 82746 ASSAY OF FOLIC ACID SERUM: CPT

## 2023-11-15 PROCEDURE — 85025 COMPLETE CBC W/AUTO DIFF WBC: CPT

## 2023-11-15 PROCEDURE — 36415 COLL VENOUS BLD VENIPUNCTURE: CPT

## 2023-11-15 PROCEDURE — 83540 ASSAY OF IRON: CPT

## 2023-11-15 PROCEDURE — 80061 LIPID PANEL: CPT

## 2023-11-21 DIAGNOSIS — E55.9 VITAMIN D DEFICIENCY: ICD-10-CM

## 2023-11-27 RX ORDER — OMEPRAZOLE 20 MG/1
20 CAPSULE, DELAYED RELEASE ORAL
Qty: 90 CAPSULE | Refills: 1 | Status: SHIPPED | OUTPATIENT
Start: 2023-11-27 | End: 2024-01-25

## 2023-12-22 ENCOUNTER — HOSPITAL ENCOUNTER (OUTPATIENT)
Facility: MEDICAL CENTER | Age: 68
End: 2023-12-22
Payer: MEDICARE

## 2023-12-22 ENCOUNTER — APPOINTMENT (OUTPATIENT)
Dept: URGENT CARE | Facility: CLINIC | Age: 68
End: 2023-12-22
Payer: MEDICARE

## 2023-12-22 ENCOUNTER — OFFICE VISIT (OUTPATIENT)
Dept: URGENT CARE | Facility: CLINIC | Age: 68
End: 2023-12-22
Payer: MEDICARE

## 2023-12-22 VITALS
OXYGEN SATURATION: 98 % | HEIGHT: 69 IN | TEMPERATURE: 98.2 F | BODY MASS INDEX: 18.4 KG/M2 | HEART RATE: 85 BPM | WEIGHT: 124.2 LBS | DIASTOLIC BLOOD PRESSURE: 80 MMHG | RESPIRATION RATE: 14 BRPM | SYSTOLIC BLOOD PRESSURE: 130 MMHG

## 2023-12-22 DIAGNOSIS — R30.0 DYSURIA: ICD-10-CM

## 2023-12-22 DIAGNOSIS — N30.90 CYSTITIS: ICD-10-CM

## 2023-12-22 DIAGNOSIS — R09.81 NASAL CONGESTION: ICD-10-CM

## 2023-12-22 LAB
APPEARANCE UR: NORMAL
BILIRUB UR STRIP-MCNC: NORMAL MG/DL
COLOR UR AUTO: YELLOW
GLUCOSE UR STRIP.AUTO-MCNC: NORMAL MG/DL
KETONES UR STRIP.AUTO-MCNC: NORMAL MG/DL
LEUKOCYTE ESTERASE UR QL STRIP.AUTO: NORMAL
NITRITE UR QL STRIP.AUTO: NORMAL
PH UR STRIP.AUTO: 6.5 [PH] (ref 5–8)
PROT UR QL STRIP: NORMAL MG/DL
RBC UR QL AUTO: NORMAL
SP GR UR STRIP.AUTO: 1.02
UROBILINOGEN UR STRIP-MCNC: 0.2 MG/DL

## 2023-12-22 PROCEDURE — 99213 OFFICE O/P EST LOW 20 MIN: CPT | Mod: 25

## 2023-12-22 PROCEDURE — 3079F DIAST BP 80-89 MM HG: CPT

## 2023-12-22 PROCEDURE — 3075F SYST BP GE 130 - 139MM HG: CPT

## 2023-12-22 PROCEDURE — 81002 URINALYSIS NONAUTO W/O SCOPE: CPT

## 2023-12-22 PROCEDURE — 87086 URINE CULTURE/COLONY COUNT: CPT

## 2023-12-22 RX ORDER — FLUTICASONE PROPIONATE 50 MCG
2 SPRAY, SUSPENSION (ML) NASAL
Qty: 16 G | Refills: 1 | Status: SHIPPED | OUTPATIENT
Start: 2023-12-22 | End: 2024-02-12 | Stop reason: SDUPTHER

## 2023-12-22 RX ORDER — SULFAMETHOXAZOLE AND TRIMETHOPRIM 800; 160 MG/1; MG/1
1 TABLET ORAL 2 TIMES DAILY
Qty: 6 TABLET | Refills: 0 | Status: SHIPPED | OUTPATIENT
Start: 2023-12-22 | End: 2023-12-25

## 2023-12-22 ASSESSMENT — ENCOUNTER SYMPTOMS
VOMITING: 0
FEVER: 0
NEUROLOGICAL NEGATIVE: 1
NAUSEA: 0
ABDOMINAL PAIN: 0
BACK PAIN: 0
CARDIOVASCULAR NEGATIVE: 1

## 2023-12-22 ASSESSMENT — FIBROSIS 4 INDEX: FIB4 SCORE: 1.41

## 2023-12-22 NOTE — PROGRESS NOTES
Chief Complaint   Patient presents with    UTI     X 1 day, urgency, burning.        HISTORY OF PRESENT ILLNESS: Patient is a pleasant 68 y.o. female who presents to urgent care today for frequency and pain with urination for the last day.  Denies any fevers, no low back pain, no nausea or vomiting.  Patient has a history of previous UTIs and states she is prone to these.    Additional complaints of ongoing nasal congestion, patient has not been taking anything for this.  Denies any fevers, no headaches, no major facial pain.    Patient Active Problem List    Diagnosis Date Noted    Underweight 10/13/2023    BMI less than 19,adult 10/13/2023    Epigastric abdominal pain 10/13/2023    Osteopenia of multiple sites 11/30/2022    Leukopenia 06/22/2021    Macrocytic anemia 06/22/2021    Eosinophilic esophagitis 12/11/2020    Atrophic vaginitis 03/17/2020    Macrocytosis without anemia 03/09/2020    Hypotension due to drugs 11/05/2019    Hyperlipidemia 03/07/2019    Restless legs 11/19/2018    Hypothyroidism 11/19/2018    Cervical pain        Allergies:Doxycycline, Codeine, Dilaudid [hydromorphone hcl], Fentanyl, Percocet [oxycodone-acetaminophen], and Vicoprofen [hydrocodone-ibuprofen]    Current Outpatient Medications Ordered in Epic   Medication Sig Dispense Refill    sulfamethoxazole-trimethoprim (BACTRIM DS) 800-160 MG tablet Take 1 Tablet by mouth 2 times a day for 3 days. 6 Tablet 0    fluticasone (FLONASE) 50 MCG/ACT nasal spray Administer 2 Sprays into affected nostril(S) at bedtime. 16 g 1    omeprazole (PRILOSEC) 20 MG delayed-release capsule TAKE 1 CAPSULE BY MOUTH EVERY DAY 90 Capsule 1    carisoprodol (SOMA) 350 MG Tab TAKE 1 TABLET BY MOUTH ONCE A DAY AS NEEDED FOR 30 DAYS      levothyroxine (SYNTHROID) 88 MCG Tab TAKE 1 TABLET BY MOUTH EVERY DAY IN THE MORNING ON AN EMPTY STOMACH 90 Tablet 1    ROPINIRole (REQUIP) 0.5 MG Tab Take 2 Tablets by mouth every evening. 180 Tablet 1    estradiol (ESTRACE) 0.1  MG/GM vaginal cream 0.5 g of cream intravaginally administered daily for 2 weeks, then reduce to twice weekly 42.5 g 0    baclofen (LIORESAL) 10 MG Tab       gabapentin (NEURONTIN) 300 MG Cap       Omega-3 Fatty Acids (OMEGA 3 PO) Take 1 Capsule by mouth every day.      MAGNESIUM PO Take 1 Tablet by mouth every day.      Cholecalciferol (VITAMIN D3 PO) Take 1 Tablet by mouth every day.      B Complex Vitamins (B COMPLEX PO) Take 1 Tablet by mouth every day.      Multiple Vitamins-Minerals (MULTIVITAMIN ADULT PO) Take 1 Tablet by mouth every day.      Multiple Vitamins-Minerals (ZINC PO) Take 1 Tab by mouth every day.      POTASSIUM PO Take 1 Tab by mouth every day. OTC       No current Epic-ordered facility-administered medications on file.       Past Medical History:   Diagnosis Date    Cataract     removed bilat    Cervical pain 2020    neck, 0-1/10    Dysfunction of right eustachian tube 2019    Discussed creating slight pressure in the eustachian tube to open them up and allow the ears to drain.  Discussed continuing nasal spray.  As well as adding an allergy medication such as loratadine to her regimen.  I did not see any fluid in the ear on exam but there still could be fluid in there.  We discussed all of this in the visit today.    Eosinophilic esophagitis 2020    Hyperlipidemia 2020    on no meds at this time    Hypothyroidism 2018    Restless legs 2018       Social History     Tobacco Use    Smoking status: Former     Current packs/day: 0.00     Average packs/day: 0.5 packs/day for 8.0 years (4.0 ttl pk-yrs)     Types: Cigarettes     Start date: 1980     Quit date: 1988     Years since quittin.9    Smokeless tobacco: Never    Tobacco comments:     quit in her 20s    Vaping Use    Vaping Use: Never used   Substance Use Topics    Alcohol use: Yes     Alcohol/week: 1.8 oz     Types: 3 Standard drinks or equivalent per week    Drug use: No       Family Status  "  Relation Name Status    Mo      Fa  Alive, age 95y    MGFa  (Not Specified)     Family History   Problem Relation Age of Onset    Diabetes Mother     Stroke Maternal Grandfather        Review of Systems   Constitutional:  Negative for fever.   HENT:  Positive for congestion.    Cardiovascular: Negative.    Gastrointestinal:  Negative for abdominal pain, nausea and vomiting.   Genitourinary:  Positive for dysuria, frequency and urgency.   Musculoskeletal:  Negative for back pain.   Neurological: Negative.        Exam:  /80   Pulse 85   Temp 36.8 °C (98.2 °F) (Temporal)   Resp 14   Ht 1.753 m (5' 9\")   Wt 56.3 kg (124 lb 3.2 oz)   SpO2 98%   Physical Exam  Constitutional:       Appearance: Normal appearance. She is normal weight. She is not toxic-appearing.   HENT:      Head: Normocephalic.      Right Ear: Tympanic membrane normal.      Left Ear: Tympanic membrane normal.      Nose: Congestion and rhinorrhea present.      Mouth/Throat:      Mouth: Mucous membranes are moist.      Pharynx: Oropharynx is clear.   Eyes:      Extraocular Movements: Extraocular movements intact.      Conjunctiva/sclera: Conjunctivae normal.      Pupils: Pupils are equal, round, and reactive to light.   Cardiovascular:      Rate and Rhythm: Normal rate and regular rhythm.      Pulses: Normal pulses.      Heart sounds: Normal heart sounds. No murmur heard.  Pulmonary:      Effort: Pulmonary effort is normal. No respiratory distress.      Breath sounds: Normal breath sounds.   Abdominal:      General: Abdomen is flat. Bowel sounds are normal.      Palpations: Abdomen is soft. There is no mass.      Tenderness: There is no abdominal tenderness. There is no right CVA tenderness, left CVA tenderness, guarding or rebound.   Musculoskeletal:         General: No swelling, tenderness, deformity or signs of injury.      Right lower leg: No edema.      Left lower leg: No edema.   Skin:     General: Skin is warm and dry.      " Capillary Refill: Capillary refill takes less than 2 seconds.      Findings: No bruising, erythema, lesion or rash.   Neurological:      General: No focal deficit present.      Mental Status: She is alert.      Motor: No weakness.   Psychiatric:         Mood and Affect: Mood normal.         Behavior: Behavior normal.         Thought Content: Thought content normal.         Judgment: Judgment normal.         Assessment/Plan:  1. Dysuria  - POCT Urinalysis  - URINE CULTURE(NEW); Future    2. Cystitis  - sulfamethoxazole-trimethoprim (BACTRIM DS) 800-160 MG tablet; Take 1 Tablet by mouth 2 times a day for 3 days.  Dispense: 6 Tablet; Refill: 0  - URINE CULTURE(NEW); Future    3. Nasal congestion  - fluticasone (FLONASE) 50 MCG/ACT nasal spray; Administer 2 Sprays into affected nostril(S) at bedtime.  Dispense: 16 g; Refill: 1    Ongoing frequency, urgency and pain with urination for the last 2 days.  Patient denies any fevers, no no nausea or vomiting, no low back pain.  On physical exam stomach is soft and nontender, negative CVA tenderness.  POCT urinalysis shows trace leukocytes, no blood or nitrates.  I did go ahead and place patient on Bactrim however advised her to not take it unless her symptoms continue to get worse or do not improve as it is a holiday I did go ahead and prescribe this, patient instead encouraged to drink plenty of fluids, I will send her urine for culture.  Advised patient to drink plenty of fluids, take Motrin and Tylenol as needed.     Secondary complaint of ongoing nasal congestion, no major facial pain or headaches.  No fevers.  Physical exam shows positive rhinorrhea with clear nasal congestion.  Patient placed on Flonase.  Reviewed plan of care with the patient.       Patient is aware of the plan of care and agreeable at this time, encouraged them to follow-up if they continue to get worse or do not improve. Total time spent with the patient 35 minutes to include, review of chart,  charting, assessment, procedure.     Supportive care, differential diagnoses, and indications for immediate follow-up discussed with patient.   Pathogenesis of diagnosis discussed including typical length and natural progression.   Instructed to return to clinic or nearest emergency department for any change in condition, further concerns, or worsening of symptoms.  Patient states understanding of the plan of care and discharge instructions.  Instructed to make an appointment, for follow up, with primary care provider.      Please note that this dictation was created using voice recognition software. I have made every reasonable attempt to correct obvious errors, but I expect that there are errors of grammar and possibly content that I did not discover before finalizing the note.      Dalila STOCKTON

## 2023-12-24 LAB
BACTERIA UR CULT: NORMAL
SIGNIFICANT IND 70042: NORMAL
SITE SITE: NORMAL
SOURCE SOURCE: NORMAL

## 2024-01-06 DIAGNOSIS — E03.9 HYPOTHYROIDISM, UNSPECIFIED TYPE: ICD-10-CM

## 2024-01-09 RX ORDER — LEVOTHYROXINE SODIUM 88 UG/1
TABLET ORAL
Qty: 90 TABLET | Refills: 0 | Status: SHIPPED | OUTPATIENT
Start: 2024-01-09 | End: 2024-01-19 | Stop reason: SDUPTHER

## 2024-01-14 DIAGNOSIS — R09.81 NASAL CONGESTION: ICD-10-CM

## 2024-01-19 ENCOUNTER — APPOINTMENT (OUTPATIENT)
Dept: RADIOLOGY | Facility: MEDICAL CENTER | Age: 69
DRG: 308 | End: 2024-01-19
Attending: EMERGENCY MEDICINE
Payer: MEDICARE

## 2024-01-19 ENCOUNTER — HOSPITAL ENCOUNTER (INPATIENT)
Facility: MEDICAL CENTER | Age: 69
LOS: 2 days | DRG: 308 | End: 2024-01-21
Attending: EMERGENCY MEDICINE | Admitting: HOSPITALIST
Payer: MEDICARE

## 2024-01-19 ENCOUNTER — OFFICE VISIT (OUTPATIENT)
Dept: MEDICAL GROUP | Facility: LAB | Age: 69
End: 2024-01-19
Payer: MEDICARE

## 2024-01-19 VITALS
DIASTOLIC BLOOD PRESSURE: 66 MMHG | WEIGHT: 123 LBS | BODY MASS INDEX: 18.22 KG/M2 | RESPIRATION RATE: 14 BRPM | HEART RATE: 76 BPM | OXYGEN SATURATION: 98 % | TEMPERATURE: 97.9 F | HEIGHT: 69 IN | SYSTOLIC BLOOD PRESSURE: 92 MMHG

## 2024-01-19 DIAGNOSIS — R06.02 SHORTNESS OF BREATH: ICD-10-CM

## 2024-01-19 DIAGNOSIS — U07.1 COVID-19 VIRUS INFECTION: ICD-10-CM

## 2024-01-19 DIAGNOSIS — E03.9 HYPOTHYROIDISM, UNSPECIFIED TYPE: ICD-10-CM

## 2024-01-19 DIAGNOSIS — R94.31 PROLONGED QT INTERVAL: ICD-10-CM

## 2024-01-19 DIAGNOSIS — R05.2 SUBACUTE COUGH: ICD-10-CM

## 2024-01-19 DIAGNOSIS — I48.91 NEW ONSET ATRIAL FIBRILLATION (HCC): ICD-10-CM

## 2024-01-19 DIAGNOSIS — E86.0 DEHYDRATION: ICD-10-CM

## 2024-01-19 DIAGNOSIS — D75.89 MACROCYTOSIS WITHOUT ANEMIA: ICD-10-CM

## 2024-01-19 DIAGNOSIS — I48.91 ATRIAL FIBRILLATION WITH RAPID VENTRICULAR RESPONSE (HCC): ICD-10-CM

## 2024-01-19 DIAGNOSIS — R00.0 TACHYCARDIA: ICD-10-CM

## 2024-01-19 DIAGNOSIS — I48.91 ATRIAL FIBRILLATION, UNSPECIFIED TYPE (HCC): ICD-10-CM

## 2024-01-19 DIAGNOSIS — D72.819 LEUKOPENIA, UNSPECIFIED TYPE: ICD-10-CM

## 2024-01-19 PROBLEM — E87.20 METABOLIC ACIDOSIS: Status: ACTIVE | Noted: 2024-01-19

## 2024-01-19 PROBLEM — E78.00 ELEVATED LDL CHOLESTEROL LEVEL: Status: ACTIVE | Noted: 2019-03-07

## 2024-01-19 PROBLEM — R79.89 D-DIMER, ELEVATED: Status: ACTIVE | Noted: 2024-01-19

## 2024-01-19 PROBLEM — I10 HYPERTENSION: Status: ACTIVE | Noted: 2024-01-19

## 2024-01-19 PROBLEM — E87.20 LACTIC ACIDOSIS: Status: ACTIVE | Noted: 2024-01-19

## 2024-01-19 LAB
ALBUMIN SERPL BCP-MCNC: 4.2 G/DL (ref 3.2–4.9)
ALBUMIN/GLOB SERPL: 1.7 G/DL
ALP SERPL-CCNC: 62 U/L (ref 30–99)
ALT SERPL-CCNC: 21 U/L (ref 2–50)
ANION GAP SERPL CALC-SCNC: 17 MMOL/L (ref 7–16)
ANION GAP SERPL CALC-SCNC: 23 MMOL/L (ref 7–16)
APPEARANCE UR: CLEAR
AST SERPL-CCNC: 37 U/L (ref 12–45)
BASOPHILS # BLD AUTO: 0.6 % (ref 0–1.8)
BASOPHILS # BLD: 0.03 K/UL (ref 0–0.12)
BILIRUB SERPL-MCNC: 0.9 MG/DL (ref 0.1–1.5)
BILIRUB UR QL STRIP.AUTO: NEGATIVE
BUN SERPL-MCNC: 15 MG/DL (ref 8–22)
BUN SERPL-MCNC: 21 MG/DL (ref 8–22)
CALCIUM ALBUM COR SERPL-MCNC: 8.6 MG/DL (ref 8.5–10.5)
CALCIUM SERPL-MCNC: 7.5 MG/DL (ref 8.4–10.2)
CALCIUM SERPL-MCNC: 8.8 MG/DL (ref 8.4–10.2)
CHLORIDE SERPL-SCNC: 102 MMOL/L (ref 96–112)
CHLORIDE SERPL-SCNC: 107 MMOL/L (ref 96–112)
CO2 SERPL-SCNC: 16 MMOL/L (ref 20–33)
CO2 SERPL-SCNC: 17 MMOL/L (ref 20–33)
COLOR UR: YELLOW
CREAT SERPL-MCNC: 0.53 MG/DL (ref 0.5–1.4)
CREAT SERPL-MCNC: 0.72 MG/DL (ref 0.5–1.4)
D DIMER PPP IA.FEU-MCNC: 1.26 UG/ML (FEU) (ref 0–0.5)
EKG IMPRESSION: NORMAL
EKG IMPRESSION: NORMAL
EOSINOPHIL # BLD AUTO: 0.08 K/UL (ref 0–0.51)
EOSINOPHIL NFR BLD: 1.6 % (ref 0–6.9)
ERYTHROCYTE [DISTWIDTH] IN BLOOD BY AUTOMATED COUNT: 53.3 FL (ref 35.9–50)
FLUAV RNA SPEC QL NAA+PROBE: NEGATIVE
FLUBV RNA SPEC QL NAA+PROBE: NEGATIVE
GFR SERPLBLD CREATININE-BSD FMLA CKD-EPI: 100 ML/MIN/1.73 M 2
GFR SERPLBLD CREATININE-BSD FMLA CKD-EPI: 91 ML/MIN/1.73 M 2
GLOBULIN SER CALC-MCNC: 2.5 G/DL (ref 1.9–3.5)
GLUCOSE SERPL-MCNC: 67 MG/DL (ref 65–99)
GLUCOSE SERPL-MCNC: 96 MG/DL (ref 65–99)
GLUCOSE UR STRIP.AUTO-MCNC: NEGATIVE MG/DL
HCT VFR BLD AUTO: 37.9 % (ref 37–47)
HGB BLD-MCNC: 12.4 G/DL (ref 12–16)
IMM GRANULOCYTES # BLD AUTO: 0.01 K/UL (ref 0–0.11)
IMM GRANULOCYTES NFR BLD AUTO: 0.2 % (ref 0–0.9)
KETONES UR STRIP.AUTO-MCNC: 40 MG/DL
LACTATE SERPL-SCNC: 1.8 MMOL/L (ref 0.5–2)
LACTATE SERPL-SCNC: 3.8 MMOL/L (ref 0.5–2)
LACTATE SERPL-SCNC: 4.2 MMOL/L (ref 0.5–2)
LEUKOCYTE ESTERASE UR QL STRIP.AUTO: NEGATIVE
LYMPHOCYTES # BLD AUTO: 0.79 K/UL (ref 1–4.8)
LYMPHOCYTES NFR BLD: 16 % (ref 22–41)
MCH RBC QN AUTO: 33.5 PG (ref 27–33)
MCHC RBC AUTO-ENTMCNC: 32.7 G/DL (ref 32.2–35.5)
MCV RBC AUTO: 102.4 FL (ref 81.4–97.8)
MICRO URNS: ABNORMAL
MONOCYTES # BLD AUTO: 0.19 K/UL (ref 0–0.85)
MONOCYTES NFR BLD AUTO: 3.8 % (ref 0–13.4)
NEUTROPHILS # BLD AUTO: 3.84 K/UL (ref 1.82–7.42)
NEUTROPHILS NFR BLD: 77.8 % (ref 44–72)
NITRITE UR QL STRIP.AUTO: NEGATIVE
NRBC # BLD AUTO: 0 K/UL
NRBC BLD-RTO: 0 /100 WBC (ref 0–0.2)
NT-PROBNP SERPL IA-MCNC: 475 PG/ML (ref 0–125)
PH UR STRIP.AUTO: 5.5 [PH] (ref 5–8)
PLATELET # BLD AUTO: 201 K/UL (ref 164–446)
PMV BLD AUTO: 9.1 FL (ref 9–12.9)
POTASSIUM SERPL-SCNC: 3.6 MMOL/L (ref 3.6–5.5)
POTASSIUM SERPL-SCNC: 4.6 MMOL/L (ref 3.6–5.5)
PROCALCITONIN SERPL-MCNC: 0.05 NG/ML
PROT SERPL-MCNC: 6.7 G/DL (ref 6–8.2)
PROT UR QL STRIP: NEGATIVE MG/DL
RBC # BLD AUTO: 3.7 M/UL (ref 4.2–5.4)
RBC UR QL AUTO: NEGATIVE
RSV RNA SPEC QL NAA+PROBE: NEGATIVE
SARS-COV-2 RNA RESP QL NAA+PROBE: DETECTED
SODIUM SERPL-SCNC: 141 MMOL/L (ref 135–145)
SODIUM SERPL-SCNC: 141 MMOL/L (ref 135–145)
SP GR UR STRIP.AUTO: 1.02
SPECIMEN SOURCE: ABNORMAL
TROPONIN T SERPL-MCNC: 11 NG/L (ref 6–19)
WBC # BLD AUTO: 4.9 K/UL (ref 4.8–10.8)

## 2024-01-19 PROCEDURE — 71045 X-RAY EXAM CHEST 1 VIEW: CPT

## 2024-01-19 PROCEDURE — A9270 NON-COVERED ITEM OR SERVICE: HCPCS | Performed by: HOSPITALIST

## 2024-01-19 PROCEDURE — 94760 N-INVAS EAR/PLS OXIMETRY 1: CPT

## 2024-01-19 PROCEDURE — 36415 COLL VENOUS BLD VENIPUNCTURE: CPT

## 2024-01-19 PROCEDURE — 81003 URINALYSIS AUTO W/O SCOPE: CPT

## 2024-01-19 PROCEDURE — 83880 ASSAY OF NATRIURETIC PEPTIDE: CPT

## 2024-01-19 PROCEDURE — 71275 CT ANGIOGRAPHY CHEST: CPT

## 2024-01-19 PROCEDURE — 8E0ZXY6 ISOLATION: ICD-10-PCS | Performed by: EMERGENCY MEDICINE

## 2024-01-19 PROCEDURE — 96375 TX/PRO/DX INJ NEW DRUG ADDON: CPT

## 2024-01-19 PROCEDURE — 99215 OFFICE O/P EST HI 40 MIN: CPT | Mod: 25 | Performed by: STUDENT IN AN ORGANIZED HEALTH CARE EDUCATION/TRAINING PROGRAM

## 2024-01-19 PROCEDURE — 99223 1ST HOSP IP/OBS HIGH 75: CPT | Mod: AI | Performed by: HOSPITALIST

## 2024-01-19 PROCEDURE — 700105 HCHG RX REV CODE 258: Performed by: HOSPITALIST

## 2024-01-19 PROCEDURE — 85379 FIBRIN DEGRADATION QUANT: CPT

## 2024-01-19 PROCEDURE — 700111 HCHG RX REV CODE 636 W/ 250 OVERRIDE (IP): Performed by: HOSPITALIST

## 2024-01-19 PROCEDURE — 96374 THER/PROPH/DIAG INJ IV PUSH: CPT

## 2024-01-19 PROCEDURE — 700117 HCHG RX CONTRAST REV CODE 255: Performed by: EMERGENCY MEDICINE

## 2024-01-19 PROCEDURE — 80053 COMPREHEN METABOLIC PANEL: CPT

## 2024-01-19 PROCEDURE — 3074F SYST BP LT 130 MM HG: CPT | Performed by: STUDENT IN AN ORGANIZED HEALTH CARE EDUCATION/TRAINING PROGRAM

## 2024-01-19 PROCEDURE — 84484 ASSAY OF TROPONIN QUANT: CPT

## 2024-01-19 PROCEDURE — 0241U HCHG SARS-COV-2 COVID-19 NFCT DS RESP RNA 4 TRGT MIC: CPT

## 2024-01-19 PROCEDURE — 96376 TX/PRO/DX INJ SAME DRUG ADON: CPT

## 2024-01-19 PROCEDURE — 85025 COMPLETE CBC W/AUTO DIFF WBC: CPT

## 2024-01-19 PROCEDURE — 93000 ELECTROCARDIOGRAM COMPLETE: CPT | Performed by: STUDENT IN AN ORGANIZED HEALTH CARE EDUCATION/TRAINING PROGRAM

## 2024-01-19 PROCEDURE — 700105 HCHG RX REV CODE 258: Performed by: EMERGENCY MEDICINE

## 2024-01-19 PROCEDURE — 770020 HCHG ROOM/CARE - TELE (206)

## 2024-01-19 PROCEDURE — 83605 ASSAY OF LACTIC ACID: CPT

## 2024-01-19 PROCEDURE — 80048 BASIC METABOLIC PNL TOTAL CA: CPT

## 2024-01-19 PROCEDURE — 84145 PROCALCITONIN (PCT): CPT

## 2024-01-19 PROCEDURE — 99285 EMERGENCY DEPT VISIT HI MDM: CPT

## 2024-01-19 PROCEDURE — 93005 ELECTROCARDIOGRAM TRACING: CPT | Performed by: EMERGENCY MEDICINE

## 2024-01-19 PROCEDURE — 700102 HCHG RX REV CODE 250 W/ 637 OVERRIDE(OP): Performed by: HOSPITALIST

## 2024-01-19 PROCEDURE — 3078F DIAST BP <80 MM HG: CPT | Performed by: STUDENT IN AN ORGANIZED HEALTH CARE EDUCATION/TRAINING PROGRAM

## 2024-01-19 RX ORDER — LEVOTHYROXINE SODIUM 88 UG/1
88 TABLET ORAL
Status: DISCONTINUED | OUTPATIENT
Start: 2024-01-20 | End: 2024-01-21 | Stop reason: HOSPADM

## 2024-01-19 RX ORDER — ROPINIROLE 0.5 MG/1
0.5 TABLET, FILM COATED ORAL EVERY EVENING
Status: DISCONTINUED | OUTPATIENT
Start: 2024-01-19 | End: 2024-01-21 | Stop reason: HOSPADM

## 2024-01-19 RX ORDER — GABAPENTIN 300 MG/1
300 CAPSULE ORAL 3 TIMES DAILY
Status: DISCONTINUED | OUTPATIENT
Start: 2024-01-19 | End: 2024-01-21 | Stop reason: HOSPADM

## 2024-01-19 RX ORDER — POLYETHYLENE GLYCOL 3350 17 G/17G
1 POWDER, FOR SOLUTION ORAL
Status: DISCONTINUED | OUTPATIENT
Start: 2024-01-19 | End: 2024-01-21 | Stop reason: HOSPADM

## 2024-01-19 RX ORDER — POLYVINYL ALCOHOL, POVIDONE 14; 6 MG/ML; MG/ML
1-2 SOLUTION/ DROPS OPHTHALMIC PRN
COMMUNITY

## 2024-01-19 RX ORDER — ROPINIROLE 0.5 MG/1
0.5 TABLET, FILM COATED ORAL EVERY EVENING
COMMUNITY

## 2024-01-19 RX ORDER — ACETAMINOPHEN 325 MG/1
650 TABLET ORAL EVERY 6 HOURS PRN
Status: DISCONTINUED | OUTPATIENT
Start: 2024-01-19 | End: 2024-01-21 | Stop reason: HOSPADM

## 2024-01-19 RX ORDER — SODIUM CHLORIDE 9 MG/ML
30 INJECTION, SOLUTION INTRAVENOUS
Status: COMPLETED | OUTPATIENT
Start: 2024-01-19 | End: 2024-01-19

## 2024-01-19 RX ORDER — DILTIAZEM HYDROCHLORIDE 5 MG/ML
10 INJECTION INTRAVENOUS
Status: COMPLETED | OUTPATIENT
Start: 2024-01-19 | End: 2024-01-19

## 2024-01-19 RX ORDER — BACLOFEN 10 MG/1
10-20 TABLET ORAL
Status: DISCONTINUED | OUTPATIENT
Start: 2024-01-19 | End: 2024-01-21

## 2024-01-19 RX ORDER — DILTIAZEM HYDROCHLORIDE 5 MG/ML
15 INJECTION INTRAVENOUS
Status: DISCONTINUED | OUTPATIENT
Start: 2024-01-19 | End: 2024-01-21 | Stop reason: HOSPADM

## 2024-01-19 RX ORDER — CEFDINIR 300 MG/1
300 CAPSULE ORAL EVERY 12 HOURS
Status: ON HOLD | COMMUNITY
Start: 2024-01-10 | End: 2024-01-21

## 2024-01-19 RX ORDER — BISACODYL 10 MG
10 SUPPOSITORY, RECTAL RECTAL
Status: DISCONTINUED | OUTPATIENT
Start: 2024-01-19 | End: 2024-01-21 | Stop reason: HOSPADM

## 2024-01-19 RX ORDER — OMEPRAZOLE 20 MG/1
20 CAPSULE, DELAYED RELEASE ORAL DAILY
Status: DISCONTINUED | OUTPATIENT
Start: 2024-01-20 | End: 2024-01-21 | Stop reason: HOSPADM

## 2024-01-19 RX ORDER — CARISOPRODOL 350 MG/1
350 TABLET ORAL
Status: DISCONTINUED | OUTPATIENT
Start: 2024-01-19 | End: 2024-01-21 | Stop reason: HOSPADM

## 2024-01-19 RX ORDER — LEVOTHYROXINE SODIUM 88 UG/1
88 TABLET ORAL
Qty: 90 TABLET | Refills: 3 | Status: SHIPPED | OUTPATIENT
Start: 2024-01-19

## 2024-01-19 RX ORDER — SODIUM CHLORIDE 9 MG/ML
30 INJECTION, SOLUTION INTRAVENOUS
Status: DISCONTINUED | OUTPATIENT
Start: 2024-01-19 | End: 2024-01-20

## 2024-01-19 RX ORDER — CARBOXYMETHYLCELLULOSE SODIUM 5 MG/ML
1-2 SOLUTION/ DROPS OPHTHALMIC EVERY 4 HOURS PRN
Status: DISCONTINUED | OUTPATIENT
Start: 2024-01-19 | End: 2024-01-21 | Stop reason: HOSPADM

## 2024-01-19 RX ORDER — ONDANSETRON 2 MG/ML
4 INJECTION INTRAMUSCULAR; INTRAVENOUS EVERY 4 HOURS PRN
Status: DISCONTINUED | OUTPATIENT
Start: 2024-01-19 | End: 2024-01-21 | Stop reason: HOSPADM

## 2024-01-19 RX ORDER — IBUPROFEN 200 MG
600 TABLET ORAL EVERY 8 HOURS PRN
COMMUNITY
End: 2024-01-19

## 2024-01-19 RX ORDER — SODIUM CHLORIDE 9 MG/ML
1000 INJECTION, SOLUTION INTRAVENOUS ONCE
Status: COMPLETED | OUTPATIENT
Start: 2024-01-19 | End: 2024-01-19

## 2024-01-19 RX ORDER — SODIUM CHLORIDE, SODIUM LACTATE, POTASSIUM CHLORIDE, AND CALCIUM CHLORIDE .6; .31; .03; .02 G/100ML; G/100ML; G/100ML; G/100ML
500 INJECTION, SOLUTION INTRAVENOUS
Status: DISCONTINUED | OUTPATIENT
Start: 2024-01-19 | End: 2024-01-20

## 2024-01-19 RX ORDER — AMOXICILLIN 250 MG
2 CAPSULE ORAL 2 TIMES DAILY
Status: DISCONTINUED | OUTPATIENT
Start: 2024-01-19 | End: 2024-01-21 | Stop reason: HOSPADM

## 2024-01-19 RX ORDER — SODIUM CHLORIDE, SODIUM LACTATE, POTASSIUM CHLORIDE, CALCIUM CHLORIDE 600; 310; 30; 20 MG/100ML; MG/100ML; MG/100ML; MG/100ML
INJECTION, SOLUTION INTRAVENOUS CONTINUOUS
Status: ACTIVE | OUTPATIENT
Start: 2024-01-19 | End: 2024-01-20

## 2024-01-19 RX ADMIN — DILTIAZEM HYDROCHLORIDE 10 MG: 5 INJECTION INTRAVENOUS at 14:30

## 2024-01-19 RX ADMIN — DILTIAZEM HYDROCHLORIDE 15 MG: 5 INJECTION INTRAVENOUS at 21:56

## 2024-01-19 RX ADMIN — SODIUM CHLORIDE 1000 ML: 9 INJECTION, SOLUTION INTRAVENOUS at 11:08

## 2024-01-19 RX ADMIN — ONDANSETRON 4 MG: 2 INJECTION INTRAMUSCULAR; INTRAVENOUS at 20:13

## 2024-01-19 RX ADMIN — SODIUM CHLORIDE, POTASSIUM CHLORIDE, SODIUM LACTATE AND CALCIUM CHLORIDE: 600; 310; 30; 20 INJECTION, SOLUTION INTRAVENOUS at 15:00

## 2024-01-19 RX ADMIN — IOHEXOL 75 ML: 350 INJECTION, SOLUTION INTRAVENOUS at 13:00

## 2024-01-19 RX ADMIN — ROPINIROLE HYDROCHLORIDE 0.5 MG: 0.5 TABLET, FILM COATED ORAL at 17:34

## 2024-01-19 RX ADMIN — BACLOFEN 20 MG: 10 TABLET ORAL at 19:57

## 2024-01-19 RX ADMIN — GABAPENTIN 300 MG: 300 CAPSULE ORAL at 21:00

## 2024-01-19 RX ADMIN — DILTIAZEM HYDROCHLORIDE 60 MG: 30 TABLET, FILM COATED ORAL at 20:30

## 2024-01-19 RX ADMIN — DILTIAZEM HYDROCHLORIDE 30 MG: 30 TABLET, FILM COATED ORAL at 20:30

## 2024-01-19 RX ADMIN — DILTIAZEM HYDROCHLORIDE 10 MG: 5 INJECTION INTRAVENOUS at 17:37

## 2024-01-19 RX ADMIN — GABAPENTIN 300 MG: 300 CAPSULE ORAL at 15:00

## 2024-01-19 RX ADMIN — DOCUSATE SODIUM 50MG AND SENNOSIDES 8.6MG 2 TABLET: 8.6; 5 TABLET, FILM COATED ORAL at 17:34

## 2024-01-19 RX ADMIN — SODIUM CHLORIDE 1674 ML: 9 INJECTION, SOLUTION INTRAVENOUS at 12:21

## 2024-01-19 RX ADMIN — DILTIAZEM HYDROCHLORIDE 30 MG: 30 TABLET, FILM COATED ORAL at 14:30

## 2024-01-19 RX ADMIN — CARISOPRODOL 350 MG: 350 TABLET ORAL at 17:34

## 2024-01-19 RX ADMIN — APIXABAN 5 MG: 5 TABLET, FILM COATED ORAL at 16:15

## 2024-01-19 RX ADMIN — DILTIAZEM HYDROCHLORIDE 10 MG: 5 INJECTION INTRAVENOUS at 15:34

## 2024-01-19 ASSESSMENT — ENCOUNTER SYMPTOMS
CHILLS: 0
VOMITING: 0
SHORTNESS OF BREATH: 0
FEVER: 0
FEVER: 0
ABDOMINAL PAIN: 0
FLANK PAIN: 0
MYALGIAS: 0
BRUISES/BLEEDS EASILY: 0
ABDOMINAL PAIN: 0
WEIGHT LOSS: 0
EYE REDNESS: 0
NAUSEA: 0
PALPITATIONS: 1
SHORTNESS OF BREATH: 1
COUGH: 1
COUGH: 1
DIARRHEA: 0
CHILLS: 0
NERVOUS/ANXIOUS: 0
SORE THROAT: 1
EYE DISCHARGE: 0
FOCAL WEAKNESS: 0
VOMITING: 1
STRIDOR: 0

## 2024-01-19 ASSESSMENT — COGNITIVE AND FUNCTIONAL STATUS - GENERAL
DAILY ACTIVITIY SCORE: 24
MOBILITY SCORE: 24
SUGGESTED CMS G CODE MODIFIER MOBILITY: CH
SUGGESTED CMS G CODE MODIFIER DAILY ACTIVITY: CH

## 2024-01-19 ASSESSMENT — CHA2DS2 SCORE
HYPERTENSION: YES
PRIOR STROKE OR TIA OR THROMBOEMBOLISM: NO
CHA2DS2 VASC SCORE: 3
CHF OR LEFT VENTRICULAR DYSFUNCTION: NO
VASCULAR DISEASE: NO
SEX: FEMALE
DIABETES: NO
AGE 75 OR GREATER: NO
AGE 65 TO 74: YES

## 2024-01-19 ASSESSMENT — LIFESTYLE VARIABLES
ON A TYPICAL DAY WHEN YOU DRINK ALCOHOL HOW MANY DRINKS DO YOU HAVE: 0
CONSUMPTION TOTAL: NEGATIVE
EVER FELT BAD OR GUILTY ABOUT YOUR DRINKING: NO
AVERAGE NUMBER OF DAYS PER WEEK YOU HAVE A DRINK CONTAINING ALCOHOL: 0
TOTAL SCORE: 0
EVER HAD A DRINK FIRST THING IN THE MORNING TO STEADY YOUR NERVES TO GET RID OF A HANGOVER: NO
HAVE YOU EVER FELT YOU SHOULD CUT DOWN ON YOUR DRINKING: NO
HAVE PEOPLE ANNOYED YOU BY CRITICIZING YOUR DRINKING: NO
TOTAL SCORE: 0
TOTAL SCORE: 0
HOW MANY TIMES IN THE PAST YEAR HAVE YOU HAD 5 OR MORE DRINKS IN A DAY: 0
ALCOHOL_USE: NO

## 2024-01-19 ASSESSMENT — FIBROSIS 4 INDEX
FIB4 SCORE: 2.73
FIB4 SCORE: 1.41
FIB4 SCORE: 1.41

## 2024-01-19 ASSESSMENT — PATIENT HEALTH QUESTIONNAIRE - PHQ9
2. FEELING DOWN, DEPRESSED, IRRITABLE, OR HOPELESS: NOT AT ALL
SUM OF ALL RESPONSES TO PHQ9 QUESTIONS 1 AND 2: 0
1. LITTLE INTEREST OR PLEASURE IN DOING THINGS: NOT AT ALL

## 2024-01-19 NOTE — ED NOTES
Pharmacy Medication Reconciliation    ~Med rec updated and complete per patient at bedside   ~Allergies have been verified and updated  ~Pt home pharmacy: [facility]      ~Patient reports that she completed a 7 day course of Omnicef yesterday 1/18/2024      ~Anticoagulants (rivaroxaban, apixaban, edoxaban, dabigatran, warfarin, enoxaparin) taken in the last 14 days? No

## 2024-01-19 NOTE — PROGRESS NOTES
Subjective:     Chief Complaint   Patient presents with    Establish Care    Pharyngitis      x 5 weeks coughing     Lab Results     HISTORY OF THE PRESENT ILLNESS: Patient is a 68 y.o. female. This pleasant patient is here today to establish care and discuss cough/sore throat and labs. His/her prior PCP was MAHIN Pulido.    Patient presents with her spouse today.    Patient states that she and her  had a sore throat and some headache, that has resolved but the sore throat and cough has been persistent for approximately 5 weeks. Sore throat improved in the past day, gargling with Listerine.  Had posttussive emesis 2 nights ago.  Not drinking water but drinking caffeine free tea.    Reports heart racing x 2-3 days. Has been more fatigued.  Has some shortness of breath noted also in the past few days with dyspnea on exertion upon walking in clinic today.  Denies any chest pain.  Has been intermittently lightheaded as well.    Epigastric pain resolved so long as she avoids triggers.  Has been continuing with Prilosec 2 20mg tablets daily as instructed and has been avoiding ETOH since last visit. Prior was drinking wine on occasion about 1-2 times a week, 2 glasses.  Has an appointment with gastroenterology on Tuesday.  Denies any gastrointestinal complaints aside from emesis 2 nights ago, no blood in the stool.    Has been taking Soma and baclofen periodically but not together, the last was a few days ago.  Periodically takes over-the-counter magnesium and potassium but not recently.  Admits to continued use of ibuprofen 600 mg on occasion for pain.    Needs a refill of her levothyroxine.  Not taking any oral estrogen, no longer using it straight screen.    Review of Systems   Constitutional:  Negative for chills, fever, malaise/fatigue and weight loss.   HENT:  Positive for sore throat.    Respiratory:  Positive for cough. Negative for shortness of breath.    Cardiovascular:  Positive for palpitations.  "Negative for chest pain.   Gastrointestinal:  Negative for abdominal pain, diarrhea, nausea and vomiting.   Skin:  Negative for rash.     Objective:     Exam: BP 92/66 (BP Location: Left arm, Patient Position: Sitting, BP Cuff Size: Adult)   Pulse 76   Temp 36.6 °C (97.9 °F)   Resp 14   Ht 1.753 m (5' 9\")   Wt 55.8 kg (123 lb)   SpO2 98%  Body mass index is 18.16 kg/m².    Physical Exam  Vitals reviewed.   Constitutional:       General: She is not in acute distress.     Appearance: Normal appearance. She is underweight. She is not ill-appearing.   HENT:      Head: Normocephalic and atraumatic.      Nose: Nose normal.      Mouth/Throat:      Mouth: Mucous membranes are moist.      Pharynx: No oropharyngeal exudate or posterior oropharyngeal erythema.   Eyes:      General: No scleral icterus.     Conjunctiva/sclera: Conjunctivae normal.   Cardiovascular:      Rate and Rhythm: Regular rhythm. Tachycardia present.      Heart sounds: Normal heart sounds.   Pulmonary:      Effort: Pulmonary effort is normal. No respiratory distress.      Breath sounds: Normal breath sounds. No wheezing, rhonchi or rales.   Abdominal:      General: Abdomen is flat. Bowel sounds are normal. There is no distension.      Palpations: Abdomen is soft. There is no mass.      Tenderness: There is abdominal tenderness (Minimal epigastric). There is no guarding or rebound.   Musculoskeletal:      Cervical back: Neck supple. No rigidity or tenderness.      Right lower leg: No edema.      Left lower leg: No edema.   Lymphadenopathy:      Cervical: Cervical adenopathy (Nontender, bilateral) present.      Right cervical: Superficial cervical adenopathy present. No deep or posterior cervical adenopathy.     Left cervical: Superficial cervical adenopathy present. No deep or posterior cervical adenopathy.   Skin:     General: Skin is warm and dry.      Coloration: Skin is not jaundiced.   Neurological:      General: No focal deficit present.      " Mental Status: She is alert and oriented to person, place, and time.   Psychiatric:         Mood and Affect: Mood normal.         Behavior: Behavior normal.         Thought Content: Thought content normal.       Labs: Reviewed from 11/15/2023    EKG Interpretation   Ordered and interpreted by Sandra Carrion DO  Rhythm: Sinus  Rate: Tachycardic, 143  Axis: normal   Intervals: Prolonged QT interval, Qtc 511  Ectopy: none   Conduction: Early R wave progression.  ST Segments: ST depressions.  T Waves: No abnormalities  Q Waves: none   Clinical Impression: Sinus tachycardia with prolonged QT interval, ST depressions and early R wave progression.  Compared to 3/26/2021.    Assessment & Plan:   68 y.o. female with the following -    1. Subacute cough  2. Shortness of breath  3. Tachycardia  4. Prolonged QT interval  5 weeks of cough and new SOB with tachycardia. EKG as above. Repeat vitals with resolution of tachycardia, BP still borderline.  Discussed potential etiology of her symptoms and that I recommend her be evaluated promptly today in the emergency department.  Patient was amenable to EMS transport and was stable prior to transport via [facility].  - EKG - Clinic Performed    5. Leukopenia, unspecified type  6. Macrocytosis without anemia  Chronic conditions, slightly worsened from prior.  Negative evaluation for hemolysis, normal JOSEFINA normal, SPEP normal and screening for nutritional deficiencies without findings in 2022.  B12/folate normal, no findings on CMP suggestive of hemolysis.  Patient agreeable to evaluation with hematology.  - Referral to Hematology Oncology    7. Hypothyroidism, unspecified type  Chronic, controlled. Continue medication(s) as below.  - levothyroxine (SYNTHROID) 88 MCG Tab; Take 1 Tablet by mouth every morning on an empty stomach.  Dispense: 90 Tablet; Refill: 3    I spent a total of 53 minutes with record review, exam, communication with the patient, and documentation of this  encounter.    Return if symptoms worsen or fail to improve.    Please note that this dictation was created using voice recognition software. I have made every reasonable attempt to correct obvious errors, but I expect that there are errors of grammar and possibly content that I did not discover before finalizing the note.

## 2024-01-19 NOTE — H&P
Hospital Medicine History & Physical Note    Date of Service  1/19/2024    Primary Care Physician  Sandra Carrion D.O.    Consultants  None     Code Status  Full Code    Chief Complaint  Chief Complaint   Patient presents with    Shortness of Breath    Rapid Heart Beat     History of Presenting Illness   Missy Stuart is a 68 y.o. female with a past medical history of hypothyroidism who presented 1/19/2024 with shortness of breath and palpitations.  Patient was at her primary care office and was noticed to be having rapid heartbeat.  The patient does report generalized weakness shortness of breath and palpitations.  She reports congestion myalgias and diffuse joint aches over the past 2 weeks.  She did have vomiting after a bout of coughing yesterday.  She denies having fevers chills headaches or diarrhea.  In the emergency room the patient was found to have markedly elevated heart rate of 141 with lactic acidosis of 4.2, she also tested positive for COVID-19 infection.      I discussed the plan of care with emergency department physician, and the patient.    Review of Systems  Review of Systems   Constitutional:  Positive for malaise/fatigue. Negative for chills and fever.   Eyes:  Negative for discharge and redness.   Respiratory:  Positive for cough and shortness of breath. Negative for stridor.    Cardiovascular:  Negative for chest pain and leg swelling.   Gastrointestinal:  Positive for vomiting. Negative for abdominal pain.   Genitourinary:  Negative for flank pain.   Musculoskeletal:  Negative for myalgias.   Skin: Negative.    Neurological:  Negative for focal weakness.   Endo/Heme/Allergies:  Does not bruise/bleed easily.   Psychiatric/Behavioral:  The patient is not nervous/anxious.      Past Medical History   has a past medical history of Cataract, Cervical pain (02/28/2020), Dysfunction of right eustachian tube (11/5/2019), Eosinophilic esophagitis (12/11/2020), Hyperlipidemia (02/28/2020),  "Hypothyroidism (11/19/2018), and Restless legs (11/19/2018).    Surgical History   has a past surgical history that includes pr breast augmentation with implant (1979 & 2018); cataract phaco with iol (5/21/2014); other orthopedic surgery (1462-3756); thyroidectomy (1983); tube & ectopic preg., removal (1987); and eye lesion excision (Left, 3/2/2020).     Family History  family history includes Diabetes in her mother; Stroke in her maternal grandfather.      Social History   reports that she quit smoking about 36 years ago. Her smoking use included cigarettes. She started smoking about 44 years ago. She has a 4.0 pack-year smoking history. She has never used smokeless tobacco. She reports current alcohol use of about 1.8 oz of alcohol per week. She reports that she does not use drugs.    Allergies  Allergies   Allergen Reactions    Hydromorphone Hives    Codeine Vomiting and Nausea    Fentanyl Vomiting and Nausea    Doxycycline Unspecified     \"stiff joints\"    Hydrocodone Unspecified     Sweating      Oxycodone Vomiting     Medications  Prior to Admission Medications   Prescriptions Last Dose Informant Patient Reported? Taking?   B Complex Vitamins (B COMPLEX PO) 1/18/2024 at AM Patient Yes No   Sig: Take 1 Tablet by mouth every day.   Cholecalciferol (VITAMIN D3 PO) FEWDAYS AGO at [facility] Patient Yes No   Sig: Take 1 Tablet by mouth every day.   MAGNESIUM PO FEW DAYS AGO at [facility] Patient Yes No   Sig: Take 1 Tablet by mouth every day.   Multiple Vitamins-Minerals (MULTIVITAMIN ADULT PO) 1/18/2024 at AM Patient Yes No   Sig: Take 1 Tablet by mouth every day.   Multiple Vitamins-Minerals (ZINC PO) 1/18/2024 at AM Patient Yes No   Sig: Take 1 Tab by mouth every day.   Omega-3 Fatty Acids (OMEGA 3 PO) 1/18/2024 at AM Patient Yes No   Sig: Take 1 Capsule by mouth every day.   POTASSIUM PO FEW DAYS AGO at [facility] Patient Yes No   Sig: Take 1 Tab by mouth every day. OTC   ROPINIRole (REQUIP) 0.5 MG Tab 1/18/2024 at PM Patient Yes " Yes   Sig: Take 0.5 mg by mouth every evening.   baclofen (LIORESAL) 10 MG Tab 1/17/2024 at PRN Patient Yes No   Sig: Take 10-20 mg by mouth 1 time a day as needed (PAIN).   carisoprodol (SOMA) 350 MG Tab PRN at PRN Patient Yes No   Sig: Take 350 mg by mouth 1 time a day as needed.   cefdinir (OMNICEF) 300 MG Cap 1/18/2024 at COMPLETE Patient Yes Yes   Sig: Take 300 mg by mouth every 12 hours.   fluticasone (FLONASE) 50 MCG/ACT nasal spray UNK at UNK Patient No No   Sig: Administer 2 Sprays into affected nostril(S) at bedtime.   gabapentin (NEURONTIN) 300 MG Cap 1/19/2024 at 0800 Patient Yes No   Sig: Take 300 mg by mouth 3 times a day.   levothyroxine (SYNTHROID) 88 MCG Tab 1/19/2024 at 0600 Patient No No   Sig: Take 1 Tablet by mouth every morning on an empty stomach.   omeprazole (PRILOSEC) 20 MG delayed-release capsule FEW DAYS AGO at UNK Patient No No   Sig: TAKE 1 CAPSULE BY MOUTH EVERY DAY   polyvinyl alcohol-povidone (REFRESH) 1.4-0.6 % ophthalmic solution PRN at PRN Patient Yes Yes   Sig: Administer 1-2 Drops into both eyes as needed (dry eye).      Facility-Administered Medications: None     Physical Exam  Temp:  [36.2 °C (97.2 °F)-36.6 °C (97.9 °F)] 36.2 °C (97.2 °F)  Pulse:  [] 123  Resp:  [14-20] 18  BP: ()/() 128/90  SpO2:  [93 %-100 %] 93 %  Blood Pressure : (!) 142/97   Temperature: 36.2 °C (97.2 °F)   Pulse: (!) 138   Respiration: 20   Pulse Oximetry: 99 %     Physical Exam  Constitutional:       General: She is not in acute distress.  HENT:      Head: Normocephalic and atraumatic.      Right Ear: External ear normal.      Left Ear: External ear normal.      Nose: No congestion or rhinorrhea.      Mouth/Throat:      Mouth: Mucous membranes are dry.      Pharynx: No oropharyngeal exudate or posterior oropharyngeal erythema.   Eyes:      General: No scleral icterus.        Right eye: No discharge.         Left eye: No discharge.      Conjunctiva/sclera: Conjunctivae normal.       Pupils: Pupils are equal, round, and reactive to light.   Cardiovascular:      Rate and Rhythm: Tachycardia present. Rhythm irregular.      Heart sounds:      No friction rub. No gallop.   Pulmonary:      Effort: Pulmonary effort is normal.   Abdominal:      General: Abdomen is flat. There is no distension.      Tenderness: There is no guarding.   Musculoskeletal:         General: No swelling.      Cervical back: Neck supple. No rigidity. No muscular tenderness.      Right lower leg: No edema.      Left lower leg: No edema.   Skin:     General: Skin is dry.      Capillary Refill: Capillary refill takes 2 to 3 seconds.      Coloration: Skin is not jaundiced or pale.      Findings: No bruising or erythema.   Neurological:      Mental Status: She is alert and oriented to person, place, and time.   Psychiatric:         Mood and Affect: Mood normal.         Judgment: Judgment normal.       Laboratory:  Recent Labs     01/19/24  1139   WBC 4.9   RBC 3.70*   HEMOGLOBIN 12.4   HEMATOCRIT 37.9   .4*   MCH 33.5*   MCHC 32.7   RDW 53.3*   PLATELETCT 201   MPV 9.1     Recent Labs     01/19/24  1100 01/19/24  1339   SODIUM 141 141   POTASSIUM 4.6 3.6   CHLORIDE 102 107   CO2 16* 17*   GLUCOSE 96 67   BUN 21 15   CREATININE 0.72 0.53   CALCIUM 8.8 7.5*     Recent Labs     01/19/24  1100 01/19/24  1339   ALTSGPT 21  --    ASTSGOT 37  --    ALKPHOSPHAT 62  --    TBILIRUBIN 0.9  --    GLUCOSE 96 67         Recent Labs     01/19/24  1100   NTPROBNP 475*         Recent Labs     01/19/24  1100   TROPONINT 11     Imaging:  CT-CTA CHEST PULMONARY ARTERY W/ RECONS   Final Result      1.  No evidence of pulmonary embolus.      2.  Mild atherosclerotic change aorta and coronary arteries.      3.  Fatty change of the liver.      DX-CHEST-PORTABLE (1 VIEW)   Final Result      No evidence of acute cardiopulmonary process.      EC-ECHOCARDIOGRAM COMPLETE W/O CONT    (Results Pending)     I personally reviewed patient EKG shows atrial  fibrillation with a rate of 140, there is no ST elevation, there is no ST depression, there is flattening of T wave in aVL, leads V5-V6.     Assessment/Plan:  Justification for Admission Status  I anticipate this patient will require at least two midnights for appropriate medical management, necessitating inpatient admission because the patient has lactic acidosis, atrial fibrillation with rapid ventricular response with a heart rate of 140 and COVID-19 infection findings concerning for sepsis.  Patient will require intravenous fluids, initiating/adjusting antiarrhythmic medications, monitoring oxygen, breathing treatment.      Patient will need a Telemetry bed on MEDICAL service.  The patient has atrial fibrillation with rapid ventricular response.    * Atrial fibrillation with rapid ventricular response (HCC)- (present on admission)  Assessment & Plan  I will start scheduled diltiazem consider switching to long-acting form according to clinical course.  I will start PRN diltiazem for sustained HR > 120   I will place on continuous cardiac monitoring.      Lactic acidosis- (present on admission)  Assessment & Plan  Likely secondary to reduced intravascular volume secondary to reduced oral intake and infection/early sepsis secondary to COVID-19 infection.  I will start intravenous fluids.  Anticipate lactic acid levels to improve with rehydration.  I will order follow-up lactic acid.     Dehydration- (present on admission)  Assessment & Plan  Likely secondary to reduced oral intake, and increase in insensible loss due to infection.  Encourage oral intake as tolerated, antiemetics as needed.  Intravenous hydration until adequate oral intake is achieved.     D-dimer, elevated- (present on admission)  Assessment & Plan  Will check CT angiography of the chest to rule out pulm embolism  We will start therapeutic anticoagulation given her atrial fibrillation and high [id number] Score     COVID-19 virus infection-  (present on admission)  Assessment & Plan  Consider starting Decadron if the patient develops respiratory failure  Will start therapeutic anticoagulation given her atrial fibrillation and elevated [id number] score.  Encourage prone positioning. Oxygen as needed, Incentive spirometry    Metabolic acidosis- (present on admission)  Assessment & Plan  Likely due to reduced intravascular volume   I will start intravenous fluids    Anticipate improvement with intravenous fluids  Continue to monitor, I will order follow-up bicarb level        AF (atrial fibrillation) (HCC)- (present on admission)  Assessment & Plan  I will check an echocardiography.  I will check thyroid function.  I will start diltiazem with hold parameters  I will start PRN IV diltiazem for sustained HR > 120   I will place on continuous cardiac monitoring.   [id number] Score 3 HTN, Age, Female   I discussed starting therapeutic anticoagulation. Patient agreeable, understands and accepts risks of potential bleeding.    I am starting apixaban    Hypertension- (present on admission)  Assessment & Plan  I will start diltiazem with hold parameters    Acquired hypothyroidism- (present on admission)  Assessment & Plan  Resume levothyroxine      VTE prophylaxis: SCDs/TEDs and therapeutic anticoagulation with apixaban

## 2024-01-19 NOTE — ED PROVIDER NOTES
"ED Provider Note    CHIEF COMPLAINT  Chief Complaint   Patient presents with    Shortness of Breath    Rapid Heart Beat       EXTERNAL RECORDS REVIEWED  Outpatient Notes primary care visit 10/13/2023 for GI problem.  Chronic since 2013 worse recently.  Triggered by sweets, pollock.  Eosinophilic esophagitis.  Epigastric abdominal pain, acute on chronic.  Increase omeprazole from 20 to 40 mg daily.  Referral to GI.  Hypothyroidism, chronic continue levothyroxine.  Leukopenia.  Hyperlipidemia.  Urgent care visit 12/22/2023 for UTI symptoms, urgency, burning.  Cystitis, given Bactrim.  Nasal congestion given Flonase.  Primary care visit earlier today for pharyngitis, 5 weeks of coughing and lab review.  Heart racing for 2 to 3 days.  More fatigue.  Some shortness of breath, dyspnea on exertion.  Has been continuing Prilosec as previously recommended.  No alcohol in the last couple of months.  Noted to be tachycardic, cervical adenopathy referred to emergency department.    HPI/ROS  LIMITATION TO HISTORY   Select: : None  OUTSIDE HISTORIAN(S):  Family     Missy Stuart is a 68 y.o. female who presents to the emergency department by ambulance from urgent care/primary care for rapid heartbeat.  Patient denies history of the same.  She was at primary care for well check and lab review when she was noted to have a rapid heartbeat.  Patient states she has felt generally ill this week, fatigue.  She had a \"cold\" since December, felt like she was finally feeling better a couple of weeks ago but this week has some recurrent symptoms, sore throat, cough.  2 episodes of posttussive emesis yesterday.  No other vomiting or diarrhea.  No abdominal pain or back pain.  Palpitations, rapid heartbeat without chest pain.  She has had some shortness of breath.  Denies fever.  Denies headache.  Denies sick contacts or travel.    Patient also states she has history of recurrent UTIs, 2 in the last month, last antibiotic dose 1/10 " after completed course of cefdinir.  Denies ongoing urinary frequency, discomfort or hematuria.  No flank pain.    PAST MEDICAL HISTORY   has a past medical history of Cataract, Cervical pain (2020), Dysfunction of right eustachian tube (2019), Eosinophilic esophagitis (2020), Hyperlipidemia (2020), Hypothyroidism (2018), and Restless legs (2018).    SURGICAL HISTORY   has a past surgical history that includes breast augmentation with implant ( & ); cataract phaco with iol (2014); other orthopedic surgery (2098-5142); thyroidectomy (); tube & ectopic preg., removal (); and eye lesion excision (Left, 3/2/2020).    FAMILY HISTORY  Family History   Problem Relation Age of Onset    Diabetes Mother     Stroke Maternal Grandfather        SOCIAL HISTORY  Social History     Tobacco Use    Smoking status: Former     Current packs/day: 0.00     Average packs/day: 0.5 packs/day for 8.0 years (4.0 ttl pk-yrs)     Types: Cigarettes     Start date: 1980     Quit date: 1988     Years since quittin.0    Smokeless tobacco: Never    Tobacco comments:     quit in her 20s    Vaping Use    Vaping Use: Never used   Substance and Sexual Activity    Alcohol use: Yes     Alcohol/week: 1.8 oz     Types: 3 Standard drinks or equivalent per week    Drug use: No    Sexual activity: Yes     Partners: Male     Comment:        CURRENT MEDICATIONS  Home Medications       Reviewed by Traci Marin (Pharmacy Tech) on 24 at 1100  Med List Status: Complete     Medication Last Dose Status   B Complex Vitamins (B COMPLEX PO) 2024 Active   baclofen (LIORESAL) 10 MG Tab 2024 Active   carisoprodol (SOMA) 350 MG Tab PRN Active   cefdinir (OMNICEF) 300 MG Cap 2024 Active   Cholecalciferol (VITAMIN D3 PO) FEWDAYS AGO Active   fluticasone (FLONASE) 50 MCG/ACT nasal spray UNK Active   gabapentin (NEURONTIN) 300 MG Cap 2024 Active   levothyroxine  "(SYNTHROID) 88 MCG Tab 1/19/2024 Active   MAGNESIUM PO FEW DAYS AGO Active   Multiple Vitamins-Minerals (MULTIVITAMIN ADULT PO) 1/18/2024 Active   Multiple Vitamins-Minerals (ZINC PO) 1/18/2024 Active   Omega-3 Fatty Acids (OMEGA 3 PO) 1/18/2024 Active   omeprazole (PRILOSEC) 20 MG delayed-release capsule FEW DAYS AGO Active   polyvinyl alcohol-povidone (REFRESH) 1.4-0.6 % ophthalmic solution PRN Active   POTASSIUM PO FEW DAYS AGO Active   ROPINIRole (REQUIP) 0.5 MG Tab 1/18/2024 Active                    ALLERGIES  Allergies   Allergen Reactions    Hydromorphone Hives    Codeine Vomiting and Nausea    Fentanyl Vomiting and Nausea    Doxycycline Unspecified     \"stiff joints\"    Hydrocodone Unspecified     Sweating      Oxycodone Vomiting       PHYSICAL EXAM  VITAL SIGNS: BP (!) 142/97   Pulse (!) 138   Temp 36.2 °C (97.2 °F)   Resp 20   Wt 55.8 kg (123 lb 0.3 oz)   LMP  (LMP Unknown)   SpO2 99%   BMI 18.17 kg/m²    Pulse ox interpretation: I interpret this pulse ox as normal.  Constitutional: Alert in no apparent distress.  HENT: Normocephalic, atraumatic. Bilateral external ears normal, Nose normal. Moist mucous membranes.  No oropharyngeal erythema, edema or exudate.  Uvula midline.  No tonsillar enlargement.  No stridor or dysphonia.  Eyes: Pupils are equal and reactive, Conjunctiva normal.   Neck: Normal range of motion, Supple.  No meningeal irritation.  Lymphatic: No lymphadenopathy noted.   Cardiovascular: Tachycardic otherwise regular rate and rhythm, no murmurs. Distal pulses intact.  No peripheral edema.  Thorax & Lungs: Normal breath sounds.  No wheezing/rales/ronchi. No increased work of breathing, clipped speech or retractions.   Abdomen: Soft, non-distended, non-tender to palpation. No palpable or pulsatile masses. No peritoneal signs. No CVA tenderness.  Skin: Warm, Dry, No erythema, No rash.   Musculoskeletal: Good range of motion in all major joints. No major deformities noted.   Neurologic: " Alert , no gross focal deficit noted.  Psychiatric: Affect normal, Judgment normal, Mood normal.     DIAGNOSTIC STUDIES / PROCEDURES    LABS  Results for orders placed or performed during the hospital encounter of 01/19/24   Complete Metabolic Panel (CMP)   Result Value Ref Range    Sodium 141 135 - 145 mmol/L    Potassium 4.6 3.6 - 5.5 mmol/L    Chloride 102 96 - 112 mmol/L    Co2 16 (L) 20 - 33 mmol/L    Anion Gap 23.0 (H) 7.0 - 16.0    Glucose 96 65 - 99 mg/dL    Bun 21 8 - 22 mg/dL    Creatinine 0.72 0.50 - 1.40 mg/dL    Calcium 8.8 8.4 - 10.2 mg/dL    Correct Calcium 8.6 8.5 - 10.5 mg/dL    AST(SGOT) 37 12 - 45 U/L    ALT(SGPT) 21 2 - 50 U/L    Alkaline Phosphatase 62 30 - 99 U/L    Total Bilirubin 0.9 0.1 - 1.5 mg/dL    Albumin 4.2 3.2 - 4.9 g/dL    Total Protein 6.7 6.0 - 8.2 g/dL    Globulin 2.5 1.9 - 3.5 g/dL    A-G Ratio 1.7 g/dL   proBrain Natriuretic Peptide, NT   Result Value Ref Range    NT-proBNP 475 (H) 0 - 125 pg/mL   Troponin - STAT Once   Result Value Ref Range    Troponin T 11 6 - 19 ng/L   D-Dimer (only helpful in low pre-test probability wells critieria. Do not order if patient ruled out by PERC criteria. See Weblinks at top of Labs section)   Result Value Ref Range    D-Dimer 1.26 (H) 0.00 - 0.50 ug/mL (FEU)   LACTIC ACID   Result Value Ref Range    Lactic Acid 4.2 (HH) 0.5 - 2.0 mmol/L   CoV-2, Flu A/B, And RSV by PCR (Ideal Me)    Specimen: Respirate   Result Value Ref Range    Influenza virus A RNA Negative Negative    Influenza virus B, PCR Negative Negative    RSV, PCR Negative Negative    SARS-CoV-2 by PCR DETECTED (AA)     SARS-CoV-2 Source NP Swab    URINALYSIS    Specimen: Urine   Result Value Ref Range    Color Yellow     Character Clear     Specific Gravity 1.025 <1.035    Ph 5.5 5.0 - 8.0    Glucose Negative Negative mg/dL    Ketones 40 (A) Negative mg/dL    Protein Negative Negative mg/dL    Bilirubin Negative Negative    Nitrite Negative Negative    Leukocyte Esterase Negative  Negative    Occult Blood Negative Negative    Micro Urine Req see below    PROCALCITONIN   Result Value Ref Range    Procalcitonin 0.05 <0.25 ng/mL   CBC WITH DIFFERENTIAL   Result Value Ref Range    WBC 4.9 4.8 - 10.8 K/uL    RBC 3.70 (L) 4.20 - 5.40 M/uL    Hemoglobin 12.4 12.0 - 16.0 g/dL    Hematocrit 37.9 37.0 - 47.0 %    .4 (H) 81.4 - 97.8 fL    MCH 33.5 (H) 27.0 - 33.0 pg    MCHC 32.7 32.2 - 35.5 g/dL    RDW 53.3 (H) 35.9 - 50.0 fL    Platelet Count 201 164 - 446 K/uL    MPV 9.1 9.0 - 12.9 fL    Neutrophils-Polys 77.80 (H) 44.00 - 72.00 %    Lymphocytes 16.00 (L) 22.00 - 41.00 %    Monocytes 3.80 0.00 - 13.40 %    Eosinophils 1.60 0.00 - 6.90 %    Basophils 0.60 0.00 - 1.80 %    Immature Granulocytes 0.20 0.00 - 0.90 %    Nucleated RBC 0.00 0.00 - 0.20 /100 WBC    Neutrophils (Absolute) 3.84 1.82 - 7.42 K/uL    Lymphs (Absolute) 0.79 (L) 1.00 - 4.80 K/uL    Monos (Absolute) 0.19 0.00 - 0.85 K/uL    Eos (Absolute) 0.08 0.00 - 0.51 K/uL    Baso (Absolute) 0.03 0.00 - 0.12 K/uL    Immature Granulocytes (abs) 0.01 0.00 - 0.11 K/uL    NRBC (Absolute) 0.00 K/uL   ESTIMATED GFR   Result Value Ref Range    GFR (CKD-EPI) 91 >60 mL/min/1.73 m 2   LACTIC ACID   Result Value Ref Range    Lactic Acid 3.8 (H) 0.5 - 2.0 mmol/L   BMP   Result Value Ref Range    Sodium 141 135 - 145 mmol/L    Potassium 3.6 3.6 - 5.5 mmol/L    Chloride 107 96 - 112 mmol/L    Co2 17 (L) 20 - 33 mmol/L    Glucose 67 65 - 99 mg/dL    Bun 15 8 - 22 mg/dL    Creatinine 0.53 0.50 - 1.40 mg/dL    Calcium 7.5 (L) 8.4 - 10.2 mg/dL    Anion Gap 17.0 (H) 7.0 - 16.0   ESTIMATED GFR   Result Value Ref Range    GFR (CKD-EPI) 100 >60 mL/min/1.73 m 2   EKG (NOW)   Result Value Ref Range    Report       [facility] Emergency Dept[facility]    Test Date:  2024-01-19  Pt Name:    SHALINI AGUIRRE                   Department: EDS  MRN:        [id number]                      Room:       -ROOM 8  Gender:     Female                        Technician:  :        1955                   Requested By:ÁNGEL MICHAEL  Order #:    [id number]                    Reading MD: ÁNGEL MICHAEL, DO    Measurements  Intervals                                Axis  Rate:       140                          P:          0  WA:         0                            QRS:        36  QRSD:       112                          T:          72  QT:         364  QTc:        556    Interpretive Statements  Atrial flutter  RSR' in V1 or V2, right VCD or RVH  Inferior infarct, acute (LCx)  Prolonged QT interval  Compared to ECG 2020 10:10:44  Myocardial infarct finding now present  Prolonged QT interval now present  Sinus rhythm no longer present  Electronically Signed On 2024 14:07:43 PST by LUCIANA MICHAEL DO     EKG   Result Value Ref Range    Report       [facility] Emergency Dept.    Test Date:  2024  Pt Name:    SHALINI AGUIRRE                   Department: [facility]  MRN:        [id number]                      Room:       [place] [address]  Gender:     Female                       Technician: [id number]  :        1955                   Requested By:ÁNGEL MICHAEL  Order #:    [id number]                    Reading MD: ÁNGEL MICHAEL, DO    Measurements  Intervals                                Axis  Rate:       140                          P:          0  WA:         0                            QRS:        8  QRSD:       80                           T:          44  QT:         314  QTc:        479    Interpretive Statements  Atrial fibrillation  Ventricular premature complex  Low voltage, precordial leads  Abnormal R-wave progression, early transition  Borderline ST depression, inferior leads  Borderline prolonged QT interval  Compared to ECG 2024 10:31:47  Ventricular premature complex(es) now present  Low QRS  voltage now present  Atrial flutter no longer present    Electronically Signed On 2024 14:07:54 PST by ÁNGEL ESTEBAN  DO FERNANDA       RADIOLOGY  I have independently interpreted the diagnostic imaging associated with this visit and am waiting the final reading from the radiologist.   My preliminary interpretation is as follows:   Chest x-ray: Normal lung fields, no consolidation or effusion    Radiologist interpretation:   CT-CTA CHEST PULMONARY ARTERY W/ RECONS   Final Result      1.  No evidence of pulmonary embolus.      2.  Mild atherosclerotic change aorta and coronary arteries.      3.  Fatty change of the liver.      DX-CHEST-PORTABLE (1 VIEW)   Final Result      No evidence of acute cardiopulmonary process.          COURSE & MEDICAL DECISION MAKING    ED Observation Status? No; Patient does not meet criteria for ED Observation.     INITIAL ASSESSMENT, COURSE AND PLAN  Care Narrative:   Seen evaluated at bedside.  Ill-appearing.  Tachycardic, 140s occasionally irregular, suspect flutter.  No acute respiratory distress, no hypoxia.  Abdominal exam is benign.  Oropharynx unremarkable.  No clinical evidence for meningitis.  Add labs per septic protocol, viral study, urinalysis.  Add chest x-ray.  Start IV fluid, monitor rate.    1156 -elevated D-dimer.  Tachycardia, palpitations, generally ill add CTA chest to exclude PE.  Chest x-ray was otherwise unrevealing.  Awaiting viral studies.    No leukocytosis, nor leukopenia, mild leftward shift without bandemia.  Normal Pro-Chip.  Lactate elevated at 4.2.  CO2 16 without other electrolyte derangement.  Renal function preserved.  Troponin normal,  although no clinical evidence for fluid overload.  Chest x-ray without pulmonary edema or effusion.  Continue IV fluid for complete 30 cc/kg bolus.  Heart rate trending downward, atrial fibrillation.  Blood pressure remained stable.  Patient ambulating independently to the bathroom without difficulty.    12:29 PM COVID positive.  Patient and family aware.    1245 -urinalysis unremarkable.  No physical clinical no more workup evidence  for other bacterial etiology of infection.  Suspect sequela of COVID-19 and new onset atrial fibrillation.  No indication for antibiotics at this time.    1330 -atrial fibrillation with persistent rapid ventricular rate.  Repeat EKG without ischemia.  Heart rate downtrending from initial onset and evaluation at 140s, anywhere from 10 5-1 20s.  Patient still appears somewhat orthostatic.  Never hypotensive.  Anticipate admission for continued IV hydration, rate control medications held at this time due to profound dehydration.    1420 -repeat lactate only slightly improved at 3.8 despite 2 L fluid bolus.    ADDITIONAL PROBLEM LIST  Recurrent UTI  Eosinophilic esophagitis    DISPOSITION AND DISCUSSIONS  I have discussed management of the patient with the following physicians and JAX's:    1410 - Dr. Rosas is aware of the patient and agreeable to consultation    CRITICAL CARE  The very real possibilty of a deterioration of this patient's condition required the highest level of my preparedness for sudden, emergent intervention.  I provided critical care services, which included medication orders, frequent reevaluations of the patient's condition and response to treatment, ordering and reviewing test results, and discussing the case with various consultants.  The critical care time associated with the care of the patient was 35 minutes. Review chart for interventions. This time is exclusive of any other billable procedures.       FINAL DIAGNOSIS  1. New onset atrial fibrillation (HCC)    2. Atrial fibrillation with rapid ventricular response (HCC)    3. COVID-19 virus infection    4. Dehydration           Electronically signed by: Kyleigh Alicea D.O., 1/19/2024 10:53 AM

## 2024-01-19 NOTE — ED NOTES
Betty from Lab called with critical result of lactic acid of 4.2  at 2345. Critical lab result read back to Ty.   Dr. Alicea notified of critical lab result at 2350.  Critical lab result read back by Dr. Alicea.

## 2024-01-20 ENCOUNTER — APPOINTMENT (OUTPATIENT)
Dept: CARDIOLOGY | Facility: MEDICAL CENTER | Age: 69
DRG: 308 | End: 2024-01-20
Attending: HOSPITALIST
Payer: MEDICARE

## 2024-01-20 LAB
ALBUMIN SERPL BCP-MCNC: 3.8 G/DL (ref 3.2–4.9)
ALBUMIN/GLOB SERPL: 1.8 G/DL
ALP SERPL-CCNC: 54 U/L (ref 30–99)
ALT SERPL-CCNC: 18 U/L (ref 2–50)
ANION GAP SERPL CALC-SCNC: 11 MMOL/L (ref 7–16)
AST SERPL-CCNC: 29 U/L (ref 12–45)
BILIRUB SERPL-MCNC: 1.2 MG/DL (ref 0.1–1.5)
BUN SERPL-MCNC: 13 MG/DL (ref 8–22)
CALCIUM ALBUM COR SERPL-MCNC: 8.8 MG/DL (ref 8.5–10.5)
CALCIUM SERPL-MCNC: 8.6 MG/DL (ref 8.4–10.2)
CHLORIDE SERPL-SCNC: 107 MMOL/L (ref 96–112)
CHOLEST SERPL-MCNC: 221 MG/DL (ref 100–199)
CO2 SERPL-SCNC: 22 MMOL/L (ref 20–33)
CREAT SERPL-MCNC: 0.6 MG/DL (ref 0.5–1.4)
ERYTHROCYTE [DISTWIDTH] IN BLOOD BY AUTOMATED COUNT: 53.2 FL (ref 35.9–50)
GFR SERPLBLD CREATININE-BSD FMLA CKD-EPI: 97 ML/MIN/1.73 M 2
GLOBULIN SER CALC-MCNC: 2.1 G/DL (ref 1.9–3.5)
GLUCOSE SERPL-MCNC: 89 MG/DL (ref 65–99)
HCT VFR BLD AUTO: 35.3 % (ref 37–47)
HDLC SERPL-MCNC: 116 MG/DL
HGB BLD-MCNC: 12 G/DL (ref 12–16)
LACTATE SERPL-SCNC: 0.7 MMOL/L (ref 0.5–2)
LDLC SERPL CALC-MCNC: 92 MG/DL
LV EJECT FRACT  99904: 75
LV EJECT FRACT MOD 2C 99903: 73.86
LV EJECT FRACT MOD 4C 99902: 83.63
LV EJECT FRACT MOD BP 99901: 79.48
MAGNESIUM SERPL-MCNC: 1.8 MG/DL (ref 1.5–2.5)
MCH RBC QN AUTO: 33.9 PG (ref 27–33)
MCHC RBC AUTO-ENTMCNC: 34 G/DL (ref 32.2–35.5)
MCV RBC AUTO: 99.7 FL (ref 81.4–97.8)
PLATELET # BLD AUTO: 176 K/UL (ref 164–446)
PMV BLD AUTO: 9.3 FL (ref 9–12.9)
POTASSIUM SERPL-SCNC: 3.9 MMOL/L (ref 3.6–5.5)
PROT SERPL-MCNC: 5.9 G/DL (ref 6–8.2)
RBC # BLD AUTO: 3.54 M/UL (ref 4.2–5.4)
SODIUM SERPL-SCNC: 140 MMOL/L (ref 135–145)
TRIGL SERPL-MCNC: 65 MG/DL (ref 0–149)
TROPONIN T SERPL-MCNC: 12 NG/L (ref 6–19)
WBC # BLD AUTO: 5.3 K/UL (ref 4.8–10.8)

## 2024-01-20 PROCEDURE — 93306 TTE W/DOPPLER COMPLETE: CPT | Mod: 26 | Performed by: INTERNAL MEDICINE

## 2024-01-20 PROCEDURE — 700101 HCHG RX REV CODE 250: Performed by: HOSPITALIST

## 2024-01-20 PROCEDURE — 93306 TTE W/DOPPLER COMPLETE: CPT

## 2024-01-20 PROCEDURE — A9270 NON-COVERED ITEM OR SERVICE: HCPCS | Performed by: HOSPITALIST

## 2024-01-20 PROCEDURE — 770020 HCHG ROOM/CARE - TELE (206)

## 2024-01-20 PROCEDURE — 80053 COMPREHEN METABOLIC PANEL: CPT

## 2024-01-20 PROCEDURE — 700102 HCHG RX REV CODE 250 W/ 637 OVERRIDE(OP): Performed by: HOSPITALIST

## 2024-01-20 PROCEDURE — 84484 ASSAY OF TROPONIN QUANT: CPT

## 2024-01-20 PROCEDURE — 85027 COMPLETE CBC AUTOMATED: CPT

## 2024-01-20 PROCEDURE — 99232 SBSQ HOSP IP/OBS MODERATE 35: CPT | Performed by: HOSPITALIST

## 2024-01-20 PROCEDURE — 36415 COLL VENOUS BLD VENIPUNCTURE: CPT

## 2024-01-20 PROCEDURE — 83605 ASSAY OF LACTIC ACID: CPT

## 2024-01-20 PROCEDURE — 80061 LIPID PANEL: CPT

## 2024-01-20 PROCEDURE — 83735 ASSAY OF MAGNESIUM: CPT

## 2024-01-20 PROCEDURE — 94760 N-INVAS EAR/PLS OXIMETRY 1: CPT

## 2024-01-20 RX ORDER — LEVOTHYROXINE SODIUM 88 UG/1
TABLET ORAL
Status: COMPLETED
Start: 2024-01-20 | End: 2024-01-20

## 2024-01-20 RX ORDER — OMEPRAZOLE 20 MG/1
CAPSULE, DELAYED RELEASE ORAL
Status: COMPLETED
Start: 2024-01-20 | End: 2024-01-20

## 2024-01-20 RX ADMIN — APIXABAN 5 MG: 5 TABLET, FILM COATED ORAL at 18:06

## 2024-01-20 RX ADMIN — DICLOFENAC SODIUM 2 G: 10 GEL TOPICAL at 16:13

## 2024-01-20 RX ADMIN — ACETAMINOPHEN 650 MG: 325 TABLET ORAL at 21:39

## 2024-01-20 RX ADMIN — DILTIAZEM HYDROCHLORIDE 60 MG: 30 TABLET, FILM COATED ORAL at 12:09

## 2024-01-20 RX ADMIN — LEVOTHYROXINE SODIUM 88 MCG: 0.09 TABLET ORAL at 04:54

## 2024-01-20 RX ADMIN — DILTIAZEM HYDROCHLORIDE 60 MG: 30 TABLET, FILM COATED ORAL at 23:54

## 2024-01-20 RX ADMIN — APIXABAN 5 MG: 5 TABLET, FILM COATED ORAL at 04:54

## 2024-01-20 RX ADMIN — GABAPENTIN 300 MG: 300 CAPSULE ORAL at 20:35

## 2024-01-20 RX ADMIN — DICLOFENAC SODIUM 2 G: 10 GEL TOPICAL at 12:10

## 2024-01-20 RX ADMIN — OMEPRAZOLE 20 MG: 20 CAPSULE, DELAYED RELEASE ORAL at 04:54

## 2024-01-20 RX ADMIN — GABAPENTIN 300 MG: 300 CAPSULE ORAL at 09:35

## 2024-01-20 RX ADMIN — GABAPENTIN 300 MG: 300 CAPSULE ORAL at 15:45

## 2024-01-20 RX ADMIN — DILTIAZEM HYDROCHLORIDE 60 MG: 30 TABLET, FILM COATED ORAL at 04:54

## 2024-01-20 RX ADMIN — DOCUSATE SODIUM 50MG AND SENNOSIDES 8.6MG 2 TABLET: 8.6; 5 TABLET, FILM COATED ORAL at 18:06

## 2024-01-20 RX ADMIN — ROPINIROLE HYDROCHLORIDE 0.5 MG: 0.5 TABLET, FILM COATED ORAL at 20:35

## 2024-01-20 RX ADMIN — DILTIAZEM HYDROCHLORIDE 60 MG: 30 TABLET, FILM COATED ORAL at 18:06

## 2024-01-20 ASSESSMENT — ENCOUNTER SYMPTOMS
VOMITING: 0
COUGH: 0
DIZZINESS: 0
HEMOPTYSIS: 0
SPUTUM PRODUCTION: 0
NAUSEA: 0
ORTHOPNEA: 0
CHILLS: 0
PALPITATIONS: 1

## 2024-01-20 ASSESSMENT — FIBROSIS 4 INDEX: FIB4 SCORE: 2.64

## 2024-01-20 ASSESSMENT — PAIN DESCRIPTION - PAIN TYPE: TYPE: ACUTE PAIN

## 2024-01-20 NOTE — CARE PLAN
The patient is Watcher - Medium risk of patient condition declining or worsening Discussed pt's care with Hospitalist.       Shift Goals  Clinical Goals: Controlled HR, echo  Patient Goals: controlled heart rate, shower, get cleaned up    Progress made toward(s) clinical / shift goals:  has not needed IVP of cardizem today. Pt declines shower. Cleaned up       Problem: Pain - Standard  Goal: Alleviation of pain or a reduction in pain to the patient’s comfort goal  Outcome: Progressing  Note: Obtained order for voltaren for pt today     Problem: Knowledge Deficit - Standard  Goal: Patient and family/care givers will demonstrate understanding of plan of care, disease process/condition, diagnostic tests and medications  Outcome: Progressing  Note: Discussed plan of care for today w/ pt this am, she is A+O x 4, she verbalizes understanding of her plan of care, no questions. Emotional support given. Reinforcing education as necessary.

## 2024-01-20 NOTE — PROGRESS NOTES
Received care of pt from NOC RN this am. Pt is A+O x 4, denies need for pain medication. On RA, 95% O2 sat.

## 2024-01-20 NOTE — CARE PLAN
The patient is Stable - Low risk of patient condition declining or worsening    Shift Goals  Clinical Goals: maintain HR below 120  Patient Goals: rest    Progress made toward(s) clinical / shift goals:  Continuous cardiac monitoring in place. Pt encouraged to report any sx of palpitations, SOB, or dizziness. PRN medications given per MAR. Care clustered to allow for rest.    Patient is not progressing towards the following goals:

## 2024-01-20 NOTE — PROGRESS NOTES
Telemetry Shift Summary    Rhythm afib/flut    HR Range 80s-150s  Ectopy R PVC  Measurements 0/0.08/0        Normal Values  Rhythm SR  HR Range    Measurements 0.12-0.20 / 0.06-0.10  / 0.30-0.52

## 2024-01-20 NOTE — PROGRESS NOTES
Pt oriented to room. Admit profile, med rec, and assessment completed. Pt reports no pain or discomfort. Due meds given per MAR. POC discussed. Questions answered. Non-skid socks in use. Safety precautions in place. Call light in reach. Pt instructed to call for assistance. Home meds to safekeeping.

## 2024-01-20 NOTE — HOSPITAL COURSE
Missy You has past medical history includes hypothyroidism.  Patient was seen in urgent care on 1/19/2024 with symptoms of shortness of breath and palpitations, EKG showed rapid heart rate in the 140s and she was transferred by EMS to the emergency room for evaluation.  In the ER, she was found to have atrial fibrillation with rapid ventricle response and was diagnosed with COVID-19.  She was hospitalized for treatment.

## 2024-01-20 NOTE — ASSESSMENT & PLAN NOTE
1/20:  With rapid ventricular response I will check an echocardiography.  Telemetry reviewed, she continues to have episodes of uncontrolled A-fib RVR  I modified diltiazem hold parameters  Continue p.o. diltiazem, continuous hemodynamic monitoring, watch for hypotension  Eliquis

## 2024-01-20 NOTE — PROGRESS NOTES
[facility] Medicine Daily Progress Note    Date of Service  1/20/2024    Chief Complaint  Missy Stuart is a 68 y.o. female admitted 1/19/2024 with shortness of breath and palpitations    Hospital Course  Missy You has past medical history includes hypothyroidism.  Patient was seen in urgent care on 1/19/2024 with symptoms of shortness of breath and palpitations, EKG showed rapid heart rate in the 140s and she was transferred by EMS to the emergency room for evaluation.  In the ER, she was found to have atrial fibrillation with rapid ventricle response and was diagnosed with COVID-19.  She was hospitalized for treatment.    Interval Problem Update  Overall feeling better  Continues to have palpitations and at times short of breath  Heart rate as high as 120s  Slight blood pressure as low as 95 overnight, diltiazem was held per parameters  At bedside, plan of care including continued telemetry monitoring and obtaining echocardiogram discussed    I have discussed this patient's plan of care and discharge plan at IDT rounds today with Case Management, Nursing, Nursing leadership, and other members of the IDT team.    Consultants/Specialty  None    Code Status  Full Code    Disposition  The patient is not medically cleared for discharge to home or a post-acute facility.  Anticipate discharge to: home with close outpatient follow-up    I have placed the appropriate orders for post-discharge needs.    Review of Systems  Review of Systems   Constitutional:  Positive for malaise/fatigue. Negative for chills.   Respiratory:  Negative for cough, hemoptysis and sputum production.    Cardiovascular:  Positive for palpitations. Negative for chest pain and orthopnea.   Gastrointestinal:  Negative for nausea and vomiting.   Skin:  Negative for itching and rash.   Neurological:  Negative for dizziness.   All other systems reviewed and are negative.       Physical Exam  Temp:  [36.4 °C (97.5 °F)-36.8 °C (98.3 °F)] 36.4 °C (97.5  °F)  Pulse:  [] 72  Resp:  [16-20] 18  BP: ()/(52-90) 113/60  SpO2:  [93 %-99 %] 95 %    Physical Exam  Constitutional:       General: She is not in acute distress.     Appearance: Normal appearance. She is normal weight.   HENT:      Head: Normocephalic and atraumatic.      Right Ear: External ear normal.      Left Ear: External ear normal.      Nose: Nose normal.   Eyes:      Extraocular Movements: Extraocular movements intact.   Cardiovascular:      Rate and Rhythm: Tachycardia present. Rhythm irregular.      Pulses: Normal pulses.   Pulmonary:      Effort: Pulmonary effort is normal.      Breath sounds: Normal breath sounds.   Abdominal:      General: Abdomen is flat. Bowel sounds are normal. There is no distension.      Palpations: Abdomen is soft.   Musculoskeletal:         General: Normal range of motion.      Cervical back: Normal range of motion and neck supple.   Skin:     General: Skin is warm and dry.      Capillary Refill: Capillary refill takes less than 2 seconds.      Coloration: Skin is not jaundiced.   Neurological:      General: No focal deficit present.      Mental Status: She is alert and oriented to person, place, and time.      Cranial Nerves: No cranial nerve deficit.      Gait: Gait normal.   Psychiatric:         Mood and Affect: Mood normal.         Behavior: Behavior normal.         Fluids    Intake/Output Summary (Last 24 hours) at 1/20/2024 1530  Last data filed at 1/20/2024 0900  Gross per 24 hour   Intake 360 ml   Output --   Net 360 ml       Laboratory  Recent Labs     01/19/24  1139 01/20/24  0249   WBC 4.9 5.3   RBC 3.70* 3.54*   HEMOGLOBIN 12.4 12.0   HEMATOCRIT 37.9 35.3*   .4* 99.7*   MCH 33.5* 33.9*   MCHC 32.7 34.0   RDW 53.3* 53.2*   PLATELETCT 201 176   MPV 9.1 9.3     Recent Labs     01/19/24  1100 01/19/24  1339 01/20/24  0249   SODIUM 141 141 140   POTASSIUM 4.6 3.6 3.9   CHLORIDE 102 107 107   CO2 16* 17* 22   GLUCOSE 96 67 89   BUN 21 15 13    CREATININE 0.72 0.53 0.60   CALCIUM 8.8 7.5* 8.6             Recent Labs     01/20/24  0249   TRIGLYCERIDE 65      LDL 92       Imaging  EC-ECHOCARDIOGRAM COMPLETE W/O CONT   Final Result      CT-CTA CHEST PULMONARY ARTERY W/ RECONS   Final Result      1.  No evidence of pulmonary embolus.      2.  Mild atherosclerotic change aorta and coronary arteries.      3.  Fatty change of the liver.      DX-CHEST-PORTABLE (1 VIEW)   Final Result      No evidence of acute cardiopulmonary process.           Assessment/Plan  * AF (atrial fibrillation) (HCC)- (present on admission)  Assessment & Plan  1/20:  With rapid ventricular response I will check an echocardiography.  Telemetry reviewed, she continues to have episodes of uncontrolled A-fib RVR  I modified diltiazem hold parameters  Continue p.o. diltiazem, continuous hemodynamic monitoring, watch for hypotension  Eliquis      COVID-19 virus infection- (present on admission)  Assessment & Plan  1/20:  Supportive care    Metabolic acidosis- (present on admission)  Assessment & Plan  1/20:  This morning's labs reviewed, metabolic acidosis has resolved with fluid resuscitation    Hypertension- (present on admission)  Assessment & Plan  Monitor blood pressure    Dehydration- (present on admission)  Assessment & Plan  Resolved    Lactic acidosis- (present on admission)  Assessment & Plan  1/20:  Lactic acidosis has resolved    D-dimer, elevated- (present on admission)  Assessment & Plan  1/20:  CT negative for pulmonary embolism    Atrial fibrillation with rapid ventricular response (HCC)- (present on admission)  Assessment & Plan  As above    Acquired hypothyroidism- (present on admission)  Assessment & Plan  Synthroid         VTE prophylaxis:    therapeutic anticoagulation with eliquis 5 mg BID      I have performed a physical exam and reviewed and updated ROS and Plan today (1/20/2024). In review of yesterday's note (1/19/2024), there are no changes except as  documented above.

## 2024-01-20 NOTE — ED NOTES
Rounded on pt. Pt in NAD.  Pt reports no further needs at this time.   Call bell within reach.     Pt  at bedside

## 2024-01-20 NOTE — DIETARY
"Nutrition Note: Low BMI noted on admit screen    Day 1 of admit.  Missy Stuart is a 68 y.o. female admitted with shortness of breath and atrial fibrillation, dehydration, +Covid,   History includes:  Cervical pain, Dysfunction of right eustachian tube, Eosinophilic esophagitis, Hyperlipidemia, Hypothyroidism, breast augmentation with implant, thyroidectomy    Assessment:  Height: 175.3 cm (5' 9\")  Weight: 58.2 kg (128 lb 4.9 oz)  Body mass index is 18.95 kg/m².  Diet/Intake: Cardiac; no meal records yet available    Evaluation:   Weight stable for the last two months per chart review  Poor intake prior to admission not noted on admit screen    Malnutrition Risk: Does not currently meet criteria    Recommendations/Interventions/Plan:    Encourage intake of intake  Document intake of all PO as % taken in ADL's to provide interdisciplinary communication across all shifts.   Monitor weight.  Nutrition rep will continue to see patient for ongoing meal and snack preferences.     RD following per dept policy.    "

## 2024-01-20 NOTE — PROGRESS NOTES
4 Eyes Skin Assessment Completed by YONATHAN Whitaker and YONATHAN Álvarez.    Head WDL  Ears WDL  Nose WDL  Mouth WDL  Neck WDL  Breast/Chest WDL  Shoulder Blades WDL  Spine WDL  (R) Arm/Elbow/Hand WDL  (L) Arm/Elbow/Hand WDL  Abdomen WDL  Groin WDL  Scrotum/Coccyx/Buttocks WDL  (R) Leg WDL  (L) Leg Incision  (R) Heel/Foot/Toe bunion red and blanching  (L) Heel/Foot/Toe bunion red and blanching          Devices In Places Tele Box      Interventions In Place Pillows    Possible Skin Injury No    Pictures Uploaded Into Epic N/A  Wound Consult Placed N/A  RN Wound Prevention Protocol Ordered No

## 2024-01-21 VITALS
HEIGHT: 69 IN | WEIGHT: 124.56 LBS | SYSTOLIC BLOOD PRESSURE: 100 MMHG | HEART RATE: 50 BPM | OXYGEN SATURATION: 99 % | BODY MASS INDEX: 18.45 KG/M2 | RESPIRATION RATE: 17 BRPM | TEMPERATURE: 97.5 F | DIASTOLIC BLOOD PRESSURE: 60 MMHG

## 2024-01-21 PROBLEM — I48.91 ATRIAL FIBRILLATION WITH RAPID VENTRICULAR RESPONSE (HCC): Status: RESOLVED | Noted: 2024-01-19 | Resolved: 2024-01-21

## 2024-01-21 PROBLEM — E87.20 METABOLIC ACIDOSIS: Status: RESOLVED | Noted: 2024-01-19 | Resolved: 2024-01-21

## 2024-01-21 PROBLEM — E86.0 DEHYDRATION: Status: RESOLVED | Noted: 2024-01-19 | Resolved: 2024-01-21

## 2024-01-21 PROBLEM — E87.20 LACTIC ACIDOSIS: Status: RESOLVED | Noted: 2024-01-19 | Resolved: 2024-01-21

## 2024-01-21 LAB
ANION GAP SERPL CALC-SCNC: 10 MMOL/L (ref 7–16)
BASOPHILS # BLD AUTO: 0.5 % (ref 0–1.8)
BASOPHILS # BLD: 0.03 K/UL (ref 0–0.12)
BUN SERPL-MCNC: 11 MG/DL (ref 8–22)
CALCIUM SERPL-MCNC: 8.7 MG/DL (ref 8.4–10.2)
CHLORIDE SERPL-SCNC: 102 MMOL/L (ref 96–112)
CO2 SERPL-SCNC: 24 MMOL/L (ref 20–33)
CREAT SERPL-MCNC: 0.57 MG/DL (ref 0.5–1.4)
EOSINOPHIL # BLD AUTO: 0.08 K/UL (ref 0–0.51)
EOSINOPHIL NFR BLD: 1.2 % (ref 0–6.9)
ERYTHROCYTE [DISTWIDTH] IN BLOOD BY AUTOMATED COUNT: 54.1 FL (ref 35.9–50)
GFR SERPLBLD CREATININE-BSD FMLA CKD-EPI: 98 ML/MIN/1.73 M 2
GLUCOSE SERPL-MCNC: 102 MG/DL (ref 65–99)
HCT VFR BLD AUTO: 35.2 % (ref 37–47)
HGB BLD-MCNC: 11.4 G/DL (ref 12–16)
IMM GRANULOCYTES # BLD AUTO: 0.02 K/UL (ref 0–0.11)
IMM GRANULOCYTES NFR BLD AUTO: 0.3 % (ref 0–0.9)
LYMPHOCYTES # BLD AUTO: 1.54 K/UL (ref 1–4.8)
LYMPHOCYTES NFR BLD: 23.3 % (ref 22–41)
MCH RBC QN AUTO: 33.4 PG (ref 27–33)
MCHC RBC AUTO-ENTMCNC: 32.4 G/DL (ref 32.2–35.5)
MCV RBC AUTO: 103.2 FL (ref 81.4–97.8)
MONOCYTES # BLD AUTO: 0.34 K/UL (ref 0–0.85)
MONOCYTES NFR BLD AUTO: 5.1 % (ref 0–13.4)
NEUTROPHILS # BLD AUTO: 4.6 K/UL (ref 1.82–7.42)
NEUTROPHILS NFR BLD: 69.6 % (ref 44–72)
NRBC # BLD AUTO: 0 K/UL
NRBC BLD-RTO: 0 /100 WBC (ref 0–0.2)
PLATELET # BLD AUTO: 140 K/UL (ref 164–446)
PMV BLD AUTO: 9.7 FL (ref 9–12.9)
POTASSIUM SERPL-SCNC: 3.7 MMOL/L (ref 3.6–5.5)
RBC # BLD AUTO: 3.41 M/UL (ref 4.2–5.4)
SODIUM SERPL-SCNC: 136 MMOL/L (ref 135–145)
WBC # BLD AUTO: 6.6 K/UL (ref 4.8–10.8)

## 2024-01-21 PROCEDURE — 94760 N-INVAS EAR/PLS OXIMETRY 1: CPT

## 2024-01-21 PROCEDURE — 36415 COLL VENOUS BLD VENIPUNCTURE: CPT

## 2024-01-21 PROCEDURE — A9270 NON-COVERED ITEM OR SERVICE: HCPCS | Performed by: HOSPITALIST

## 2024-01-21 PROCEDURE — 85025 COMPLETE CBC W/AUTO DIFF WBC: CPT

## 2024-01-21 PROCEDURE — 99239 HOSP IP/OBS DSCHRG MGMT >30: CPT | Performed by: HOSPITALIST

## 2024-01-21 PROCEDURE — 80048 BASIC METABOLIC PNL TOTAL CA: CPT

## 2024-01-21 PROCEDURE — 700102 HCHG RX REV CODE 250 W/ 637 OVERRIDE(OP): Performed by: HOSPITALIST

## 2024-01-21 RX ORDER — DILTIAZEM HYDROCHLORIDE 240 MG/1
240 CAPSULE, COATED, EXTENDED RELEASE ORAL DAILY
Qty: 30 CAPSULE | Refills: 0 | Status: SHIPPED | OUTPATIENT
Start: 2024-01-21 | End: 2024-02-07 | Stop reason: SDUPTHER

## 2024-01-21 RX ORDER — DILTIAZEM HYDROCHLORIDE 180 MG/1
180 CAPSULE, COATED, EXTENDED RELEASE ORAL
Status: DISCONTINUED | OUTPATIENT
Start: 2024-01-21 | End: 2024-01-21 | Stop reason: HOSPADM

## 2024-01-21 RX ADMIN — GABAPENTIN 300 MG: 300 CAPSULE ORAL at 10:15

## 2024-01-21 RX ADMIN — OMEPRAZOLE 20 MG: 20 CAPSULE, DELAYED RELEASE ORAL at 05:05

## 2024-01-21 RX ADMIN — LEVOTHYROXINE SODIUM 88 MCG: 0.09 TABLET ORAL at 05:04

## 2024-01-21 RX ADMIN — APIXABAN 5 MG: 5 TABLET, FILM COATED ORAL at 05:05

## 2024-01-21 RX ADMIN — DILTIAZEM HYDROCHLORIDE 180 MG: 180 CAPSULE, COATED, EXTENDED RELEASE ORAL at 13:14

## 2024-01-21 ASSESSMENT — PAIN DESCRIPTION - PAIN TYPE: TYPE: ACUTE PAIN

## 2024-01-21 ASSESSMENT — FIBROSIS 4 INDEX: FIB4 SCORE: 3.32

## 2024-01-21 NOTE — CARE PLAN
The patient is Stable - Low risk of patient condition declining or worsening    Shift Goals  Clinical Goals: HR control, hemodynamic stability, fluid intake  Patient Goals: D/C  Family Goals: None presnt    Progress made toward(s) clinical / shift goals:        Problem: Pain - Standard  Goal: Alleviation of pain or a reduction in pain to the patient’s comfort goal  Outcome: Progressing  Note: Educated patient on the use of 1-10 pain scale and use of pain descriptors. Administered pain medication when needed per MAR. Non-pharmacological methods for pain control in place such as rest, repositioning, and enforcing a calm and conductive environment.     Problem: Knowledge Deficit - Standard  Goal: Patient and family/care givers will demonstrate understanding of plan of care, disease process/condition, diagnostic tests and medications  Outcome: Progressing  Note: Patient educated on plan and goals of care and disease process. Education provided on medications, procedures, and equipment. Will continue to re-inforce education when required. All questions and concerns answered at this time.     Problem: Hemodynamics  Goal: Patient's hemodynamics, fluid balance and neurologic status will be stable or improve  Outcome: Progressing  Note: Cardiac monitoring, vital signs, daily weights, monitor i/o, manage IV fluids and infusions

## 2024-01-21 NOTE — DISCHARGE SUMMARY
Discharge Summary    CHIEF COMPLAINT ON ADMISSION  Chief Complaint   Patient presents with    Shortness of Breath    Rapid Heart Beat       Reason for Admission  EMS     Admission Date  1/19/2024    CODE STATUS  Full Code    HPI & HOSPITAL COURSE  This is a 68 y.o. female here with shortness of breath and rapid heart rate    Missy Vehon has past medical history includes hypothyroidism.  Patient was seen in urgent care on 1/19/2024 with symptoms of shortness of breath and palpitations, EKG showed rapid heart rate in the 140s and she was transferred by EMS to the emergency room for evaluation.  In the ER, she was found to have atrial fibrillation with rapid ventricular response and was diagnosed with COVID-19.  She was hospitalized for treatment.    The patient continues to have atrial fibrillation however her rate is well-controlled with diltiazem ranging from approximately .  The patient has been started on apixaban.  We discussed the importance of anticoagulation for stroke prevention.  Appointment with cardiology has been requested through hospital scheduling.  The patient is given given return precautions to seek medical attention if she is feeling dizzy, lightheaded, or has chest pain.    Therefore, she is discharged in good and stable condition to home with close outpatient follow-up.    The patient met 2-midnight criteria for an inpatient stay at the time of discharge.    Discharge Date  1/21/2024    FOLLOW UP ITEMS POST DISCHARGE  Follow up with primary care provider, follow up with cardiology    DISCHARGE DIAGNOSES  Principal Problem:    AF (atrial fibrillation) (HCC) (POA: Yes)  Active Problems:    Hypertension (POA: Yes)    COVID-19 virus infection (POA: Yes)    Acquired hypothyroidism (POA: Yes)      Overview: S/p partial thyroidectomy in 1970s    D-dimer, elevated (POA: Yes)  Resolved Problems:    Metabolic acidosis (POA: Yes)    Dehydration (POA: Yes)    Atrial fibrillation with rapid ventricular  response (HCC) (POA: Yes)    Lactic acidosis (POA: Yes)      FOLLOW UP  No future appointments.  Sandra Carrion D.O.  [address]  [place] [facility] [id number] [phone]    Follow up in 1 week(s)      [facility]  [phone]  Follow up in 1 week(s)        MEDICATIONS ON DISCHARGE     Medication List        START taking these medications        Instructions   apixaban 5mg Tabs  Commonly known as: Eliquis   Take 1 Tablet by mouth 2 times a day.  Dose: 5 mg     dilTIAZem  MG Cp24  Commonly known as: Cardizem CD   Take 1 Capsule by mouth every day.  Dose: 240 mg            CONTINUE taking these medications        Instructions   B COMPLEX PO   Take 1 Tablet by mouth every day.  Dose: 1 Tablet     baclofen 10 MG Tabs  Commonly known as: Lioresal   Take 10-20 mg by mouth 1 time a day as needed (PAIN).  Dose: 10-20 mg     carisoprodol 350 MG Tabs  Commonly known as: Soma   Take 350 mg by mouth 1 time a day as needed.  Dose: 350 mg     fluticasone 50 MCG/ACT nasal spray  Commonly known as: Flonase   Administer 2 Sprays into affected nostril(S) at bedtime.  Dose: 2 Spray     gabapentin 300 MG Caps  Commonly known as: Neurontin   Take 300 mg by mouth 3 times a day.  Dose: 300 mg     levothyroxine 88 MCG Tabs  Commonly known as: Synthroid   Take 1 Tablet by mouth every morning on an empty stomach.  Dose: 88 mcg     MAGNESIUM PO   Take 1 Tablet by mouth every day.  Dose: 1 Tablet     MULTIVITAMIN ADULT PO   Take 1 Tablet by mouth every day.  Dose: 1 Tablet     OMEGA 3 PO   Take 1 Capsule by mouth every day.  Dose: 1 Capsule     omeprazole 20 MG delayed-release capsule  Commonly known as: PriLOSEC   TAKE 1 CAPSULE BY MOUTH EVERY DAY  Dose: 20 mg     POTASSIUM PO   Take 1 Tab by mouth every day. OTC  Dose: 1 Tablet     Refresh 1.4-0.6 % ophthalmic solution  Generic drug: polyvinyl alcohol-povidone   Administer 1-2 Drops into both eyes as needed (dry eye).  Dose: 1-2 Drop     ROPINIRole 0.5 MG  "Tabs  Commonly known as: Requip   Take 0.5 mg by mouth every evening.  Dose: 0.5 mg     VITAMIN D3 PO   Take 1 Tablet by mouth every day.  Dose: 1 Tablet     ZINC PO   Take 1 Tab by mouth every day.  Dose: 1 Tablet            STOP taking these medications      cefdinir 300 MG Caps  Commonly known as: Omnicef              Allergies  Allergies   Allergen Reactions    Hydromorphone Hives    Codeine Vomiting and Nausea    Fentanyl Vomiting and Nausea    Doxycycline Unspecified     \"stiff joints\"    Hydrocodone Unspecified     Sweating      Oxycodone Vomiting       DIET  Orders Placed This Encounter   Procedures    Diet Order Diet: Cardiac     Standing Status:   Standing     Number of Occurrences:   1     Order Specific Question:   Diet:     Answer:   Cardiac [6]       ACTIVITY  As tolerated.  Weight bearing as tolerated    CONSULTATIONS  None    PROCEDURES    Echocardiogram 1/20/2024:  The ejection fraction is measured to be 79 % by Schmidt's biplane.  No significant valvular disease.   Estimated right ventricular systolic pressure is 30 mmHg.       LABORATORY  Lab Results   Component Value Date    SODIUM 136 01/21/2024    POTASSIUM 3.7 01/21/2024    CHLORIDE 102 01/21/2024    CO2 24 01/21/2024    GLUCOSE 102 (H) 01/21/2024    BUN 11 01/21/2024    CREATININE 0.57 01/21/2024        Lab Results   Component Value Date    WBC 6.6 01/21/2024    HEMOGLOBIN 11.4 (L) 01/21/2024    HEMATOCRIT 35.2 (L) 01/21/2024    PLATELETCT 140 (L) 01/21/2024        Total time of the discharge process exceeds 36 minutes.  "

## 2024-01-21 NOTE — PROGRESS NOTES
Assumed patient care and received report Carolee MCMANUS. Assessment completed. Pt A&Ox4. Respirations are even and unlabored RA. Pt denies pain. Tele pt, tele monitor in place, VS stable, call light and belongings are within reach. POC updated possible D/C this afternoon. Pt educated on room and call light, pt verbalized understanding. Needs met.

## 2024-01-21 NOTE — PROGRESS NOTES
Telemetry Shift Summary    Rhythm a flutter  HR Range   Ectopy none  Measurements 0/0.08/0        Normal Values  Rhythm SR  HR Range    Measurements 0.12-0.20 / 0.06-0.10  / 0.30-0.52

## 2024-01-21 NOTE — DISCHARGE INSTRUCTIONS
Atrial Fibrillation    Atrial fibrillation is a type of heartbeat that is irregular or fast. If you have this condition, your heart beats without any order. This makes it hard for your heart to pump blood in a normal way.  Atrial fibrillation may come and go, or it may become a long-lasting problem. If this condition is not treated, it can put you at higher risk for stroke, heart failure, and other heart problems.  What are the causes?  This condition may be caused by diseases that damage the heart. They include:  High blood pressure.  Heart failure.  Heart valve disease.  Heart surgery.  Other causes include:  Diabetes.  Thyroid disease.  Being overweight.  Kidney disease.  Sometimes the cause is not known.  What increases the risk?  You are more likely to develop this condition if:  You are older.  You smoke.  You exercise often and very hard.  You have a family history of this condition.  You are a man.  You use drugs.  You drink a lot of alcohol.  You have lung conditions, such as emphysema, pneumonia, or COPD.  You have sleep apnea.  What are the signs or symptoms?  Common symptoms of this condition include:  A feeling that your heart is beating very fast.  Chest pain or discomfort.  Feeling short of breath.  Suddenly feeling light-headed or weak.  Getting tired easily during activity.  Fainting.  Sweating.  In some cases, there are no symptoms.  How is this treated?  Treatment for this condition depends on underlying conditions and how you feel when you have atrial fibrillation. They include:  Medicines to:  Prevent blood clots.  Treat heart rate or heart rhythm problems.  Using devices, such as a pacemaker, to correct heart rhythm problems.  Doing surgery to remove the part of the heart that sends bad signals.  Closing an area where clots can form in the heart (left atrial appendage).  In some cases, your doctor will treat other underlying conditions.  Follow these instructions at home:  Medicines  Take  "over-the-counter and prescription medicines only as told by your doctor.  Do not take any new medicines without first talking to your doctor.  If you are taking blood thinners:  Talk with your doctor before you take any medicines that have aspirin or NSAIDs, such as ibuprofen, in them.  Take your medicine exactly as told by your doctor. Take it at the same time each day.  Avoid activities that could hurt or bruise you. Follow instructions about how to prevent falls.  Wear a bracelet that says you are taking blood thinners. Or, carry a card that lists what medicines you take.  Lifestyle         Do not use any products that have nicotine or tobacco in them. These include cigarettes, e-cigarettes, and chewing tobacco. If you need help quitting, ask your doctor.  Eat heart-healthy foods. Talk with your doctor about the right eating plan for you.  Exercise regularly as told by your doctor.  Do not drink alcohol.  Lose weight if you are overweight.  Do not use drugs, including cannabis.  General instructions  If you have a condition that causes breathing to stop for a short period of time (apnea), treat it as told by your doctor.  Keep a healthy weight. Do not use diet pills unless your doctor says they are safe for you. Diet pills may make heart problems worse.  Keep all follow-up visits as told by your doctor. This is important.  Contact a doctor if:  You notice a change in the speed, rhythm, or strength of your heartbeat.  You are taking a blood-thinning medicine and you get more bruising.  You get tired more easily when you move or exercise.  You have a sudden change in weight.  Get help right away if:    You have pain in your chest or your belly (abdomen).  You have trouble breathing.  You have side effects of blood thinners, such as blood in your vomit, poop (stool), or pee (urine), or bleeding that cannot stop.  You have any signs of a stroke. \"BE FAST\" is an easy way to remember the main warning signs:  B - " Balance. Signs are dizziness, sudden trouble walking, or loss of balance.  E - Eyes. Signs are trouble seeing or a change in how you see.  F - Face. Signs are sudden weakness or loss of feeling in the face, or the face or eyelid drooping on one side.  A - Arms. Signs are weakness or loss of feeling in an arm. This happens suddenly and usually on one side of the body.  S - Speech. Signs are sudden trouble speaking, slurred speech, or trouble understanding what people say.  T - Time. Time to call emergency services. Write down what time symptoms started.  You have other signs of a stroke, such as:  A sudden, very bad headache with no known cause.  Feeling like you may vomit (nausea).  Vomiting.  A seizure.  These symptoms may be an emergency. Do not wait to see if the symptoms will go away. Get medical help right away. Call your local emergency services (911 in the [place] Do not drive yourself to the hospital.  Summary  Atrial fibrillation is a type of heartbeat that is irregular or fast.  You are at higher risk of this condition if you smoke, are older, have diabetes, or are overweight.  Follow your doctor's instructions about medicines, diet, exercise, and follow-up visits.  Get help right away if you have signs or symptoms of a stroke.  Get help right away if you cannot catch your breath, or you have chest pain or discomfort.  This information is not intended to replace advice given to you by your health care provider. Make sure you discuss any questions you have with your health care provider.  Document Revised: 06/10/2020 Document Reviewed: 06/10/2020  Elsevier Patient Education © 2023 [employer]

## 2024-01-21 NOTE — PROGRESS NOTES
Patients IV removed. MD aware of pt vitals and wanted me to give once dose of Dilitiazem. Patient educated and repeat back understanding. Patient signed all documents with complete understanding. Returned home meds to pt.

## 2024-01-21 NOTE — CARE PLAN
The patient is Stable - Low risk of patient condition declining or worsening    Shift Goals  Clinical Goals: HR control  Patient Goals: rest    Progress made toward(s) clinical / shift goals:  Continuous cardiac monitoring in place. Medication given per MAR. Care clustered to allow for rest.    Patient is not progressing towards the following goals:

## 2024-01-21 NOTE — PROGRESS NOTES
Report received from Elli MCMANUS. POC discussed. Pt resting comfortably in bed. Safety precautions in place.

## 2024-01-21 NOTE — DISCHARGE PLANNING
Anticipated Discharge Disposition: Home when cleared .    Action: ER CM checked Eliquis and it does go thru but co pay is over 500.00 . Updated CM on floor and will see if MD and CM want to see if change to Xarelto or give Eliquis card. Changed rx to Xarelto and copay more reasonable 133.67 now.    Barriers to Discharge: Cost of RX .     Plan: PLEASE Check with CM prior to DC to ensure resolved.12:36 Resolved RX She is better off on Xarelto as co pay is much less. ER CM can not alleviate completely copays but this is more manageable.

## 2024-01-22 ENCOUNTER — PATIENT OUTREACH (OUTPATIENT)
Dept: MEDICAL GROUP | Facility: LAB | Age: 69
End: 2024-01-22
Payer: MEDICARE

## 2024-01-22 ASSESSMENT — ENCOUNTER SYMPTOMS
DIZZINESS: 0
SYNCOPE: 0
ORTHOPNEA: 0
NAUSEA: 0
NIGHT SWEATS: 0
FOCAL WEAKNESS: 0
DYSPNEA ON EXERTION: 0
PND: 0
NEAR-SYNCOPE: 0
DIARRHEA: 0
SHORTNESS OF BREATH: 0
PALPITATIONS: 0
COUGH: 0
FEVER: 0
WEAKNESS: 0
IRREGULAR HEARTBEAT: 0
ABDOMINAL PAIN: 0
VOMITING: 0
WHEEZING: 0

## 2024-01-22 NOTE — PROGRESS NOTES
Transitional Care Management  TCM Outreach Date and Time: Filed (1/22/2024  1:15 PM)    Discharge Questions  Actual Discharge Date: 01/21/24  Now that you are home, how are you feeling?: Fair (States feeling much better; had a little SOB last evening but feeling fine today; up and doing ADL without issue.  States able to eat and drink easily; is forcing fluids.  States is a bit frightened about  AFib dx..  Reviewed the dx. and S/S to report.)  Did you receive any new prescriptions?: Yes  Were you able to get them filled?: Yes  Meds to Bed or Pharmacy filled?: Pharmacy  Do you have any questions about your current medications or new medications (Review Med Rec)?: No (Detailed Med Rec completed)  Did you have any durable medical equipment ordered?: No  Do you have a follow up appointment scheduled with your PCP?: Yes  Appointment Date: 01/26/24  Appointment Time: 1420  Any issues or paperwork you wish to discuss with your PCP?: No  Are you (patient) able to get to the appointment?: Yes  If Home Health was ordered, have they contacted you (Patient): Not Applicable  Did you have enough support after your last discharge?: Yes (Spouse is support system)  Does this patient qualify for the CCM program?: No    Transitional Care  Number of attempts made to contact patient: 1  Current or previous attempts competed within two business days of discharge? : Yes  Provided education regarding treatment plan, medications, self-management, ADLs?: No  Has patient completed an Advanced Directive?: Yes  Is the patient's advanced directive on file?: Yes  Has the Care Manager's phone number provided?: No  Is there anything else I can help you with?: No    Discharge Summary  Chief Complaint: EMS to ER:  SOB; Rapid heart beat  Admitting Diagnosis: SOB; Palpitations; COVID positive; Metabollic acidosis  Discharge Diagnosis: AFib (newly diagnosed); HTN; COVID

## 2024-01-22 NOTE — PROGRESS NOTES
Cardiology Follow-up Consultation Note    Date of note:    ***  Primary Care Provider: Sandra Carrion D.O.    Patient Name: Missy Stuart   YOB: 1955  MRN:              [id number]    Chief Complaint: Hospital follow-up    History of Present Illness: Ms. Missy Stuart is a 68 y.o. female whose current medical problems include dyslipidemia, atrial fibrillation, and hypothyroidism who is here for hospital follow-up.    The patient was previously seen by me in 2021 for syncope.  Echocardiogram did not show any significant abnormalities in 2021.  The patient was hospitalized last week for shortness of breath/palpitations, found to be in A-fib RVR.  She was started on OAC and diltiazem, and discharged to follow-up in cardiology clinic.    The patient presents today for follow-up.***.  No chest pain or prodromal symptoms prior to the episode.  The patient believes that she pinched her nerves while stretching.  She is pretty active, and no symptoms when walking or hiking.  No orthopnea, PND, or leg swelling.  No palpitations.  No recurrent syncope or presyncopal episodes.    Cardiovascular Risk Factors:  1. Smoking status: Former smoker  2. Type II Diabetes Mellitus: None/not recently checked  3. Hypertension: None  4. Dyslipidemia: Diet controlled  Cholesterol,Tot   Date Value Ref Range Status   03/05/2019 227 (H) 100 - 199 mg/dL Final     LDL   Date Value Ref Range Status   03/05/2019 128 (H) <100 mg/dL Final     HDL   Date Value Ref Range Status   03/05/2019 84 >=40 mg/dL Final     Triglycerides   Date Value Ref Range Status   03/05/2019 75 0 - 149 mg/dL Final     5. Family history of early Coronary Artery Disease in a first degree relative (Male less than 55 years of age; Female less than 65 years of age): None  6.  Obesity and/or Metabolic Syndrome: BMI 18.9  7. Sedentary lifestyle: Pt walks and hikes    Review of Systems   Constitutional: Negative for fever, malaise/fatigue and night sweats.    Cardiovascular:  Negative for chest pain, dyspnea on exertion, irregular heartbeat, leg swelling, near-syncope, orthopnea, palpitations, paroxysmal nocturnal dyspnea and syncope.   Respiratory:  Negative for cough, shortness of breath and wheezing.    Gastrointestinal:  Negative for abdominal pain, diarrhea, nausea and vomiting.   Neurological:  Negative for dizziness, focal weakness and weakness.       All other systems reviewed and are negative.       Current Outpatient Medications   Medication Sig Dispense Refill    apixaban (ELIQUIS) 5mg Tab Take 1 Tablet by mouth 2 times a day. 60 Tablet 0    dilTIAZem CD (CARDIZEM CD) 240 MG CAPSULE SR 24 HR Take 1 Capsule by mouth every day. 30 Capsule 0    rivaroxaban (XARELTO) 20 MG Tab tablet Take 1 Tablet by mouth with dinner. 30 Tablet 0    levothyroxine (SYNTHROID) 88 MCG Tab Take 1 Tablet by mouth every morning on an empty stomach. 90 Tablet 3    ROPINIRole (REQUIP) 0.5 MG Tab Take 0.5 mg by mouth every evening.      polyvinyl alcohol-povidone (REFRESH) 1.4-0.6 % ophthalmic solution Administer 1-2 Drops into both eyes as needed (dry eye).      fluticasone (FLONASE) 50 MCG/ACT nasal spray Administer 2 Sprays into affected nostril(S) at bedtime. 16 g 1    omeprazole (PRILOSEC) 20 MG delayed-release capsule TAKE 1 CAPSULE BY MOUTH EVERY DAY 90 Capsule 1    carisoprodol (SOMA) 350 MG Tab Take 350 mg by mouth 1 time a day as needed.      baclofen (LIORESAL) 10 MG Tab Take 10-20 mg by mouth 1 time a day as needed (PAIN).      gabapentin (NEURONTIN) 300 MG Cap Take 300 mg by mouth 3 times a day.      Omega-3 Fatty Acids (OMEGA 3 PO) Take 1 Capsule by mouth every day.      MAGNESIUM PO Take 1 Tablet by mouth every day.      Cholecalciferol (VITAMIN D3 PO) Take 1 Tablet by mouth every day.      B Complex Vitamins (B COMPLEX PO) Take 1 Tablet by mouth every day.      Multiple Vitamins-Minerals (MULTIVITAMIN ADULT PO) Take 1 Tablet by mouth every day.      Multiple  "Vitamins-Minerals (ZINC PO) Take 1 Tab by mouth every day.      POTASSIUM PO Take 1 Tab by mouth every day. OTC       No current facility-administered medications for this visit.         Allergies   Allergen Reactions    Hydromorphone Hives    Codeine Vomiting and Nausea    Fentanyl Vomiting and Nausea    Doxycycline Unspecified     \"stiff joints\"    Hydrocodone Unspecified     Sweating      Oxycodone Vomiting       Physical Exam:  Ambulatory Vitals  There were no vitals taken for this visit.   Oxygen Therapy:     BP Readings from Last 4 Encounters:   01/21/24 100/60   01/19/24 92/66   12/22/23 130/80   10/13/23 96/58       Weight/BMI: There is no height or weight on file to calculate BMI.  Wt Readings from Last 4 Encounters:   01/21/24 56.5 kg (124 lb 9 oz)   01/19/24 55.8 kg (123 lb)   12/22/23 56.3 kg (124 lb 3.2 oz)   10/13/23 55.7 kg (122 lb 12.8 oz)       General: Well appearing and in no apparent distress  Eyes: nl conjunctiva, no icteric sclera  ENT: wearing a mask, normal external appearance of ears  Neck: no visible JVP,  no carotid bruits  Lungs: normal respiratory effort, CTAB  Heart: RRR, no murmurs, no rubs or gallops,  no edema bilateral lower extremities. No LV/RV heave on cardiac palpatation. + bilateral radial pulses.  + bilateral dp pulses.   Abdomen: soft, non tender, non distended, no masses, normal bowel sounds.  No HSM.  Extremities/MSK: no clubbing, no cyanosis  Neurological: No focal sensory deficits  Psychiatric: Appropriate affect, A/O x 3, intact judgement and insight  Skin: Warm extremities      Lab Data Review:  Lab Results   Component Value Date/Time    CHOLSTRLTOT 221 (H) 01/20/2024 02:49 AM    LDL 92 01/20/2024 02:49 AM     01/20/2024 02:49 AM    TRIGLYCERIDE 65 01/20/2024 02:49 AM       Lab Results   Component Value Date/Time    SODIUM 136 01/21/2024 12:59 AM    POTASSIUM 3.7 01/21/2024 12:59 AM    CHLORIDE 102 01/21/2024 12:59 AM    CO2 24 01/21/2024 12:59 AM    GLUCOSE 102 " "(H) 01/21/2024 12:59 AM    BUN 11 01/21/2024 12:59 AM    CREATININE 0.57 01/21/2024 12:59 AM     Lab Results   Component Value Date/Time    ALKPHOSPHAT 54 01/20/2024 02:49 AM    ASTSGOT 29 01/20/2024 02:49 AM    ALTSGPT 18 01/20/2024 02:49 AM    TBILIRUBIN 1.2 01/20/2024 02:49 AM      Lab Results   Component Value Date/Time    WBC 6.6 01/21/2024 12:59 AM    HEMOGLOBIN 11.4 (L) 01/21/2024 12:59 AM     No results found for: \"HBA1C\"      Cardiac Imaging and Procedures Review:    EKG dated 3/26/2021: My personal interpretation is sinus rhythm, PAC    Echocardiogram 1/20/2024  CONCLUSIONS  The ejection fraction is measured to be 79 % by Schmidt's biplane.  No significant valvular disease.   Estimated right ventricular systolic pressure is 30 mmHg.         Assessment & Plan     No diagnosis found.        Shared Medical Decision Making:    Atrial fibrillation  [id number] at least 2 (age, female)  -Continue anticoagulation    Dyslipidemia  -Patient reports controlling with diet.  Repeat lipid pending.    All of the patient's excellent questions were answered to the best of my knowledge and to her satisfaction.  It was a pleasure seeing Ms. Missy Stuart in my clinic today. No follow-ups on file. Patient is aware to call the cardiology clinic with any questions or concerns.      Robbie Dawn MD  [facility], [address]  [address] [address]  Phone: [phone]  Fax: [phone]    "

## 2024-01-23 ENCOUNTER — OFFICE VISIT (OUTPATIENT)
Dept: CARDIOLOGY | Facility: MEDICAL CENTER | Age: 69
End: 2024-01-23
Attending: STUDENT IN AN ORGANIZED HEALTH CARE EDUCATION/TRAINING PROGRAM
Payer: MEDICARE

## 2024-01-23 VITALS — HEIGHT: 69 IN | BODY MASS INDEX: 18.39 KG/M2

## 2024-01-23 DIAGNOSIS — I10 HYPERTENSION, UNSPECIFIED TYPE: ICD-10-CM

## 2024-01-23 NOTE — DOCUMENTATION QUERY
[facility]                                                                       Query Response Note      PATIENT:               SHALINI AGUIRRE  ACCT #:                  [id number]  MRN:                     [id number]  :                      1955  ADMIT DATE:       2024 10:21 AM  DISCH DATE:        2024 1:37 PM  RESPONDING  PROVIDER #:        [id number]           QUERY TEXT:    The diagnosis of sepsis/early sepsis has been documented in H&P.    SIRS on admission with HR >90. Patient found to be in uncontrolled A fib RVR.   Lactic acidosis noted, resolved with fluid resuscitation.    Patient discharged home after 2 day LOS. Sepsis dx not included on DCS.      Please clarify the status of sepsis by providing SIRS criteria and sepsis-related organ dysfunction.      Sepsis - real or suspected infection plus 2 or more SIRS criteria + organ dysfunction related to sepsis    Temp <96.8 or >101  HR >90  RR >20  WBC <4,000 or > 12,000  >10% bandemia         The patient's Clinical Indicators include:  Clinical Findings:    -Labs: WBC 4.9; Lactic acid 4.2-->3.8-->1.8-->0.7; Procalcitonin 0.05  -Vital Signs: T 97.9; ; RR 20  -CXR: No evidence of acute cardiopulmonary process.  -UA: unremarkable      Risk Factors: COVID 19, acidosis, dehydration    Treatments: IV fluids, labs, telemetry monitoring, incentive spirometer, ECHO, scheduled diltiazem       Contact me with any questions.    Thank you for your time and attention,  Klarissa Cannon RN, [email or web address]  Connect via email, Voalte or messenger.  Options provided:   -- Sepsis ruled out after study   -- Sepsis exists, (provide SIRS criteria plus sepsis-related organ dysfunction)   -- Other explanation, Please specify      Query created by: Klarissa Cannon on 2024 12:10 PM    RESPONSE TEXT:    Sepsis ruled out after study          Electronically signed by:  GWYN PATEL MD  1/23/2024 1:13 PM

## 2024-01-24 NOTE — PROGRESS NOTES
"Chief Complaint   Patient presents with    Hypertension    Atrial Fibrillation       Subjective     Missy Stuart is a 68 y.o. female who presents today for follow-up after hospital.    Patient has a medical history significant for atrial fibrillation, dyslipidemia, hypothyroidism.  Previously seen by Dr. Dawn in 2021 for syncope, echo at that time was normal.  Recently she was seen in the hospital found to be in A-fib with RVR, started on oral anticoagulation and diltiazem.    Today in clinic patient is complaining of heart racing, likely dehydrated, recent COVID infection, was feeling poorly for 4-5 days. EKG done today in clinic shows that patient is in flutter with RVR, blood pressure is low, slightly symptomatic feeling\"starry\" will send to emergency department, may need KOTA/DCCV, did eat at 7:30 am.    Past Medical History:   Diagnosis Date    Cataract     removed bilat    Cervical pain 02/28/2020    neck, 0-1/10    Dysfunction of right eustachian tube 11/5/2019    Discussed creating slight pressure in the eustachian tube to open them up and allow the ears to drain.  Discussed continuing nasal spray.  As well as adding an allergy medication such as loratadine to her regimen.  I did not see any fluid in the ear on exam but there still could be fluid in there.  We discussed all of this in the visit today.    Eosinophilic esophagitis 12/11/2020    Hyperlipidemia 02/28/2020    on no meds at this time    Hypothyroidism 11/19/2018    Restless legs 11/19/2018     Past Surgical History:   Procedure Laterality Date    EYE LESION EXCISION Left 3/2/2020    Procedure: EXCISION, LESION, EYE - UPPER LID W/POSS PLACEMENT OF MINI MONKA STENT;  Surgeon: Jaime Pastrana M.D.;  Location: SURGERY SAME DAY [facility];  Service: Ophthalmology    CATARACT PHACO WITH IOL  5/21/2014    Performed by Fadi Morales M.D. at SURGERY SAME DAY [facility]    TUBE & ECTOPIC PREG., REMOVAL  1987    THYROIDECTOMY  1983    partial    " "OTHER ORTHOPEDIC SURGERY  [id number]    neck fusion    MS BREAST AUGMENTATION WITH IMPLANT   & 2018     Family History   Problem Relation Age of Onset    Diabetes Mother     Stroke Maternal Grandfather      Social History     Socioeconomic History    Marital status:      Spouse name: Not on file    Number of children: Not on file    Years of education: Not on file    Highest education level: Not on file   Occupational History    Not on file   Tobacco Use    Smoking status: Former     Current packs/day: 0.00     Average packs/day: 0.5 packs/day for 8.0 years (4.0 ttl pk-yrs)     Types: Cigarettes     Start date: 1980     Quit date: 1988     Years since quittin.0    Smokeless tobacco: Never    Tobacco comments:     quit in her 20s    Vaping Use    Vaping Use: Never used   Substance and Sexual Activity    Alcohol use: Yes     Alcohol/week: 1.8 oz     Types: 3 Standard drinks or equivalent per week    Drug use: No    Sexual activity: Yes     Partners: Male     Comment:    Other Topics Concern    Not on file   Social History Narrative    Retired dental hygiene      Social Determinants of Health     Financial Resource Strain: Not on file   Food Insecurity: Not on file   Transportation Needs: Not on file   Physical Activity: Not on file   Stress: Not on file   Social Connections: Not on file   Intimate Partner Violence: Not on file   Housing Stability: Not on file     Allergies   Allergen Reactions    Hydromorphone Hives    Codeine Vomiting and Nausea    Fentanyl Vomiting and Nausea    Doxycycline Unspecified     \"stiff joints\"    Hydrocodone Unspecified     Sweating      Oxycodone Vomiting     Outpatient Encounter Medications as of 2024   Medication Sig Dispense Refill    dilTIAZem CD (CARDIZEM CD) 240 MG CAPSULE SR 24 HR Take 1 Capsule by mouth every day. 30 Capsule 0    rivaroxaban (XARELTO) 20 MG Tab tablet Take 1 Tablet by mouth with dinner. 30 Tablet 0    levothyroxine (SYNTHROID) " "88 MCG Tab Take 1 Tablet by mouth every morning on an empty stomach. 90 Tablet 3    ROPINIRole (REQUIP) 0.5 MG Tab Take 0.5 mg by mouth every evening.      polyvinyl alcohol-povidone (REFRESH) 1.4-0.6 % ophthalmic solution Administer 1-2 Drops into both eyes as needed (dry eye).      fluticasone (FLONASE) 50 MCG/ACT nasal spray Administer 2 Sprays into affected nostril(S) at bedtime. 16 g 1    carisoprodol (SOMA) 350 MG Tab Take 350 mg by mouth 1 time a day as needed.      baclofen (LIORESAL) 10 MG Tab Take 10-20 mg by mouth 1 time a day as needed (PAIN).      gabapentin (NEURONTIN) 300 MG Cap Take 300 mg by mouth 3 times a day.      Omega-3 Fatty Acids (OMEGA 3 PO) Take 1 Capsule by mouth every day.      MAGNESIUM PO Take 1 Tablet by mouth every day.      Cholecalciferol (VITAMIN D3 PO) Take 1 Tablet by mouth every day.      B Complex Vitamins (B COMPLEX PO) Take 1 Tablet by mouth every day.      Multiple Vitamins-Minerals (MULTIVITAMIN ADULT PO) Take 1 Tablet by mouth every day.      Multiple Vitamins-Minerals (ZINC PO) Take 1 Tab by mouth every day.      POTASSIUM PO Take 1 Tab by mouth every day. OTC      [DISCONTINUED] apixaban (ELIQUIS) 5mg Tab Take 1 Tablet by mouth 2 times a day. (Patient not taking: Reported on 1/22/2024) 60 Tablet 0    [DISCONTINUED] omeprazole (PRILOSEC) 20 MG delayed-release capsule TAKE 1 CAPSULE BY MOUTH EVERY DAY (Patient not taking: Reported on 1/22/2024) 90 Capsule 1     No facility-administered encounter medications on file as of 1/25/2024.     Review of Systems   Respiratory:  Negative for shortness of breath.    Cardiovascular:  Negative for chest pain, palpitations, orthopnea and leg swelling.   Neurological:  Positive for dizziness and weakness. Negative for loss of consciousness.   All other systems reviewed and are negative.             Objective     BP (!) 89/71   Pulse (!) 136   Resp 16   Ht 1.753 m (5' 9\")   Wt 55.3 kg (122 lb)   LMP  (LMP Unknown)   SpO2 99%   BMI " 18.02 kg/m²     Physical Exam  Vitals reviewed.   Constitutional:       General: She is not in acute distress.     Appearance: She is normal weight.   Cardiovascular:      Rate and Rhythm: Tachycardia present. Rhythm regularly irregular.      Heart sounds: No murmur heard.  Pulmonary:      Effort: Pulmonary effort is normal. No respiratory distress.      Breath sounds: Normal breath sounds. No rhonchi.   Abdominal:      General: There is no distension.      Tenderness: There is no abdominal tenderness.   Musculoskeletal:      Cervical back: Normal range of motion.      Right lower leg: No edema.      Left lower leg: No edema.   Neurological:      General: No focal deficit present.      Mental Status: She is alert.            Lab Results   Component Value Date/Time    CHOLSTRLTOT 221 (H) 01/20/2024 02:49 AM    LDL 92 01/20/2024 02:49 AM     01/20/2024 02:49 AM    TRIGLYCERIDE 65 01/20/2024 02:49 AM       Lab Results   Component Value Date/Time    SODIUM 136 01/21/2024 12:59 AM    POTASSIUM 3.7 01/21/2024 12:59 AM    CHLORIDE 102 01/21/2024 12:59 AM    CO2 24 01/21/2024 12:59 AM    GLUCOSE 102 (H) 01/21/2024 12:59 AM    BUN 11 01/21/2024 12:59 AM    CREATININE 0.57 01/21/2024 12:59 AM     Lab Results   Component Value Date/Time    ALKPHOSPHAT 54 01/20/2024 02:49 AM    ASTSGOT 29 01/20/2024 02:49 AM    ALTSGPT 18 01/20/2024 02:49 AM    TBILIRUBIN 1.2 01/20/2024 02:49 AM          Assessment & Plan     1. Atrial fibrillation, unspecified type (HCC)  EKG - Clinic Performed    REFERRAL TO CARDIOLOGY          Medical Decision Making: Today's Assessment/Status/Plan:          Atrial fibrillation/a flutter  -EKG done in clinic shows patient currently in a flutter RVR rate of 130s, blood pressure borderline hypotensive with systolic in the 80s  -Patient on oral diltiazem did take her dose this morning  -Given hemodynamic instability and persistent rapid heart rate recommend patient present to the emergency department, if  she remains in this rapid rate most likely would benefit from KOTA/DCCV  -[id number] 2 Continue anticoagulation, only on anticoagulation for 6 days     Dyslipidemia  -Patient reports controlling with diet.      Follow-up with EP in the next month    Rose Oseguera, MSN, APRN  [facility]  [phone]    Please note this dictation was created using voice recognition software.  I have made every reasonable attempt to correct obvious errors, but there may be errors of grammar and possibly content that I did not discover before finalizing the note.

## 2024-01-25 ENCOUNTER — OFFICE VISIT (OUTPATIENT)
Dept: CARDIOLOGY | Facility: MEDICAL CENTER | Age: 69
DRG: 310 | End: 2024-01-25
Attending: NURSE PRACTITIONER
Payer: MEDICARE

## 2024-01-25 ENCOUNTER — APPOINTMENT (OUTPATIENT)
Dept: RADIOLOGY | Facility: MEDICAL CENTER | Age: 69
DRG: 310 | End: 2024-01-25
Attending: EMERGENCY MEDICINE
Payer: MEDICARE

## 2024-01-25 ENCOUNTER — HOSPITAL ENCOUNTER (INPATIENT)
Facility: MEDICAL CENTER | Age: 69
LOS: 1 days | DRG: 310 | End: 2024-01-26
Attending: EMERGENCY MEDICINE | Admitting: INTERNAL MEDICINE
Payer: MEDICARE

## 2024-01-25 VITALS
BODY MASS INDEX: 18.07 KG/M2 | SYSTOLIC BLOOD PRESSURE: 89 MMHG | HEIGHT: 69 IN | RESPIRATION RATE: 16 BRPM | OXYGEN SATURATION: 99 % | WEIGHT: 122 LBS | HEART RATE: 136 BPM | DIASTOLIC BLOOD PRESSURE: 71 MMHG

## 2024-01-25 DIAGNOSIS — U07.1 COVID-19 VIRUS INFECTION: ICD-10-CM

## 2024-01-25 DIAGNOSIS — I48.91 ATRIAL FIBRILLATION, UNSPECIFIED TYPE (HCC): ICD-10-CM

## 2024-01-25 DIAGNOSIS — I48.91 ATRIAL FIBRILLATION WITH RAPID VENTRICULAR RESPONSE (HCC): ICD-10-CM

## 2024-01-25 LAB
ALBUMIN SERPL BCP-MCNC: 4.8 G/DL (ref 3.2–4.9)
ALBUMIN/GLOB SERPL: 1.6 G/DL
ALP SERPL-CCNC: 65 U/L (ref 30–99)
ALT SERPL-CCNC: 22 U/L (ref 2–50)
ANION GAP SERPL CALC-SCNC: 15 MMOL/L (ref 7–16)
APTT PPP: 32.8 SEC (ref 24.7–36)
AST SERPL-CCNC: 29 U/L (ref 12–45)
BASOPHILS # BLD AUTO: 1.2 % (ref 0–1.8)
BASOPHILS # BLD: 0.05 K/UL (ref 0–0.12)
BILIRUB SERPL-MCNC: 0.8 MG/DL (ref 0.1–1.5)
BUN SERPL-MCNC: 18 MG/DL (ref 8–22)
CALCIUM ALBUM COR SERPL-MCNC: 9.2 MG/DL (ref 8.5–10.5)
CALCIUM SERPL-MCNC: 9.8 MG/DL (ref 8.5–10.5)
CHLORIDE SERPL-SCNC: 103 MMOL/L (ref 96–112)
CO2 SERPL-SCNC: 21 MMOL/L (ref 20–33)
CREAT SERPL-MCNC: 0.69 MG/DL (ref 0.5–1.4)
D DIMER PPP IA.FEU-MCNC: 0.3 UG/ML (FEU) (ref 0–0.5)
EKG IMPRESSION: NORMAL
EKG IMPRESSION: NORMAL
EOSINOPHIL # BLD AUTO: 0.05 K/UL (ref 0–0.51)
EOSINOPHIL NFR BLD: 1.2 % (ref 0–6.9)
ERYTHROCYTE [DISTWIDTH] IN BLOOD BY AUTOMATED COUNT: 53.1 FL (ref 35.9–50)
GFR SERPLBLD CREATININE-BSD FMLA CKD-EPI: 94 ML/MIN/1.73 M 2
GLOBULIN SER CALC-MCNC: 3 G/DL (ref 1.9–3.5)
GLUCOSE SERPL-MCNC: 91 MG/DL (ref 65–99)
HCT VFR BLD AUTO: 44.6 % (ref 37–47)
HGB BLD-MCNC: 14.4 G/DL (ref 12–16)
IMM GRANULOCYTES # BLD AUTO: 0.01 K/UL (ref 0–0.11)
IMM GRANULOCYTES NFR BLD AUTO: 0.2 % (ref 0–0.9)
INR PPP: 1.28 (ref 0.87–1.13)
LACTATE SERPL-SCNC: 1.5 MMOL/L (ref 0.5–2)
LYMPHOCYTES # BLD AUTO: 1.16 K/UL (ref 1–4.8)
LYMPHOCYTES NFR BLD: 26.8 % (ref 22–41)
MCH RBC QN AUTO: 33.4 PG (ref 27–33)
MCHC RBC AUTO-ENTMCNC: 32.3 G/DL (ref 32.2–35.5)
MCV RBC AUTO: 103.5 FL (ref 81.4–97.8)
MONOCYTES # BLD AUTO: 0.38 K/UL (ref 0–0.85)
MONOCYTES NFR BLD AUTO: 8.8 % (ref 0–13.4)
NEUTROPHILS # BLD AUTO: 2.68 K/UL (ref 1.82–7.42)
NEUTROPHILS NFR BLD: 61.8 % (ref 44–72)
NRBC # BLD AUTO: 0 K/UL
NRBC BLD-RTO: 0 /100 WBC (ref 0–0.2)
NT-PROBNP SERPL IA-MCNC: 188 PG/ML (ref 0–125)
PLATELET # BLD AUTO: 227 K/UL (ref 164–446)
PMV BLD AUTO: 9.8 FL (ref 9–12.9)
POTASSIUM SERPL-SCNC: 4 MMOL/L (ref 3.6–5.5)
PROT SERPL-MCNC: 7.8 G/DL (ref 6–8.2)
PROTHROMBIN TIME: 16.1 SEC (ref 12–14.6)
RBC # BLD AUTO: 4.31 M/UL (ref 4.2–5.4)
SODIUM SERPL-SCNC: 139 MMOL/L (ref 135–145)
T4 FREE SERPL-MCNC: 1.78 NG/DL (ref 0.93–1.7)
TROPONIN T SERPL-MCNC: 9 NG/L (ref 6–19)
TSH SERPL DL<=0.005 MIU/L-ACNC: 2.71 UIU/ML (ref 0.38–5.33)
WBC # BLD AUTO: 4.3 K/UL (ref 4.8–10.8)

## 2024-01-25 PROCEDURE — 85610 PROTHROMBIN TIME: CPT

## 2024-01-25 PROCEDURE — 93005 ELECTROCARDIOGRAM TRACING: CPT | Performed by: EMERGENCY MEDICINE

## 2024-01-25 PROCEDURE — 36415 COLL VENOUS BLD VENIPUNCTURE: CPT

## 2024-01-25 PROCEDURE — 84439 ASSAY OF FREE THYROXINE: CPT

## 2024-01-25 PROCEDURE — 83880 ASSAY OF NATRIURETIC PEPTIDE: CPT

## 2024-01-25 PROCEDURE — 83605 ASSAY OF LACTIC ACID: CPT

## 2024-01-25 PROCEDURE — 71045 X-RAY EXAM CHEST 1 VIEW: CPT

## 2024-01-25 PROCEDURE — 96375 TX/PRO/DX INJ NEW DRUG ADDON: CPT

## 2024-01-25 PROCEDURE — 85025 COMPLETE CBC W/AUTO DIFF WBC: CPT

## 2024-01-25 PROCEDURE — 84484 ASSAY OF TROPONIN QUANT: CPT

## 2024-01-25 PROCEDURE — 700111 HCHG RX REV CODE 636 W/ 250 OVERRIDE (IP): Mod: JZ | Performed by: EMERGENCY MEDICINE

## 2024-01-25 PROCEDURE — 3078F DIAST BP <80 MM HG: CPT | Performed by: NURSE PRACTITIONER

## 2024-01-25 PROCEDURE — 3074F SYST BP LT 130 MM HG: CPT | Performed by: NURSE PRACTITIONER

## 2024-01-25 PROCEDURE — 99223 1ST HOSP IP/OBS HIGH 75: CPT | Mod: AI | Performed by: INTERNAL MEDICINE

## 2024-01-25 PROCEDURE — 99214 OFFICE O/P EST MOD 30 MIN: CPT | Performed by: NURSE PRACTITIONER

## 2024-01-25 PROCEDURE — 93005 ELECTROCARDIOGRAM TRACING: CPT

## 2024-01-25 PROCEDURE — 99285 EMERGENCY DEPT VISIT HI MDM: CPT

## 2024-01-25 PROCEDURE — 93005 ELECTROCARDIOGRAM TRACING: CPT | Performed by: INTERNAL MEDICINE

## 2024-01-25 PROCEDURE — 99222 1ST HOSP IP/OBS MODERATE 55: CPT | Mod: FS | Performed by: INTERNAL MEDICINE

## 2024-01-25 PROCEDURE — 96374 THER/PROPH/DIAG INJ IV PUSH: CPT

## 2024-01-25 PROCEDURE — 700102 HCHG RX REV CODE 250 W/ 637 OVERRIDE(OP): Performed by: INTERNAL MEDICINE

## 2024-01-25 PROCEDURE — 93005 ELECTROCARDIOGRAM TRACING: CPT | Performed by: NURSE PRACTITIONER

## 2024-01-25 PROCEDURE — 770020 HCHG ROOM/CARE - TELE (206)

## 2024-01-25 PROCEDURE — 80053 COMPREHEN METABOLIC PANEL: CPT

## 2024-01-25 PROCEDURE — 85730 THROMBOPLASTIN TIME PARTIAL: CPT

## 2024-01-25 PROCEDURE — A9270 NON-COVERED ITEM OR SERVICE: HCPCS | Performed by: INTERNAL MEDICINE

## 2024-01-25 PROCEDURE — 85379 FIBRIN DEGRADATION QUANT: CPT

## 2024-01-25 PROCEDURE — 84443 ASSAY THYROID STIM HORMONE: CPT

## 2024-01-25 RX ORDER — DIGOXIN 0.25 MG/ML
500 INJECTION INTRAMUSCULAR; INTRAVENOUS ONCE
Status: DISCONTINUED | OUTPATIENT
Start: 2024-01-25 | End: 2024-01-25

## 2024-01-25 RX ORDER — METOPROLOL TARTRATE 1 MG/ML
5 INJECTION, SOLUTION INTRAVENOUS
Status: DISCONTINUED | OUTPATIENT
Start: 2024-01-25 | End: 2024-01-26 | Stop reason: HOSPADM

## 2024-01-25 RX ORDER — DIGOXIN 0.25 MG/ML
250 INJECTION INTRAMUSCULAR; INTRAVENOUS ONCE
Status: COMPLETED | OUTPATIENT
Start: 2024-01-25 | End: 2024-01-25

## 2024-01-25 RX ORDER — ROPINIROLE 0.5 MG/1
0.5 TABLET, FILM COATED ORAL
Status: DISCONTINUED | OUTPATIENT
Start: 2024-01-25 | End: 2024-01-26 | Stop reason: HOSPADM

## 2024-01-25 RX ORDER — BISACODYL 10 MG
10 SUPPOSITORY, RECTAL RECTAL
Status: DISCONTINUED | OUTPATIENT
Start: 2024-01-25 | End: 2024-01-26 | Stop reason: HOSPADM

## 2024-01-25 RX ORDER — DIGOXIN 0.25 MG/ML
125 INJECTION INTRAMUSCULAR; INTRAVENOUS EVERY 6 HOURS
Status: DISPENSED | OUTPATIENT
Start: 2024-01-25 | End: 2024-01-26

## 2024-01-25 RX ORDER — DILTIAZEM HYDROCHLORIDE 240 MG/1
240 CAPSULE, COATED, EXTENDED RELEASE ORAL DAILY
Status: DISCONTINUED | OUTPATIENT
Start: 2024-01-26 | End: 2024-01-26 | Stop reason: HOSPADM

## 2024-01-25 RX ORDER — GABAPENTIN 300 MG/1
300 CAPSULE ORAL 3 TIMES DAILY
Status: DISCONTINUED | OUTPATIENT
Start: 2024-01-25 | End: 2024-01-26 | Stop reason: HOSPADM

## 2024-01-25 RX ORDER — CEFDINIR 300 MG/1
300 CAPSULE ORAL 2 TIMES DAILY
Status: ON HOLD | COMMUNITY
Start: 2024-01-10 | End: 2024-01-26

## 2024-01-25 RX ORDER — CARISOPRODOL 350 MG/1
350 TABLET ORAL
Status: DISCONTINUED | OUTPATIENT
Start: 2024-01-25 | End: 2024-01-26 | Stop reason: HOSPADM

## 2024-01-25 RX ORDER — AMOXICILLIN 250 MG
2 CAPSULE ORAL 2 TIMES DAILY
Status: DISCONTINUED | OUTPATIENT
Start: 2024-01-25 | End: 2024-01-26 | Stop reason: HOSPADM

## 2024-01-25 RX ORDER — POLYETHYLENE GLYCOL 3350 17 G/17G
1 POWDER, FOR SOLUTION ORAL
Status: DISCONTINUED | OUTPATIENT
Start: 2024-01-25 | End: 2024-01-26 | Stop reason: HOSPADM

## 2024-01-25 RX ORDER — ACETAMINOPHEN 325 MG/1
650 TABLET ORAL EVERY 6 HOURS PRN
Status: DISCONTINUED | OUTPATIENT
Start: 2024-01-25 | End: 2024-01-26 | Stop reason: HOSPADM

## 2024-01-25 RX ORDER — BACLOFEN 10 MG/1
10-20 TABLET ORAL
Status: DISCONTINUED | OUTPATIENT
Start: 2024-01-25 | End: 2024-01-26 | Stop reason: HOSPADM

## 2024-01-25 RX ORDER — LEVOTHYROXINE SODIUM 88 UG/1
88 TABLET ORAL
Status: DISCONTINUED | OUTPATIENT
Start: 2024-01-26 | End: 2024-01-26 | Stop reason: HOSPADM

## 2024-01-25 RX ORDER — DILTIAZEM HYDROCHLORIDE 5 MG/ML
10 INJECTION INTRAVENOUS ONCE
Status: COMPLETED | OUTPATIENT
Start: 2024-01-25 | End: 2024-01-25

## 2024-01-25 RX ADMIN — DIGOXIN 250 MCG: 0.25 INJECTION INTRAMUSCULAR; INTRAVENOUS at 16:17

## 2024-01-25 RX ADMIN — DILTIAZEM HYDROCHLORIDE 10 MG: 5 INJECTION INTRAVENOUS at 12:50

## 2024-01-25 RX ADMIN — DILTIAZEM HYDROCHLORIDE 10 MG: 5 INJECTION INTRAVENOUS at 14:02

## 2024-01-25 RX ADMIN — ROPINIROLE HYDROCHLORIDE 0.5 MG: 0.5 TABLET, FILM COATED ORAL at 20:47

## 2024-01-25 RX ADMIN — GABAPENTIN 300 MG: 300 CAPSULE ORAL at 20:47

## 2024-01-25 RX ADMIN — RIVAROXABAN 20 MG: 20 TABLET, FILM COATED ORAL at 17:07

## 2024-01-25 RX ADMIN — DOCUSATE SODIUM 50 MG AND SENNOSIDES 8.6 MG 2 TABLET: 8.6; 5 TABLET, FILM COATED ORAL at 17:10

## 2024-01-25 ASSESSMENT — COGNITIVE AND FUNCTIONAL STATUS - GENERAL
SUGGESTED CMS G CODE MODIFIER DAILY ACTIVITY: CH
MOBILITY SCORE: 24
DAILY ACTIVITIY SCORE: 24
DAILY ACTIVITIY SCORE: 24
SUGGESTED CMS G CODE MODIFIER DAILY ACTIVITY: CH
SUGGESTED CMS G CODE MODIFIER MOBILITY: CH

## 2024-01-25 ASSESSMENT — LIFESTYLE VARIABLES
ON A TYPICAL DAY WHEN YOU DRINK ALCOHOL HOW MANY DRINKS DO YOU HAVE: 0
HAVE PEOPLE ANNOYED YOU BY CRITICIZING YOUR DRINKING: NO
CONSUMPTION TOTAL: NEGATIVE
TOTAL SCORE: 0
TOTAL SCORE: 0
HOW MANY TIMES IN THE PAST YEAR HAVE YOU HAD 5 OR MORE DRINKS IN A DAY: 0
EVER HAD A DRINK FIRST THING IN THE MORNING TO STEADY YOUR NERVES TO GET RID OF A HANGOVER: NO
ALCOHOL_USE: YES
DOES PATIENT WANT TO STOP DRINKING: CANNOT ASSESS
HAVE YOU EVER FELT YOU SHOULD CUT DOWN ON YOUR DRINKING: NO
EVER FELT BAD OR GUILTY ABOUT YOUR DRINKING: NO
TOTAL SCORE: 0
AVERAGE NUMBER OF DAYS PER WEEK YOU HAVE A DRINK CONTAINING ALCOHOL: 1

## 2024-01-25 ASSESSMENT — PAIN DESCRIPTION - PAIN TYPE: TYPE: ACUTE PAIN

## 2024-01-25 ASSESSMENT — CHA2DS2 SCORE
PRIOR STROKE OR TIA OR THROMBOEMBOLISM: NO
AGE 65 TO 74: YES
CHF OR LEFT VENTRICULAR DYSFUNCTION: NO
SEX: FEMALE
DIABETES: NO
CHA2DS2 VASC SCORE: 3
AGE 75 OR GREATER: NO
VASCULAR DISEASE: NO
HYPERTENSION: YES

## 2024-01-25 ASSESSMENT — ENCOUNTER SYMPTOMS
COUGH: 0
LOSS OF CONSCIOUSNESS: 0
MYALGIAS: 0
ORTHOPNEA: 0
CHEST TIGHTNESS: 0
CONSTIPATION: 0
SPEECH CHANGE: 0
SHORTNESS OF BREATH: 0
STRIDOR: 0
DIZZINESS: 1
WEAKNESS: 1
WEAKNESS: 0
WHEEZING: 0
CHOKING: 0
FEVER: 0
VOMITING: 0
NAUSEA: 0
SHORTNESS OF BREATH: 1
HEMOPTYSIS: 0
WEIGHT LOSS: 0
BLURRED VISION: 0
PALPITATIONS: 0
DIARRHEA: 0
ABDOMINAL PAIN: 0
NECK PAIN: 0
DOUBLE VISION: 0
PHOTOPHOBIA: 0
SHORTNESS OF BREATH: 0
PALPITATIONS: 0
ORTHOPNEA: 0
CHILLS: 0
DIZZINESS: 0
CLAUDICATION: 0

## 2024-01-25 ASSESSMENT — PATIENT HEALTH QUESTIONNAIRE - PHQ9
SUM OF ALL RESPONSES TO PHQ9 QUESTIONS 1 AND 2: 0
2. FEELING DOWN, DEPRESSED, IRRITABLE, OR HOPELESS: NOT AT ALL
1. LITTLE INTEREST OR PLEASURE IN DOING THINGS: NOT AT ALL

## 2024-01-25 ASSESSMENT — FIBROSIS 4 INDEX
FIB4 SCORE: 3.32
FIB4 SCORE: 3.32
FIB4 SCORE: 1.85

## 2024-01-25 NOTE — ED TRIAGE NOTES
Ambulates to triage after an EKG  Chief Complaint   Patient presents with    Irregular Heart Beat     Dx with a-fib Friday, started on xarelto and diltiazem     Denies any CP, occasionally feels like she needs to stop and catch her breath. Feels a little dizzy.

## 2024-01-25 NOTE — ED NOTES
Rounded on pt. Pt resting in gurney with unlabored breathing.   at bedside.  Call light within reach.

## 2024-01-25 NOTE — ED NOTES
Pharmacy Medication Reconciliation    ~Med rec updated and complete per patient at bedside   ~Allergies have been verified and updated  ~Pt home pharmacy: [facility]      ~Patient reports that she completed a 7 day course of Omnicef that was started on 1/10/2024      ~Anticoagulants (rivaroxaban, apixaban, edoxaban, dabigatran, warfarin, enoxaparin) taken in the last 14 days? Yes  ~Anticoagulant: Xarelto, Last dose: 1/24/2024

## 2024-01-25 NOTE — ED PROVIDER NOTES
"ED Provider Note  CHIEF COMPLAINT  Chief Complaint   Patient presents with    Irregular Heart Beat     Dx with a-fib Friday, started on xarelto and diltiazem       HPI  Missy Stuart is a 68 y.o. female who presents for evaluation of rapid heart rate and low blood pressure.  This apparently happened at her cardiology follow-up today and she was told to come to the emergency department, possibly for \"electrical shock\" according to the patient.  Patient noted that she was feeling fine yesterday but woke up this morning feeling like she has less energy.  She noted no other specific symptoms and specifically denied chest pain, shortness of breath, nausea, or sweating.  She was diagnosed about a week ago with atrial fibrillation/flutter and started on Xarelto and diltiazem.  She was supposed to go on Eliquis initially however her insurance would not cover this.  EXTERNAL RECORDS REVIEWED  Reviewed office visit from today.  Their concern was for uncontrolled atrial flutter in the setting of low blood pressure.  They noted that the patient may benefit from KOTA/DCCV.  ROS  Constitutional: No fevers or chills  Skin: No rashes  HEENT: No sore throat, runny nose  Neck: No neck pain  Chest: No pain or rashes  Pulm: No shortness of breath, cough, wheezing, stridor, or pain with inspiration/expiration  Gastrointestinal: No nausea, vomiting, diarrhea, constipation, bloating, melena, hematochezia or abdominal pain.  Genitourinary: No dysuria or hematuria  Musculoskeletal: No pain, swelling, or weakness  Neurologic: No sensory or focal motor changes to extremities. No confusion or disorientation.  Heme: Taking Xarelto.  Immuno: No hx of recurrent infections        LIMITATION TO HISTORY   None  OUTSIDE HISTORIAN(S):  None        PAST FAM HISTORY  Family History   Problem Relation Age of Onset    Diabetes Mother     Stroke Maternal Grandfather        PAST MEDICAL HISTORY   has a past medical history of Cataract, Cervical pain " (2020), Dysfunction of right eustachian tube (2019), Eosinophilic esophagitis (2020), Hyperlipidemia (2020), Hypothyroidism (2018), and Restless legs (2018).    SOCIAL HISTORY  Social History     Tobacco Use    Smoking status: Former     Current packs/day: 0.00     Average packs/day: 0.5 packs/day for 8.0 years (4.0 ttl pk-yrs)     Types: Cigarettes     Start date: 1980     Quit date: 1988     Years since quittin.0    Smokeless tobacco: Never    Tobacco comments:     quit in her 20s    Vaping Use    Vaping Use: Never used   Substance and Sexual Activity    Alcohol use: Yes     Alcohol/week: 1.8 oz     Types: 3 Standard drinks or equivalent per week    Drug use: No    Sexual activity: Yes     Partners: Male     Comment:        SURGICAL HISTORY   has a past surgical history that includes breast augmentation with implant ( & ); cataract phaco with iol (2014); other orthopedic surgery ([id number]); thyroidectomy (); tube & ectopic preg., removal (); and eye lesion excision (Left, 3/2/2020).    CURRENT MEDICATIONS  Home Medications       Reviewed by Traci Marin (Pharmacy Tech) on 24 at 1556  Med List Status: Complete     Medication Last Dose Status   B Complex Vitamins (B COMPLEX PO) UNK Active   baclofen (LIORESAL) 10 MG Tab PRN Active   carisoprodol (SOMA) 350 MG Tab PRN Active   cefdinir (OMNICEF) 300 MG Cap 2024 Active   Cholecalciferol (VITAMIN D3 PO) 2024 Active   diclofenac sodium (VOLTAREN ARTHRITIS PAIN) 1 % Gel 2024 Active   dilTIAZem CD (CARDIZEM CD) 240 MG CAPSULE SR 24 HR 2024 Active   fluticasone (FLONASE) 50 MCG/ACT nasal spray 2024 Active   gabapentin (NEURONTIN) 300 MG Cap 2024 Active   levothyroxine (SYNTHROID) 88 MCG Tab 2024 Active   MAGNESIUM PO 2024 Active   Multiple Vitamins-Minerals (MULTIVITAMIN ADULT PO) 2024 Active   Multiple Vitamins-Minerals (ZINC PO) 2024  "Active   Omega-3 Fatty Acids (OMEGA 3 PO) 1/24/2024 Active   polyvinyl alcohol-povidone (REFRESH) 1.4-0.6 % ophthalmic solution PRN Active   POTASSIUM PO 1/24/2024 Active   rivaroxaban (XARELTO) 20 MG Tab tablet 1/25/2024 Active   ROPINIRole (REQUIP) 0.5 MG Tab 1/25/2024 Active                     ALLERGIES  Allergies   Allergen Reactions    Hydromorphone Hives    Codeine Vomiting and Nausea    Fentanyl Vomiting and Nausea    Doxycycline Unspecified     \"stiff joints\"    Hydrocodone Unspecified     Sweating      Oxycodone Vomiting       PHYSICAL EXAM  VITAL SIGNS: BP (!) 140/72   Pulse (!) 128   Temp 36.7 °C (98 °F) (Temporal)   Resp 20   Wt 55.6 kg (122 lb 9.2 oz)   LMP  (LMP Unknown)   SpO2 98%   BMI 18.10 kg/m²    Gen: Alert in no apparent distress.  HEENT: No signs of trauma, Bilateral external ears normal, Nose normal. Conjunctiva normal, Non-icteric.   Neck:  No tenderness, Supple, No masses  Lymphatic: No cervical lymphadenopathy noted.   Cardiovascular: Irregularly irregular tachycardia.  Capillary refill less than 3 seconds to all extremities, 2+ distal pulses.  Thorax & Lungs: Normal breath sounds, No respiratory distress, No wheezing bilateral chest rise  Abdomen: Bowel sounds normal, Soft, No tenderness, No masses, No pulsatile masses. No Guarding or rebound  Skin: Warm, Dry, No erythema, No rash noted to exposed areas.   Back: No bony tenderness, No CVA tenderness.   Extremities: Intact distal pulses, No edema  Neurologic: Alert , no facial droop, grossly normal coordination and strength  Psychiatric: Affect pleasant    INITIAL IMPRESSION  Patient arrives for evaluation of what appears to be tachycardia related to atrial flutter/fibrillation.  Rate appears generally fairly regular but she does occasionally have an irregular heart rate indicating a possible component of both.  She does not appear hemodynamically unstable but it is notable that it cardiology clinic she had hypotension.  Patient " notes only generalized weakness but no chest pain, shortness of breath, sweating, or dizziness.  She does endorse slight lightheadedness since this morning.  She notes that while the diagnosis of atrial flutter is recent, she may have been having episodes for some time.  We will attempt to control her with a small dose of diltiazem as her blood pressure is borderline.  She will need to be watched closely for hemodynamic deterioration and we will consider emergent cardioversion if this happens.  Will perform screening laboratory evaluation and chest x-ray as well.  Likely the patient will need to be admitted if we cannot control the rate or her blood pressure becomes too low with appropriate rate control.    ED observation? No    LABS  Results for orders placed or performed during the hospital encounter of 01/25/24   CBC WITH DIFFERENTIAL   Result Value Ref Range    WBC 4.3 (L) 4.8 - 10.8 K/uL    RBC 4.31 4.20 - 5.40 M/uL    Hemoglobin 14.4 12.0 - 16.0 g/dL    Hematocrit 44.6 37.0 - 47.0 %    .5 (H) 81.4 - 97.8 fL    MCH 33.4 (H) 27.0 - 33.0 pg    MCHC 32.3 32.2 - 35.5 g/dL    RDW 53.1 (H) 35.9 - 50.0 fL    Platelet Count 227 164 - 446 K/uL    MPV 9.8 9.0 - 12.9 fL    Neutrophils-Polys 61.80 44.00 - 72.00 %    Lymphocytes 26.80 22.00 - 41.00 %    Monocytes 8.80 0.00 - 13.40 %    Eosinophils 1.20 0.00 - 6.90 %    Basophils 1.20 0.00 - 1.80 %    Immature Granulocytes 0.20 0.00 - 0.90 %    Nucleated RBC 0.00 0.00 - 0.20 /100 WBC    Neutrophils (Absolute) 2.68 1.82 - 7.42 K/uL    Lymphs (Absolute) 1.16 1.00 - 4.80 K/uL    Monos (Absolute) 0.38 0.00 - 0.85 K/uL    Eos (Absolute) 0.05 0.00 - 0.51 K/uL    Baso (Absolute) 0.05 0.00 - 0.12 K/uL    Immature Granulocytes (abs) 0.01 0.00 - 0.11 K/uL    NRBC (Absolute) 0.00 K/uL   COMP METABOLIC PANEL   Result Value Ref Range    Sodium 139 135 - 145 mmol/L    Potassium 4.0 3.6 - 5.5 mmol/L    Chloride 103 96 - 112 mmol/L    Co2 21 20 - 33 mmol/L    Anion Gap 15.0 7.0 -  16.0    Glucose 91 65 - 99 mg/dL    Bun 18 8 - 22 mg/dL    Creatinine 0.69 0.50 - 1.40 mg/dL    Calcium 9.8 8.5 - 10.5 mg/dL    Correct Calcium 9.2 8.5 - 10.5 mg/dL    AST(SGOT) 29 12 - 45 U/L    ALT(SGPT) 22 2 - 50 U/L    Alkaline Phosphatase 65 30 - 99 U/L    Total Bilirubin 0.8 0.1 - 1.5 mg/dL    Albumin 4.8 3.2 - 4.9 g/dL    Total Protein 7.8 6.0 - 8.2 g/dL    Globulin 3.0 1.9 - 3.5 g/dL    A-G Ratio 1.6 g/dL   TROPONIN   Result Value Ref Range    Troponin T 9 6 - 19 ng/L   LACTIC ACID   Result Value Ref Range    Lactic Acid 1.5 0.5 - 2.0 mmol/L   proBrain Natriuretic Peptide, NT   Result Value Ref Range    NT-proBNP 188 (H) 0 - 125 pg/mL   PROTHROMBIN TIME (INR)   Result Value Ref Range    PT 16.1 (H) 12.0 - 14.6 sec    INR 1.28 (H) 0.87 - 1.13   APTT   Result Value Ref Range    APTT 32.8 24.7 - 36.0 sec   ESTIMATED GFR   Result Value Ref Range    GFR (CKD-EPI) 94 >60 mL/min/1.73 m 2   TSH   Result Value Ref Range    TSH 2.710 0.380 - 5.330 uIU/mL   Free Thyroxine (T4)   Result Value Ref Range    Free T-4 1.78 (H) 0.93 - 1.70 ng/dL   D-DIMER   Result Value Ref Range    D-Dimer 0.30 0.00 - 0.50 ug/mL (FEU)   EKG   Result Value Ref Range    Report       [facility] Emergency Dept.    Test Date:  2024  Pt Name:    SHALINI AGUIRRE                   Department: ER  MRN:        [id number]                      Room:  Gender:     Female                       Technician: [id number]  :        [id number]                   Requested By:ER TRIAGE PROTOCOL  Order #:    [id number]                    Reading MD:    Measurements  Intervals                                Axis  Rate:       103                          P:          0  FL:         0                            QRS:        29  QRSD:       85                           T:          71  QT:         341  QTc:        447    Interpretive Statements  Atrial flutter  Abnormal R-wave progression, early transition  Compared to ECG 2024 09:41:46  Sinus  tachycardia no longer present       I have independently interpreted this EKG  Atrial flutter with a rate of 103, AR interval not assessable.  It is slightly irregular with abnormal R wave progression.  It is very similar to previous EKG performed in the clinic this morning around 9:41 AM.  It is also similar to EKG performed on the 19th when she was admitted for the same issue.  I have independently interpreted the diagnostic imaging associated with this visit and am waiting the final reading from the radiologist.   My preliminary interpretation is a follows: Single view chest x-ray: No pulmonary opacities to suggest infiltrates or effusions.  Cardiomediastinal silhouette appears to be within normal limits.  RADIOLOGY  DX-CHEST-PORTABLE (1 VIEW)   Final Result         No acute cardiac or pulmonary abnormality is identified.        Critical Care Note  Upon my evaluation, this patient had high probability of imminent and life-threatening deterioration due to atrial flutter with rapid ventricular rate and intermittent hypotension., which required my direct attention, intervention, and personal management. I personally provided 35 minutes of critical care time exclusive of time spent on separately billable procedures. Time includes review of laboratory data, radiology results, discussion with consultants, and monitoring for potential decompensation.          COURSE & MEDICAL DECISION MAKING  Pertinent Labs & Imaging studies reviewed. (See chart for details)  1:59 PM  Patient did get some resolution of her tachycardia with first dose of diltiazem but she is now back up into the 120s.  She also briefly dropped down into the 90s systolic, but this also has recovered.  Will attempt another dose of diltiazem and then consider alternative medication such as digoxin.  it is likely that if the second dose of diltiazem does not effectively keep her heart rate down, she will need to be admitted for consideration of alternative  medication such as digoxin.    3:47 PM  Patient's blood pressure is staying above 100 systolic but her heart rate is still around 120.  It did briefly fall with each dose of diltiazem but this does not last longer than an hour.  I discussed options with the patient and I do not feel it safe to discharge her under these conditions and admitting her for further treatment and evaluation, especially by cardiology, is very reasonable at this point.  I feel there is a high chance for failure if she attempts to go home with a higher dose of diltiazem and she could also get hypotensive exacerbating the problem.  Case was discussed with Dr. Bridges, on-call cardiologist who agreed with treatment with digoxin.  He notes that he will review the chart to determine whether KOTA and DC cardioversion was appropriate for an in the hospital setting.  Case was then discussed with the hospitalist who stated understanding of the treatment initiated in the emergency department and will evaluate the patient for admission to the hospital.      I have discussed management of the patient with the following physicians and JAX's: Dr. Nash, cardiologist, Dr. Robles, hospitalist, pharmacist    Escalation of care considered, and ultimately not performed: None    Barriers to care at this time, including but not limited to: None.     Decision tools and Rx drugs considered including, but not limited to : Diltiazem, digoxin    Discussion of management with other [facility] or appropriate source(s): None      FINAL IMPRESSION  1. Atrial fibrillation with rapid ventricular response (HCC)        Electronically signed by: Forrest Tineo M.D., 1/25/2024 12:40 PM

## 2024-01-25 NOTE — ED NOTES
ERP updated that pt's HR remains in 110s-120s.  Orders received for additional dose of diltiazem.

## 2024-01-25 NOTE — ED NOTES
Bedside report with YONATHAN Jose.  Pt resting in [facility], connected to monitor.  Pt on room air, low fall risk.  Call light within reach.

## 2024-01-25 NOTE — ED NOTES
Pt ambulatory from lobby with stead gait.  Pt denies chest pain and reports minimal shortness of breath. Pt states she was sent by her cardiologist for further workup for A flutter.

## 2024-01-26 ENCOUNTER — TELEPHONE (OUTPATIENT)
Dept: CARDIOLOGY | Facility: MEDICAL CENTER | Age: 69
End: 2024-01-26

## 2024-01-26 ENCOUNTER — PHARMACY VISIT (OUTPATIENT)
Dept: PHARMACY | Facility: MEDICAL CENTER | Age: 69
End: 2024-01-26
Payer: COMMERCIAL

## 2024-01-26 ENCOUNTER — APPOINTMENT (OUTPATIENT)
Dept: MEDICAL GROUP | Facility: LAB | Age: 69
End: 2024-01-26
Payer: MEDICARE

## 2024-01-26 VITALS
RESPIRATION RATE: 20 BRPM | TEMPERATURE: 97.2 F | OXYGEN SATURATION: 97 % | HEART RATE: 98 BPM | BODY MASS INDEX: 17.65 KG/M2 | WEIGHT: 119.49 LBS | SYSTOLIC BLOOD PRESSURE: 108 MMHG | DIASTOLIC BLOOD PRESSURE: 65 MMHG

## 2024-01-26 PROBLEM — I48.91 ATRIAL FIBRILLATION WITH RVR (HCC): Status: RESOLVED | Noted: 2024-01-25 | Resolved: 2024-01-26

## 2024-01-26 LAB
ALBUMIN SERPL BCP-MCNC: 4 G/DL (ref 3.2–4.9)
ALBUMIN/GLOB SERPL: 1.6 G/DL
ALP SERPL-CCNC: 53 U/L (ref 30–99)
ALT SERPL-CCNC: 15 U/L (ref 2–50)
ANION GAP SERPL CALC-SCNC: 11 MMOL/L (ref 7–16)
AST SERPL-CCNC: 19 U/L (ref 12–45)
BASOPHILS # BLD AUTO: 1.5 % (ref 0–1.8)
BASOPHILS # BLD: 0.05 K/UL (ref 0–0.12)
BILIRUB SERPL-MCNC: 0.7 MG/DL (ref 0.1–1.5)
BUN SERPL-MCNC: 20 MG/DL (ref 8–22)
CALCIUM ALBUM COR SERPL-MCNC: 8.9 MG/DL (ref 8.5–10.5)
CALCIUM SERPL-MCNC: 8.9 MG/DL (ref 8.5–10.5)
CHLORIDE SERPL-SCNC: 106 MMOL/L (ref 96–112)
CO2 SERPL-SCNC: 22 MMOL/L (ref 20–33)
CREAT SERPL-MCNC: 0.72 MG/DL (ref 0.5–1.4)
EKG IMPRESSION: NORMAL
EKG IMPRESSION: NORMAL
EOSINOPHIL # BLD AUTO: 0.07 K/UL (ref 0–0.51)
EOSINOPHIL NFR BLD: 2 % (ref 0–6.9)
ERYTHROCYTE [DISTWIDTH] IN BLOOD BY AUTOMATED COUNT: 53.1 FL (ref 35.9–50)
GFR SERPLBLD CREATININE-BSD FMLA CKD-EPI: 91 ML/MIN/1.73 M 2
GLOBULIN SER CALC-MCNC: 2.5 G/DL (ref 1.9–3.5)
GLUCOSE SERPL-MCNC: 86 MG/DL (ref 65–99)
HCT VFR BLD AUTO: 38.5 % (ref 37–47)
HGB BLD-MCNC: 12.9 G/DL (ref 12–16)
IMM GRANULOCYTES # BLD AUTO: 0.01 K/UL (ref 0–0.11)
IMM GRANULOCYTES NFR BLD AUTO: 0.3 % (ref 0–0.9)
LYMPHOCYTES # BLD AUTO: 1.45 K/UL (ref 1–4.8)
LYMPHOCYTES NFR BLD: 42.3 % (ref 22–41)
MAGNESIUM SERPL-MCNC: 2.2 MG/DL (ref 1.5–2.5)
MCH RBC QN AUTO: 34.2 PG (ref 27–33)
MCHC RBC AUTO-ENTMCNC: 33.5 G/DL (ref 32.2–35.5)
MCV RBC AUTO: 102.1 FL (ref 81.4–97.8)
MONOCYTES # BLD AUTO: 0.47 K/UL (ref 0–0.85)
MONOCYTES NFR BLD AUTO: 13.7 % (ref 0–13.4)
NEUTROPHILS # BLD AUTO: 1.38 K/UL (ref 1.82–7.42)
NEUTROPHILS NFR BLD: 40.2 % (ref 44–72)
NRBC # BLD AUTO: 0 K/UL
NRBC BLD-RTO: 0 /100 WBC (ref 0–0.2)
PLATELET # BLD AUTO: 212 K/UL (ref 164–446)
PMV BLD AUTO: 9.8 FL (ref 9–12.9)
POTASSIUM SERPL-SCNC: 3.9 MMOL/L (ref 3.6–5.5)
PROT SERPL-MCNC: 6.5 G/DL (ref 6–8.2)
RBC # BLD AUTO: 3.77 M/UL (ref 4.2–5.4)
SODIUM SERPL-SCNC: 139 MMOL/L (ref 135–145)
WBC # BLD AUTO: 3.4 K/UL (ref 4.8–10.8)

## 2024-01-26 PROCEDURE — 83735 ASSAY OF MAGNESIUM: CPT

## 2024-01-26 PROCEDURE — RXMED WILLOW AMBULATORY MEDICATION CHARGE: Performed by: HOSPITALIST

## 2024-01-26 PROCEDURE — 93010 ELECTROCARDIOGRAM REPORT: CPT | Performed by: STUDENT IN AN ORGANIZED HEALTH CARE EDUCATION/TRAINING PROGRAM

## 2024-01-26 PROCEDURE — 93005 ELECTROCARDIOGRAM TRACING: CPT | Performed by: INTERNAL MEDICINE

## 2024-01-26 PROCEDURE — 700102 HCHG RX REV CODE 250 W/ 637 OVERRIDE(OP): Performed by: INTERNAL MEDICINE

## 2024-01-26 PROCEDURE — 99239 HOSP IP/OBS DSCHRG MGMT >30: CPT | Performed by: HOSPITALIST

## 2024-01-26 PROCEDURE — 99232 SBSQ HOSP IP/OBS MODERATE 35: CPT | Mod: FS | Performed by: INTERNAL MEDICINE

## 2024-01-26 PROCEDURE — 80053 COMPREHEN METABOLIC PANEL: CPT

## 2024-01-26 PROCEDURE — A9270 NON-COVERED ITEM OR SERVICE: HCPCS | Performed by: INTERNAL MEDICINE

## 2024-01-26 PROCEDURE — 93010 ELECTROCARDIOGRAM REPORT: CPT | Performed by: INTERNAL MEDICINE

## 2024-01-26 PROCEDURE — 85025 COMPLETE CBC W/AUTO DIFF WBC: CPT

## 2024-01-26 RX ORDER — BENZONATATE 100 MG/1
100 CAPSULE ORAL 3 TIMES DAILY PRN
Qty: 15 CAPSULE | Refills: 0 | Status: SHIPPED | OUTPATIENT
Start: 2024-01-26

## 2024-01-26 RX ADMIN — LEVOTHYROXINE SODIUM 88 MCG: 0.09 TABLET ORAL at 05:14

## 2024-01-26 RX ADMIN — DILTIAZEM HYDROCHLORIDE 240 MG: 240 CAPSULE, COATED, EXTENDED RELEASE ORAL at 05:14

## 2024-01-26 RX ADMIN — GABAPENTIN 300 MG: 300 CAPSULE ORAL at 10:12

## 2024-01-26 ASSESSMENT — ENCOUNTER SYMPTOMS
APNEA: 0
CHEST TIGHTNESS: 0
CHOKING: 0
WHEEZING: 0
SHORTNESS OF BREATH: 0
COUGH: 0
STRIDOR: 0

## 2024-01-26 NOTE — PROGRESS NOTES
Cardiology Follow Up Progress Note    Date of Service  1/26/2024    Attending Physician  CECILIA Paulino.*    Chief Complaint   Symptomatic A-fib flutter RVR    HPI    Missy Stuart is a 68 y.o. female with hypothyroid , recent COVID-19 virus infection (1/19/2024), recent hospitalization for new findings of atrial fibrillation with RVR 1/19/2024, on Xarelto 20 mg, dyslipidemia who was sent from cardiology office 1/25/2024 with symptomatic atrial flutter RVR and soft BP.     Interim Events    No overnight cardiac events  Telemetry-SR  Spontaneously reverted to SR this morning  SBP appropriate    Review of Systems  Review of Systems   Respiratory:  Negative for apnea, cough, choking, chest tightness, shortness of breath, wheezing and stridor.        Vital signs in last 24 hours  Temp:  [36 °C (96.8 °F)-36.4 °C (97.5 °F)] 36.2 °C (97.2 °F)  Pulse:  [] 98  Resp:  [14-22] 20  BP: ()/(58-89) 108/65  SpO2:  [93 %-99 %] 97 %    Physical Exam  Physical Exam  Cardiovascular:      Rate and Rhythm: Normal rate and regular rhythm.      Pulses: Normal pulses.   Pulmonary:      Effort: Pulmonary effort is normal.   Skin:     General: Skin is warm.   Neurological:      Mental Status: She is alert. Mental status is at baseline.   Psychiatric:         Mood and Affect: Mood normal.         Lab Review  Lab Results   Component Value Date/Time    WBC 3.4 (L) 01/26/2024 02:57 AM    RBC 3.77 (L) 01/26/2024 02:57 AM    HEMOGLOBIN 12.9 01/26/2024 02:57 AM    HEMATOCRIT 38.5 01/26/2024 02:57 AM    .1 (H) 01/26/2024 02:57 AM    MCH 34.2 (H) 01/26/2024 02:57 AM    MCHC 33.5 01/26/2024 02:57 AM    MPV 9.8 01/26/2024 02:57 AM      Lab Results   Component Value Date/Time    SODIUM 139 01/26/2024 02:57 AM    POTASSIUM 3.9 01/26/2024 02:57 AM    CHLORIDE 106 01/26/2024 02:57 AM    CO2 22 01/26/2024 02:57 AM    GLUCOSE 86 01/26/2024 02:57 AM    BUN 20 01/26/2024 02:57 AM    CREATININE 0.72 01/26/2024 02:57 AM      Lab  Results   Component Value Date/Time    ASTSGOT 19 01/26/2024 02:57 AM    ALTSGPT 15 01/26/2024 02:57 AM     Lab Results   Component Value Date/Time    CHOLSTRLTOT 221 (H) 01/20/2024 02:49 AM    LDL 92 01/20/2024 02:49 AM     01/20/2024 02:49 AM    TRIGLYCERIDE 65 01/20/2024 02:49 AM    TROPONINT 9 01/25/2024 12:38 PM       Recent Labs     01/25/24  1238   NTPROBNP 188*           Assessment/Plan    # A-fib/flutter RVR  # Recent diagnosis of A-fib/flutter, 1/19/2024  # Recent COVID-19 virus infection, 1/19/2024  # On OAC with Xarelto 20 mg daily  # On diltiazem  # LVEF 75% no valvular abnormalities.        -Patient back in SR this morning. Cancelled KOTA DCCV this am.  -Continue diltiazem to 40 daily.  -Continue rivaroxaban 20 mg daily.  -Follow-up in cardiology clinic/EP 2/7/2024 at 9 AM.     Cardiology will sign off  I personally spent a total of 15 minutes which includes face-to-face time and non-face-to-face time spent on preparing to see the patient, reviewing hospital notes and tests, obtaining history from the patient, performing a medically appropriate exam, counseling and educating the patient, ordering medications/tests/procedures/referrals as clinically indicated, and documenting information in the electronic medical record.     Thank you for allowing me to participate in the care of this patient.  Cardiology will sign off on this patient    Please contact me with any questions.    MINI El.   Cardiologist, [facility]  [phone]

## 2024-01-26 NOTE — PROGRESS NOTES
Received care of pt from NOC RN this am. Pt is A+O x 4. Pt w/o c/o. Called cardiology with pt and  at BS per pt and  request; pt and  able to ask cards aprn questions. Pt OK to DC per cardiology

## 2024-01-26 NOTE — PROGRESS NOTES
Report received from ED RN, pt arrived to floor with belongings, care assumed. Assessment complete, AAOx4, VSS, RA, tele box on and monitors notified. Bed in lowest position, call light in reach.

## 2024-01-26 NOTE — CONSULTS
Cardiology Initial Consultation    Date of Service  1/25/2024    Referring Physician  Dung Robles M.D.    Reason for Consultation  A-fib/flutter RVR, symptomatic    History of Presenting Illness  Missy Stuart is a 68 y.o. female with hypothyroid , recent COVID-19 virus infection (1/19/2024), recent hospitalization for new findings of atrial fibrillation with RVR 1/19/2024, on Xarelto 20 mg, dyslipidemia who was sent from cardiology office 1/25/2024 with symptomatic atrial flutter RVR and soft BP.    Labs reviewed normal TSH mildly elevated free T4,1.78.  Echo revealed hyperdynamic LV systolic function EF 79% no significant valvular abnormalities diastolic function difficult to assess with arrhythmia.    Review of Systems  Review of Systems   Respiratory:  Negative for cough, choking, chest tightness, shortness of breath, wheezing and stridor.        Past Medical History   has a past medical history of Cataract, Cervical pain (02/28/2020), Dysfunction of right eustachian tube (11/5/2019), Eosinophilic esophagitis (12/11/2020), Hyperlipidemia (02/28/2020), Hypothyroidism (11/19/2018), and Restless legs (11/19/2018).    Surgical History   has a past surgical history that includes pr breast augmentation with implant (1979 & 2018); cataract phaco with iol (5/21/2014); other orthopedic surgery (8998-2354); thyroidectomy (1983); tube & ectopic preg., removal (1987); and eye lesion excision (Left, 3/2/2020).    Family History  family history includes Diabetes in her mother; Stroke in her maternal grandfather.    Social History   reports that she quit smoking about 36 years ago. Her smoking use included cigarettes. She started smoking about 44 years ago. She has a 4.0 pack-year smoking history. She has never used smokeless tobacco. She reports current alcohol use of about 1.8 oz of alcohol per week. She reports that she does not use drugs.    Medications  Prior to Admission Medications   Prescriptions Last Dose  Informant Patient Reported? Taking?   B Complex Vitamins (B COMPLEX PO) UNK at UNK Patient Yes No   Sig: Take 1 Tablet by mouth every day.   Cholecalciferol (VITAMIN D3 PO) 1/24/2024 at AFTERNOON Patient Yes No   Sig: Take 1 Tablet by mouth every day.   MAGNESIUM PO 1/24/2024 at AFTERNOON Patient Yes No   Sig: Take 1 Tablet by mouth every day.   Multiple Vitamins-Minerals (MULTIVITAMIN ADULT PO) 1/25/2024 at 0800 Patient Yes No   Sig: Take 1 Tablet by mouth every day.   Multiple Vitamins-Minerals (ZINC PO) 1/24/2024 at AFTERNOON Patient Yes No   Sig: Take 1 Tab by mouth every day.   Omega-3 Fatty Acids (OMEGA 3 PO) 1/24/2024 at AFTERNOON Patient Yes No   Sig: Take 1 Capsule by mouth every day.   POTASSIUM PO 1/24/2024 at AFTERNOON Patient Yes No   Sig: Take 1 Tab by mouth every day. OTC   ROPINIRole (REQUIP) 0.5 MG Tab 1/25/2024 at 0300 Patient Yes No   Sig: Take 0.5 mg by mouth every evening.   baclofen (LIORESAL) 10 MG Tab PRN at PRN Patient Yes No   Sig: Take 10-20 mg by mouth 1 time a day as needed (PAIN).   carisoprodol (SOMA) 350 MG Tab PRN at PRN Patient Yes No   Sig: Take 350 mg by mouth 1 time a day as needed.   cefdinir (OMNICEF) 300 MG Cap 1/18/2024 at COMPLETE Patient Yes Yes   Sig: Take 300 mg by mouth 2 times a day. 7 days   diclofenac sodium (VOLTAREN ARTHRITIS PAIN) 1 % Gel 1/24/2024 at PRN Patient Yes Yes   Sig: Apply 2 g topically 3 times a day as needed (neck pain).   dilTIAZem CD (CARDIZEM CD) 240 MG CAPSULE SR 24 HR 1/25/2024 at 0630 Patient No No   Sig: Take 1 Capsule by mouth every day.   fluticasone (FLONASE) 50 MCG/ACT nasal spray 1/25/2024 at 0300 Patient No No   Sig: Administer 2 Sprays into affected nostril(S) at bedtime.   gabapentin (NEURONTIN) 300 MG Cap 1/24/2024 at PM Patient Yes No   Sig: Take 300 mg by mouth 3 times a day.   levothyroxine (SYNTHROID) 88 MCG Tab 1/25/2024 at 0730 Patient No No   Sig: Take 1 Tablet by mouth every morning on an empty stomach.   polyvinyl  "alcohol-povidone (REFRESH) 1.4-0.6 % ophthalmic solution PRN at PRN Patient Yes No   Sig: Administer 1-2 Drops into both eyes as needed (dry eye).   rivaroxaban (XARELTO) 20 MG Tab tablet 1/24/2024 at 1800 Patient No No   Sig: Take 1 Tablet by mouth with dinner.      Facility-Administered Medications: None       Allergies  Allergies   Allergen Reactions    Hydromorphone Hives    Codeine Vomiting and Nausea    Fentanyl Vomiting and Nausea    Doxycycline Unspecified     \"stiff joints\"    Hydrocodone Unspecified     Sweating      Oxycodone Vomiting       Vital signs in last 24 hours  Temp:  [36.7 °C (98 °F)] 36.7 °C (98 °F)  Pulse:  [] 114  Resp:  [16-22] 20  BP: ()/(58-89) 110/64  SpO2:  [93 %-99 %] 95 %    Physical Exam  Physical Exam  Cardiovascular:      Rate and Rhythm: Tachycardia present. Rhythm irregular.      Pulses: Normal pulses.   Pulmonary:      Effort: Pulmonary effort is normal.   Skin:     General: Skin is warm.   Neurological:      Mental Status: She is alert. Mental status is at baseline.   Psychiatric:         Mood and Affect: Mood normal.         Lab Review  Lab Results   Component Value Date/Time    WBC 4.3 (L) 01/25/2024 12:38 PM    RBC 4.31 01/25/2024 12:38 PM    HEMOGLOBIN 14.4 01/25/2024 12:38 PM    HEMATOCRIT 44.6 01/25/2024 12:38 PM    .5 (H) 01/25/2024 12:38 PM    MCH 33.4 (H) 01/25/2024 12:38 PM    MCHC 32.3 01/25/2024 12:38 PM    MPV 9.8 01/25/2024 12:38 PM      Lab Results   Component Value Date/Time    SODIUM 139 01/25/2024 12:38 PM    POTASSIUM 4.0 01/25/2024 12:38 PM    CHLORIDE 103 01/25/2024 12:38 PM    CO2 21 01/25/2024 12:38 PM    GLUCOSE 91 01/25/2024 12:38 PM    BUN 18 01/25/2024 12:38 PM    CREATININE 0.69 01/25/2024 12:38 PM      Lab Results   Component Value Date/Time    ASTSGOT 29 01/25/2024 12:38 PM    ALTSGPT 22 01/25/2024 12:38 PM     Lab Results   Component Value Date/Time    CHOLSTRLTOT 221 (H) 01/20/2024 02:49 AM    LDL 92 01/20/2024 02:49 AM    HDL " 116 01/20/2024 02:49 AM    TRIGLYCERIDE 65 01/20/2024 02:49 AM    TROPONINT 9 01/25/2024 12:38 PM       Recent Labs     01/25/24  1238   NTPROBNP 188*       Cardiac Imaging and Procedures Review  EKG:  My personal interpretation of the EKG dated  is     Echocardiogram:  1/20/24  Normal left ventricular chamber size. Normal left ventricular wall   thickness. Hyperdynamic left ventricular systolic function. The   ejection fraction is measured to be 79 % by Schmidt's biplane.   Diastolic function is difficult to assess with arrhythmia.          Imaging  Chest X-Ray:  No acute cardiac or pulmonary abnormality is identified.        Assessment/Plan    # A-fib/flutter RVR  # Recent diagnosis of A-fib/flutter, 1/19/2024  # Recent COVID-19 virus infection, 1/19/2024  # On OAC with Xarelto 20 mg daily      -Digoxin load.  -Continue diltiazem to 40 daily.  -Continue rivaroxaban 20 mg daily.  -Tentatively plan for KOTA cardioversion in the morning.  -Keep n.p.o. at midnight    Cardiology will follow along    I personally spent a total of 30 minutes which includes face-to-face time and non-face-to-face time spent on preparing to see the patient, reviewing hospital notes and tests, obtaining history from the patient, performing a medically appropriate exam, counseling and educating the patient, ordering medications/tests/procedures/referrals as clinically indicated, and documenting information in the electronic medical record.       Thank you for allowing me to participate in the care of this patient.    I will continue to follow this patient    Please contact me with any questions.    MINI El.   Cardiologist, [facility]  [phone]

## 2024-01-26 NOTE — TELEPHONE ENCOUNTER
----- Message from Pramod Evans M.D. sent at 1/26/2024  9:09 AM PST -----  Follow up please with EP docs for recurrent flutter out[ADEEL  , possible evaluation for ablation     Thx

## 2024-01-26 NOTE — ED NOTES
Given lean cuisine- ok to eat/drink until midnight. Pt aware of NPO plan after midnight. Resting in bed. Remains in a-fib on monitor- 120-128bpm.

## 2024-01-26 NOTE — CARE PLAN
The patient is Stable - Low risk of patient condition declining or worsening    Shift Goals  Clinical Goals: discharge?  Patient Goals: discharge?  Family Goals: LISSET    Progress made toward(s) clinical / shift goals:  discharging  Discussed pt's care with Hospitalist. Discussed pt's care with cards.      Problem: Knowledge Deficit - Standard  Goal: Patient and family/care givers will demonstrate understanding of plan of care, disease process/condition, diagnostic tests and medications  Outcome: Met     Problem: Psychosocial  Goal: Patient's level of anxiety will decrease  Outcome: Met  Goal: Patient's ability to verbalize feelings about condition will improve  Outcome: Met  Goal: Patient's ability to re-evaluate and adapt role responsibilities will improve  Outcome: Met  Goal: Patient and family will demonstrate ability to cope with life altering diagnosis and/or procedure  Outcome: Met  Goal: Spiritual and cultural needs incorporated into hospitalization  Outcome: Met     Problem: Communication  Goal: The ability to communicate needs accurately and effectively will improve  Outcome: Met     Problem: Hemodynamics  Goal: Patient's hemodynamics, fluid balance and neurologic status will be stable or improve  Outcome: Met     Problem: Respiratory  Goal: Patient will achieve/maintain optimum respiratory ventilation and gas exchange  Outcome: Met     Problem: Chest Tube Management  Goal: Complications related to chest tube will be avoided or minimized  Outcome: Met     Problem: Fluid Volume  Goal: Fluid volume balance will be maintained  Outcome: Met     Problem: Mechanical Ventilation  Goal: Safe management of artificial airway and ventilation  Outcome: Met  Goal: Successful weaning off mechanical ventilator, spontaneously maintains adequate gas exchange  Outcome: Met  Goal: Patient will be able to express needs and understand communication  Outcome: Met     Problem: Dysphagia  Goal: Dysphagia will improve  Outcome:  Met     Problem: Urinary Elimination  Goal: Establish and maintain regular urinary output  Outcome: Met     Problem: Bowel Elimination  Goal: Establish and maintain regular bowel function  Outcome: Met     Problem: Gastrointestinal Irritability  Goal: Nausea and vomiting will be absent or improve  Outcome: Met  Goal: Diarrhea will be absent or improved  Outcome: Met     Problem: Rectal Tube  Goal: Fecal output will be contained and skin will remain free from irritation  Outcome: Met     Problem: Mobility  Goal: Patient's capacity to carry out activities will improve  Outcome: Met     Problem: Self Care  Goal: Patient will have the ability to perform ADLs independently or with assistance (bathe, groom, dress, toilet and feed)  Outcome: Met     Problem: Infection - Standard  Goal: Patient will remain free from infection  Outcome: Met     Problem: Wound/ / Incision Healing  Goal: Patient's wound/surgical incision will decrease in size and heals properly  Outcome: Met

## 2024-01-26 NOTE — PROGRESS NOTES
Tele & PIV removed. Order placed to send pt to discharge lounge. Updated pt concerning plan of care.

## 2024-01-26 NOTE — PROGRESS NOTES
4 Eyes Skin Assessment Completed by YONATHAN Casillas and YONATHAN Guidry.    Head WDL  Ears red and blanching from mask  Nose WDL  Mouth WDL  Neck WDL  Breast/Chest WDL  Shoulder Blades WDL  Spine WDL  (R) Arm/Elbow/Hand WDL  (L) Arm/Elbow/Hand WDL  Abdomen WDL  Groin WDL  Scrotum/Coccyx/Buttocks WDL  (R) Leg WDL  (L) Leg WDL  (R) Heel/Foot/Toe WDL  (L) Heel/Foot/Toe WDL          Devices In Places Tele Box, Blood Pressure Cuff, and Pulse Ox      Interventions In Place Pillows and Pressure Redistribution Mattress    Possible Skin Injury No    Pictures Uploaded Into Epic N/A  Wound Consult Placed N/A  RN Wound Prevention Protocol Ordered No

## 2024-01-26 NOTE — ASSESSMENT & PLAN NOTE
Recently diagnosed a week ago  Continue telemetry  TSH and echo are normal and no history of alcoholism  Continue diltiazem hold Xarelto  Digoxin IV  Planning for cardioversion and KOTA tomorrow morning, n.p.o. at midnight

## 2024-01-26 NOTE — ED NOTES
Assumed care of patient. Pt remains in a-fib- rate 115-120bpm. Pt ambulatory with steady gait to restroom. Denies CP/dizziness at this time. Endorses some SOB. Updated on plan of care. ERP at bedside to update as well

## 2024-01-26 NOTE — CARE PLAN
The patient is Stable - Low risk of patient condition declining or worsening    Shift Goals  Clinical Goals: KOTA w/ cardioversion  Patient Goals: sleep, comfort  Family Goals: LISSET    Progress made toward(s) clinical / shift goals:      Problem: Knowledge Deficit - Standard  Goal: Patient and family/care givers will demonstrate understanding of plan of care, disease process/condition, diagnostic tests and medications  Outcome: Progressing     Problem: Hemodynamics  Goal: Patient's hemodynamics, fluid balance and neurologic status will be stable or improve  Outcome: Progressing       Patient is not progressing towards the following goals:

## 2024-01-26 NOTE — H&P
Hospital Medicine History & Physical Note    Date of Service  1/25/2024    Primary Care Physician  Sandra Carrion D.O.    Consultants  Cardiologist    Code Status  Full Code    Chief Complaint  Chief Complaint   Patient presents with    Irregular Heart Beat     Dx with a-fib Friday, started on xarelto and diltiazem       History of Presenting Illness    68-year-old female with history of A-fib and hypothyroidism with dyslipidemia who presented 1/25 for A-fib with RVR.  Recently patient was admitted to the hospital for A-fib and was discharged on Xarelto and diltiazem, patient was at cardiology clinic where they found A-fib with RVR and low blood pressure around 80, patient was transferred to the emergency department, patient was alert oriented x 4, denied any significant chest pain however she has some shortness of breath and generalized weakness, no fever or chills and no other symptoms.  EKG was done and showed A-fib with RVR, patient was evaluated by cardiologist who recommended admission to the hospital, digoxin IV and possible cardioversion tomorrow morning.      I discussed the plan of care with patient, bedside RN, and cardiologist .    Review of Systems  Review of Systems   Constitutional:  Positive for malaise/fatigue. Negative for chills, fever and weight loss.   HENT:  Negative for ear pain, hearing loss and tinnitus.    Eyes:  Negative for blurred vision, double vision and photophobia.   Respiratory:  Positive for shortness of breath. Negative for cough and hemoptysis.    Cardiovascular:  Negative for chest pain, palpitations, orthopnea and claudication.   Gastrointestinal:  Negative for abdominal pain, constipation, diarrhea, nausea and vomiting.   Genitourinary:  Negative for dysuria, frequency and urgency.   Musculoskeletal:  Negative for myalgias and neck pain.   Skin:  Negative for rash.   Neurological:  Negative for dizziness, speech change and weakness.       Past Medical History   has a past  "medical history of Cataract, Cervical pain (02/28/2020), Dysfunction of right eustachian tube (11/5/2019), Eosinophilic esophagitis (12/11/2020), Hyperlipidemia (02/28/2020), Hypothyroidism (11/19/2018), and Restless legs (11/19/2018).    Surgical History   has a past surgical history that includes pr breast augmentation with implant (1979 & 2018); cataract phaco with iol (5/21/2014); other orthopedic surgery (5692-4624); thyroidectomy (1983); tube & ectopic preg., removal (1987); and eye lesion excision (Left, 3/2/2020).     Family History  family history includes Diabetes in her mother; Stroke in her maternal grandfather.   Family history reviewed with patient. There is no family history that is pertinent to the chief complaint.     Social History   reports that she quit smoking about 36 years ago. Her smoking use included cigarettes. She started smoking about 44 years ago. She has a 4.0 pack-year smoking history. She has never used smokeless tobacco. She reports current alcohol use of about 1.8 oz of alcohol per week. She reports that she does not use drugs.    Allergies  Allergies   Allergen Reactions    Hydromorphone Hives    Codeine Vomiting and Nausea    Fentanyl Vomiting and Nausea    Doxycycline Unspecified     \"stiff joints\"    Hydrocodone Unspecified     Sweating      Oxycodone Vomiting       Medications  Prior to Admission Medications   Prescriptions Last Dose Informant Patient Reported? Taking?   B Complex Vitamins (B COMPLEX PO) UNK at UNK Patient Yes No   Sig: Take 1 Tablet by mouth every day.   Cholecalciferol (VITAMIN D3 PO) 1/24/2024 at AFTERNOON Patient Yes No   Sig: Take 1 Tablet by mouth every day.   MAGNESIUM PO 1/24/2024 at AFTERNOON Patient Yes No   Sig: Take 1 Tablet by mouth every day.   Multiple Vitamins-Minerals (MULTIVITAMIN ADULT PO) 1/25/2024 at 0800 Patient Yes No   Sig: Take 1 Tablet by mouth every day.   Multiple Vitamins-Minerals (ZINC PO) 1/24/2024 at AFTERNOON Patient Yes No "   Sig: Take 1 Tab by mouth every day.   Omega-3 Fatty Acids (OMEGA 3 PO) 1/24/2024 at AFTERNOON Patient Yes No   Sig: Take 1 Capsule by mouth every day.   POTASSIUM PO 1/24/2024 at AFTERNOON Patient Yes No   Sig: Take 1 Tab by mouth every day. OTC   ROPINIRole (REQUIP) 0.5 MG Tab 1/25/2024 at 0300 Patient Yes No   Sig: Take 0.5 mg by mouth every evening.   baclofen (LIORESAL) 10 MG Tab PRN at PRN Patient Yes No   Sig: Take 10-20 mg by mouth 1 time a day as needed (PAIN).   carisoprodol (SOMA) 350 MG Tab PRN at PRN Patient Yes No   Sig: Take 350 mg by mouth 1 time a day as needed.   cefdinir (OMNICEF) 300 MG Cap 1/18/2024 at COMPLETE Patient Yes Yes   Sig: Take 300 mg by mouth 2 times a day. 7 days   diclofenac sodium (VOLTAREN ARTHRITIS PAIN) 1 % Gel 1/24/2024 at PRN Patient Yes Yes   Sig: Apply 2 g topically 3 times a day as needed (neck pain).   dilTIAZem CD (CARDIZEM CD) 240 MG CAPSULE SR 24 HR 1/25/2024 at 0630 Patient No No   Sig: Take 1 Capsule by mouth every day.   fluticasone (FLONASE) 50 MCG/ACT nasal spray 1/25/2024 at 0300 Patient No No   Sig: Administer 2 Sprays into affected nostril(S) at bedtime.   gabapentin (NEURONTIN) 300 MG Cap 1/24/2024 at PM Patient Yes No   Sig: Take 300 mg by mouth 3 times a day.   levothyroxine (SYNTHROID) 88 MCG Tab 1/25/2024 at 0730 Patient No No   Sig: Take 1 Tablet by mouth every morning on an empty stomach.   polyvinyl alcohol-povidone (REFRESH) 1.4-0.6 % ophthalmic solution PRN at PRN Patient Yes No   Sig: Administer 1-2 Drops into both eyes as needed (dry eye).   rivaroxaban (XARELTO) 20 MG Tab tablet 1/24/2024 at 1800 Patient No No   Sig: Take 1 Tablet by mouth with dinner.      Facility-Administered Medications: None       Physical Exam  Temp:  [36.7 °C (98 °F)] 36.7 °C (98 °F)  Pulse:  [] 128  Resp:  [16-22] 20  BP: ()/(50-89) 140/72  SpO2:  [93 %-100 %] 98 %  Blood Pressure : 110/64   Temperature: 36.7 °C (98 °F)   Pulse: (!) 114   Respiration: 20    Pulse Oximetry: 95 %       Physical Exam  Constitutional:       Appearance: She is not ill-appearing.   HENT:      Head: Normocephalic and atraumatic.   Cardiovascular:      Rate and Rhythm: Tachycardia present. Rhythm irregular.      Heart sounds: No murmur heard.  Pulmonary:      Effort: No respiratory distress.      Breath sounds: No wheezing or rales.   Abdominal:      General: There is no distension.      Tenderness: There is no abdominal tenderness. There is no guarding.   Skin:     Coloration: Skin is not jaundiced.      Findings: No bruising, lesion or rash.   Neurological:      General: No focal deficit present.      Mental Status: She is oriented to person, place, and time. Mental status is at baseline.      Cranial Nerves: No cranial nerve deficit.      Motor: No weakness.         Laboratory:  Recent Labs     01/25/24  1238   WBC 4.3*   RBC 4.31   HEMOGLOBIN 14.4   HEMATOCRIT 44.6   .5*   MCH 33.4*   MCHC 32.3   RDW 53.1*   PLATELETCT 227   MPV 9.8     Recent Labs     01/25/24  1238   SODIUM 139   POTASSIUM 4.0   CHLORIDE 103   CO2 21   GLUCOSE 91   BUN 18   CREATININE 0.69   CALCIUM 9.8     Recent Labs     01/25/24  1238   ALTSGPT 22   ASTSGOT 29   ALKPHOSPHAT 65   TBILIRUBIN 0.8   GLUCOSE 91     Recent Labs     01/25/24  1238   APTT 32.8   INR 1.28*     Recent Labs     01/25/24  1238   NTPROBNP 188*         Recent Labs     01/25/24  1238   TROPONINT 9       Imaging:  DX-CHEST-PORTABLE (1 VIEW)   Final Result         No acute cardiac or pulmonary abnormality is identified.          X-Ray:  I have personally reviewed the images and compared with prior images.  EKG:  I have personally reviewed the images and compared with prior images.    Assessment/Plan:  Justification for Admission Status  I anticipate this patient will require at least two midnights for appropriate medical management, necessitating inpatient admission because A-fib with RVR    Patient will need a Telemetry bed on CARDIOLOGY  service .  The need is secondary to A-fib with RVR.    * Atrial fibrillation with RVR (HCC)- (present on admission)  Assessment & Plan  Recently diagnosed a week ago  Continue telemetry  TSH and echo are normal and no history of alcoholism  Continue diltiazem hold Xarelto  Digoxin IV  Planning for cardioversion and KOTA tomorrow morning, n.p.o. at midnight    Hypertension- (present on admission)  Assessment & Plan  Holding blood pressure medications      Macrocytosis without anemia- (present on admission)  Assessment & Plan  Normal TSH and B12  Close monitoring and possible referral to hematologist if needed    Acquired hypothyroidism- (present on admission)  Assessment & Plan  Continue home dose of levothyroxine 88 mcg daily  TSH is normal        VTE prophylaxis: SCDs/TEDs and therapeutic anticoagulation with Xarelto

## 2024-01-26 NOTE — PROGRESS NOTES
Pt was brought to discharge lounge. PT states having all belongings. Pt did not have any questions regarding discharge. Pt got meds via meds to beds.

## 2024-01-27 NOTE — DISCHARGE SUMMARY
Discharge Summary    CHIEF COMPLAINT ON ADMISSION  Chief Complaint   Patient presents with    Irregular Heart Beat     Dx with a-fib Friday, started on xarelto and diltiazem       Reason for Admission  sent by md     Admission Date  1/25/2024    CODE STATUS  Full code    HPI & HOSPITAL COURSE    Please see original H&P and consult note for [facility] information, patient was admitted due to A-fib with RVR, cardiology was consulted and plan was for possible cardioversion, patient overnight she went back to sinus rhythm, cardiology evaluated today and recommended to continue on Cardizem and oral anticoagulation and follow-up with EP as outpatient, patient is recovering from COVID, symptoms from COVID have improved, patient denies any fever chills, patient is alert oriented follows commands she denies any chest pain palpitations no dizziness no lightheadedness no focal weakness, I discussed case and plan of care with patient and patient's family, patient at this time is stable for discharge, she has expressed understanding of her plan of care and agree with detail questions have been answered.      Therefore, she is discharged in good and stable condition to home with close outpatient follow-up.    The patient recovered much more quickly than anticipated on admission.    Discharge Date  1/26/2024    FOLLOW UP ITEMS POST DISCHARGE  Primary care physician  Cardiology  EP    DISCHARGE DIAGNOSES  Principal Problem (Resolved):    Atrial fibrillation with RVR (HCC) (POA: Yes)  Active Problems:    Acquired hypothyroidism (POA: Yes)      Overview: S/p partial thyroidectomy in 1970s    Macrocytosis without anemia (POA: Yes)      Overview: b12 folate normal      4 drinks per week      bmi 19          Hypertension (POA: Yes)      FOLLOW UP  Future Appointments   Date Time Provider Department Center   2/7/2024  9:00 AM Cruz Lim M.D. [facility] None   5/6/2024 12:00 PM Issac Morales M.D. [facility] None     Sandra Carrion D.O.  [id number]  [address] [address]  [phone]    Schedule an appointment as soon as possible for a visit in 1 week(s)  follow up w/ your primary care provider      MEDICATIONS ON DISCHARGE     Medication List        START taking these medications        Instructions   benzonatate 100 MG Caps  Commonly known as: Tessalon   Take 1 Capsule by mouth 3 times a day as needed for Cough.  Dose: 100 mg            CONTINUE taking these medications        Instructions   B COMPLEX PO   Take 1 Tablet by mouth every day.  Dose: 1 Tablet     baclofen 10 MG Tabs  Commonly known as: Lioresal   Take 10-20 mg by mouth 1 time a day as needed (PAIN).  Dose: 10-20 mg     carisoprodol 350 MG Tabs  Commonly known as: Soma   Take 350 mg by mouth 1 time a day as needed.  Dose: 350 mg     dilTIAZem  MG Cp24  Commonly known as: Cardizem CD   Take 1 Capsule by mouth every day.  Dose: 240 mg     fluticasone 50 MCG/ACT nasal spray  Commonly known as: Flonase   Administer 2 Sprays into affected nostril(S) at bedtime.  Dose: 2 Spray     gabapentin 300 MG Caps  Commonly known as: Neurontin   Take 300 mg by mouth 3 times a day.  Dose: 300 mg     levothyroxine 88 MCG Tabs  Commonly known as: Synthroid   Take 1 Tablet by mouth every morning on an empty stomach.  Dose: 88 mcg     MAGNESIUM PO   Take 1 Tablet by mouth every day.  Dose: 1 Tablet     MULTIVITAMIN ADULT PO   Take 1 Tablet by mouth every day.  Dose: 1 Tablet     OMEGA 3 PO   Take 1 Capsule by mouth every day.  Dose: 1 Capsule     Refresh 1.4-0.6 % ophthalmic solution  Generic drug: polyvinyl alcohol-povidone   Administer 1-2 Drops into both eyes as needed (dry eye).  Dose: 1-2 Drop     rivaroxaban 20 MG Tabs tablet  Commonly known as: Xarelto   Take 1 Tablet by mouth with dinner.  Dose: 20 mg     ROPINIRole 0.5 MG Tabs  Commonly known as: Requip   Take 0.5 mg by mouth every evening.  Dose: 0.5 mg     VITAMIN D3 PO   Take 1 Tablet by mouth every day.  Dose: 1 Tablet    "  Voltaren Arthritis Pain 1 % Gel  Generic drug: diclofenac sodium   Apply 2 g topically 3 times a day as needed (neck pain).  Dose: 2 g            STOP taking these medications      cefdinir 300 MG Caps  Commonly known as: Omnicef     POTASSIUM PO     ZINC PO              Allergies  Allergies   Allergen Reactions    Hydromorphone Hives    Codeine Vomiting and Nausea    Fentanyl Vomiting and Nausea    Doxycycline Unspecified     \"stiff joints\"    Hydrocodone Unspecified     Sweating      Oxycodone Vomiting       DIET  No orders of the defined types were placed in this encounter.      ACTIVITY  As tolerated.  Weight bearing as tolerated    CONSULTATIONS  Cardiology    PROCEDURES  None    LABORATORY  Lab Results   Component Value Date    SODIUM 139 01/26/2024    POTASSIUM 3.9 01/26/2024    CHLORIDE 106 01/26/2024    CO2 22 01/26/2024    GLUCOSE 86 01/26/2024    BUN 20 01/26/2024    CREATININE 0.72 01/26/2024        Lab Results   Component Value Date    WBC 3.4 (L) 01/26/2024    HEMOGLOBIN 12.9 01/26/2024    HEMATOCRIT 38.5 01/26/2024    PLATELETCT 212 01/26/2024        Total time of the discharge process exceeds 31 minutes.  "

## 2024-01-29 ENCOUNTER — PATIENT OUTREACH (OUTPATIENT)
Dept: MEDICAL GROUP | Facility: LAB | Age: 69
End: 2024-01-29
Payer: MEDICARE

## 2024-01-29 NOTE — PROGRESS NOTES
Transitional Care Management  TCM Outreach Date and Time: Filed (1/29/2024 11:21 AM)    Discharge Questions  Actual Discharge Date: 01/26/24  Now that you are home, how are you feeling?: Fair (Doing well but she does feel as if she wakes up in the morning in AFib, which settles after taking her morning medications.  She is keeping a log with VS and information for her PCP and Cardiologist.  Denies SOB, CP, dizziness or lightheadedness.)  Did you receive any new prescriptions?: Yes  Were you able to get them filled?: Yes  Meds to Bed or Pharmacy filled?: Pharmacy  Do you have any questions about your current medications or new medications (Review Med Rec)?: No  Did you have any durable medical equipment ordered?: No  Do you have a follow up appointment scheduled with your PCP?: Yes  Appointment Date: 02/01/24  Appointment Time: 1420  Any issues or paperwork you wish to discuss with your PCP?: No  Are you (patient) able to get to the appointment?: Yes ( [facility] is her support system)  If Home Health was ordered, have they contacted you (Patient): Not Applicable  Did you have enough support after your last discharge?: Yes (Spouse)  Does this patient qualify for the CCM program?: No    Transitional Care  Number of attempts made to contact patient: 1  Current or previous attempts competed within two business days of discharge? : Yes  Provided education regarding treatment plan, medications, self-management, ADLs?: Yes (Reviewed expectations for AFib follow up with EP Cardiology; S/S for return to ER; answered all questions.)  Has patient completed an Advanced Directive?: Yes  Is the patient's advanced directive on file?: Yes  Has the Care Manager's phone number provided?: No  Is there anything else I can help you with?: No    Discharge Summary  Chief Complaint: MD sent to ER:  Irregular heart beat  Admitting Diagnosis: A Fib with RVR; S/P COVID  Discharge Diagnosis: A Fib with RVR: S/P COVID

## 2024-02-01 ENCOUNTER — OFFICE VISIT (OUTPATIENT)
Dept: MEDICAL GROUP | Facility: LAB | Age: 69
End: 2024-02-01
Payer: MEDICARE

## 2024-02-01 VITALS
HEIGHT: 69 IN | BODY MASS INDEX: 18.37 KG/M2 | WEIGHT: 124 LBS | DIASTOLIC BLOOD PRESSURE: 58 MMHG | TEMPERATURE: 98 F | SYSTOLIC BLOOD PRESSURE: 98 MMHG | HEART RATE: 92 BPM | OXYGEN SATURATION: 98 % | RESPIRATION RATE: 12 BRPM

## 2024-02-01 DIAGNOSIS — D70.9 NEUTROPENIA, UNSPECIFIED TYPE (HCC): ICD-10-CM

## 2024-02-01 DIAGNOSIS — I70.0 ATHEROSCLEROSIS OF AORTA (HCC): ICD-10-CM

## 2024-02-01 DIAGNOSIS — I48.0 PAROXYSMAL ATRIAL FIBRILLATION (HCC): ICD-10-CM

## 2024-02-01 DIAGNOSIS — Z09 HOSPITAL DISCHARGE FOLLOW-UP: Primary | ICD-10-CM

## 2024-02-01 PROBLEM — E78.00 ELEVATED LDL CHOLESTEROL LEVEL: Status: RESOLVED | Noted: 2019-03-07 | Resolved: 2024-02-01

## 2024-02-01 PROBLEM — U07.1 COVID-19 VIRUS INFECTION: Status: RESOLVED | Noted: 2024-01-19 | Resolved: 2024-02-01

## 2024-02-01 PROBLEM — K76.0 FATTY LIVER: Status: ACTIVE | Noted: 2024-02-01

## 2024-02-01 PROCEDURE — 99496 TRANSJ CARE MGMT HIGH F2F 7D: CPT | Performed by: STUDENT IN AN ORGANIZED HEALTH CARE EDUCATION/TRAINING PROGRAM

## 2024-02-01 PROCEDURE — 3078F DIAST BP <80 MM HG: CPT | Performed by: STUDENT IN AN ORGANIZED HEALTH CARE EDUCATION/TRAINING PROGRAM

## 2024-02-01 PROCEDURE — 3074F SYST BP LT 130 MM HG: CPT | Performed by: STUDENT IN AN ORGANIZED HEALTH CARE EDUCATION/TRAINING PROGRAM

## 2024-02-01 ASSESSMENT — FIBROSIS 4 INDEX: FIB4 SCORE: 1.57

## 2024-02-01 NOTE — PROGRESS NOTES
Transitional Care Management  TCM Outreach Date and Time: Filed (1/29/2024 11:21 AM)     Discharge Questions  Actual Discharge Date: 01/26/24  Now that you are home, how are you feeling?: Fair (Doing well but she does feel as if she wakes up in the morning in AFib, which settles after taking her morning medications.  She is keeping a log with VS and information for her PCP and Cardiologist.  Denies SOB, CP, dizziness or lightheadedness.)  Did you receive any new prescriptions?: Yes  Were you able to get them filled?: Yes  Meds to Bed or Pharmacy filled?: Pharmacy  Do you have any questions about your current medications or new medications (Review Med Rec)?: No  Did you have any durable medical equipment ordered?: No  Do you have a follow up appointment scheduled with your PCP?: Yes  Appointment Date: 02/01/24  Appointment Time: 1420  Any issues or paperwork you wish to discuss with your PCP?: No  Are you (patient) able to get to the appointment?: Yes ( [employer] is her support system)  If Home Health was ordered, have they contacted you (Patient): Not Applicable  Did you have enough support after your last discharge?: Yes (Spouse)  Does this patient qualify for the CCM program?: No     Transitional Care  Number of attempts made to contact patient: 1  Current or previous attempts competed within two business days of discharge? : Yes  Provided education regarding treatment plan, medications, self-management, ADLs?: Yes (Reviewed expectations for AFib follow up with EP Cardiology; S/S for return to ER; answered all questions.)  Has patient completed an Advanced Directive?: Yes  Is the patient's advanced directive on file?: Yes  Has the Care Manager's phone number provided?: No  Is there anything else I can help you with?: No     Discharge Summary  Chief Complaint: MD sent to ER:  Irregular heart beat  Admitting Diagnosis: A Fib with RVR; S/P COVID  Discharge Diagnosis: A Fib with RVR: S/P COVID    Subjective:      Missy Stuart is a 68 y.o. female who presents for Hospital Follow-up.    HPI:   Recently hospitalized for COVID-19 and atrial fibrillation with rapid ventricular rate.    Since her last visit patient has had 2 hospital admissions first 1/19-21/2024 and overnight 1/25-1/26/2024 after she was seen in an outpatient setting with cardiology. Patient has a upcoming appointment with electrophysiologist 2/7.    Patient has been logging her blood pressure and heart rate.  Her heart rate initially upon getting home was 120 but otherwise has been well-controlled less than 100 aside from 1 day it was 102.  Taking diltiazem every morning, 0700 today. Has not had any palpitations, chest pain, or shortness of breath.  Minimal cough, did take Tessalon the other day but without sputum production, fevers or chills.  Patient's blood pressure this morning prior to diltiazem 110/66, no hypotension at home.  Feeling improved from prior.    Tolerating Xarelto without hematuria, epistaxis or blood in the stool.  Has not needed any of her muscle relaxers and plans to hold off on them for now.  Continues with her other medications as prescribed.    Current medicines (including reconciliation performed today)  Current Outpatient Medications   Medication Sig Dispense Refill    benzonatate (TESSALON) 100 MG Cap Take 1 Capsule by mouth 3 times a day as needed for Cough. 15 Capsule 0    diclofenac sodium (VOLTAREN ARTHRITIS PAIN) 1 % Gel Apply 2 g topically 3 times a day as needed (neck pain).      dilTIAZem CD (CARDIZEM CD) 240 MG CAPSULE SR 24 HR Take 1 Capsule by mouth every day. 30 Capsule 0    rivaroxaban (XARELTO) 20 MG Tab tablet Take 1 Tablet by mouth with dinner. 30 Tablet 0    levothyroxine (SYNTHROID) 88 MCG Tab Take 1 Tablet by mouth every morning on an empty stomach. 90 Tablet 3    ROPINIRole (REQUIP) 0.5 MG Tab Take 0.5 mg by mouth every evening.      polyvinyl alcohol-povidone (REFRESH) 1.4-0.6 % ophthalmic solution  Administer 1-2 Drops into both eyes as needed (dry eye).      fluticasone (FLONASE) 50 MCG/ACT nasal spray Administer 2 Sprays into affected nostril(S) at bedtime. 16 g 1    gabapentin (NEURONTIN) 300 MG Cap Take 300 mg by mouth 3 times a day.      Omega-3 Fatty Acids (OMEGA 3 PO) Take 1 Capsule by mouth every day.      MAGNESIUM PO Take 1 Tablet by mouth every day.      Cholecalciferol (VITAMIN D3 PO) Take 1 Tablet by mouth every day.      Multiple Vitamins-Minerals (MULTIVITAMIN ADULT PO) Take 1 Tablet by mouth every day.      carisoprodol (SOMA) 350 MG Tab Take 350 mg by mouth 1 time a day as needed.      baclofen (LIORESAL) 10 MG Tab Take 10-20 mg by mouth 1 time a day as needed (PAIN).      B Complex Vitamins (B COMPLEX PO) Take 1 Tablet by mouth every day.       No current facility-administered medications for this visit.     Allergies:   Hydromorphone, Codeine, Fentanyl, Doxycycline, Hydrocodone, and Oxycodone    Social History     Tobacco Use    Smoking status: Former     Current packs/day: 0.00     Average packs/day: 0.5 packs/day for 8.0 years (4.0 ttl pk-yrs)     Types: Cigarettes     Start date: 1980     Quit date: 1988     Years since quittin.1    Smokeless tobacco: Never    Tobacco comments:     quit in her 20s    Vaping Use    Vaping Use: Never used   Substance Use Topics    Alcohol use: Yes     Alcohol/week: 1.8 oz     Types: 3 Standard drinks or equivalent per week    Drug use: No     Review of Systems   Constitutional:  Negative for chills, fever, malaise/fatigue and weight loss.   HENT:  Negative for nosebleeds.    Respiratory:  Positive for cough. Negative for sputum production, shortness of breath and wheezing.    Cardiovascular:  Negative for chest pain, palpitations and leg swelling.   Gastrointestinal:  Negative for abdominal pain, blood in stool, constipation, diarrhea, nausea and vomiting.   Genitourinary:  Negative for hematuria.   Skin:  Negative for rash.   Neurological:   "Negative for dizziness and headaches.   Endo/Heme/Allergies:  Does not bruise/bleed easily.       Objective:     Vitals:    02/01/24 1414 02/01/24 1455   BP: (!) 82/58 98/58   BP Location: Left arm Left arm   Patient Position: Sitting Sitting   BP Cuff Size: Adult Adult   Pulse: 92    Resp: 12    Temp: 36.7 °C (98 °F)    SpO2: 98%    Weight: 56.2 kg (124 lb)    Height: 1.753 m (5' 9\")      Body mass index is 18.31 kg/m².    Physical Exam  Vitals reviewed.   Constitutional:       General: She is not in acute distress.     Appearance: Normal appearance. She is underweight. She is not ill-appearing.   HENT:      Head: Normocephalic and atraumatic.      Nose: Nose normal.      Mouth/Throat:      Mouth: Mucous membranes are moist.   Eyes:      General: No scleral icterus.     Conjunctiva/sclera: Conjunctivae normal.   Cardiovascular:      Rate and Rhythm: Normal rate and regular rhythm.      Heart sounds: Normal heart sounds. No murmur heard.  Pulmonary:      Effort: Pulmonary effort is normal. No respiratory distress.      Breath sounds: Normal breath sounds. No stridor. No wheezing, rhonchi or rales.   Abdominal:      General: Abdomen is flat. Bowel sounds are normal. There is no distension.      Palpations: Abdomen is soft. There is no mass.      Tenderness: There is no abdominal tenderness. There is no guarding or rebound.   Musculoskeletal:      Cervical back: Normal range of motion and neck supple. No rigidity or tenderness.      Right lower leg: No edema.      Left lower leg: No edema.   Skin:     General: Skin is warm and dry.      Coloration: Skin is not jaundiced.   Neurological:      General: No focal deficit present.      Mental Status: She is alert and oriented to person, place, and time.   Psychiatric:         Mood and Affect: Mood normal.         Behavior: Behavior normal.         Thought Content: Thought content normal.     Labs: Reviewed from 1/20/2024, 1/25/2024, 1/26/2024    Assessment and Plan:   1. " Hospital discharge follow-up  - Chart and discharge summary were reviewed.   - Hospitalization and results reviewed with patient.   - Medications reviewed including instructions regarding high risk medications, dosing and side effects.  - Recommended Services: Cardiology/electrophysiology-appointment pending next week.  Hematology-appointment scheduled in May.  - Advance directive/POLST on file?  Yes    2. Paroxysmal atrial fibrillation (HCC)  Acute in the setting of COVID-19 with resolved RVR.  Aside from viral illness no other known trigger, no valvular heart disease.  Discussed pathophysiology and reasoning behind rate control/anticoagulation.  Patient continues with diltiazem with hold parameters per cardiology and Xarelto.  Has an upcoming appointment with electrophysiology to discuss potentially more definitive treatment next week.  If long-term anticoagulation needed, would consider referral to anticoagulation monitoring.    dilTIAZem CD (CARDIZEM CD) 240 MG CAPSULE SR 24 HR, Take 1 Capsule by mouth every day., Disp: 30 Capsule, Rfl: 0    rivaroxaban (XARELTO) 20 MG Tab tablet, Take 1 Tablet by mouth with dinner., Disp: 30 Tablet, Rfl: 0    3. Atherosclerosis of aorta (HCC)  Incidental finding on CT chest to rule out PE, mild.  LDL normalized, HDL quite high at 116.  Anticoagulated currently with Xarelto, no indication at this time for statin/aspirin.    4. Neutropenia, unspecified type (HCC)  Acute in patient with chronic leukopenia,mild.  Patient has had mild neutropenia in the past approximately 3 years ago.  Chronic macrocytosis of unclear etiology as well.  Repeat CBC prior to appointment with hematology in May.  - CBC WITH DIFFERENTIAL; Future    Follow-up:Return in about 3 months (around 5/1/2024), or if symptoms worsen or fail to improve, for Annual Medicare Visit.

## 2024-02-02 ASSESSMENT — ENCOUNTER SYMPTOMS
SHORTNESS OF BREATH: 0
ABDOMINAL PAIN: 0
DIARRHEA: 0
BRUISES/BLEEDS EASILY: 0
VOMITING: 0
NAUSEA: 0
SPUTUM PRODUCTION: 0
BLOOD IN STOOL: 0
WEIGHT LOSS: 0
CHILLS: 0
WHEEZING: 0
HEADACHES: 0
DIZZINESS: 0
COUGH: 1
PALPITATIONS: 0
FEVER: 0
CONSTIPATION: 0

## 2024-02-07 ENCOUNTER — OFFICE VISIT (OUTPATIENT)
Dept: CARDIOLOGY | Facility: MEDICAL CENTER | Age: 69
End: 2024-02-07
Attending: INTERNAL MEDICINE
Payer: MEDICARE

## 2024-02-07 VITALS
OXYGEN SATURATION: 100 % | RESPIRATION RATE: 14 BRPM | DIASTOLIC BLOOD PRESSURE: 58 MMHG | HEART RATE: 96 BPM | WEIGHT: 123 LBS | HEIGHT: 69 IN | SYSTOLIC BLOOD PRESSURE: 100 MMHG | BODY MASS INDEX: 18.22 KG/M2

## 2024-02-07 DIAGNOSIS — E03.9 ACQUIRED HYPOTHYROIDISM: ICD-10-CM

## 2024-02-07 DIAGNOSIS — I48.3 TYPICAL ATRIAL FLUTTER (HCC): ICD-10-CM

## 2024-02-07 DIAGNOSIS — Z79.01 ANTICOAGULATED: ICD-10-CM

## 2024-02-07 DIAGNOSIS — I48.0 PAROXYSMAL ATRIAL FIBRILLATION (HCC): ICD-10-CM

## 2024-02-07 DIAGNOSIS — I15.9 SECONDARY HYPERTENSION: ICD-10-CM

## 2024-02-07 PROBLEM — I95.2 HYPOTENSION DUE TO DRUGS: Status: RESOLVED | Noted: 2019-11-05 | Resolved: 2024-02-07

## 2024-02-07 LAB — EKG IMPRESSION: NORMAL

## 2024-02-07 PROCEDURE — 93010 ELECTROCARDIOGRAM REPORT: CPT | Performed by: INTERNAL MEDICINE

## 2024-02-07 PROCEDURE — 99213 OFFICE O/P EST LOW 20 MIN: CPT | Performed by: INTERNAL MEDICINE

## 2024-02-07 PROCEDURE — 93005 ELECTROCARDIOGRAM TRACING: CPT | Performed by: INTERNAL MEDICINE

## 2024-02-07 PROCEDURE — 99204 OFFICE O/P NEW MOD 45 MIN: CPT | Performed by: INTERNAL MEDICINE

## 2024-02-07 PROCEDURE — 3078F DIAST BP <80 MM HG: CPT | Performed by: INTERNAL MEDICINE

## 2024-02-07 PROCEDURE — 3074F SYST BP LT 130 MM HG: CPT | Performed by: INTERNAL MEDICINE

## 2024-02-07 RX ORDER — DILTIAZEM HYDROCHLORIDE 240 MG/1
240 CAPSULE, COATED, EXTENDED RELEASE ORAL DAILY
Qty: 90 CAPSULE | Refills: 3 | Status: SHIPPED | OUTPATIENT
Start: 2024-02-07

## 2024-02-07 ASSESSMENT — FIBROSIS 4 INDEX: FIB4 SCORE: 1.57

## 2024-02-07 NOTE — PROGRESS NOTES
Chief Complaint   Patient presents with    Atrial Fibrillation     F/V Dx: Atrial fibrillation, unspecified type (HCC)       Subjective     Missy Stuart is a 68 y.o. female who presents today with recent hospitalization for paroxysmal atrial fibrillation atrial flutter.  Seen by Rose and sent to the emergency room.  Scheduled for cardioversion but converted spontaneously.  On diltiazem.  On Xarelto.  Has not been tested for sleep apnea.  On thyroid replacement.  No smoking or alcohol use.  Retired.  Normal echocardiogram.  Did have COVID during the hospitalization.    Past Medical History:   Diagnosis Date    Cataract     removed bilat    Cervical pain 02/28/2020    neck, 0-1/10    COVID-19 virus infection 01/19/2024    Dysfunction of right eustachian tube 11/05/2019    Discussed creating slight pressure in the eustachian tube to open them up and allow the ears to drain.  Discussed continuing nasal spray.  As well as adding an allergy medication such as loratadine to her regimen.  I did not see any fluid in the ear on exam but there still could be fluid in there.  We discussed all of this in the visit today.    Elevated LDL cholesterol level 03/07/2019    Eosinophilic esophagitis 12/11/2020    Hyperlipidemia 02/28/2020    on no meds at this time    Hypothyroidism 11/19/2018    Restless legs 11/19/2018     Past Surgical History:   Procedure Laterality Date    EYE LESION EXCISION Left 3/2/2020    Procedure: EXCISION, LESION, EYE - UPPER LID W/POSS PLACEMENT OF MINI MONKA STENT;  Surgeon: Jaime Pastrana M.D.;  Location: SURGERY SAME DAY [facility];  Service: Ophthalmology    CATARACT PHACO WITH IOL  5/21/2014    Performed by Fadi Morales M.D. at SURGERY SAME DAY [facility]    TUBE & ECTOPIC PREG., REMOVAL  1987    THYROIDECTOMY  1983    partial    OTHER ORTHOPEDIC SURGERY  [id number]    neck fusion    FL BREAST AUGMENTATION WITH IMPLANT  1979 & 2018     Family History   Problem Relation Age of Onset     "Diabetes Mother     Stroke Maternal Grandfather      Social History     Socioeconomic History    Marital status:      Spouse name: Not on file    Number of children: Not on file    Years of education: Not on file    Highest education level: Not on file   Occupational History    Not on file   Tobacco Use    Smoking status: Former     Current packs/day: 0.00     Average packs/day: 0.5 packs/day for 8.0 years (4.0 ttl pk-yrs)     Types: Cigarettes     Start date: 1980     Quit date: 1988     Years since quittin.1    Smokeless tobacco: Never    Tobacco comments:     quit in her 20s    Vaping Use    Vaping Use: Never used   Substance and Sexual Activity    Alcohol use: Not Currently    Drug use: No    Sexual activity: Yes     Partners: Male     Comment:    Other Topics Concern    Not on file   Social History Narrative    Retired dental hygiene      Social Determinants of Health     Financial Resource Strain: Not on file   Food Insecurity: Not on file   Transportation Needs: Not on file   Physical Activity: Not on file   Stress: Not on file   Social Connections: Not on file   Intimate Partner Violence: Not on file   Housing Stability: Not on file     Allergies   Allergen Reactions    Hydromorphone Hives    Codeine Vomiting and Nausea    Fentanyl Vomiting and Nausea    Doxycycline Unspecified     \"stiff joints\"    Hydrocodone Unspecified     Sweating      Oxycodone Vomiting     Outpatient Encounter Medications as of 2024   Medication Sig Dispense Refill    dilTIAZem CD (CARDIZEM CD) 240 MG CAPSULE SR 24 HR Take 1 Capsule by mouth every day. 90 Capsule 3    rivaroxaban (XARELTO) 20 MG Tab tablet Take 1 Tablet by mouth with dinner. 90 Tablet 3    benzonatate (TESSALON) 100 MG Cap Take 1 Capsule by mouth 3 times a day as needed for Cough. 15 Capsule 0    diclofenac sodium (VOLTAREN ARTHRITIS PAIN) 1 % Gel Apply 2 g topically 3 times a day as needed (neck pain).      levothyroxine (SYNTHROID) 88 " "MCG Tab Take 1 Tablet by mouth every morning on an empty stomach. 90 Tablet 3    ROPINIRole (REQUIP) 0.5 MG Tab Take 0.5 mg by mouth every evening.      polyvinyl alcohol-povidone (REFRESH) 1.4-0.6 % ophthalmic solution Administer 1-2 Drops into both eyes as needed (dry eye).      fluticasone (FLONASE) 50 MCG/ACT nasal spray Administer 2 Sprays into affected nostril(S) at bedtime. 16 g 1    carisoprodol (SOMA) 350 MG Tab Take 350 mg by mouth 1 time a day as needed.      baclofen (LIORESAL) 10 MG Tab Take 10-20 mg by mouth 1 time a day as needed (PAIN).      gabapentin (NEURONTIN) 300 MG Cap Take 300 mg by mouth 3 times a day.      Omega-3 Fatty Acids (OMEGA 3 PO) Take 1 Capsule by mouth every day.      MAGNESIUM PO Take 1 Tablet by mouth every day.      Cholecalciferol (VITAMIN D3 PO) Take 1 Tablet by mouth every day.      B Complex Vitamins (B COMPLEX PO) Take 1 Tablet by mouth every day.      [DISCONTINUED] dilTIAZem CD (CARDIZEM CD) 240 MG CAPSULE SR 24 HR Take 1 Capsule by mouth every day. 30 Capsule 0    [DISCONTINUED] rivaroxaban (XARELTO) 20 MG Tab tablet Take 1 Tablet by mouth with dinner. 30 Tablet 0    [DISCONTINUED] Multiple Vitamins-Minerals (MULTIVITAMIN ADULT PO) Take 1 Tablet by mouth every day. (Patient not taking: Reported on 2/7/2024)       No facility-administered encounter medications on file as of 2/7/2024.     ROS           Objective     /58 (BP Location: Left arm, Patient Position: Sitting, BP Cuff Size: Adult)   Pulse 96   Resp 14   Ht 1.753 m (5' 9\")   Wt 55.8 kg (123 lb)   LMP  (LMP Unknown)   SpO2 100%   BMI 18.16 kg/m²     Physical Exam  Constitutional:       Appearance: She is well-developed.      Comments: Thin   HENT:      Head: Normocephalic and atraumatic.   Eyes:      Pupils: Pupils are equal, round, and reactive to light.   Cardiovascular:      Rate and Rhythm: Normal rate and regular rhythm.      Heart sounds: Normal heart sounds. No murmur heard.     No friction rub. " No gallop.   Pulmonary:      Effort: Pulmonary effort is normal.      Breath sounds: Normal breath sounds.   Abdominal:      General: Bowel sounds are normal.      Palpations: Abdomen is soft.   Musculoskeletal:         General: Normal range of motion.      Cervical back: Normal range of motion and neck supple.   Skin:     General: Skin is warm.   Neurological:      Mental Status: She is alert and oriented to person, place, and time.      Cranial Nerves: No cranial nerve deficit.   Psychiatric:         Behavior: Behavior normal.         Thought Content: Thought content normal.         Judgment: Judgment normal.                Assessment & Plan     1. Paroxysmal atrial fibrillation (HCC)  EKG    Holter Monitor Study    dilTIAZem CD (CARDIZEM CD) 240 MG CAPSULE SR 24 HR    rivaroxaban (XARELTO) 20 MG Tab tablet      2. Secondary hypertension        3. Acquired hypothyroidism        4. Typical atrial flutter (HCC)        5. Anticoagulated            Medical Decision Making: Today's Assessment/Status/Plan:   1.  Paroxysmal atrial fibrillation atrial flutter.  May be having some breakthrough check Zio patch.  Continue Cardizem.  2.  CHADS-VASC score 2.  Continue Xarelto for now.  3.  Patient deferred on sleep study.  4.  Follow-up in 3 months.

## 2024-02-08 ENCOUNTER — NON-PROVIDER VISIT (OUTPATIENT)
Dept: CARDIOLOGY | Facility: MEDICAL CENTER | Age: 69
End: 2024-02-08
Attending: INTERNAL MEDICINE
Payer: MEDICARE

## 2024-02-08 DIAGNOSIS — I47.10 SVT (SUPRAVENTRICULAR TACHYCARDIA) (HCC): ICD-10-CM

## 2024-02-08 DIAGNOSIS — I49.8 VENTRICULAR TRIGEMINY: ICD-10-CM

## 2024-02-08 DIAGNOSIS — I48.0 PAROXYSMAL ATRIAL FIBRILLATION (HCC): ICD-10-CM

## 2024-02-08 DIAGNOSIS — I49.1 APC (ATRIAL PREMATURE CONTRACTIONS): ICD-10-CM

## 2024-02-08 DIAGNOSIS — I49.8 VENTRICULAR BIGEMINY: ICD-10-CM

## 2024-02-08 DIAGNOSIS — I49.3 PVC'S (PREMATURE VENTRICULAR CONTRACTIONS): ICD-10-CM

## 2024-02-08 NOTE — PROGRESS NOTES
Home enrollment completed in the 14 day Zio XT  Holter monitor per Cruz Lim MD.  Monitor will be shipped to patient via [employer], pending EOS.

## 2024-02-12 ENCOUNTER — PATIENT MESSAGE (OUTPATIENT)
Dept: MEDICAL GROUP | Facility: LAB | Age: 69
End: 2024-02-12
Payer: MEDICARE

## 2024-02-12 DIAGNOSIS — R09.81 NASAL CONGESTION: ICD-10-CM

## 2024-02-12 RX ORDER — FLUTICASONE PROPIONATE 50 MCG
2 SPRAY, SUSPENSION (ML) NASAL
Qty: 16 G | Refills: 1 | Status: SHIPPED | OUTPATIENT
Start: 2024-02-12

## 2024-02-12 RX ORDER — FLUTICASONE PROPIONATE 50 MCG
2 SPRAY, SUSPENSION (ML) NASAL
Qty: 48 ML | Refills: 1 | OUTPATIENT
Start: 2024-02-12

## 2024-03-08 ENCOUNTER — TELEPHONE (OUTPATIENT)
Dept: CARDIOLOGY | Facility: MEDICAL CENTER | Age: 69
End: 2024-03-08
Payer: MEDICARE

## 2024-03-08 NOTE — TELEPHONE ENCOUNTER
Received request via: Patient    Was the patient seen in the last year in this department? Yes    Does the patient have an active prescription (recently filled or refills available) for medication(s) requested? No    Ph[facility] Name: [facility]   [address]   Does the patient have detention Plus and need 100 day supply (blood pressure, diabetes and cholesterol meds only)? Patient does not have SCP

## 2024-03-09 ENCOUNTER — PATIENT MESSAGE (OUTPATIENT)
Dept: MEDICAL GROUP | Facility: LAB | Age: 69
End: 2024-03-09
Payer: MEDICARE

## 2024-03-09 DIAGNOSIS — N95.2 ATROPHIC VAGINITIS: ICD-10-CM

## 2024-04-03 RX ORDER — ESTRADIOL 0.1 MG/G
0.5 CREAM VAGINAL
Qty: 42.5 G | Refills: 3 | Status: SHIPPED | OUTPATIENT
Start: 2024-04-03

## 2024-04-04 ENCOUNTER — APPOINTMENT (RX ONLY)
Dept: URBAN - METROPOLITAN AREA CLINIC 4 | Facility: CLINIC | Age: 69
Setting detail: DERMATOLOGY
End: 2024-04-04

## 2024-04-04 DIAGNOSIS — D485 NEOPLASM OF UNCERTAIN BEHAVIOR OF SKIN: ICD-10-CM

## 2024-04-04 DIAGNOSIS — L57.0 ACTINIC KERATOSIS: ICD-10-CM

## 2024-04-04 PROBLEM — D48.5 NEOPLASM OF UNCERTAIN BEHAVIOR OF SKIN: Status: ACTIVE | Noted: 2024-04-04

## 2024-04-04 PROCEDURE — 17000 DESTRUCT PREMALG LESION: CPT | Mod: 59

## 2024-04-04 PROCEDURE — 11102 TANGNTL BX SKIN SINGLE LES: CPT

## 2024-04-04 PROCEDURE — ? BIOPSY BY SHAVE METHOD

## 2024-04-04 PROCEDURE — 17003 DESTRUCT PREMALG LES 2-14: CPT

## 2024-04-04 PROCEDURE — ? COUNSELING

## 2024-04-04 PROCEDURE — ? LIQUID NITROGEN

## 2024-04-04 ASSESSMENT — LOCATION ZONE DERM
LOCATION ZONE: ARM
LOCATION ZONE: HAND
LOCATION ZONE: FINGER

## 2024-04-04 ASSESSMENT — LOCATION SIMPLE DESCRIPTION DERM
LOCATION SIMPLE: LEFT POSTERIOR UPPER ARM
LOCATION SIMPLE: LEFT MIDDLE FINGER
LOCATION SIMPLE: LEFT HAND
LOCATION SIMPLE: RIGHT HAND

## 2024-04-04 ASSESSMENT — LOCATION DETAILED DESCRIPTION DERM
LOCATION DETAILED: RIGHT DORSAL MIDDLE METACARPOPHALANGEAL JOINT
LOCATION DETAILED: LEFT DISTAL POSTERIOR UPPER ARM
LOCATION DETAILED: RIGHT ULNAR DORSAL HAND
LOCATION DETAILED: LEFT PROXIMAL DORSAL MIDDLE FINGER
LOCATION DETAILED: LEFT DORSAL INDEX METACARPOPHALANGEAL JOINT

## 2024-04-04 NOTE — PROCEDURE: LIQUID NITROGEN
Post-Care Instructions: I reviewed with the patient in detail post-care instructions. Patient is to wear sunprotection, and avoid picking at any of the treated lesions. Pt may apply Vaseline to crusted or scabbing areas.
Aperture Size (Optional): C
Show Applicator Variable?: Yes
Duration Of Freeze Thaw-Cycle (Seconds): 2
Number Of Freeze-Thaw Cycles: 2 freeze-thaw cycles
Detail Level: Simple
Consent: The patient's consent was obtained including but not limited to risks of crusting, scabbing, blistering, scarring, darker or lighter pigmentary change, recurrence, incomplete removal and infection.
Render Post-Care Instructions In Note?: no
Application Tool (Optional): Liquid Nitrogen Sprayer

## 2024-04-10 DIAGNOSIS — R09.81 NASAL CONGESTION: ICD-10-CM

## 2024-04-10 RX ORDER — FLUTICASONE PROPIONATE 50 MCG
2 SPRAY, SUSPENSION (ML) NASAL
Qty: 48 ML | Refills: 3 | Status: SHIPPED | OUTPATIENT
Start: 2024-04-10

## 2024-04-10 NOTE — TELEPHONE ENCOUNTER
Received request via: Pharmacy    Was the patient seen in the last year in this department? Yes    Does the patient have an active prescription (recently filled or refills available) for medication(s) requested? No    Pharm[facility] Name: [facility] [place]    Does the patient have FPC Plus and need 100 day supply (blood pressure, diabetes and cholesterol meds only)? Patient does not have SCP

## 2024-04-24 ENCOUNTER — APPOINTMENT (OUTPATIENT)
Dept: CARDIOLOGY | Facility: MEDICAL CENTER | Age: 69
End: 2024-04-24
Attending: NURSE PRACTITIONER
Payer: MEDICARE

## 2024-04-24 ENCOUNTER — TELEPHONE (OUTPATIENT)
Dept: CARDIOLOGY | Facility: MEDICAL CENTER | Age: 69
End: 2024-04-24
Payer: MEDICARE

## 2024-04-29 NOTE — PROGRESS NOTES
"05/06/24    Subjective    Chief Complaint:  Leukopenia and macrocytosis     HPI:  69 female referred for consultation by Dr. Sandra Carrion for the above issues. She has been neutropenic on and off since April of 2022. She has been macrocytic on every CBC since 2011. B12, folate, TSH have been normal. Feels well. No offending medications. Drinks on average 2 glasses of wine a day.     ROS:    Constitutional: No weight loss  Skin: No rash or jaundice  HENT: No change in eyesight or hearing  Cardiovascular:No chest pain or arrythmia  Respiratory:No cough or SOB  GI:No nausea, vomiting, diarrhea, constipation  :No dysuria or frequency  Musculoskeletal:No bone or joint pain  Neuro:No sx's of neuropathy  Psych: No complaints    PMH:      Allergies   Allergen Reactions    Hydromorphone Hives    Codeine Vomiting and Nausea    Fentanyl Vomiting and Nausea    Doxycycline Unspecified     \"stiff joints\"    Hydrocodone Unspecified     Sweating      Oxycodone Vomiting       Past Medical History:   Diagnosis Date    Cataract     removed bilat    Cervical pain 02/28/2020    neck, 0-1/10    COVID-19 virus infection 01/19/2024    Dysfunction of right eustachian tube 11/05/2019    Discussed creating slight pressure in the eustachian tube to open them up and allow the ears to drain.  Discussed continuing nasal spray.  As well as adding an allergy medication such as loratadine to her regimen.  I did not see any fluid in the ear on exam but there still could be fluid in there.  We discussed all of this in the visit today.    Elevated LDL cholesterol level 03/07/2019    Eosinophilic esophagitis 12/11/2020    Hyperlipidemia 02/28/2020    on no meds at this time    Hypothyroidism 11/19/2018    Restless legs 11/19/2018        Past Surgical History:   Procedure Laterality Date    EYE LESION EXCISION Left 3/2/2020    Procedure: EXCISION, LESION, EYE - UPPER LID W/POSS PLACEMENT OF MINI MONKA STENT;  Surgeon: Jaime Pastrana M.D.;  Location: " SURGERY SAME DAY [facility];  Service: Ophthalmology    CATARACT PHACO WITH IOL  5/21/2014    Performed by Fadi Morales M.D. at SURGERY SAME DAY [facility]    TUBE & ECTOPIC PREG., REMOVAL  1987    THYROIDECTOMY  1983    partial    OTHER ORTHOPEDIC SURGERY  [id number]    neck fusion    NC BREAST AUGMENTATION WITH IMPLANT  1979 & 2018        Medications:    Current Outpatient Medications on File Prior to Encounter   Medication Sig Dispense Refill    fluticasone (FLONASE) 50 MCG/ACT nasal spray ADMINISTER 2 SPRAYS INTO AFFECTED NOSTRIL(S) AT BEDTIME. 48 mL 3    estradiol (ESTRACE) 0.1 MG/GM vaginal cream Insert 0.5 g into the vagina two times a week. 42.5 g 3    dilTIAZem CD (CARDIZEM CD) 240 MG CAPSULE SR 24 HR Take 1 Capsule by mouth every day. 90 Capsule 3    rivaroxaban (XARELTO) 20 MG Tab tablet Take 1 Tablet by mouth with dinner. 90 Tablet 3    diclofenac sodium (VOLTAREN ARTHRITIS PAIN) 1 % Gel Apply 2 g topically 3 times a day as needed (neck pain).      levothyroxine (SYNTHROID) 88 MCG Tab Take 1 Tablet by mouth every morning on an empty stomach. 90 Tablet 3    ROPINIRole (REQUIP) 0.5 MG Tab Take 0.5 mg by mouth every evening.      carisoprodol (SOMA) 350 MG Tab Take 350 mg by mouth 1 time a day as needed.      baclofen (LIORESAL) 10 MG Tab Take 10-20 mg by mouth 1 time a day as needed (PAIN).      gabapentin (NEURONTIN) 300 MG Cap Take 300 mg by mouth 3 times a day.      Omega-3 Fatty Acids (OMEGA 3 PO) Take 1 Capsule by mouth every day.      MAGNESIUM PO Take 1 Tablet by mouth every day.      Cholecalciferol (VITAMIN D3 PO) Take 1 Tablet by mouth every day.      B Complex Vitamins (B COMPLEX PO) Take 1 Tablet by mouth every day.      benzonatate (TESSALON) 100 MG Cap Take 1 Capsule by mouth 3 times a day as needed for Cough. 15 Capsule 0    polyvinyl alcohol-povidone (REFRESH) 1.4-0.6 % ophthalmic solution Administer 1-2 Drops into both eyes as needed (dry eye).       No current facility-administered  "medications on file prior to encounter.       Social History     Tobacco Use    Smoking status: Former     Current packs/day: 0.00     Average packs/day: 0.5 packs/day for 8.0 years (4.0 ttl pk-yrs)     Types: Cigarettes     Start date: 1980     Quit date: 1988     Years since quittin.3    Smokeless tobacco: Never    Tobacco comments:     quit in her 20s    Substance Use Topics    Alcohol use: Not Currently        Family History   Problem Relation Age of Onset    Diabetes Mother     Stroke Maternal Grandfather         Objective    Vitals:    /78 (BP Location: Left arm, Patient Position: Sitting, BP Cuff Size: Adult)   Pulse 80   Temp 36 °C (96.8 °F) (Temporal)   Resp 12   Ht 1.753 m (5' 9\")   Wt 59.1 kg (130 lb 4.7 oz)   LMP  (LMP Unknown)   SpO2 98%   BMI 19.24 kg/m²     Physical Exam:    Appears well-developed and well-nourished. No distress.    Head -  Normocephalic .   Eyes - Pupils are equal. Conjunctivae normal. No scleral icterus.   Ears - normal hearing  Neck - Neck supple. No thyromegaly. Scar from C spine fusion.   Cardiovascular - Normal rate, regular rhythm, normal heart sounds and intact distal pulses. No  gallop, murmur or rub  Pulmonary - Normal breath sounds.  No wheeze, rales or rhonchi  Breast - Augmented. Symmetrical. No mass on indentation.  Abdominal -Soft. No distension, tenderness, organomegaly or mass  Extremities-  No edema or tenderness.    Nodes - No submental, submandibular, preauricular, cervical, axillary or inguinal adenopathy.    Neurological -   Alert and oriented.  Skin - Skin is warm and dry. No rash noted. Not diaphoretic. No erythema. No pallor. No jaundice   Psychiatric -  Normal mood and affect.    Labs:     Latest Reference Range & Units 11/15/23 09:32 24 11:39 24 02:49 24 00:59 24 12:38 24 02:57   WBC 4.8 - 10.8 K/uL 3.2 (L) 4.9 5.3 6.6 4.3 (L) 3.4 (L)   RBC 4.20 - 5.40 M/uL 3.55 (L) 3.70 (L) 3.54 (L) 3.41 (L) 4.31 3.77 " (L)   Hemoglobin 12.0 - 16.0 g/dL 12.1 12.4 12.0 11.4 (L) 14.4 12.9   Hematocrit 37.0 - 47.0 % 37.7 37.9 35.3 (L) 35.2 (L) 44.6 38.5   MCV 81.4 - 97.8 fL 106.2 (H) 102.4 (H) 99.7 (H) 103.2 (H) 103.5 (H) 102.1 (H)   MCH 27.0 - 33.0 pg 34.1 (H) 33.5 (H) 33.9 (H) 33.4 (H) 33.4 (H) 34.2 (H)   MCHC 32.2 - 35.5 g/dL 32.1 (L) 32.7 34.0 32.4 32.3 33.5   RDW 35.9 - 50.0 fL 53.8 (H) 53.3 (H) 53.2 (H) 54.1 (H) 53.1 (H) 53.1 (H)   Platelet Count 164 - 446 K/uL 316 201 176 140 (L) 227 212   MPV 9.0 - 12.9 fL 9.2 9.1 9.3 9.7 9.8 9.8   Neutrophils-Polys 44.00 - 72.00 % 62.90 77.80 (H)  69.60 61.80 40.20 (L)   Neutrophils (Absolute) 1.82 - 7.42 K/uL 2.00 3.84  4.60 2.68 1.38 (L)   Lymphocytes 22.00 - 41.00 % 26.10 16.00 (L)  23.30 26.80 42.30 (H)   Lymphs (Absolute) 1.00 - 4.80 K/uL 0.83 (L) 0.79 (L)  1.54 1.16 1.45   Monocytes 0.00 - 13.40 % 9.10 3.80  5.10 8.80 13.70 (H)      [facility] Reference Range & Units 01/26/24 02:57   Sodium 135 - 145 mmol/L 139   Potassium 3.6 - 5.5 mmol/L 3.9   Chloride 96 - 112 mmol/L 106   Co2 20 - 33 mmol/L 22   Anion Gap 7.0 - 16.0  11.0   Glucose 65 - 99 mg/dL 86   Bun 8 - 22 mg/dL 20   Creatinine 0.50 - 1.40 mg/dL 0.72   GFR (CKD-EPI) >60 mL/min/1.73 m 2 91   Calcium 8.5 - 10.5 mg/dL 8.9   Correct Calcium 8.5 - 10.5 mg/dL 8.9   AST(SGOT) 12 - 45 U/L 19   ALT(SGPT) 2 - 50 U/L 15   Alkaline Phosphatase 30 - 99 U/L 53   Total Bilirubin 0.1 - 1.5 mg/dL 0.7   Albumin 3.2 - 4.9 g/dL 4.0   Total Protein 6.0 - 8.2 g/dL 6.5   Globulin 1.9 - 3.5 g/dL 2.5   A-G Ratio g/dL 1.6   Magnesium 1.5 - 2.5 mg/dL 2.2      Latest Reference Range & Units 11/15/23 09:32   Ferritin 10.0 - 291.0 ng/mL 58.6   Folate -Folic Acid >4.0 ng/mL 27.6   Vitamin B12 -True Cobalamin 211 - 911 pg/mL 530   TSH 0.380 - 5.330 uIU/mL 1.190     Assessment  Chronic macrocytosis. On and off neutropenia.   Imp:    Visit Diagnosis:    1. Neutropenia, unspecified type (HCC)  JOSEFINA REFLEXIVE PROFILE    HEPATITIS PANEL ACUTE(4 COMPONENTS)    CBC WITH  DIFFERENTIAL      2. Macrocytosis  RETICULOCYTES COUNT    Monoclonal Protein and FLC, Serum    CBC WITH DIFFERENTIAL        Plan:  Above lab  Return to complete consult when results are available    Issac Morales M.D.

## 2024-05-06 ENCOUNTER — HOSPITAL ENCOUNTER (OUTPATIENT)
Dept: LAB | Facility: MEDICAL CENTER | Age: 69
End: 2024-05-06
Attending: INTERNAL MEDICINE
Payer: MEDICARE

## 2024-05-06 ENCOUNTER — HOSPITAL ENCOUNTER (OUTPATIENT)
Dept: HEMATOLOGY ONCOLOGY | Facility: MEDICAL CENTER | Age: 69
End: 2024-05-06
Attending: INTERNAL MEDICINE
Payer: MEDICARE

## 2024-05-06 VITALS
HEIGHT: 69 IN | DIASTOLIC BLOOD PRESSURE: 78 MMHG | RESPIRATION RATE: 12 BRPM | HEART RATE: 80 BPM | OXYGEN SATURATION: 98 % | BODY MASS INDEX: 19.3 KG/M2 | TEMPERATURE: 96.8 F | SYSTOLIC BLOOD PRESSURE: 100 MMHG | WEIGHT: 130.29 LBS

## 2024-05-06 DIAGNOSIS — D75.89 MACROCYTOSIS: ICD-10-CM

## 2024-05-06 DIAGNOSIS — D70.9 NEUTROPENIA, UNSPECIFIED TYPE (HCC): ICD-10-CM

## 2024-05-06 LAB
BASOPHILS # BLD AUTO: 0.9 % (ref 0–1.8)
BASOPHILS # BLD: 0.04 K/UL (ref 0–0.12)
EOSINOPHIL # BLD AUTO: 0.05 K/UL (ref 0–0.51)
EOSINOPHIL NFR BLD: 1.1 % (ref 0–6.9)
ERYTHROCYTE [DISTWIDTH] IN BLOOD BY AUTOMATED COUNT: 57.6 FL (ref 35.9–50)
HCT VFR BLD AUTO: 40.6 % (ref 37–47)
HGB BLD-MCNC: 12.9 G/DL (ref 12–16)
HGB RETIC QN AUTO: 35.9 PG/CELL (ref 29–35)
IMM GRANULOCYTES # BLD AUTO: 0.01 K/UL (ref 0–0.11)
IMM GRANULOCYTES NFR BLD AUTO: 0.2 % (ref 0–0.9)
IMM RETICS NFR: 12.6 % (ref 2.6–16.1)
LDH SERPL L TO P-CCNC: 204 U/L (ref 107–266)
LYMPHOCYTES # BLD AUTO: 0.97 K/UL (ref 1–4.8)
LYMPHOCYTES NFR BLD: 21.8 % (ref 22–41)
MCH RBC QN AUTO: 33.2 PG (ref 27–33)
MCHC RBC AUTO-ENTMCNC: 31.8 G/DL (ref 32.2–35.5)
MCV RBC AUTO: 104.4 FL (ref 81.4–97.8)
MONOCYTES # BLD AUTO: 0.34 K/UL (ref 0–0.85)
MONOCYTES NFR BLD AUTO: 7.7 % (ref 0–13.4)
NEUTROPHILS # BLD AUTO: 3.03 K/UL (ref 1.82–7.42)
NEUTROPHILS NFR BLD: 68.3 % (ref 44–72)
NRBC # BLD AUTO: 0 K/UL
NRBC BLD-RTO: 0 /100 WBC (ref 0–0.2)
PLATELET # BLD AUTO: 265 K/UL (ref 164–446)
PMV BLD AUTO: 9.9 FL (ref 9–12.9)
RBC # BLD AUTO: 3.89 M/UL (ref 4.2–5.4)
RETICS # AUTO: 0.07 M/UL (ref 0.04–0.12)
RETICS/RBC NFR: 1.8 % (ref 0.8–2.6)
WBC # BLD AUTO: 4.4 K/UL (ref 4.8–10.8)

## 2024-05-06 PROCEDURE — 99204 OFFICE O/P NEW MOD 45 MIN: CPT | Performed by: INTERNAL MEDICINE

## 2024-05-06 ASSESSMENT — FIBROSIS 4 INDEX: FIB4 SCORE: 1.6

## 2024-05-06 ASSESSMENT — PAIN SCALES - GENERAL: PAINLEVEL: NO PAIN

## 2024-05-08 LAB — NUCLEAR IGG SER QL IA: NORMAL

## 2024-05-09 ENCOUNTER — APPOINTMENT (RX ONLY)
Dept: URBAN - METROPOLITAN AREA CLINIC 4 | Facility: CLINIC | Age: 69
Setting detail: DERMATOLOGY
End: 2024-05-09

## 2024-05-09 DIAGNOSIS — L70.8 OTHER ACNE: ICD-10-CM

## 2024-05-09 DIAGNOSIS — L82.1 OTHER SEBORRHEIC KERATOSIS: ICD-10-CM

## 2024-05-09 DIAGNOSIS — B07.8 OTHER VIRAL WARTS: ICD-10-CM

## 2024-05-09 DIAGNOSIS — L57.0 ACTINIC KERATOSIS: ICD-10-CM

## 2024-05-09 PROBLEM — D04.62 CARCINOMA IN SITU OF SKIN OF LEFT UPPER LIMB, INCLUDING SHOULDER: Status: ACTIVE | Noted: 2024-05-09

## 2024-05-09 LAB
ALBUMIN SERPL ELPH-MCNC: 4.27 G/DL (ref 3.75–5.01)
ALPHA1 GLOB SERPL ELPH-MCNC: 0.28 G/DL (ref 0.19–0.46)
ALPHA2 GLOB SERPL ELPH-MCNC: 0.73 G/DL (ref 0.48–1.05)
B-GLOBULIN SERPL ELPH-MCNC: 0.78 G/DL (ref 0.48–1.1)
EER MONOCLONAL PROTEIN AND FLC, SERUM Q5224: ABNORMAL
GAMMA GLOB SERPL ELPH-MCNC: 0.74 G/DL (ref 0.62–1.51)
IGA SERPL-MCNC: 189 MG/DL (ref 68–408)
IGG SERPL-MCNC: 664 MG/DL (ref 768–1632)
IGM SERPL-MCNC: 97 MG/DL (ref 35–263)
INTERPRETATION SERPL IFE-IMP: ABNORMAL
INTERPRETATION SERPL IFE-IMP: ABNORMAL
KAPPA LC FREE SER-MCNC: 16.19 MG/L (ref 3.3–19.4)
KAPPA LC FREE/LAMBDA FREE SER NEPH: 1.72 {RATIO} (ref 0.26–1.65)
LAMBDA LC FREE SERPL-MCNC: 9.39 MG/L (ref 5.71–26.3)
MONOCLONAL PROTEIN NL11656: ABNORMAL G/DL
PROT SERPL-MCNC: 6.8 G/DL (ref 6.3–8.2)

## 2024-05-09 PROCEDURE — 17000 DESTRUCT PREMALG LESION: CPT

## 2024-05-09 PROCEDURE — ? COUNSELING

## 2024-05-09 PROCEDURE — 99212 OFFICE O/P EST SF 10 MIN: CPT | Mod: 25

## 2024-05-09 PROCEDURE — ? ADDITIONAL NOTES

## 2024-05-09 PROCEDURE — ? LIQUID NITROGEN

## 2024-05-09 PROCEDURE — ? DIAGNOSIS COMMENT

## 2024-05-09 ASSESSMENT — LOCATION DETAILED DESCRIPTION DERM
LOCATION DETAILED: RIGHT DISTAL ULNAR THUMB
LOCATION DETAILED: LEFT CENTRAL ZYGOMA
LOCATION DETAILED: LEFT CENTRAL BUCCAL CHEEK
LOCATION DETAILED: RIGHT PROXIMAL RADIAL DORSAL RING FINGER

## 2024-05-09 ASSESSMENT — LOCATION SIMPLE DESCRIPTION DERM
LOCATION SIMPLE: RIGHT THUMB
LOCATION SIMPLE: RIGHT RING FINGER
LOCATION SIMPLE: LEFT ZYGOMA
LOCATION SIMPLE: LEFT CHEEK

## 2024-05-09 ASSESSMENT — LOCATION ZONE DERM
LOCATION ZONE: FINGER
LOCATION ZONE: FACE

## 2024-05-09 NOTE — PROCEDURE: ADDITIONAL NOTES
Render Risk Assessment In Note?: no
Detail Level: Simple
Additional Notes: Discussed with pt that site is healing nicely, discussed treatment options with pt if it comes back could do 5FU or Excision. Pt would like to monitor and will call if there’s any signs of reoccurrence.
Additional Notes: Used ln2

## 2024-05-09 NOTE — PROCEDURE: DIAGNOSIS COMMENT
Detail Level: Simple
Render Risk Assessment In Note?: no
Comment: [id number] bx proven sccis 04/04/2024

## 2024-05-09 NOTE — PROCEDURE: LIQUID NITROGEN
Number Of Freeze-Thaw Cycles: 2 freeze-thaw cycles
Render Post-Care Instructions In Note?: no
Show Applicator Variable?: Yes
Consent: The patient's consent was obtained including but not limited to risks of crusting, scabbing, blistering, scarring, darker or lighter pigmentary change, recurrence, incomplete removal and infection.
Detail Level: Simple
Application Tool (Optional): Liquid Nitrogen Sprayer
Aperture Size (Optional): C
Post-Care Instructions: I reviewed with the patient in detail post-care instructions. Patient is to wear sunprotection, and avoid picking at any of the treated lesions. Pt may apply Vaseline to crusted or scabbing areas.
Duration Of Freeze Thaw-Cycle (Seconds): 2

## 2024-05-22 ENCOUNTER — OFFICE VISIT (OUTPATIENT)
Dept: CARDIOLOGY | Facility: MEDICAL CENTER | Age: 69
End: 2024-05-22
Attending: NURSE PRACTITIONER
Payer: MEDICARE

## 2024-05-22 VITALS
RESPIRATION RATE: 15 BRPM | OXYGEN SATURATION: 99 % | HEIGHT: 69 IN | BODY MASS INDEX: 19.26 KG/M2 | SYSTOLIC BLOOD PRESSURE: 102 MMHG | HEART RATE: 77 BPM | DIASTOLIC BLOOD PRESSURE: 56 MMHG | WEIGHT: 130 LBS

## 2024-05-22 DIAGNOSIS — E03.9 ACQUIRED HYPOTHYROIDISM: ICD-10-CM

## 2024-05-22 DIAGNOSIS — I25.10 CORONARY ARTERY CALCIFICATION SEEN ON CAT SCAN: ICD-10-CM

## 2024-05-22 DIAGNOSIS — D68.69 HYPERCOAGULABLE STATE DUE TO PAROXYSMAL ATRIAL FIBRILLATION (HCC): ICD-10-CM

## 2024-05-22 DIAGNOSIS — I48.0 PAROXYSMAL ATRIAL FIBRILLATION (HCC): ICD-10-CM

## 2024-05-22 DIAGNOSIS — Z79.01 ANTICOAGULATED: ICD-10-CM

## 2024-05-22 DIAGNOSIS — I48.0 HYPERCOAGULABLE STATE DUE TO PAROXYSMAL ATRIAL FIBRILLATION (HCC): ICD-10-CM

## 2024-05-22 LAB — EKG IMPRESSION: NORMAL

## 2024-05-22 RX ORDER — NITROFURANTOIN 25; 75 MG/1; MG/1
100 CAPSULE ORAL 2 TIMES DAILY
COMMUNITY
Start: 2024-05-15

## 2024-05-22 RX ORDER — DRONEDARONE 400 MG/1
400 TABLET, FILM COATED ORAL 2 TIMES DAILY WITH MEALS
Qty: 60 TABLET | Refills: 3 | Status: SHIPPED | OUTPATIENT
Start: 2024-05-22

## 2024-05-22 ASSESSMENT — ENCOUNTER SYMPTOMS
SHORTNESS OF BREATH: 0
WEIGHT LOSS: 0
CLAUDICATION: 0
PALPITATIONS: 1
WEAKNESS: 0
DIZZINESS: 0
PND: 0
ORTHOPNEA: 0

## 2024-05-22 ASSESSMENT — FIBROSIS 4 INDEX: FIB4 SCORE: 1.28

## 2024-05-22 NOTE — PROGRESS NOTES
Chief Complaint   Patient presents with    Atrial Fibrillation       Subjective     Missy Stuart is a 69 y.o. female patient of Dr. Lim who presents today for follow up regarding her pAF.     Patient was last seen by Dr. Lim on 2/7/2024 for hospital follow up. Patient presented with AF/AFL, she was scheduled for DCCV but converted spontaneously. A cardiac monitor was ordered that showed 36% AF burden.     Other past medical history is pertinent for hypothyroidism, Covid infection in January, HLD and anticoagulated with Xarelto.     Today patient states that she does have palpitations when she is in AF, especially when she is still or lying down. She has noticed that her HR fluctuates at home as well. She is tolerating OAC well. Having some ongoing low BP readings. Seeing hematology for elevated MCV on CBC.     Past Medical History:   Diagnosis Date    Cataract     removed bilat    Cervical pain 02/28/2020    neck, 0-1/10    COVID-19 virus infection 01/19/2024    Dysfunction of right eustachian tube 11/05/2019    Discussed creating slight pressure in the eustachian tube to open them up and allow the ears to drain.  Discussed continuing nasal spray.  As well as adding an allergy medication such as loratadine to her regimen.  I did not see any fluid in the ear on exam but there still could be fluid in there.  We discussed all of this in the visit today.    Elevated LDL cholesterol level 03/07/2019    Eosinophilic esophagitis 12/11/2020    Hyperlipidemia 02/28/2020    on no meds at this time    Hypothyroidism 11/19/2018    Restless legs 11/19/2018     Past Surgical History:   Procedure Laterality Date    EYE LESION EXCISION Left 3/2/2020    Procedure: EXCISION, LESION, EYE - UPPER LID W/POSS PLACEMENT OF MINI MONKA STENT;  Surgeon: Jaime Pastrana M.D.;  Location: SURGERY SAME DAY [facility];  Service: Ophthalmology    CATARACT PHACO WITH IOL  5/21/2014    Performed by Fadi Morales M.D. at SURGERY SAME DAY  "[facility]    TUBE & ECTOPIC PREG., REMOVAL  1987    THYROIDECTOMY  1983    partial    OTHER ORTHOPEDIC SURGERY  [id number]    neck fusion    SD BREAST AUGMENTATION WITH IMPLANT   & 2018     Family History   Problem Relation Age of Onset    Diabetes Mother     Stroke Maternal Grandfather      Social History     Socioeconomic History    Marital status:      Spouse name: Not on file    Number of children: Not on file    Years of education: Not on file    Highest education level: Not on file   Occupational History    Not on file   Tobacco Use    Smoking status: Former     Current packs/day: 0.00     Average packs/day: 0.5 packs/day for 8.0 years (4.0 ttl pk-yrs)     Types: Cigarettes     Start date: 1980     Quit date: 1988     Years since quittin.4    Smokeless tobacco: Never    Tobacco comments:     quit in her 20s    Vaping Use    Vaping status: Never Used   Substance and Sexual Activity    Alcohol use: Not Currently    Drug use: No    Sexual activity: Yes     Partners: Male     Comment:    Other Topics Concern    Not on file   Social History Narrative    Retired dental hygiene      Social Determinants of Health     Financial Resource Strain: Not on file   Food Insecurity: Not on file   Transportation Needs: Not on file   Physical Activity: Not on file   Stress: Not on file   Social Connections: Not on file   Intimate Partner Violence: Not on file   Housing Stability: Not on file     Allergies   Allergen Reactions    Hydromorphone Hives    Codeine Vomiting and Nausea    Fentanyl Vomiting and Nausea    Doxycycline Unspecified     \"stiff joints\"    Hydrocodone Unspecified     Sweating      Oxycodone Vomiting     Outpatient Encounter Medications as of 2024   Medication Sig Dispense Refill    nitrofurantoin (MACROBID) 100 MG Cap Take 100 mg by mouth 2 times a day.      dronedarone (MULTAQ) 400 MG Tab Take 1 Tablet by mouth 2 times a day with meals. 60 Tablet 3    fluticasone " (FLONASE) 50 MCG/ACT nasal spray ADMINISTER 2 SPRAYS INTO AFFECTED NOSTRIL(S) AT BEDTIME. 48 mL 3    estradiol (ESTRACE) 0.1 MG/GM vaginal cream Insert 0.5 g into the vagina two times a week. 42.5 g 3    dilTIAZem CD (CARDIZEM CD) 240 MG CAPSULE SR 24 HR Take 1 Capsule by mouth every day. 90 Capsule 3    rivaroxaban (XARELTO) 20 MG Tab tablet Take 1 Tablet by mouth with dinner. 90 Tablet 3    diclofenac sodium (VOLTAREN ARTHRITIS PAIN) 1 % Gel Apply 2 g topically 3 times a day as needed (neck pain).      levothyroxine (SYNTHROID) 88 MCG Tab Take 1 Tablet by mouth every morning on an empty stomach. 90 Tablet 3    ROPINIRole (REQUIP) 0.5 MG Tab Take 0.5 mg by mouth every evening.      carisoprodol (SOMA) 350 MG Tab Take 350 mg by mouth 1 time a day as needed.      baclofen (LIORESAL) 10 MG Tab Take 10-20 mg by mouth 1 time a day as needed (PAIN).      gabapentin (NEURONTIN) 300 MG Cap Take 300 mg by mouth 3 times a day.      Omega-3 Fatty Acids (OMEGA 3 PO) Take 1 Capsule by mouth every day.      MAGNESIUM PO Take 1 Tablet by mouth every day.      Cholecalciferol (VITAMIN D3 PO) Take 1 Tablet by mouth every day.      B Complex Vitamins (B COMPLEX PO) Take 1 Tablet by mouth every day.      [DISCONTINUED] benzonatate (TESSALON) 100 MG Cap Take 1 Capsule by mouth 3 times a day as needed for Cough. 15 Capsule 0    [DISCONTINUED] polyvinyl alcohol-povidone (REFRESH) 1.4-0.6 % ophthalmic solution Administer 1-2 Drops into both eyes as needed (dry eye).       No facility-administered encounter medications on file as of 5/22/2024.     Review of Systems   Constitutional:  Negative for malaise/fatigue and weight loss.   Respiratory:  Negative for shortness of breath.    Cardiovascular:  Positive for palpitations. Negative for chest pain, orthopnea, claudication, leg swelling and PND.   Neurological:  Negative for dizziness and weakness.   All other systems reviewed and are negative.             Objective     /56 (BP  "Location: Left arm, Patient Position: Sitting, BP Cuff Size: Adult)   Pulse 77   Resp 15   Ht 1.753 m (5' 9\")   Wt 59 kg (130 lb)   LMP  (LMP Unknown)   SpO2 99%   BMI 19.20 kg/m²     Physical Exam  Constitutional:       General: She is not in acute distress.     Appearance: She is well-developed.   HENT:      Head: Normocephalic.   Eyes:      Extraocular Movements: Extraocular movements intact.   Neck:      Vascular: No carotid bruit or JVD.   Cardiovascular:      Rate and Rhythm: Normal rate and regular rhythm.      Heart sounds: Normal heart sounds. No murmur heard.  Pulmonary:      Effort: No respiratory distress.      Breath sounds: Normal breath sounds.   Musculoskeletal:      Cervical back: Normal range of motion.      Right lower leg: No edema.      Left lower leg: No edema.   Skin:     General: Skin is warm and dry.   Neurological:      Mental Status: She is alert and oriented to person, place, and time.   Psychiatric:         Behavior: Behavior normal.            TTE 1/20/2024:  Left Ventricle  Normal left ventricular chamber size. Normal left ventricular wall thickness. Hyperdynamic left ventricular systolic function. The ejection fraction is measured to be 79 % by Schmidt's biplane.   Diastolic function is difficult to assess with arrhythmia.     Right Ventricle  Normal right ventricular size and systolic function.     Right Atrium  Normal right atrial size. Normal inferior vena cava size and inspiratory collapse.     Left Atrium  Mildly dilated left atrium. Left atrial volume index is 36 mL/sq m.     Mitral Valve  Structurally normal mitral valve without significant stenosis or regurgitation.     Aortic Valve  The aortic valve appears trileaflet. Aortic valve sclerosis without significant stenosis. No aortic insufficiency.     Tricuspid Valve  Structurally normal tricuspid valve. No tricuspid stenosis. Mild tricuspid regurgitation. Right atrial pressure is estimated to be 3 mmHg. Estimated right " ventricular systolic pressure is 30 mmHg.     Pulmonic Valve  The pulmonic valve is not well visualized.     Pericardium  Normal pericardium without effusion.     Aorta  Normal aortic root for body surface area. The ascending aorta diameter is  3.2cm.    Chest CT-CTA 1/19/2024:  IMPRESSION:     1.  No evidence of pulmonary embolus.     2.  Mild atherosclerotic change aorta and coronary arteries.     3.  Fatty change of the liver.       Assessment & Plan     1. Paroxysmal atrial fibrillation (HCC)  EKG    dronedarone (MULTAQ) 400 MG Tab    [id number] PET W/FLOW RESERVE      2. Coronary artery calcification seen on CAT scan  [id number] PET W/FLOW RESERVE      3. Anticoagulated        4. Acquired hypothyroidism        5. Hypercoagulable state due to paroxysmal atrial fibrillation (HCC)            Medical Decision Making: Today's Assessment/Status/Plan:   pAF:  - 36% burden on recent Zio, symptomatic with palpitations.   - Rhythm management regarding AAD and/or ablation was discussed. Patient is open to AAD while she thinks about ablation. Will start Multaq 200 mg BID and stop Diltiazem to help improve BP. If Multaq is too costly, will start Flecainide and reduce her Diltiazem. Cardiac Pet ordered in case Flecainide needs to be started.   - Continue OAC with Xarelto 20 mg daily, patient is tolerating well with no bleeding problems.     Hypothyroidism:  - Stable.  - On synthroid 88 mcg daily.    Coronary Artery Calcification on Chest CTA:  - Denies anginal symptoms however admits to not being significantly active.   - Last ischemic w/o was in 2012. Will order Cardiac PET scan.   - LDL 92, . Discussed LDL goal with known coronary artery calcification, patient will think about statin therapy.     The ASCVD Risk score ([place] DK, et al., 2019) failed to calculate for the following reasons:    The valid HDL cholesterol range is 20 to 100 mg/dL        Patient will follow up as scheduled below or earlier if  needed. Encouraged patient to contact our office should any questions or concerns arise in the mean time.     Future Appointments   Date Time Provider Department Center   5/23/2024 10:00 AM Sandra Carrion D.O. [facility]   6/4/2024 11:00 AM Issac Morales M.D. [facility] None   6/12/2024  8:15 AM [facility] [address]   6/20/2024 10:30 AM JAKE Montero [facility] None

## 2024-05-23 ENCOUNTER — APPOINTMENT (OUTPATIENT)
Dept: MEDICAL GROUP | Facility: LAB | Age: 69
End: 2024-05-23
Payer: MEDICARE

## 2024-05-23 ENCOUNTER — TELEPHONE (OUTPATIENT)
Dept: CARDIOLOGY | Facility: MEDICAL CENTER | Age: 69
End: 2024-05-23

## 2024-05-23 VITALS
HEIGHT: 69 IN | TEMPERATURE: 96.3 F | HEART RATE: 86 BPM | DIASTOLIC BLOOD PRESSURE: 68 MMHG | OXYGEN SATURATION: 99 % | WEIGHT: 132 LBS | BODY MASS INDEX: 19.55 KG/M2 | SYSTOLIC BLOOD PRESSURE: 110 MMHG

## 2024-05-23 DIAGNOSIS — R12 HEARTBURN: ICD-10-CM

## 2024-05-23 DIAGNOSIS — I48.0 HYPERCOAGULABLE STATE DUE TO PAROXYSMAL ATRIAL FIBRILLATION (HCC): ICD-10-CM

## 2024-05-23 DIAGNOSIS — Z12.31 ENCOUNTER FOR SCREENING MAMMOGRAM FOR BREAST CANCER: ICD-10-CM

## 2024-05-23 DIAGNOSIS — D75.89 MACROCYTOSIS WITHOUT ANEMIA: ICD-10-CM

## 2024-05-23 DIAGNOSIS — Z12.39 ENCOUNTER FOR BREAST CANCER SCREENING USING NON-MAMMOGRAM MODALITY: ICD-10-CM

## 2024-05-23 DIAGNOSIS — D72.819 LEUKOPENIA, UNSPECIFIED TYPE: ICD-10-CM

## 2024-05-23 DIAGNOSIS — I48.0 PAROXYSMAL ATRIAL FIBRILLATION (HCC): ICD-10-CM

## 2024-05-23 DIAGNOSIS — R92.343 EXTREMELY DENSE TISSUE OF BOTH BREASTS ON MAMMOGRAPHY: ICD-10-CM

## 2024-05-23 DIAGNOSIS — D68.69 HYPERCOAGULABLE STATE DUE TO PAROXYSMAL ATRIAL FIBRILLATION (HCC): ICD-10-CM

## 2024-05-23 PROBLEM — I25.10 CORONARY ARTERY CALCIFICATION SEEN ON CAT SCAN: Status: ACTIVE | Noted: 2024-05-23

## 2024-05-23 PROBLEM — R10.13 EPIGASTRIC ABDOMINAL PAIN: Status: RESOLVED | Noted: 2023-10-13 | Resolved: 2024-05-23

## 2024-05-23 PROCEDURE — 3074F SYST BP LT 130 MM HG: CPT | Performed by: STUDENT IN AN ORGANIZED HEALTH CARE EDUCATION/TRAINING PROGRAM

## 2024-05-23 PROCEDURE — 3078F DIAST BP <80 MM HG: CPT | Performed by: STUDENT IN AN ORGANIZED HEALTH CARE EDUCATION/TRAINING PROGRAM

## 2024-05-23 PROCEDURE — 99214 OFFICE O/P EST MOD 30 MIN: CPT | Performed by: STUDENT IN AN ORGANIZED HEALTH CARE EDUCATION/TRAINING PROGRAM

## 2024-05-23 RX ORDER — POLYETHYLENE GLYCOL 3350 17 G/17G
17 POWDER, FOR SOLUTION ORAL DAILY
COMMUNITY

## 2024-05-23 RX ORDER — FLECAINIDE ACETATE 50 MG/1
50 TABLET ORAL 2 TIMES DAILY
Qty: 180 TABLET | Refills: 1 | Status: SHIPPED | OUTPATIENT
Start: 2024-05-23

## 2024-05-23 RX ORDER — DILTIAZEM HYDROCHLORIDE 120 MG/1
120 CAPSULE, COATED, EXTENDED RELEASE ORAL DAILY
Qty: 90 CAPSULE | Refills: 1 | Status: SHIPPED | OUTPATIENT
Start: 2024-05-23

## 2024-05-23 RX ORDER — OMEPRAZOLE 20 MG/1
20 CAPSULE, DELAYED RELEASE ORAL DAILY
COMMUNITY
Start: 2024-02-28

## 2024-05-23 ASSESSMENT — ENCOUNTER SYMPTOMS
FEVER: 0
BRUISES/BLEEDS EASILY: 0
PALPITATIONS: 1
ABDOMINAL PAIN: 0
COUGH: 1
WEIGHT LOSS: 0
SHORTNESS OF BREATH: 0
NAUSEA: 0
VOMITING: 0
HEARTBURN: 1
CONSTIPATION: 0
HEADACHES: 0
SPUTUM PRODUCTION: 0
DIARRHEA: 0
DIZZINESS: 0
CHILLS: 0
BLOOD IN STOOL: 0
WHEEZING: 0

## 2024-05-23 ASSESSMENT — FIBROSIS 4 INDEX: FIB4 SCORE: 1.28

## 2024-05-23 NOTE — PROGRESS NOTES
Subjective:     Chief Complaint   Patient presents with    Follow-Up     HPI:   Missy is a 69 y.o. female who presents today for follow-up.    Verbal consent was acquired by the patient to use [employer] ambient listening note generation during this visit Yes     History of Present Illness  The patient presents to the office for follow-up of multiple medical concerns.    The patient recently consulted a cardiologist who proposed a trial of flecainide, contingent upon financial constraints. She reports intermittent episodes of tachycardia. Her cardiologist has proposed weaning off diltiazem and initiating Multaq. A cardiac PET scan has been scheduled to assess her blood flow. She has a scheduled appointment with Dr. Morales to review her lab results. Prior to her lab results, she consumed wine on weekends during a camping trip. She is not currently under the care of the anticoagulation clinic. Her current medication regimen includes Xarelto.    She had a UTI and was given cefdinir but later had to be switched to Macrobid.  Continues with topical estrogen.     She did see gastroenterology but they are waiting until her cardiac status is more stable prior to proceeding with colonoscopy.  They advised her to use omeprazole 20 mg every morning but she admits that she often forgets it.  Her heartburn depends on what she eats but does state that the medication seems to help.  She does still have a mild cough without chest pain, shortness of breath or sputum production and this typically occurs when she lies down at night.    She is not constipated. She is drinking more water and using MiraLAX as needed. She denies any abdominal pain.     Review of Systems   Constitutional:  Negative for chills, fever, malaise/fatigue and weight loss.   HENT:  Negative for nosebleeds.    Respiratory:  Positive for cough. Negative for sputum production, shortness of breath and wheezing.    Cardiovascular:  Positive for palpitations. Negative  "for chest pain and leg swelling.   Gastrointestinal:  Positive for heartburn. Negative for abdominal pain, blood in stool, constipation, diarrhea, nausea and vomiting.   Genitourinary:  Negative for hematuria.   Skin:  Negative for rash.   Neurological:  Negative for dizziness and headaches.   Endo/Heme/Allergies:  Does not bruise/bleed easily.     Objective:     Exam:  /68 (BP Location: Left arm, Patient Position: Sitting, BP Cuff Size: Adult)   Pulse 86   Temp (!) 35.7 °C (96.3 °F) (Temporal)   Ht 1.753 m (5' 9\")   Wt 59.9 kg (132 lb)   LMP  (LMP Unknown)   SpO2 99%   BMI 19.49 kg/m²  Body mass index is 19.49 kg/m².    Physical Exam  Vitals reviewed.   Constitutional:       General: She is not in acute distress.     Appearance: Normal appearance. She is not ill-appearing.   HENT:      Head: Normocephalic and atraumatic.      Nose: Nose normal.      Mouth/Throat:      Mouth: Mucous membranes are moist.   Eyes:      General: No scleral icterus.     Conjunctiva/sclera: Conjunctivae normal.   Cardiovascular:      Rate and Rhythm: Normal rate and regular rhythm.      Heart sounds: Normal heart sounds. No murmur heard.  Pulmonary:      Effort: Pulmonary effort is normal. No respiratory distress.      Breath sounds: Normal breath sounds. No stridor. No wheezing, rhonchi or rales.   Abdominal:      General: Abdomen is flat. Bowel sounds are normal. There is no distension.      Palpations: Abdomen is soft. There is no mass.      Tenderness: There is no abdominal tenderness. There is no guarding or rebound.   Musculoskeletal:      Cervical back: Normal range of motion and neck supple. No rigidity or tenderness.      Right lower leg: No edema.      Left lower leg: No edema.   Skin:     General: Skin is warm and dry.      Coloration: Skin is not jaundiced.   Neurological:      General: No focal deficit present.      Mental Status: She is alert and oriented to person, place, and time.   Psychiatric:         Mood " and Affect: Mood normal.         Behavior: Behavior normal.         Thought Content: Thought content normal.       Labs: Reviewed from 1/20/2024, 1/25/2024, 5/6/2024    Assessment & Plan:     69 y.o. female with the following -     1. Macrocytosis without anemia  2. Leukopenia, unspecified type  Chronic macrocytosis of unclear etiology, slightly worse from prior but without anemia.  Denies excess alcohol, even less than prior.  Neutropenia resolved with mild leukopenia.  Platelets are normal.  She has an upcoming appointment with hematology to discuss the results/plan.  Monitor CBC every 6 months unless otherwise directed by hematology.  - CBC WITH DIFFERENTIAL; Future    3. Paroxysmal atrial fibrillation (HCC)  4. Hypercoagulable state due to paroxysmal atrial fibrillation (HCC)  Chronic, unstable.  Cardiology team plans to discontinue diltiazem and start Multaq 400 mg twice daily, if not covered then flecainide instead.  Has cardiac PET scan scheduled.  Continues with Xarelto 20 mg daily.  Will monitor CBC/BMP every 6 months while anticoagulated.  Continue care with cardiology, aware of ER precautions.  - TSH WITH REFLEX TO FT4; Future  - CBC WITH DIFFERENTIAL; Future  - Basic Metabolic Panel; Future    5. Heartburn  Chronic, fair control as she admits to forgetting omeprazole often.  Avoid things that trigger, recommend omeprazole 20 mg daily.  Gave return precautions.    6. Encounter for breast cancer screening using non-mammogram modality  7. Extremely dense tissue of both breasts on mammography  - US-SCREENING WHOLE BREAST (3D SCREENING); Future    8. Encounter for screening mammogram for breast cancer  - MA-SCREENING MAMMO BILAT W/ IMPLANTS W/CAD; Future    Return in about 6 months (around 11/23/2024), or if symptoms worsen or fail to improve, for Annual Medicare Visit.    Please note that this dictation was created using voice recognition software. I have made every reasonable attempt to correct obvious  errors, but I expect that there are errors of grammar and possibly content that I did not discover before finalizing the note.

## 2024-05-23 NOTE — TELEPHONE ENCOUNTER
Noted below:    BERTA Montero.  You14 minutes ago (4:26 PM)       Lets do Flecainide 50 mg BID and reduce her Diltiazem to 120 mg daily.    Thank you!!    JS       Called patient and updated of JS recommendations. Medication orders updated.

## 2024-05-23 NOTE — TELEPHONE ENCOUNTER
JS    Caller: Missy Stuart    Topic/issue: Patient called because she went to get her prescription for dronedarone (MULTAQ) 400 MG Tab but the pharmacy has not received the prescription. This is a new medication and she has not been able to start it.     Please advise.     Callback Number: [phone]    Thank you,     Zenaida AQUINO

## 2024-05-23 NOTE — TELEPHONE ENCOUNTER
Called pt and updated on PA procedure and processing time.    To pharmacy coordinators as FYI. Thank you!   normal shape

## 2024-05-23 NOTE — TELEPHONE ENCOUNTER
Noted below:     Griselda Jade, PhT  You; Cardiology [facility] Pharmacy Coordinator2 minutes ago (3:22 PM)     ASIM  Louie Chacko    Her copay is $450 and she is over income for PAP. I just spoke to her and she said the MD let her know if its to expensive that she could change it. Is that possible  ?       To JS, please review and advise. Thank you!

## 2024-05-23 NOTE — TELEPHONE ENCOUNTER
Call transferred to RN. Pt is concerned that her Multaq costs $450+    To pharmacy coordinators, can you assist? Thank you!

## 2024-05-23 NOTE — ADDENDUM NOTE
Addended by: KEMI VASQUEZ on: 5/23/2024 04:48 PM     Modules accepted: Orders     Patient information on fall and injury prevention

## 2024-05-24 PROBLEM — R12 HEARTBURN: Status: ACTIVE | Noted: 2024-05-24

## 2024-05-24 NOTE — PROGRESS NOTES
"06/04/24    Subjective    Chief Complaint:  Follow up from consultation for neutropenia and macrocytosis    HPI:  69 female seen in consultation 5/6/24 for the above issues. In terms of the neutropenia, JOSEFINA is negative and a hepatitis panel was ordered but not done. In terms of the macrocytosis, SPEP is negative and reticulocyte count normal. Repeat CBC shows a normal ANC and some degree of lymphopenia.     ROS:    Constitutional: No weight loss  Skin: No rash or jaundice  HENT: No change in eyesight or hearing  Cardiovascular:No chest pain or arrythmia  Respiratory:No cough or SOB  GI:No nausea, vomiting, diarrhea, constipation  :No dysuria or frequency  Musculoskeletal:No bone or joint pain  Neuro:No sx's of neuropathy  Psych: No complaints    PMH:      Allergies   Allergen Reactions    Hydromorphone Hives    Codeine Vomiting and Nausea    Fentanyl Vomiting and Nausea    Doxycycline Unspecified     \"stiff joints\"    Hydrocodone Unspecified     Sweating      Oxycodone Vomiting       Past Medical History:   Diagnosis Date    Cataract     removed bilat    Cervical pain 02/28/2020    neck, 0-1/10    COVID-19 virus infection 01/19/2024    Dysfunction of right eustachian tube 11/05/2019    Discussed creating slight pressure in the eustachian tube to open them up and allow the ears to drain.  Discussed continuing nasal spray.  As well as adding an allergy medication such as loratadine to her regimen.  I did not see any fluid in the ear on exam but there still could be fluid in there.  We discussed all of this in the visit today.    Elevated LDL cholesterol level 03/07/2019    Eosinophilic esophagitis 12/11/2020    Epigastric abdominal pain 10/13/2023    Hyperlipidemia 02/28/2020    on no meds at this time    Hypothyroidism 11/19/2018    Restless legs 11/19/2018        Past Surgical History:   Procedure Laterality Date    EYE LESION EXCISION Left 3/2/2020    Procedure: EXCISION, LESION, EYE - UPPER LID W/POSS PLACEMENT " OF MINI MONKA STENT;  Surgeon: Jaime Pastrana M.D.;  Location: SURGERY SAME DAY [facility];  Service: Ophthalmology    CATARACT PHACO WITH IOL  5/21/2014    Performed by Fadi Morales M.D. at SURGERY SAME DAY [facility]    TUBE & ECTOPIC PREG., REMOVAL  1987    THYROIDECTOMY  1983    partial    OTHER ORTHOPEDIC SURGERY  [id number]    neck fusion    MT BREAST AUGMENTATION WITH IMPLANT  1979 & 2018        Medications:    Current Outpatient Medications on File Prior to Visit   Medication Sig Dispense Refill    omeprazole (PRILOSEC) 20 MG delayed-release capsule Take 20 mg by mouth every day.      polyethylene glycol/lytes (MIRALAX) Pack Take 17 g by mouth every day.      flecainide (TAMBOCOR) 50 MG tablet Take 1 Tablet by mouth 2 times a day. 180 Tablet 1    dilTIAZem CD (CARDIZEM CD) 120 MG CAPSULE SR 24 HR Take 1 Capsule by mouth every day. 90 Capsule 1    nitrofurantoin (MACROBID) 100 MG Cap Take 100 mg by mouth 2 times a day.      dronedarone (MULTAQ) 400 MG Tab Take 1 Tablet by mouth 2 times a day with meals. 60 Tablet 3    fluticasone (FLONASE) 50 MCG/ACT nasal spray ADMINISTER 2 SPRAYS INTO AFFECTED NOSTRIL(S) AT BEDTIME. 48 mL 3    estradiol (ESTRACE) 0.1 MG/GM vaginal cream Insert 0.5 g into the vagina two times a week. 42.5 g 3    rivaroxaban (XARELTO) 20 MG Tab tablet Take 1 Tablet by mouth with dinner. 90 Tablet 3    diclofenac sodium (VOLTAREN ARTHRITIS PAIN) 1 % Gel Apply 2 g topically 3 times a day as needed (neck pain).      levothyroxine (SYNTHROID) 88 MCG Tab Take 1 Tablet by mouth every morning on an empty stomach. 90 Tablet 3    ROPINIRole (REQUIP) 0.5 MG Tab Take 0.5 mg by mouth every evening.      carisoprodol (SOMA) 350 MG Tab Take 350 mg by mouth 1 time a day as needed.      baclofen (LIORESAL) 10 MG Tab Take 10-20 mg by mouth 1 time a day as needed (PAIN).      gabapentin (NEURONTIN) 300 MG Cap Take 300 mg by mouth 3 times a day.      Omega-3 Fatty Acids (OMEGA 3 PO) Take 1 Capsule by  mouth every day.      MAGNESIUM PO Take 1 Tablet by mouth every day.      Cholecalciferol (VITAMIN D3 PO) Take 1 Tablet by mouth every day.      B Complex Vitamins (B COMPLEX PO) Take 1 Tablet by mouth every day.       No current facility-administered medications on file prior to visit.       Social History     Tobacco Use    Smoking status: Former     Current packs/day: 0.00     Average packs/day: 0.5 packs/day for 8.0 years (4.0 ttl pk-yrs)     Types: Cigarettes     Start date: 1980     Quit date: 1988     Years since quittin.4    Smokeless tobacco: Never    Tobacco comments:     quit in her 20s    Substance Use Topics    Alcohol use: Not Currently        Family History   Problem Relation Age of Onset    Diabetes Mother     Stroke Maternal Grandfather         Objective    Vitals:    LMP  (LMP Unknown)     Physical Exam:    Appears well-developed and well-nourished. No distress.    Head -  Normocephalic .   Eyes - Pupils are equal. Conjunctivae normal. No scleral icterus.   Ears - normal hearing  Neurological -   Alert and oriented.  Skin - Skin is warm and dry. No rash noted. Not diaphoretic. No erythema. No pallor. No jaundice   Psychiatric -  Normal mood and affect.    Labs:     Latest Reference Range & Units 24 00:59 24 12:38 24 02:57 24 12:30   WBC 4.8 - 10.8 K/uL 6.6 4.3 (L) 3.4 (L) 4.4 (L)   RBC 4.20 - 5.40 M/uL 3.41 (L) 4.31 3.77 (L) 3.89 (L)   Hemoglobin 12.0 - 16.0 g/dL 11.4 (L) 14.4 12.9 12.9   Hematocrit 37.0 - 47.0 % 35.2 (L) 44.6 38.5 40.6   MCV 81.4 - 97.8 fL 103.2 (H) 103.5 (H) 102.1 (H) 104.4 (H)   MCH 27.0 - 33.0 pg 33.4 (H) 33.4 (H) 34.2 (H) 33.2 (H)   MCHC 32.2 - 35.5 g/dL 32.4 32.3 33.5 31.8 (L)   RDW 35.9 - 50.0 fL 54.1 (H) 53.1 (H) 53.1 (H) 57.6 (H)   Platelet Count 164 - 446 K/uL 140 (L) 227 212 265   MPV 9.0 - 12.9 fL 9.7 9.8 9.8 9.9   Neutrophils-Polys 44.00 - 72.00 % 69.60 61.80 40.20 (L) 68.30   Neutrophils (Absolute) 1.82 - 7.42 K/uL 4.60 2.68 1.38  (L) 3.03   Lymphocytes 22.00 - 41.00 % 23.30 26.80 42.30 (H) 21.80 (L)   Lymphs (Absolute) 1.00 - 4.80 K/uL 1.54 1.16 1.45 0.97 (L)      Latest Reference Range & Units 04/28/22 07:10   Antinuclear Antibody None Detected  None Detected       Assessment    Imp:    Visit Diagnosis:    1. Neutropenia, unspecified type (HCC)        2. Macrocytosis              Plan:      Issac Morales M.D.

## 2024-05-30 ENCOUNTER — TELEPHONE (OUTPATIENT)
Dept: HEMATOLOGY ONCOLOGY | Facility: MEDICAL CENTER | Age: 69
End: 2024-05-30
Payer: MEDICARE

## 2024-05-30 NOTE — TELEPHONE ENCOUNTER
Patient called to cancel the Tuesday 6/4/24 appointment.  She will be out of town.    She's not certain that Dr. Morales absolutely needs to see her, as her PCP, other doctors are monitoring her labs.    She may or may not have cell service on Monday 6/3/24 - if you try to call.

## 2024-06-04 ENCOUNTER — APPOINTMENT (OUTPATIENT)
Dept: HEMATOLOGY ONCOLOGY | Facility: MEDICAL CENTER | Age: 69
End: 2024-06-04
Payer: MEDICARE

## 2024-06-04 DIAGNOSIS — D75.89 MACROCYTOSIS: ICD-10-CM

## 2024-06-04 DIAGNOSIS — D70.9 NEUTROPENIA, UNSPECIFIED TYPE (HCC): ICD-10-CM

## 2024-06-12 ENCOUNTER — HOSPITAL ENCOUNTER (OUTPATIENT)
Dept: RADIOLOGY | Facility: MEDICAL CENTER | Age: 69
End: 2024-06-12
Attending: NURSE PRACTITIONER
Payer: MEDICARE

## 2024-06-12 DIAGNOSIS — I25.10 CORONARY ARTERY CALCIFICATION SEEN ON CAT SCAN: ICD-10-CM

## 2024-06-12 DIAGNOSIS — I48.0 PAROXYSMAL ATRIAL FIBRILLATION (HCC): ICD-10-CM

## 2024-06-12 PROCEDURE — 78431 MYOCRD IMG PET RST&STRS CT: CPT | Mod: 26 | Performed by: INTERNAL MEDICINE

## 2024-06-12 PROCEDURE — 93018 CV STRESS TEST I&R ONLY: CPT | Performed by: INTERNAL MEDICINE

## 2024-06-12 PROCEDURE — 78431 MYOCRD IMG PET RST&STRS CT: CPT

## 2024-06-12 PROCEDURE — 78434 AQMBF PET REST & RX STRESS: CPT | Mod: 26 | Performed by: INTERNAL MEDICINE

## 2024-06-12 NOTE — PROGRESS NOTES
Chief Complaint   Patient presents with    Atrial Fibrillation       Subjective     Missy Stuart is a 69 y.o. female patient of Dr. Lim who presents today for follow up regarding her pAF.     Patient was last seen by myself on 5/22/2024. Recent Zio showed 36% AF burden, patient was symptomatic with palpitations. AAD versus ablation was discussed, patient wishes to think about ablation and start AAD. Multaq was ordered but was very costly, so it was decided to start Flecainide 50 mg BID and her Diltiazem was reduced to 120 mg daily. Cardiac PET was ordered that showed no prior MI or significant jeopardized myocardium.     Other past medical history is pertinent for hypothyroidism, Covid infection in January, HLD and anticoagulated with Xarelto.     Today patient states that she is tolerating the Flecainide well. She does not believe that she has had any significant AF episodes since starting the Flecainide. She has had occasional episodes that are very brief where she feels like she cannot catch her breath. Denies chest pain, dizziness, edema or syncope. Overall reports feeling well. Recently went camping and did some hiking and feels that she did well.     Past Medical History:   Diagnosis Date    Cataract     removed bilat    Cervical pain 02/28/2020    neck, 0-1/10    COVID-19 virus infection 01/19/2024    Dysfunction of right eustachian tube 11/05/2019    Discussed creating slight pressure in the eustachian tube to open them up and allow the ears to drain.  Discussed continuing nasal spray.  As well as adding an allergy medication such as loratadine to her regimen.  I did not see any fluid in the ear on exam but there still could be fluid in there.  We discussed all of this in the visit today.    Elevated LDL cholesterol level 03/07/2019    Eosinophilic esophagitis 12/11/2020    Epigastric abdominal pain 10/13/2023    Hyperlipidemia 02/28/2020    on no meds at this time    Hypothyroidism 11/19/2018     Restless legs 2018     Past Surgical History:   Procedure Laterality Date    EYE LESION EXCISION Left 3/2/2020    Procedure: EXCISION, LESION, EYE - UPPER LID W/POSS PLACEMENT OF MINI MONKA STENT;  Surgeon: Jaime Pastrana M.D.;  Location: SURGERY SAME DAY [facility];  Service: Ophthalmology    CATARACT PHACO WITH IOL  2014    Performed by Fadi Morales M.D. at SURGERY SAME DAY [facility] ORS    TUBE & ECTOPIC PREG., REMOVAL      THYROIDECTOMY      partial    OTHER ORTHOPEDIC SURGERY  [id number]    neck fusion    AR BREAST AUGMENTATION WITH IMPLANT   &      Family History   Problem Relation Age of Onset    Diabetes Mother     Stroke Maternal Grandfather      Social History     Socioeconomic History    Marital status:      Spouse name: Not on file    Number of children: Not on file    Years of education: Not on file    Highest education level: Not on file   Occupational History    Not on file   Tobacco Use    Smoking status: Former     Current packs/day: 0.00     Average packs/day: 0.5 packs/day for 8.0 years (4.0 ttl pk-yrs)     Types: Cigarettes     Start date: 1980     Quit date: 1988     Years since quittin.4    Smokeless tobacco: Never    Tobacco comments:     quit in her 20s    Vaping Use    Vaping status: Never Used   Substance and Sexual Activity    Alcohol use: Not Currently    Drug use: No    Sexual activity: Yes     Partners: Male     Comment:    Other Topics Concern    Not on file   Social History Narrative    Retired dental hygiene      Social Determinants of Health     Financial Resource Strain: Not on file   Food Insecurity: Not on file   Transportation Needs: Not on file   Physical Activity: Not on file   Stress: Not on file   Social Connections: Not on file   Intimate Partner Violence: Not on file   Housing Stability: Not on file     Allergies   Allergen Reactions    Hydromorphone Hives    Codeine Vomiting and Nausea    Fentanyl Vomiting and  "Nausea    Doxycycline Unspecified     \"stiff joints\"    Hydrocodone Unspecified     Sweating      Oxycodone Vomiting     Outpatient Encounter Medications as of 6/19/2024   Medication Sig Dispense Refill    omeprazole (PRILOSEC) 20 MG delayed-release capsule Take 20 mg by mouth every day.      flecainide (TAMBOCOR) 50 MG tablet Take 1 Tablet by mouth 2 times a day. 180 Tablet 1    dilTIAZem CD (CARDIZEM CD) 120 MG CAPSULE SR 24 HR Take 1 Capsule by mouth every day. 90 Capsule 1    fluticasone (FLONASE) 50 MCG/ACT nasal spray ADMINISTER 2 SPRAYS INTO AFFECTED NOSTRIL(S) AT BEDTIME. 48 mL 3    estradiol (ESTRACE) 0.1 MG/GM vaginal cream Insert 0.5 g into the vagina two times a week. 42.5 g 3    rivaroxaban (XARELTO) 20 MG Tab tablet Take 1 Tablet by mouth with dinner. 90 Tablet 3    diclofenac sodium (VOLTAREN ARTHRITIS PAIN) 1 % Gel Apply 2 g topically 3 times a day as needed (neck pain).      levothyroxine (SYNTHROID) 88 MCG Tab Take 1 Tablet by mouth every morning on an empty stomach. 90 Tablet 3    ROPINIRole (REQUIP) 0.5 MG Tab Take 0.5 mg by mouth every evening.      carisoprodol (SOMA) 350 MG Tab Take 350 mg by mouth 1 time a day as needed.      baclofen (LIORESAL) 10 MG Tab Take 10-20 mg by mouth 1 time a day as needed (PAIN).      gabapentin (NEURONTIN) 300 MG Cap Take 300 mg by mouth 3 times a day.      Omega-3 Fatty Acids (OMEGA 3 PO) Take 1 Capsule by mouth every day.      MAGNESIUM PO Take 1 Tablet by mouth every day.      Cholecalciferol (VITAMIN D3 PO) Take 1 Tablet by mouth every day.      B Complex Vitamins (B COMPLEX PO) Take 1 Tablet by mouth every day.      [DISCONTINUED] polyethylene glycol/lytes (MIRALAX) Pack Take 17 g by mouth every day.      [DISCONTINUED] nitrofurantoin (MACROBID) 100 MG Cap Take 100 mg by mouth 2 times a day. (Patient not taking: Reported on 6/19/2024)      [DISCONTINUED] dronedarone (MULTAQ) 400 MG Tab Take 1 Tablet by mouth 2 times a day with meals. 60 Tablet 3     No " "facility-administered encounter medications on file as of 6/19/2024.     Review of Systems   Constitutional:  Negative for malaise/fatigue and weight loss.   Respiratory:  Negative for shortness of breath.    Cardiovascular:  Negative for chest pain, palpitations, orthopnea, claudication, leg swelling and PND.   Neurological:  Negative for dizziness and weakness.   All other systems reviewed and are negative.             Objective     /62 (BP Location: Left arm, Patient Position: Sitting, BP Cuff Size: Adult)   Pulse 79   Resp 16   Ht 1.753 m (5' 9\")   Wt 56.9 kg (125 lb 6.4 oz)   LMP  (LMP Unknown)   SpO2 100%   BMI 18.52 kg/m²     Physical Exam  Constitutional:       General: She is not in acute distress.     Appearance: She is well-developed.   HENT:      Head: Normocephalic.   Eyes:      Extraocular Movements: Extraocular movements intact.   Neck:      Vascular: No carotid bruit or JVD.   Cardiovascular:      Rate and Rhythm: Normal rate and regular rhythm.      Heart sounds: Normal heart sounds. No murmur heard.  Pulmonary:      Effort: No respiratory distress.      Breath sounds: Normal breath sounds.   Musculoskeletal:      Cervical back: Normal range of motion.      Right lower leg: No edema.      Left lower leg: No edema.   Skin:     General: Skin is warm and dry.   Neurological:      Mental Status: She is alert and oriented to person, place, and time.   Psychiatric:         Mood and Affect: Mood normal.         Behavior: Behavior normal.         Thought Content: Thought content normal.            TTE 1/20/2024:  Left Ventricle  Normal left ventricular chamber size. Normal left ventricular wall thickness. Hyperdynamic left ventricular systolic function. The ejection fraction is measured to be 79 % by Schmidt's biplane.   Diastolic function is difficult to assess with arrhythmia.     Right Ventricle  Normal right ventricular size and systolic function.     Right Atrium  Normal right atrial size. " Normal inferior vena cava size and inspiratory collapse.     Left Atrium  Mildly dilated left atrium. Left atrial volume index is 36 mL/sq m.     Mitral Valve  Structurally normal mitral valve without significant stenosis or regurgitation.     Aortic Valve  The aortic valve appears trileaflet. Aortic valve sclerosis without significant stenosis. No aortic insufficiency.     Tricuspid Valve  Structurally normal tricuspid valve. No tricuspid stenosis. Mild tricuspid regurgitation. Right atrial pressure is estimated to be 3 mmHg. Estimated right ventricular systolic pressure is 30 mmHg.     Pulmonic Valve  The pulmonic valve is not well visualized.     Pericardium  Normal pericardium without effusion.     Aorta  Normal aortic root for body surface area. The ascending aorta diameter is  3.2cm.    Chest CT-CTA 1/19/2024:  IMPRESSION:     1.  No evidence of pulmonary embolus.     2.  Mild atherosclerotic change aorta and coronary arteries.     3.  Fatty change of the liver.       Assessment & Plan     1. Atrial fibrillation, unspecified type (HCC)  EKG      2. Hypercoagulable state due to paroxysmal atrial fibrillation (HCC)        3. Anticoagulated        4. Coronary artery calcification seen on CAT scan        5. Acquired hypothyroidism        6. Encounter for monitoring flecainide therapy              Medical Decision Making: Today's Assessment/Status/Plan:   pAF:  - 36% burden on recent Zio, symptomatic with palpitations.   - Maintaining NSR. On Flecainide 50 mg BID and Diltiazem 120 mg daily.   - Revisited ablation, patient is doing well on AAD and will think about it.   - Continue OAC with Xarelto 20 mg daily, patient is tolerating well with no bleeding problems. LIMA-C was discussed.     High Risk Medication (Flecainide):  - Recent low risk cardiac PET.  - TTE in January showed preserved LVED, no significant valvular abnormality.   - Stable CrCl on last chem panel.   - Stable interval measurements on EKG today.      Hypothyroidism:  - Stable.  - On synthroid 88 mcg daily.    Coronary Artery Calcification on Chest CTA:  - Denies anginal symptoms however admits to not being significantly active.   - LDL 92, . Previously discussed LDL goal with known coronary artery calcification, patient will think about statin therapy.       Patient will follow up as scheduled below or earlier if needed. Encouraged patient to contact our office should any questions or concerns arise in the mean time.     Future Appointments   Date Time Provider Department Center   9/18/2024  7:45 AM JAKE Montero [facility] None   11/27/2024  9:00 AM Sandra Carrion D.O. [facility]ax[facility] Camejo

## 2024-06-19 ENCOUNTER — OFFICE VISIT (OUTPATIENT)
Dept: CARDIOLOGY | Facility: MEDICAL CENTER | Age: 69
End: 2024-06-19
Attending: NURSE PRACTITIONER
Payer: MEDICARE

## 2024-06-19 VITALS
BODY MASS INDEX: 18.57 KG/M2 | HEART RATE: 79 BPM | OXYGEN SATURATION: 100 % | SYSTOLIC BLOOD PRESSURE: 122 MMHG | WEIGHT: 125.4 LBS | DIASTOLIC BLOOD PRESSURE: 62 MMHG | HEIGHT: 69 IN | RESPIRATION RATE: 16 BRPM

## 2024-06-19 DIAGNOSIS — D68.69 HYPERCOAGULABLE STATE DUE TO PAROXYSMAL ATRIAL FIBRILLATION (HCC): ICD-10-CM

## 2024-06-19 DIAGNOSIS — Z51.81 ENCOUNTER FOR MONITORING FLECAINIDE THERAPY: ICD-10-CM

## 2024-06-19 DIAGNOSIS — I48.0 HYPERCOAGULABLE STATE DUE TO PAROXYSMAL ATRIAL FIBRILLATION (HCC): ICD-10-CM

## 2024-06-19 DIAGNOSIS — E03.9 ACQUIRED HYPOTHYROIDISM: ICD-10-CM

## 2024-06-19 DIAGNOSIS — Z79.01 ANTICOAGULATED: ICD-10-CM

## 2024-06-19 DIAGNOSIS — Z79.899 ENCOUNTER FOR MONITORING FLECAINIDE THERAPY: ICD-10-CM

## 2024-06-19 DIAGNOSIS — I25.10 CORONARY ARTERY CALCIFICATION SEEN ON CAT SCAN: ICD-10-CM

## 2024-06-19 DIAGNOSIS — I48.91 ATRIAL FIBRILLATION, UNSPECIFIED TYPE (HCC): ICD-10-CM

## 2024-06-19 LAB — EKG IMPRESSION: NORMAL

## 2024-06-19 PROCEDURE — 99214 OFFICE O/P EST MOD 30 MIN: CPT | Mod: 25 | Performed by: NURSE PRACTITIONER

## 2024-06-19 PROCEDURE — 3074F SYST BP LT 130 MM HG: CPT | Performed by: NURSE PRACTITIONER

## 2024-06-19 PROCEDURE — 93005 ELECTROCARDIOGRAM TRACING: CPT | Performed by: NURSE PRACTITIONER

## 2024-06-19 PROCEDURE — 3078F DIAST BP <80 MM HG: CPT | Performed by: NURSE PRACTITIONER

## 2024-06-19 PROCEDURE — 99213 OFFICE O/P EST LOW 20 MIN: CPT | Performed by: NURSE PRACTITIONER

## 2024-06-19 PROCEDURE — 93010 ELECTROCARDIOGRAM REPORT: CPT | Performed by: INTERNAL MEDICINE

## 2024-06-19 ASSESSMENT — ENCOUNTER SYMPTOMS
PALPITATIONS: 0
DIZZINESS: 0
WEIGHT LOSS: 0
ORTHOPNEA: 0
SHORTNESS OF BREATH: 0
PND: 0
CLAUDICATION: 0
WEAKNESS: 0

## 2024-06-19 ASSESSMENT — FIBROSIS 4 INDEX: FIB4 SCORE: 1.28

## 2024-06-21 ENCOUNTER — TELEPHONE (OUTPATIENT)
Dept: MEDICAL GROUP | Facility: LAB | Age: 69
End: 2024-06-21

## 2024-06-21 ENCOUNTER — APPOINTMENT (RX ONLY)
Dept: URBAN - METROPOLITAN AREA CLINIC 4 | Facility: CLINIC | Age: 69
Setting detail: DERMATOLOGY
End: 2024-06-21

## 2024-06-21 ENCOUNTER — OFFICE VISIT (OUTPATIENT)
Dept: MEDICAL GROUP | Facility: LAB | Age: 69
End: 2024-06-21
Payer: MEDICARE

## 2024-06-21 VITALS
HEART RATE: 74 BPM | SYSTOLIC BLOOD PRESSURE: 112 MMHG | HEIGHT: 69 IN | RESPIRATION RATE: 16 BRPM | OXYGEN SATURATION: 98 % | DIASTOLIC BLOOD PRESSURE: 62 MMHG | BODY MASS INDEX: 18.16 KG/M2 | WEIGHT: 122.6 LBS | TEMPERATURE: 97.6 F

## 2024-06-21 DIAGNOSIS — M25.511 CHRONIC RIGHT SHOULDER PAIN: ICD-10-CM

## 2024-06-21 DIAGNOSIS — D485 NEOPLASM OF UNCERTAIN BEHAVIOR OF SKIN: ICD-10-CM

## 2024-06-21 DIAGNOSIS — G89.29 CHRONIC RIGHT SHOULDER PAIN: ICD-10-CM

## 2024-06-21 PROBLEM — D48.5 NEOPLASM OF UNCERTAIN BEHAVIOR OF SKIN: Status: ACTIVE | Noted: 2024-06-21

## 2024-06-21 LAB — EKG IMPRESSION: NORMAL

## 2024-06-21 PROCEDURE — 99203 OFFICE O/P NEW LOW 30 MIN: CPT | Performed by: STUDENT IN AN ORGANIZED HEALTH CARE EDUCATION/TRAINING PROGRAM

## 2024-06-21 PROCEDURE — 3078F DIAST BP <80 MM HG: CPT | Performed by: STUDENT IN AN ORGANIZED HEALTH CARE EDUCATION/TRAINING PROGRAM

## 2024-06-21 PROCEDURE — ? BIOPSY BY SHAVE METHOD

## 2024-06-21 PROCEDURE — 11102 TANGNTL BX SKIN SINGLE LES: CPT

## 2024-06-21 PROCEDURE — 3074F SYST BP LT 130 MM HG: CPT | Performed by: STUDENT IN AN ORGANIZED HEALTH CARE EDUCATION/TRAINING PROGRAM

## 2024-06-21 PROCEDURE — ? COUNSELING

## 2024-06-21 PROCEDURE — 11103 TANGNTL BX SKIN EA SEP/ADDL: CPT

## 2024-06-21 RX ORDER — MELOXICAM 15 MG/1
15 TABLET ORAL DAILY
Qty: 14 TABLET | Refills: 0 | Status: SHIPPED | OUTPATIENT
Start: 2024-06-21 | End: 2024-07-05

## 2024-06-21 ASSESSMENT — LOCATION DETAILED DESCRIPTION DERM
LOCATION DETAILED: LEFT LATERAL DISTAL CALF
LOCATION DETAILED: RIGHT LATERAL PROXIMAL CALF

## 2024-06-21 ASSESSMENT — FIBROSIS 4 INDEX: FIB4 SCORE: 1.28

## 2024-06-21 ASSESSMENT — LOCATION SIMPLE DESCRIPTION DERM
LOCATION SIMPLE: RIGHT LOWER LEG
LOCATION SIMPLE: LEFT LOWER LEG

## 2024-06-21 ASSESSMENT — LOCATION ZONE DERM: LOCATION ZONE: LEG

## 2024-06-21 NOTE — TELEPHONE ENCOUNTER
Patient asking if she should get the imaging now or wait some time when she has taken the medication

## 2024-06-21 NOTE — PROGRESS NOTES
Subjective:     CC:   Chief Complaint   Patient presents with    Shoulder Injury        HPI:   History of Present Illness  The patient presents for evaluation of right shoulder pain.    The patient has been experiencing right shoulder pain for approximately 10 days, which was previously characterized by clicking and popping sensations. She underwent imaging approximately 1.5 weeks ago, during which she was in a supine position with her arms above her head for approximately 30 minutes. Prior to the imaging, she experienced radiating pain into her chest. She speculates a possible rotator cuff issue, although she does not believe there is a severe tear. She has ceased bowling activities and must be cautious when performing tasks such as hair grooming, itching, lateral movements over her head, and donning her seatbelt. These symptoms have been present for several months, and she reports instability in her right shoulder. She does not recall any specific incident that could have triggered the pain, although she acknowledges that bowling was stressful. She has not undergone any imaging of her shoulder.       Problem   Right Shoulder Pain       Current Outpatient Medications Ordered in Epic   Medication Sig Dispense Refill    meloxicam (MOBIC) 15 MG tablet Take 1 Tablet by mouth every day for 14 days. Take with food 14 Tablet 0    omeprazole (PRILOSEC) 20 MG delayed-release capsule Take 20 mg by mouth every day.      flecainide (TAMBOCOR) 50 MG tablet Take 1 Tablet by mouth 2 times a day. 180 Tablet 1    dilTIAZem CD (CARDIZEM CD) 120 MG CAPSULE SR 24 HR Take 1 Capsule by mouth every day. 90 Capsule 1    fluticasone (FLONASE) 50 MCG/ACT nasal spray ADMINISTER 2 SPRAYS INTO AFFECTED NOSTRIL(S) AT BEDTIME. 48 mL 3    estradiol (ESTRACE) 0.1 MG/GM vaginal cream Insert 0.5 g into the vagina two times a week. 42.5 g 3    rivaroxaban (XARELTO) 20 MG Tab tablet Take 1 Tablet by mouth with dinner. 90 Tablet 3    diclofenac sodium  "(VOLTAREN ARTHRITIS PAIN) 1 % Gel Apply 2 g topically 3 times a day as needed (neck pain).      levothyroxine (SYNTHROID) 88 MCG Tab Take 1 Tablet by mouth every morning on an empty stomach. 90 Tablet 3    ROPINIRole (REQUIP) 0.5 MG Tab Take 0.5 mg by mouth every evening.      carisoprodol (SOMA) 350 MG Tab Take 350 mg by mouth 1 time a day as needed.      baclofen (LIORESAL) 10 MG Tab Take 10-20 mg by mouth 1 time a day as needed (PAIN).      gabapentin (NEURONTIN) 300 MG Cap Take 300 mg by mouth 3 times a day.      Omega-3 Fatty Acids (OMEGA 3 PO) Take 1 Capsule by mouth every day.      MAGNESIUM PO Take 1 Tablet by mouth every day.      Cholecalciferol (VITAMIN D3 PO) Take 1 Tablet by mouth every day.      B Complex Vitamins (B COMPLEX PO) Take 1 Tablet by mouth every day.       No current [facility]-ordered facility-administered medications on file.           ROS:  ROS    Objective:     Exam:  /62 (BP Location: Right arm, Patient Position: Sitting, BP Cuff Size: Adult)   Pulse 74   Temp 36.4 °C (97.6 °F) (Temporal)   Resp 16   Ht 1.753 m (5' 9\")   Wt 55.6 kg (122 lb 9.6 oz)   LMP  (LMP Unknown)   SpO2 98%   BMI 18.10 kg/m²  Body mass index is 18.1 kg/m².    Physical Exam  Musculoskeletal:      Comments: Empty can test negative  Slight limitation with internal rotation of the shoulder  Positive apprehension                    Assessment & Plan:     Problem List Items Addressed This Visit       Right shoulder pain     Chronic-not controlled   -patient will take meloxicam for 7 days, I did discuss with her bleeding risk with xarelto          Relevant Medications    meloxicam (MOBIC) 15 MG tablet    Other Relevant Orders    DX-SHOULDER 2+ RIGHT    Referral to Physical Therapy             Verbal consent was acquired by the patient to use [employer] ambient listening note generation during this visit Yes          Please note that this dictation was created using voice recognition software. I have made every " reasonable attempt to correct obvious errors, but I expect that there are errors of grammar and possibly content that I did not discover before finalizing the note.

## 2024-06-21 NOTE — ASSESSMENT & PLAN NOTE
Chronic-not controlled   -patient will take meloxicam for 7 days, I did discuss with her bleeding risk with xarelto

## 2024-07-09 ENCOUNTER — APPOINTMENT (RX ONLY)
Dept: URBAN - METROPOLITAN AREA CLINIC 4 | Facility: CLINIC | Age: 69
Setting detail: DERMATOLOGY
End: 2024-07-09

## 2024-07-09 PROBLEM — C44.729 SQUAMOUS CELL CARCINOMA OF SKIN OF LEFT LOWER LIMB, INCLUDING HIP: Status: ACTIVE | Noted: 2024-07-09

## 2024-07-09 PROCEDURE — 13122 CMPLX RPR S/A/L ADDL 5 CM/>: CPT

## 2024-07-09 PROCEDURE — ? EXCISION

## 2024-07-09 PROCEDURE — 13121 CMPLX RPR S/A/L 2.6-7.5 CM: CPT

## 2024-07-09 PROCEDURE — 11604 EXC TR-EXT MAL+MARG 3.1-4 CM: CPT

## 2024-07-09 NOTE — PROCEDURE: EXCISION
Referring Physician (Optional): KAYODE DE LA TORRE
Surgeon (Optional): Berto
Biopsy Photograph Reviewed: Yes
Previous Accession (Optional): [id number]
Date Of Previous Biopsy (Optional): 6/21/2024
Size Of Lesion In Cm: 2.9
Size Of Margin In Cm: 0.2
Anesthesia Volume In Cc: 18
Was An Eye Clamp Used?: No
Eye Clamp Note Details: An eye clamp was used during the procedure.
Excision Method: Elliptical
Saucerization Depth: dermis and superficial adipose tissue
Repair Type: Complex
Intermediate / Complex Repair - Final Wound Length In Cm: 7.7
Suturegard Retention Suture: 2-0 Nylon
Retention Suture Bite Size: 3 mm
Length To Time In Minutes Device Was In Place: 10
Number Of Hemigard Strips Per Side: 1
Width Of Defect Perpendicular To Closure In Cm (Required): 2.5
Distance Of Undermining In Cm (Required): 3.1
Undermining Type: Entire Wound
Debridement Text: The wound edges were debrided prior to proceeding with the closure to facilitate wound healing.
Helical Rim Text: The closure involved the helical rim.
Vermilion Border Text: The closure involved the vermilion border.
Nostril Rim Text: The closure involved the nostril rim.
Retention Suture Text: Retention sutures were placed to support the closure and prevent dehiscence.
Primary Defect Length (In Cm): 0
Lab: [id number]
Lab Facility: 
Graft Donor Site Bandage (Optional-Leave Blank If You Don't Want In Note): Steri-strips and a pressure bandage were applied to the donor site.
Epidermal Closure Graft Donor Site (Optional): simple interrupted
Billing Type: Third-Party Bill
Excision Depth: adipose tissue
Scalpel Size: 15 blade
Anesthesia Type: 1% lidocaine with epinephrine
Additional Anesthesia Volume In Cc: 6
Hemostasis: Electrocautery
Estimated Blood Loss (Cc): minimal
Detail Level: Detailed
Repair Depth: use same depth as excision depth
Repair Anesthesia Method: local infiltration
Anesthesia Volume In Cc: 16.6
Deep Sutures: 2-0 Vicryl
Dermal Closure: buried vertical mattress
Epidermal Sutures: 4-0 Vicryl Rapide
Epidermal Closure: running subcuticular
Wound Care: Bacitracin
Dressing: dry sterile dressing
Suturegard Intro: Intraoperative tissue expansion was performed, utilizing the SUTUREGARD device, in order to reduce wound tension.
Suturegard Body: The suture ends were repeatedly re-tightened and re-clamped to achieve the desired tissue expansion.
Hemigard Intro: Due to skin fragility and wound tension, it was decided to use HEMIGARD adhesive retention suture devices to permit a linear closure. The skin was cleaned and dried for a 6cm distance away from the wound. Excessive hair, if present, was removed to allow for adhesion.
Hemigard Postcare Instructions: The HEMIGARD strips are to remain completely dry for at least 5-7 days.
Positioning (Leave Blank If You Do Not Want): The patient was placed in a comfortable position exposing the surgical site.
Pre-Excision Curettage Text (Leave Blank If You Do Not Want): Prior to drawing the surgical margin the visible lesion was removed with electrodesiccation and curettage to clearly define the lesion size.
Complex Repair Preamble Text (Leave Blank If You Do Not Want): Extensive wide undermining was performed.
Intermediate Repair Preamble Text (Leave Blank If You Do Not Want): Undermining was performed with blunt dissection.
Curvilinear Excision Additional Text (Leave Blank If You Do Not Want): The margin was drawn around the clinically apparent lesion.  A curvilinear shape was then drawn on the skin incorporating the lesion and margins.  Incisions were then made along these lines to the appropriate tissue plane and the lesion was extirpated.
Fusiform Excision Additional Text (Leave Blank If You Do Not Want): The margin was drawn around the clinically apparent lesion.  A fusiform shape was then drawn on the skin incorporating the lesion and margins.  Incisions were then made along these lines to the appropriate tissue plane and the lesion was extirpated.
Elliptical Excision Additional Text (Leave Blank If You Do Not Want): The margin was drawn around the clinically apparent lesion.  An elliptical shape was then drawn on the skin incorporating the lesion and margins.  Incisions were then made along these lines to the appropriate tissue plane and the lesion was extirpated.
Saucerization Excision Additional Text (Leave Blank If You Do Not Want): The margin was drawn around the clinically apparent lesion.  Incisions were then made along these lines, in a tangential fashion, to the appropriate tissue plane and the lesion was extirpated.
Slit Excision Additional Text (Leave Blank If You Do Not Want): A linear line was drawn on the skin overlying the lesion. An incision was made slowly until the lesion was visualized.  Once visualized, the lesion was removed with blunt dissection.
Excisional Biopsy Additional Text (Leave Blank If You Do Not Want): The margin was drawn around the clinically apparent lesion. An elliptical shape was then drawn on the skin incorporating the lesion and margins.  Incisions were then made along these lines to the appropriate tissue plane and the lesion was extirpated.
Perilesional Excision Additional Text (Leave Blank If You Do Not Want): The margin was drawn around the clinically apparent lesion. Incisions were then made along these lines to the appropriate tissue plane and the lesion was extirpated.
Repair Performed By Another Provider Text (Leave Blank If You Do Not Want): After the tissue was excised the defect was repaired by another provider.
No Repair - Repaired With Adjacent Surgical Defect Text (Leave Blank If You Do Not Want): After the excision the defect was repaired concurrently with another surgical defect which was in close approximation.
Adjacent Tissue Transfer Text: The defect edges were debeveled with a #15 scalpel blade. Given the location of the defect and the proximity to free margins an adjacent tissue transfer was deemed most appropriate. Using a sterile surgical marker, an appropriate flap was drawn incorporating the defect and placing the expected incisions within the relaxed skin tension lines where possible. The area thus outlined was incised deep to adipose tissue with a #15 scalpel blade. The skin margins were undermined to an appropriate distance in all directions utilizing iris scissors and carried over to close the primary defect.
Advancement Flap (Single) Text: The defect edges were debeveled with a #15 scalpel blade.  Given the location of the defect and the proximity to free margins a single advancement flap was deemed most appropriate.  Using a sterile surgical marker, an appropriate advancement flap was drawn incorporating the defect and placing the expected incisions within the relaxed skin tension lines where possible.    The area thus outlined was incised deep to adipose tissue with a #15 scalpel blade.  The skin margins were undermined to an appropriate distance in all directions utilizing iris scissors.
Advancement Flap (Double) Text: The defect edges were debeveled with a #15 scalpel blade.  Given the location of the defect and the proximity to free margins a double advancement flap was deemed most appropriate.  Using a sterile surgical marker, the appropriate advancement flaps were drawn incorporating the defect and placing the expected incisions within the relaxed skin tension lines where possible.    The area thus outlined was incised deep to adipose tissue with a #15 scalpel blade.  The skin margins were undermined to an appropriate distance in all directions utilizing iris scissors.
Burow's Advancement Flap Text: The defect edges were debeveled with a #15 scalpel blade.  Given the location of the defect and the proximity to free margins a Burow's advancement flap was deemed most appropriate.  Using a sterile surgical marker, the appropriate advancement flap was drawn incorporating the defect and placing the expected incisions within the relaxed skin tension lines where possible.    The area thus outlined was incised deep to adipose tissue with a #15 scalpel blade.  The skin margins were undermined to an appropriate distance in all directions utilizing iris scissors.
Chonodrocutaneous Helical Advancement Flap Text: The defect edges were debeveled with a #15 scalpel blade. Given the location of the defect and the proximity to free margins a chondrocutaneous helical advancement flap was deemed most appropriate. Using a sterile surgical marker, the appropriate advancement flap was drawn incorporating the defect and placing the expected incisions within the relaxed skin tension lines where possible. The area thus outlined was incised deep to adipose tissue with a #15 scalpel blade. The skin margins were undermined to an appropriate distance in all directions utilizing iris scissors. Following this, the designed flap was advanced and carried over into the primary defect and sutured into place.
Crescentic Advancement Flap Text: The defect edges were debeveled with a #15 scalpel blade.  Given the location of the defect and the proximity to free margins a crescentic advancement flap was deemed most appropriate.  Using a sterile surgical marker, the appropriate advancement flap was drawn incorporating the defect and placing the expected incisions within the relaxed skin tension lines where possible.    The area thus outlined was incised deep to adipose tissue with a #15 scalpel blade.  The skin margins were undermined to an appropriate distance in all directions utilizing iris scissors.
A-T Advancement Flap Text: The defect edges were debeveled with a #15 scalpel blade.  Given the location of the defect, shape of the defect and the proximity to free margins an A-T advancement flap was deemed most appropriate.  Using a sterile surgical marker, an appropriate advancement flap was drawn incorporating the defect and placing the expected incisions within the relaxed skin tension lines where possible.    The area thus outlined was incised deep to adipose tissue with a #15 scalpel blade.  The skin margins were undermined to an appropriate distance in all directions utilizing iris scissors.
O-T Advancement Flap Text: The defect edges were debeveled with a #15 scalpel blade.  Given the location of the defect, shape of the defect and the proximity to free margins an O-T advancement flap was deemed most appropriate.  Using a sterile surgical marker, an appropriate advancement flap was drawn incorporating the defect and placing the expected incisions within the relaxed skin tension lines where possible.    The area thus outlined was incised deep to adipose tissue with a #15 scalpel blade.  The skin margins were undermined to an appropriate distance in all directions utilizing iris scissors.
O-L Flap Text: The defect edges were debeveled with a #15 scalpel blade.  Given the location of the defect, shape of the defect and the proximity to free margins an O-L flap was deemed most appropriate.  Using a sterile surgical marker, an appropriate advancement flap was drawn incorporating the defect and placing the expected incisions within the relaxed skin tension lines where possible.    The area thus outlined was incised deep to adipose tissue with a #15 scalpel blade.  The skin margins were undermined to an appropriate distance in all directions utilizing iris scissors.
O-Z Flap Text: The defect edges were debeveled with a #15 scalpel blade. Given the location of the defect, shape of the defect and the proximity to free margins an O-Z flap was deemed most appropriate. Using a sterile surgical marker, an appropriate transposition flap was drawn incorporating the defect and placing the expected incisions within the relaxed skin tension lines where possible. The area thus outlined was incised deep to adipose tissue with a #15 scalpel blade. The skin margins were undermined to an appropriate distance in all directions utilizing iris scissors. Following this, the designed flap was carried over into the primary defect and sutured into place.
Double O-Z Flap Text: The defect edges were debeveled with a #15 scalpel blade. Given the location of the defect, shape of the defect and the proximity to free margins a Double O-Z flap was deemed most appropriate. Using a sterile surgical marker, an appropriate transposition flap was drawn incorporating the defect and placing the expected incisions within the relaxed skin tension lines where possible. The area thus outlined was incised deep to adipose tissue with a #15 scalpel blade. The skin margins were undermined to an appropriate distance in all directions utilizing iris scissors. Following this, the designed flap was carried over into the primary defect and sutured into place.
V-Y Flap Text: The defect edges were debeveled with a #15 scalpel blade.  Given the location of the defect, shape of the defect and the proximity to free margins a V-Y flap was deemed most appropriate.  Using a sterile surgical marker, an appropriate advancement flap was drawn incorporating the defect and placing the expected incisions within the relaxed skin tension lines where possible.    The area thus outlined was incised deep to adipose tissue with a #15 scalpel blade.  The skin margins were undermined to an appropriate distance in all directions utilizing iris scissors.
Advancement-Rotation Flap Text: The defect edges were debeveled with a #15 scalpel blade.  Given the location of the defect, shape of the defect and the proximity to free margins an advancement-rotation flap was deemed most appropriate.  Using a sterile surgical marker, an appropriate flap was drawn incorporating the defect and placing the expected incisions within the relaxed skin tension lines where possible. The area thus outlined was incised deep to adipose tissue with a #15 scalpel blade.  The skin margins were undermined to an appropriate distance in all directions utilizing iris scissors.
Mercedes Flap Text: The defect edges were debeveled with a #15 scalpel blade. Given the location of the defect, shape of the defect and the proximity to free margins a Mercedes flap was deemed most appropriate. Using a sterile surgical marker, an appropriate advancement flap was drawn incorporating the defect and placing the expected incisions within the relaxed skin tension lines where possible. The area thus outlined was incised deep to adipose tissue with a #15 scalpel blade. The skin margins were undermined to an appropriate distance in all directions utilizing iris scissors. Following this, the designed flap was advanced and carried over into the primary defect and sutured into place.
Modified Advancement Flap Text: The defect edges were debeveled with a #15 scalpel blade.  Given the location of the defect, shape of the defect and the proximity to free margins a modified advancement flap was deemed most appropriate.  Using a sterile surgical marker, an appropriate advancement flap was drawn incorporating the defect and placing the expected incisions within the relaxed skin tension lines where possible.    The area thus outlined was incised deep to adipose tissue with a #15 scalpel blade.  The skin margins were undermined to an appropriate distance in all directions utilizing iris scissors.
Mucosal Advancement Flap Text: Given the location of the defect, shape of the defect and the proximity to free margins a mucosal advancement flap was deemed most appropriate. Incisions were made with a 15 blade scalpel in the appropriate fashion along the cutaneous vermilion border and the mucosal lip. The remaining actinically damaged mucosal tissue was excised.  The mucosal advancement flap was then elevated to the gingival sulcus with care taken to preserve the neurovascular structures and advanced into the primary defect. Care was taken to ensure that precise realignment of the vermilion border was achieved.
Peng Advancement Flap Text: The defect edges were debeveled with a #15 scalpel blade. Given the location of the defect, shape of the defect and the proximity to free margins a Peng advancement flap was deemed most appropriate. Using a sterile surgical marker, an appropriate advancement flap was drawn incorporating the defect and placing the expected incisions within the relaxed skin tension lines where possible. The area thus outlined was incised deep to adipose tissue with a #15 scalpel blade. The skin margins were undermined to an appropriate distance in all directions utilizing iris scissors. Following this, the designed flap was advanced and carried over into the primary defect and sutured into place.
Hatchet Flap Text: The defect edges were debeveled with a #15 scalpel blade.  Given the location of the defect, shape of the defect and the proximity to free margins a hatchet flap was deemed most appropriate.  Using a sterile surgical marker, an appropriate hatchet flap was drawn incorporating the defect and placing the expected incisions within the relaxed skin tension lines where possible.    The area thus outlined was incised deep to adipose tissue with a #15 scalpel blade.  The skin margins were undermined to an appropriate distance in all directions utilizing iris scissors.
Rotation Flap Text: The defect edges were debeveled with a #15 scalpel blade.  Given the location of the defect, shape of the defect and the proximity to free margins a rotation flap was deemed most appropriate.  Using a sterile surgical marker, an appropriate rotation flap was drawn incorporating the defect and placing the expected incisions within the relaxed skin tension lines where possible.    The area thus outlined was incised deep to adipose tissue with a #15 scalpel blade.  The skin margins were undermined to an appropriate distance in all directions utilizing iris scissors.
Bilateral Rotation Flap Text: The defect edges were debeveled with a #15 scalpel blade. Given the location of the defect, shape of the defect and the proximity to free margins a bilateral rotation flap was deemed most appropriate. Using a sterile surgical marker, an appropriate rotation flap was drawn incorporating the defect and placing the expected incisions within the relaxed skin tension lines where possible. The area thus outlined was incised deep to adipose tissue with a #15 scalpel blade. The skin margins were undermined to an appropriate distance in all directions utilizing iris scissors. Following this, the designed flap was carried over into the primary defect and sutured into place.
Spiral Flap Text: The defect edges were debeveled with a #15 scalpel blade.  Given the location of the defect, shape of the defect and the proximity to free margins a spiral flap was deemed most appropriate.  Using a sterile surgical marker, an appropriate rotation flap was drawn incorporating the defect and placing the expected incisions within the relaxed skin tension lines where possible. The area thus outlined was incised deep to adipose tissue with a #15 scalpel blade.  The skin margins were undermined to an appropriate distance in all directions utilizing iris scissors.
Staged Advancement Flap Text: The defect edges were debeveled with a #15 scalpel blade. Given the location of the defect, shape of the defect and the proximity to free margins a staged advancement flap was deemed most appropriate. Using a sterile surgical marker, an appropriate advancement flap was drawn incorporating the defect and placing the expected incisions within the relaxed skin tension lines where possible. The area thus outlined was incised deep to adipose tissue with a #15 scalpel blade. The skin margins were undermined to an appropriate distance in all directions utilizing iris scissors. Following this, the designed flap was carried over into the primary defect and sutured into place.
Star Wedge Flap Text: The defect edges were debeveled with a #15 scalpel blade.  Given the location of the defect, shape of the defect and the proximity to free margins a star wedge flap was deemed most appropriate.  Using a sterile surgical marker, an appropriate rotation flap was drawn incorporating the defect and placing the expected incisions within the relaxed skin tension lines where possible. The area thus outlined was incised deep to adipose tissue with a #15 scalpel blade.  The skin margins were undermined to an appropriate distance in all directions utilizing iris scissors.
Transposition Flap Text: The defect edges were debeveled with a #15 scalpel blade.  Given the location of the defect and the proximity to free margins a transposition flap was deemed most appropriate.  Using a sterile surgical marker, an appropriate transposition flap was drawn incorporating the defect.    The area thus outlined was incised deep to adipose tissue with a #15 scalpel blade.  The skin margins were undermined to an appropriate distance in all directions utilizing iris scissors.
Muscle Hinge Flap Text: The defect edges were debeveled with a #15 scalpel blade.  Given the size, depth and location of the defect and the proximity to free margins a muscle hinge flap was deemed most appropriate.  Using a sterile surgical marker, an appropriate hinge flap was drawn incorporating the defect. The area thus outlined was incised with a #15 scalpel blade.  The skin margins were undermined to an appropriate distance in all directions utilizing iris scissors.
Mustarde Flap Text: The defect edges were debeveled with a #15 scalpel blade.  Given the size, depth and location of the defect and the proximity to free margins a Mustarde flap was deemed most appropriate. Using a sterile surgical marker, an appropriate flap was drawn incorporating the defect. The area thus outlined was incised with a #15 scalpel blade. The skin margins were undermined to an appropriate distance in all directions utilizing iris scissors. Following this, the designed flap was carried into the primary defect and sutured into place.
Nasal Turnover Hinge Flap Text: The defect edges were debeveled with a #15 scalpel blade.  Given the size, depth, location of the defect and the defect being full thickness a nasal turnover hinge flap was deemed most appropriate. Using a sterile surgical marker, an appropriate hinge flap was drawn incorporating the defect. The area thus outlined was incised with a #15 scalpel blade. The flap was designed to recreate the nasal mucosal lining and the alar rim. The skin margins were undermined to an appropriate distance in all directions utilizing iris scissors. Following this, the designed flap was carried over into the primary defect and sutured into place
Nasalis-Muscle-Based Myocutaneous Island Pedicle Flap Text: Using a #15 blade, an incision was made around the donor flap to the level of the nasalis muscle. Wide lateral undermining was then performed in both the subcutaneous plane above the nasalis muscle, and in a submuscular plane just above periosteum. This allowed the formation of a free nasalis muscle axial pedicle (based on the angular artery) which was still attached to the actual cutaneous flap, increasing its mobility and vascular viability. Hemostasis was obtained with pinpoint electrocoagulation. The flap was mobilized into position and the pivotal anchor points positioned and stabilized with buried interrupted sutures. Subcutaneous and dermal tissues were closed in a multilayered fashion with sutures. Tissue redundancies were excised, and the epidermal edges were apposed without significant tension and sutured with sutures.
Nasalis Myocutaneous Flap Text: Using a #15 blade, an incision was made around the donor flap to the level of the nasalis muscle. Wide lateral undermining was then performed in both the subcutaneous plane above the nasalis muscle, and in a submuscular plane just above periosteum. This allowed the formation of a free nasalis muscle axial pedicle which was still attached to the actual cutaneous flap, increasing its mobility and vascular viability. Hemostasis was obtained with pinpoint electrocoagulation. The flap was mobilized into position and the pivotal anchor points positioned and stabilized with buried interrupted sutures. Subcutaneous and dermal tissues were closed in a multilayered fashion with sutures. Tissue redundancies were excised, and the epidermal edges were apposed without significant tension and sutured with sutures.
Nasolabial Transposition Flap Text: The defect edges were debeveled with a #15 scalpel blade.  Given the size, depth and location of the defect and the proximity to free margins a nasolabial transposition flap was deemed most appropriate. Using a sterile surgical marker, an appropriate flap was drawn incorporating the defect. The area thus outlined was incised with a #15 scalpel blade. The skin margins were undermined to an appropriate distance in all directions utilizing iris scissors. Following this, the designed flap was carried into the primary defect and sutured into place.
Orbicularis Oris Muscle Flap Text: The defect edges were debeveled with a #15 scalpel blade.  Given that the defect affected the competency of the oral sphincter an orbicularis oris muscle flap was deemed most appropriate to restore this competency and normal muscle function.  Using a sterile surgical marker, an appropriate flap was drawn incorporating the defect. The area thus outlined was incised with a #15 scalpel blade. Following this, the designed flap was carried over into the primary defect and sutured into place.
Melolabial Transposition Flap Text: The defect edges were debeveled with a #15 scalpel blade.  Given the location of the defect and the proximity to free margins a melolabial flap was deemed most appropriate.  Using a sterile surgical marker, an appropriate melolabial transposition flap was drawn incorporating the defect.    The area thus outlined was incised deep to adipose tissue with a #15 scalpel blade.  The skin margins were undermined to an appropriate distance in all directions utilizing iris scissors.
Rectangular Flap Text: The defect edges were debeveled with a #15 scalpel blade. Given the location of the defect and the proximity to free margins a rectangular flap was deemed most appropriate. Using a sterile surgical marker, an appropriate rectangular flap was drawn incorporating the defect. The area thus outlined was incised deep to adipose tissue with a #15 scalpel blade. The skin margins were undermined to an appropriate distance in all directions utilizing iris scissors. Following this, the designed flap was carried over into the primary defect and sutured into place.
Rhombic Flap Text: The defect edges were debeveled with a #15 scalpel blade.  Given the location of the defect and the proximity to free margins a rhombic flap was deemed most appropriate.  Using a sterile surgical marker, an appropriate rhombic flap was drawn incorporating the defect.    The area thus outlined was incised deep to adipose tissue with a #15 scalpel blade.  The skin margins were undermined to an appropriate distance in all directions utilizing iris scissors.
Rhomboid Transposition Flap Text: The defect edges were debeveled with a #15 scalpel blade. Given the location of the defect and the proximity to free margins a rhomboid transposition flap was deemed most appropriate. Using a sterile surgical marker, an appropriate rhomboid flap was drawn incorporating the defect. The area thus outlined was incised deep to adipose tissue with a #15 scalpel blade. The skin margins were undermined to an appropriate distance in all directions utilizing iris scissors. Following this, the designed flap was carried over into the primary defect and sutured into place.
Bi-Rhombic Flap Text: The defect edges were debeveled with a #15 scalpel blade.  Given the location of the defect and the proximity to free margins a bi-rhombic flap was deemed most appropriate.  Using a sterile surgical marker, an appropriate rhombic flap was drawn incorporating the defect. The area thus outlined was incised deep to adipose tissue with a #15 scalpel blade.  The skin margins were undermined to an appropriate distance in all directions utilizing iris scissors.
Helical Rim Advancement Flap Text: The defect edges were debeveled with a #15 blade scalpel.  Given the location of the defect and the proximity to free margins (helical rim) a double helical rim advancement flap was deemed most appropriate.  Using a sterile surgical marker, the appropriate advancement flaps were drawn incorporating the defect and placing the expected incisions between the helical rim and antihelix where possible.  The area thus outlined was incised through and through with a #15 scalpel blade.  With a skin hook and iris scissors, the flaps were gently and sharply undermined and freed up.
Bilateral Helical Rim Advancement Flap Text: The defect edges were debeveled with a #15 blade scalpel.  Given the location of the defect and the proximity to free margins (helical rim) a bilateral helical rim advancement flap was deemed most appropriate.  Using a sterile surgical marker, the appropriate advancement flaps were drawn incorporating the defect and placing the expected incisions between the helical rim and antihelix where possible.  The area thus outlined was incised through and through with a #15 scalpel blade.  With a skin hook and iris scissors, the flaps were gently and sharply undermined and freed up.
Ear Star Wedge Flap Text: The defect edges were debeveled with a #15 blade scalpel.  Given the location of the defect and the proximity to free margins (helical rim) an ear star wedge flap was deemed most appropriate.  Using a sterile surgical marker, the appropriate flap was drawn incorporating the defect and placing the expected incisions between the helical rim and antihelix where possible.  The area thus outlined was incised through and through with a #15 scalpel blade.
Banner Transposition Flap Text: The defect edges were debeveled with a #15 scalpel blade. Given the location of the defect and the proximity to free margins a Banner transposition flap was deemed most appropriate. Using a sterile surgical marker, an appropriate flap was drawn around the defect. The area thus outlined was incised deep to adipose tissue with a #15 scalpel blade. The skin margins were undermined to an appropriate distance in all directions utilizing iris scissors. Following this, the designed flap was carried into the primary defect and sutured into place.
Bilobed Flap Text: The defect edges were debeveled with a #15 scalpel blade.  Given the location of the defect and the proximity to free margins a bilobe flap was deemed most appropriate.  Using a sterile surgical marker, an appropriate bilobe flap drawn around the defect.    The area thus outlined was incised deep to adipose tissue with a #15 scalpel blade.  The skin margins were undermined to an appropriate distance in all directions utilizing iris scissors.
Bilobed Transposition Flap Text: The defect edges were debeveled with a #15 scalpel blade.  Given the location of the defect and the proximity to free margins a bilobed transposition flap was deemed most appropriate.  Using a sterile surgical marker, an appropriate bilobe flap drawn around the defect.    The area thus outlined was incised deep to adipose tissue with a #15 scalpel blade.  The skin margins were undermined to an appropriate distance in all directions utilizing iris scissors.
Trilobed Flap Text: The defect edges were debeveled with a #15 scalpel blade.  Given the location of the defect and the proximity to free margins a trilobed flap was deemed most appropriate.  Using a sterile surgical marker, an appropriate trilobed flap drawn around the defect.    The area thus outlined was incised deep to adipose tissue with a #15 scalpel blade.  The skin margins were undermined to an appropriate distance in all directions utilizing iris scissors.
Dorsal Nasal Flap Text: The defect edges were debeveled with a #15 scalpel blade.  Given the location of the defect and the proximity to free margins a dorsal nasal flap was deemed most appropriate.  Using a sterile surgical marker, an appropriate dorsal nasal flap was drawn around the defect.    The area thus outlined was incised deep to adipose tissue with a #15 scalpel blade.  The skin margins were undermined to an appropriate distance in all directions utilizing iris scissors.
Island Pedicle Flap Text: The defect edges were debeveled with a #15 scalpel blade.  Given the location of the defect, shape of the defect and the proximity to free margins an island pedicle advancement flap was deemed most appropriate.  Using a sterile surgical marker, an appropriate advancement flap was drawn incorporating the defect, outlining the appropriate donor tissue and placing the expected incisions within the relaxed skin tension lines where possible.    The area thus outlined was incised deep to adipose tissue with a #15 scalpel blade.  The skin margins were undermined to an appropriate distance in all directions around the primary defect and laterally outward around the island pedicle utilizing iris scissors.  There was minimal undermining beneath the pedicle flap.
Island Pedicle Flap With Canthal Suspension Text: The defect edges were debeveled with a #15 scalpel blade.  Given the location of the defect, shape of the defect and the proximity to free margins an island pedicle advancement flap was deemed most appropriate.  Using a sterile surgical marker, an appropriate advancement flap was drawn incorporating the defect, outlining the appropriate donor tissue and placing the expected incisions within the relaxed skin tension lines where possible. The area thus outlined was incised deep to adipose tissue with a #15 scalpel blade.  The skin margins were undermined to an appropriate distance in all directions around the primary defect and laterally outward around the island pedicle utilizing iris scissors.  There was minimal undermining beneath the pedicle flap. A suspension suture was placed in the canthal tendon to prevent tension and prevent ectropion.
Alar Island Pedicle Flap Text: The defect edges were debeveled with a #15 scalpel blade.  Given the location of the defect, shape of the defect and the proximity to the alar rim an island pedicle advancement flap was deemed most appropriate.  Using a sterile surgical marker, an appropriate advancement flap was drawn incorporating the defect, outlining the appropriate donor tissue and placing the expected incisions within the nasal ala running parallel to the alar rim. The area thus outlined was incised with a #15 scalpel blade.  The skin margins were undermined minimally to an appropriate distance in all directions around the primary defect and laterally outward around the island pedicle utilizing iris scissors.  There was minimal undermining beneath the pedicle flap.
Double Island Pedicle Flap Text: The defect edges were debeveled with a #15 scalpel blade.  Given the location of the defect, shape of the defect and the proximity to free margins a double island pedicle advancement flap was deemed most appropriate.  Using a sterile surgical marker, an appropriate advancement flap was drawn incorporating the defect, outlining the appropriate donor tissue and placing the expected incisions within the relaxed skin tension lines where possible.    The area thus outlined was incised deep to adipose tissue with a #15 scalpel blade.  The skin margins were undermined to an appropriate distance in all directions around the primary defect and laterally outward around the island pedicle utilizing iris scissors.  There was minimal undermining beneath the pedicle flap.
Island Pedicle Flap-Requiring Vessel Identification Text: The defect edges were debeveled with a #15 scalpel blade.  Given the location of the defect, shape of the defect and the proximity to free margins an island pedicle advancement flap was deemed most appropriate.  Using a sterile surgical marker, an appropriate advancement flap was drawn, based on the axial vessel mentioned above, incorporating the defect, outlining the appropriate donor tissue and placing the expected incisions within the relaxed skin tension lines where possible.    The area thus outlined was incised deep to adipose tissue with a #15 scalpel blade.  The skin margins were undermined to an appropriate distance in all directions around the primary defect and laterally outward around the island pedicle utilizing iris scissors.  There was minimal undermining beneath the pedicle flap.
Keystone Flap Text: The defect edges were debeveled with a #15 scalpel blade.  Given the location of the defect, shape of the defect a keystone flap was deemed most appropriate.  Using a sterile surgical marker, an appropriate keystone flap was drawn incorporating the defect, outlining the appropriate donor tissue and placing the expected incisions within the relaxed skin tension lines where possible. The area thus outlined was incised deep to adipose tissue with a #15 scalpel blade.  The skin margins were undermined to an appropriate distance in all directions around the primary defect and laterally outward around the flap utilizing iris scissors.
O-T Plasty Text: The defect edges were debeveled with a #15 scalpel blade.  Given the location of the defect, shape of the defect and the proximity to free margins an O-T plasty was deemed most appropriate.  Using a sterile surgical marker, an appropriate O-T plasty was drawn incorporating the defect and placing the expected incisions within the relaxed skin tension lines where possible.    The area thus outlined was incised deep to adipose tissue with a #15 scalpel blade.  The skin margins were undermined to an appropriate distance in all directions utilizing iris scissors.
O-Z Plasty Text: The defect edges were debeveled with a #15 scalpel blade.  Given the location of the defect, shape of the defect and the proximity to free margins an O-Z plasty (double transposition flap) was deemed most appropriate.  Using a sterile surgical marker, the appropriate transposition flaps were drawn incorporating the defect and placing the expected incisions within the relaxed skin tension lines where possible.    The area thus outlined was incised deep to adipose tissue with a #15 scalpel blade.  The skin margins were undermined to an appropriate distance in all directions utilizing iris scissors.  Hemostasis was achieved with electrocautery.  The flaps were then transposed into place, one clockwise and the other counterclockwise, and anchored with interrupted buried subcutaneous sutures.
Double O-Z Plasty Text: The defect edges were debeveled with a #15 scalpel blade. Given the location of the defect, shape of the defect and the proximity to free margins a Double O-Z plasty (double transposition flap) was deemed most appropriate. Using a sterile surgical marker, the appropriate transposition flaps were drawn incorporating the defect and placing the expected incisions within the relaxed skin tension lines where possible. The area thus outlined was incised deep to adipose tissue with a #15 scalpel blade. The skin margins were undermined to an appropriate distance in all directions utilizing iris scissors. Hemostasis was achieved with electrocautery. The flaps were then transposed and carried over into place, one clockwise and the other counterclockwise, and anchored with interrupted buried subcutaneous sutures.
V-Y Plasty Text: The defect edges were debeveled with a #15 scalpel blade.  Given the location of the defect, shape of the defect and the proximity to free margins an V-Y advancement flap was deemed most appropriate.  Using a sterile surgical marker, an appropriate advancement flap was drawn incorporating the defect and placing the expected incisions within the relaxed skin tension lines where possible.    The area thus outlined was incised deep to adipose tissue with a #15 scalpel blade.  The skin margins were undermined to an appropriate distance in all directions utilizing iris scissors.
H Plasty Text: Given the location of the defect, shape of the defect and the proximity to free margins a H-plasty was deemed most appropriate for repair.  Using a sterile surgical marker, the appropriate advancement arms of the H-plasty were drawn incorporating the defect and placing the expected incisions within the relaxed skin tension lines where possible. The area thus outlined was incised deep to adipose tissue with a #15 scalpel blade. The skin margins were undermined to an appropriate distance in all directions utilizing iris scissors.  The opposing advancement arms were then advanced into place in opposite direction and anchored with interrupted buried subcutaneous sutures.
W Plasty Text: The lesion was extirpated to the level of the fat with a #15 scalpel blade.  Given the location of the defect, shape of the defect and the proximity to free margins a W-plasty was deemed most appropriate for repair.  Using a sterile surgical marker, the appropriate transposition arms of the W-plasty were drawn incorporating the defect and placing the expected incisions within the relaxed skin tension lines where possible.    The area thus outlined was incised deep to adipose tissue with a #15 scalpel blade.  The skin margins were undermined to an appropriate distance in all directions utilizing iris scissors.  The opposing transposition arms were then transposed into place in opposite direction and anchored with interrupted buried subcutaneous sutures.
Z Plasty Text: The lesion was extirpated to the level of the fat with a #15 scalpel blade.  Given the location of the defect, shape of the defect and the proximity to free margins a Z-plasty was deemed most appropriate for repair.  Using a sterile surgical marker, the appropriate transposition arms of the Z-plasty were drawn incorporating the defect and placing the expected incisions within the relaxed skin tension lines where possible.    The area thus outlined was incised deep to adipose tissue with a #15 scalpel blade.  The skin margins were undermined to an appropriate distance in all directions utilizing iris scissors.  The opposing transposition arms were then transposed into place in opposite direction and anchored with interrupted buried subcutaneous sutures.
Double Z Plasty Text: The lesion was extirpated to the level of the fat with a #15 scalpel blade. Given the location of the defect, shape of the defect and the proximity to free margins a double Z-plasty was deemed most appropriate for repair. Using a sterile surgical marker, the appropriate transposition arms of the double Z-plasty were drawn incorporating the defect and placing the expected incisions within the relaxed skin tension lines where possible. The area thus outlined was incised deep to adipose tissue with a #15 scalpel blade. The skin margins were undermined to an appropriate distance in all directions utilizing iris scissors. The opposing transposition arms were then transposed and carried over into place in opposite direction and anchored with interrupted buried subcutaneous sutures.
Zygomaticofacial Flap Text: Given the location of the defect, shape of the defect and the proximity to free margins a zygomaticofacial flap was deemed most appropriate for repair. Using a sterile surgical marker, the appropriate flap was drawn incorporating the defect and placing the expected incisions within the relaxed skin tension lines where possible. The area thus outlined was incised deep to adipose tissue with a #15 scalpel blade with preservation of a vascular pedicle.  The skin margins were undermined to an appropriate distance in all directions utilizing iris scissors. The flap was then carried over into the defect and anchored with interrupted buried subcutaneous sutures.
Cheek Interpolation Flap Text: A decision was made to reconstruct the defect utilizing an interpolation axial flap and a staged reconstruction.  A telfa template was made of the defect.  This telfa template was then used to outline the Cheek Interpolation flap.  The donor area for the pedicle flap was then injected with anesthesia.  The flap was excised through the skin and subcutaneous tissue down to the layer of the underlying musculature.  The interpolation flap was carefully excised within this deep plane to maintain its blood supply.  The edges of the donor site were undermined.   The donor site was closed in a primary fashion.  The pedicle was then rotated into position and sutured.  Once the tube was sutured into place, adequate blood supply was confirmed with blanching and refill.  The pedicle was then wrapped with xeroform gauze and dressed appropriately with a telfa and gauze bandage to ensure continued blood supply and protect the attached pedicle.
Cheek-To-Nose Interpolation Flap Text: A decision was made to reconstruct the defect utilizing an interpolation axial flap and a staged reconstruction.  A telfa template was made of the defect.  This telfa template was then used to outline the Cheek-To-Nose Interpolation flap.  The donor area for the pedicle flap was then injected with anesthesia.  The flap was excised through the skin and subcutaneous tissue down to the layer of the underlying musculature.  The interpolation flap was carefully excised within this deep plane to maintain its blood supply.  The edges of the donor site were undermined.   The donor site was closed in a primary fashion.  The pedicle was then rotated into position and sutured.  Once the tube was sutured into place, adequate blood supply was confirmed with blanching and refill.  The pedicle was then wrapped with xeroform gauze and dressed appropriately with a telfa and gauze bandage to ensure continued blood supply and protect the attached pedicle.
Interpolation Flap Text: A decision was made to reconstruct the defect utilizing an interpolation axial flap and a staged reconstruction.  A telfa template was made of the defect.  This telfa template was then used to outline the interpolation flap.  The donor area for the pedicle flap was then injected with anesthesia.  The flap was excised through the skin and subcutaneous tissue down to the layer of the underlying musculature.  The interpolation flap was carefully excised within this deep plane to maintain its blood supply.  The edges of the donor site were undermined.   The donor site was closed in a primary fashion.  The pedicle was then rotated into position and sutured.  Once the tube was sutured into place, adequate blood supply was confirmed with blanching and refill.  The pedicle was then wrapped with xeroform gauze and dressed appropriately with a telfa and gauze bandage to ensure continued blood supply and protect the attached pedicle.
Melolabial Interpolation Flap Text: A decision was made to reconstruct the defect utilizing an interpolation axial flap and a staged reconstruction.  A telfa template was made of the defect.  This telfa template was then used to outline the melolabial interpolation flap.  The donor area for the pedicle flap was then injected with anesthesia.  The flap was excised through the skin and subcutaneous tissue down to the layer of the underlying musculature.  The pedicle flap was carefully excised within this deep plane to maintain its blood supply.  The edges of the donor site were undermined.   The donor site was closed in a primary fashion.  The pedicle was then rotated into position and sutured.  Once the tube was sutured into place, adequate blood supply was confirmed with blanching and refill.  The pedicle was then wrapped with xeroform gauze and dressed appropriately with a telfa and gauze bandage to ensure continued blood supply and protect the attached pedicle.
Mastoid Interpolation Flap Text: A decision was made to reconstruct the defect utilizing an interpolation axial flap and a staged reconstruction.  A telfa template was made of the defect.  This telfa template was then used to outline the mastoid interpolation flap.  The donor area for the pedicle flap was then injected with anesthesia.  The flap was excised through the skin and subcutaneous tissue down to the layer of the underlying musculature.  The pedicle flap was carefully excised within this deep plane to maintain its blood supply.  The edges of the donor site were undermined.   The donor site was closed in a primary fashion.  The pedicle was then rotated into position and sutured.  Once the tube was sutured into place, adequate blood supply was confirmed with blanching and refill.  The pedicle was then wrapped with xeroform gauze and dressed appropriately with a telfa and gauze bandage to ensure continued blood supply and protect the attached pedicle.
Posterior Auricular Interpolation Flap Text: A decision was made to reconstruct the defect utilizing an interpolation axial flap and a staged reconstruction.  A telfa template was made of the defect.  This telfa template was then used to outline the posterior auricular interpolation flap.  The donor area for the pedicle flap was then injected with anesthesia.  The flap was excised through the skin and subcutaneous tissue down to the layer of the underlying musculature.  The pedicle flap was carefully excised within this deep plane to maintain its blood supply.  The edges of the donor site were undermined.   The donor site was closed in a primary fashion.  The pedicle was then rotated into position and sutured.  Once the tube was sutured into place, adequate blood supply was confirmed with blanching and refill.  The pedicle was then wrapped with xeroform gauze and dressed appropriately with a telfa and gauze bandage to ensure continued blood supply and protect the attached pedicle.
Paramedian Forehead Flap Text: A decision was made to reconstruct the defect utilizing an interpolation axial flap and a staged reconstruction.  A telfa template was made of the defect.  This telfa template was then used to outline the paramedian forehead pedicle flap.  The donor area for the pedicle flap was then injected with anesthesia.  The flap was excised through the skin and subcutaneous tissue down to the layer of the underlying musculature.  The pedicle flap was carefully excised within this deep plane to maintain its blood supply.  The edges of the donor site were undermined.   The donor site was closed in a primary fashion.  The pedicle was then rotated into position and sutured.  Once the tube was sutured into place, adequate blood supply was confirmed with blanching and refill.  The pedicle was then wrapped with xeroform gauze and dressed appropriately with a telfa and gauze bandage to ensure continued blood supply and protect the attached pedicle.
Abbe Flap (Upper To Lower Lip) Text: The defect of the lower lip was assessed and measured.  Given the location and size of the defect, an Abbe flap was deemed most appropriate. Using a sterile surgical marker, an appropriate Abbe flap was measured and drawn on the upper lip. Local anesthesia was then infiltrated.  A scalpel was then used to incise the upper lip through and through the skin, vermilion, muscle and mucosa, leaving the flap pedicled on the opposite side.  The flap was then rotated and transferred to the lower lip defect.  The flap was then sutured into place with a three layer technique, closing the orbicularis oris muscle layer with subcutaneous buried sutures, followed by a mucosal layer and an epidermal layer.
Abbe Flap (Lower To Upper Lip) Text: The defect of the upper lip was assessed and measured.  Given the location and size of the defect, an Abbe flap was deemed most appropriate. Using a sterile surgical marker, an appropriate Abbe flap was measured and drawn on the lower lip. Local anesthesia was then infiltrated. A scalpel was then used to incise the upper lip through and through the skin, vermilion, muscle and mucosa, leaving the flap pedicled on the opposite side.  The flap was then rotated and transferred to the lower lip defect.  The flap was then sutured into place with a three layer technique, closing the orbicularis oris muscle layer with subcutaneous buried sutures, followed by a mucosal layer and an epidermal layer.
Estlander Flap (Upper To Lower Lip) Text: The defect of the lower lip was assessed and measured.  Given the location and size of the defect, an Estlander flap was deemed most appropriate. Using a sterile surgical marker, an appropriate Estlander flap was measured and drawn on the upper lip. Local anesthesia was then infiltrated. A scalpel was then used to incise the lateral aspect of the flap, through skin, muscle and mucosa, leaving the flap pedicled medially.  The flap was then rotated and positioned to fill the lower lip defect.  The flap was then sutured into place with a three layer technique, closing the orbicularis oris muscle layer with subcutaneous buried sutures, followed by a mucosal layer and an epidermal layer.
Lip Wedge Excision Repair Text: Given the location of the defect and the proximity to free margins a full thickness wedge repair was deemed most appropriate.  Using a sterile surgical marker, the appropriate repair was drawn incorporating the defect and placing the expected incisions perpendicular to the vermilion border.  The vermilion border was also meticulously outlined to ensure appropriate reapproximation during the repair.  The area thus outlined was incised through and through with a #15 scalpel blade.  The muscularis and dermis were reaproximated with deep sutures following hemostasis. Care was taken to realign the vermilion border before proceeding with the superficial closure.  Once the vermilion was realigned the superfical and mucosal closure was finished.
Ftsg Text: The defect edges were debeveled with a #15 scalpel blade.  Given the location of the defect, shape of the defect and the proximity to free margins a full thickness skin graft was deemed most appropriate.  Using a sterile surgical marker, the primary defect shape was transferred to the donor site. The area thus outlined was incised deep to adipose tissue with a #15 scalpel blade.  The harvested graft was then trimmed of adipose tissue until only dermis and epidermis was left.  The skin margins of the secondary defect were undermined to an appropriate distance in all directions utilizing iris scissors.  The secondary defect was closed with interrupted buried subcutaneous sutures.  The skin edges were then re-apposed with running  sutures.  The skin graft was then placed in the primary defect and oriented appropriately.
Split-Thickness Skin Graft Text: The defect edges were debeveled with a #15 scalpel blade.  Given the location of the defect, shape of the defect and the proximity to free margins a split thickness skin graft was deemed most appropriate.  Using a sterile surgical marker, the primary defect shape was transferred to the donor site. The split thickness graft was then harvested.  The skin graft was then placed in the primary defect and oriented appropriately.
Pinch Graft Text: The defect edges were debeveled with a #15 scalpel blade. Given the location of the defect, shape of the defect and the proximity to free margins a pinch graft was deemed most appropriate. Using a sterile surgical marker, the primary defect shape was transferred to the donor site. The area thus outlined was incised deep to adipose tissue with a #15 scalpel blade.  The harvested graft was then trimmed of adipose tissue until only dermis and epidermis was left. The skin margins of the secondary defect were undermined to an appropriate distance in all directions utilizing iris scissors.  The secondary defect was closed with interrupted buried subcutaneous sutures.  The skin edges were then re-apposed with running  sutures.  The skin graft was then placed in the primary defect and oriented appropriately.
Burow's Graft Text: The defect edges were debeveled with a #15 scalpel blade. Given the location of the defect, shape of the defect, the proximity to free margins and the presence of a standing cone deformity a Burow's skin graft was deemed most appropriate. The standing cone was removed and this tissue was then trimmed to the shape of the primary defect. The adipose tissue was also removed until only dermis and epidermis were left.  The skin margins of the secondary defect were undermined to an appropriate distance in all directions utilizing iris scissors.  The secondary defect was closed with interrupted buried subcutaneous sutures.  The skin edges were then re-apposed with running  sutures.  The skin graft was then placed in the primary defect and oriented appropriately.
Cartilage Graft Text: The defect edges were debeveled with a #15 scalpel blade.  Given the location of the defect, shape of the defect, the fact the defect involved a full thickness cartilage defect a cartilage graft was deemed most appropriate.  An appropriate donor site was identified, cleansed, and anesthetized. The cartilage graft was then harvested and transferred to the recipient site, oriented appropriately and then sutured into place.  The secondary defect was then repaired using a primary closure.
Composite Graft Text: The defect edges were debeveled with a #15 scalpel blade.  Given the location of the defect, shape of the defect, the proximity to free margins and the fact the defect was full thickness a composite graft was deemed most appropriate.  The defect was outline and then transferred to the donor site.  A full thickness graft was then excised from the donor site. The graft was then placed in the primary defect, oriented appropriately and then sutured into place.  The secondary defect was then repaired using a primary closure.
Epidermal Autograft Text: The defect edges were debeveled with a #15 scalpel blade.  Given the location of the defect, shape of the defect and the proximity to free margins an epidermal autograft was deemed most appropriate.  Using a sterile surgical marker, the primary defect shape was transferred to the donor site. The epidermal graft was then harvested.  The skin graft was then placed in the primary defect and oriented appropriately.
Dermal Autograft Text: The defect edges were debeveled with a #15 scalpel blade.  Given the location of the defect, shape of the defect and the proximity to free margins a dermal autograft was deemed most appropriate.  Using a sterile surgical marker, the primary defect shape was transferred to the donor site. The area thus outlined was incised deep to adipose tissue with a #15 scalpel blade.  The harvested graft was then trimmed of adipose and epidermal tissue until only dermis was left.  The skin graft was then placed in the primary defect and oriented appropriately.
Skin Substitute Text: The defect edges were debeveled with a #15 scalpel blade.  Given the location of the defect, shape of the defect and the proximity to free margins a skin substitute graft was deemed most appropriate.  The graft material was trimmed to fit the size of the defect. The graft was then placed in the primary defect and oriented appropriately.
Tissue Cultured Epidermal Autograft Text: The defect edges were debeveled with a #15 scalpel blade.  Given the location of the defect, shape of the defect and the proximity to free margins a tissue cultured epidermal autograft was deemed most appropriate.  The graft was then trimmed to fit the size of the defect.  The graft was then placed in the primary defect and oriented appropriately.
Xenograft Text: The defect edges were debeveled with a #15 scalpel blade.  Given the location of the defect, shape of the defect and the proximity to free margins a xenograft was deemed most appropriate.  The graft was then trimmed to fit the size of the defect.  The graft was then placed in the primary defect and oriented appropriately.
Purse String (Intermediate) Text: Given the location of the defect and the characteristics of the surrounding skin a purse string intermediate closure was deemed most appropriate.  Undermining was performed circumfirentially around the surgical defect.  A purse string suture was then placed and tightened.
Purse String (Simple) Text: Given the location of the defect and the characteristics of the surrounding skin a purse string simple closure was deemed most appropriate.  Undermining was performed circumferentially around the surgical defect.  A purse string suture was then placed and tightened.
Partial Purse String (Intermediate) Text: Given the location of the defect and the characteristics of the surrounding skin an intermediate purse string closure was deemed most appropriate.  Undermining was performed circumferentially around the surgical defect.  A purse string suture was then placed and tightened. Wound tension of the circular defect prevented complete closure of the wound.
Partial Purse String (Simple) Text: Given the location of the defect and the characteristics of the surrounding skin a simple purse string closure was deemed most appropriate.  Undermining was performed circumferentially around the surgical defect.  A purse string suture was then placed and tightened. Wound tension of the circular defect prevented complete closure of the wound.
Complex Repair And Single Advancement Flap Text: The defect edges were debeveled with a #15 scalpel blade.  The primary defect was closed partially with a complex linear closure.  Given the location of the remaining defect, shape of the defect and the proximity to free margins a single advancement flap was deemed most appropriate for complete closure of the defect.  Using a sterile surgical marker, an appropriate advancement flap was drawn incorporating the defect and placing the expected incisions within the relaxed skin tension lines where possible.    The area thus outlined was incised deep to adipose tissue with a #15 scalpel blade.  The skin margins were undermined to an appropriate distance in all directions utilizing iris scissors.
Complex Repair And Double Advancement Flap Text: The defect edges were debeveled with a #15 scalpel blade.  The primary defect was closed partially with a complex linear closure.  Given the location of the remaining defect, shape of the defect and the proximity to free margins a double advancement flap was deemed most appropriate for complete closure of the defect.  Using a sterile surgical marker, an appropriate advancement flap was drawn incorporating the defect and placing the expected incisions within the relaxed skin tension lines where possible.    The area thus outlined was incised deep to adipose tissue with a #15 scalpel blade.  The skin margins were undermined to an appropriate distance in all directions utilizing iris scissors.
Complex Repair And Modified Advancement Flap Text: The defect edges were debeveled with a #15 scalpel blade.  The primary defect was closed partially with a complex linear closure.  Given the location of the remaining defect, shape of the defect and the proximity to free margins a modified advancement flap was deemed most appropriate for complete closure of the defect.  Using a sterile surgical marker, an appropriate advancement flap was drawn incorporating the defect and placing the expected incisions within the relaxed skin tension lines where possible.    The area thus outlined was incised deep to adipose tissue with a #15 scalpel blade.  The skin margins were undermined to an appropriate distance in all directions utilizing iris scissors.
Complex Repair And A-T Advancement Flap Text: The defect edges were debeveled with a #15 scalpel blade.  The primary defect was closed partially with a complex linear closure.  Given the location of the remaining defect, shape of the defect and the proximity to free margins an A-T advancement flap was deemed most appropriate for complete closure of the defect.  Using a sterile surgical marker, an appropriate advancement flap was drawn incorporating the defect and placing the expected incisions within the relaxed skin tension lines where possible.    The area thus outlined was incised deep to adipose tissue with a #15 scalpel blade.  The skin margins were undermined to an appropriate distance in all directions utilizing iris scissors.
Complex Repair And O-T Advancement Flap Text: The defect edges were debeveled with a #15 scalpel blade.  The primary defect was closed partially with a complex linear closure.  Given the location of the remaining defect, shape of the defect and the proximity to free margins an O-T advancement flap was deemed most appropriate for complete closure of the defect.  Using a sterile surgical marker, an appropriate advancement flap was drawn incorporating the defect and placing the expected incisions within the relaxed skin tension lines where possible.    The area thus outlined was incised deep to adipose tissue with a #15 scalpel blade.  The skin margins were undermined to an appropriate distance in all directions utilizing iris scissors.
Complex Repair And O-L Flap Text: The defect edges were debeveled with a #15 scalpel blade.  The primary defect was closed partially with a complex linear closure.  Given the location of the remaining defect, shape of the defect and the proximity to free margins an O-L flap was deemed most appropriate for complete closure of the defect.  Using a sterile surgical marker, an appropriate flap was drawn incorporating the defect and placing the expected incisions within the relaxed skin tension lines where possible.    The area thus outlined was incised deep to adipose tissue with a #15 scalpel blade.  The skin margins were undermined to an appropriate distance in all directions utilizing iris scissors.
Complex Repair And Bilobe Flap Text: The defect edges were debeveled with a #15 scalpel blade.  The primary defect was closed partially with a complex linear closure.  Given the location of the remaining defect, shape of the defect and the proximity to free margins a bilobe flap was deemed most appropriate for complete closure of the defect.  Using a sterile surgical marker, an appropriate advancement flap was drawn incorporating the defect and placing the expected incisions within the relaxed skin tension lines where possible.    The area thus outlined was incised deep to adipose tissue with a #15 scalpel blade.  The skin margins were undermined to an appropriate distance in all directions utilizing iris scissors.
Complex Repair And Melolabial Flap Text: The defect edges were debeveled with a #15 scalpel blade.  The primary defect was closed partially with a complex linear closure.  Given the location of the remaining defect, shape of the defect and the proximity to free margins a melolabial flap was deemed most appropriate for complete closure of the defect.  Using a sterile surgical marker, an appropriate advancement flap was drawn incorporating the defect and placing the expected incisions within the relaxed skin tension lines where possible.    The area thus outlined was incised deep to adipose tissue with a #15 scalpel blade.  The skin margins were undermined to an appropriate distance in all directions utilizing iris scissors.
Complex Repair And Rotation Flap Text: The defect edges were debeveled with a #15 scalpel blade.  The primary defect was closed partially with a complex linear closure.  Given the location of the remaining defect, shape of the defect and the proximity to free margins a rotation flap was deemed most appropriate for complete closure of the defect.  Using a sterile surgical marker, an appropriate advancement flap was drawn incorporating the defect and placing the expected incisions within the relaxed skin tension lines where possible.    The area thus outlined was incised deep to adipose tissue with a #15 scalpel blade.  The skin margins were undermined to an appropriate distance in all directions utilizing iris scissors.
Complex Repair And Rhombic Flap Text: The defect edges were debeveled with a #15 scalpel blade.  The primary defect was closed partially with a complex linear closure.  Given the location of the remaining defect, shape of the defect and the proximity to free margins a rhombic flap was deemed most appropriate for complete closure of the defect.  Using a sterile surgical marker, an appropriate advancement flap was drawn incorporating the defect and placing the expected incisions within the relaxed skin tension lines where possible.    The area thus outlined was incised deep to adipose tissue with a #15 scalpel blade.  The skin margins were undermined to an appropriate distance in all directions utilizing iris scissors.
Complex Repair And Transposition Flap Text: The defect edges were debeveled with a #15 scalpel blade.  The primary defect was closed partially with a complex linear closure.  Given the location of the remaining defect, shape of the defect and the proximity to free margins a transposition flap was deemed most appropriate for complete closure of the defect.  Using a sterile surgical marker, an appropriate advancement flap was drawn incorporating the defect and placing the expected incisions within the relaxed skin tension lines where possible.    The area thus outlined was incised deep to adipose tissue with a #15 scalpel blade.  The skin margins were undermined to an appropriate distance in all directions utilizing iris scissors.
Complex Repair And V-Y Plasty Text: The defect edges were debeveled with a #15 scalpel blade.  The primary defect was closed partially with a complex linear closure.  Given the location of the remaining defect, shape of the defect and the proximity to free margins a V-Y plasty was deemed most appropriate for complete closure of the defect.  Using a sterile surgical marker, an appropriate advancement flap was drawn incorporating the defect and placing the expected incisions within the relaxed skin tension lines where possible.    The area thus outlined was incised deep to adipose tissue with a #15 scalpel blade.  The skin margins were undermined to an appropriate distance in all directions utilizing iris scissors.
Complex Repair And M Plasty Text: The defect edges were debeveled with a #15 scalpel blade.  The primary defect was closed partially with a complex linear closure.  Given the location of the remaining defect, shape of the defect and the proximity to free margins an M plasty was deemed most appropriate for complete closure of the defect.  Using a sterile surgical marker, an appropriate advancement flap was drawn incorporating the defect and placing the expected incisions within the relaxed skin tension lines where possible.    The area thus outlined was incised deep to adipose tissue with a #15 scalpel blade.  The skin margins were undermined to an appropriate distance in all directions utilizing iris scissors.
Complex Repair And Double M Plasty Text: The defect edges were debeveled with a #15 scalpel blade.  The primary defect was closed partially with a complex linear closure.  Given the location of the remaining defect, shape of the defect and the proximity to free margins a double M plasty was deemed most appropriate for complete closure of the defect.  Using a sterile surgical marker, an appropriate advancement flap was drawn incorporating the defect and placing the expected incisions within the relaxed skin tension lines where possible.    The area thus outlined was incised deep to adipose tissue with a #15 scalpel blade.  The skin margins were undermined to an appropriate distance in all directions utilizing iris scissors.
Complex Repair And W Plasty Text: The defect edges were debeveled with a #15 scalpel blade.  The primary defect was closed partially with a complex linear closure.  Given the location of the remaining defect, shape of the defect and the proximity to free margins a W plasty was deemed most appropriate for complete closure of the defect.  Using a sterile surgical marker, an appropriate advancement flap was drawn incorporating the defect and placing the expected incisions within the relaxed skin tension lines where possible.    The area thus outlined was incised deep to adipose tissue with a #15 scalpel blade.  The skin margins were undermined to an appropriate distance in all directions utilizing iris scissors.
Complex Repair And Z Plasty Text: The defect edges were debeveled with a #15 scalpel blade.  The primary defect was closed partially with a complex linear closure.  Given the location of the remaining defect, shape of the defect and the proximity to free margins a Z plasty was deemed most appropriate for complete closure of the defect.  Using a sterile surgical marker, an appropriate advancement flap was drawn incorporating the defect and placing the expected incisions within the relaxed skin tension lines where possible.    The area thus outlined was incised deep to adipose tissue with a #15 scalpel blade.  The skin margins were undermined to an appropriate distance in all directions utilizing iris scissors.
Complex Repair And Dorsal Nasal Flap Text: The defect edges were debeveled with a #15 scalpel blade.  The primary defect was closed partially with a complex linear closure.  Given the location of the remaining defect, shape of the defect and the proximity to free margins a dorsal nasal flap was deemed most appropriate for complete closure of the defect.  Using a sterile surgical marker, an appropriate flap was drawn incorporating the defect and placing the expected incisions within the relaxed skin tension lines where possible.    The area thus outlined was incised deep to adipose tissue with a #15 scalpel blade.  The skin margins were undermined to an appropriate distance in all directions utilizing iris scissors.
Complex Repair And Ftsg Text: The defect edges were debeveled with a #15 scalpel blade.  The primary defect was closed partially with a complex linear closure.  Given the location of the defect, shape of the defect and the proximity to free margins a full thickness skin graft was deemed most appropriate to repair the remaining defect.  The graft was trimmed to fit the size of the remaining defect.  The graft was then placed in the primary defect, oriented appropriately, and sutured into place.
Complex Repair And Burow's Graft Text: The defect edges were debeveled with a #15 scalpel blade.  The primary defect was closed partially with a complex linear closure.  Given the location of the defect, shape of the defect, the proximity to free margins and the presence of a standing cone deformity a Burow's graft was deemed most appropriate to repair the remaining defect.  The graft was trimmed to fit the size of the remaining defect.  The graft was then placed in the primary defect, oriented appropriately, and sutured into place.
Complex Repair And Split-Thickness Skin Graft Text: The defect edges were debeveled with a #15 scalpel blade.  The primary defect was closed partially with a complex linear closure.  Given the location of the defect, shape of the defect and the proximity to free margins a split thickness skin graft was deemed most appropriate to repair the remaining defect.  The graft was trimmed to fit the size of the remaining defect.  The graft was then placed in the primary defect, oriented appropriately, and sutured into place.
Complex Repair And Epidermal Autograft Text: The defect edges were debeveled with a #15 scalpel blade.  The primary defect was closed partially with a complex linear closure.  Given the location of the defect, shape of the defect and the proximity to free margins an epidermal autograft was deemed most appropriate to repair the remaining defect.  The graft was trimmed to fit the size of the remaining defect.  The graft was then placed in the primary defect, oriented appropriately, and sutured into place.
Complex Repair And Dermal Autograft Text: The defect edges were debeveled with a #15 scalpel blade.  The primary defect was closed partially with a complex linear closure.  Given the location of the defect, shape of the defect and the proximity to free margins an dermal autograft was deemed most appropriate to repair the remaining defect.  The graft was trimmed to fit the size of the remaining defect.  The graft was then placed in the primary defect, oriented appropriately, and sutured into place.
Complex Repair And Tissue Cultured Epidermal Autograft Text: The defect edges were debeveled with a #15 scalpel blade.  The primary defect was closed partially with a complex linear closure.  Given the location of the defect, shape of the defect and the proximity to free margins an tissue cultured epidermal autograft was deemed most appropriate to repair the remaining defect.  The graft was trimmed to fit the size of the remaining defect.  The graft was then placed in the primary defect, oriented appropriately, and sutured into place.
Complex Repair And Xenograft Text: The defect edges were debeveled with a #15 scalpel blade.  The primary defect was closed partially with a complex linear closure.  Given the location of the defect, shape of the defect and the proximity to free margins a xenograft was deemed most appropriate to repair the remaining defect.  The graft was trimmed to fit the size of the remaining defect.  The graft was then placed in the primary defect, oriented appropriately, and sutured into place.
Complex Repair And Skin Substitute Graft Text: The defect edges were debeveled with a #15 scalpel blade.  The primary defect was closed partially with a complex linear closure.  Given the location of the remaining defect, shape of the defect and the proximity to free margins a skin substitute graft was deemed most appropriate to repair the remaining defect.  The graft was trimmed to fit the size of the remaining defect.  The graft was then placed in the primary defect, oriented appropriately, and sutured into place.
Path Notes (To The Dermatopathologist): Please check margins.
Consent was obtained from the patient. The risks and benefits to therapy were discussed in detail. Specifically, the risks of infection, scarring, bleeding, prolonged wound healing, incomplete removal, allergy to anesthesia, nerve injury and recurrence were addressed. Prior to the procedure, the treatment site was clearly identified and confirmed by the patient. All components of Universal Protocol/PAUSE Rule completed.
Post-Care Instructions: I reviewed with the patient in detail post-care instructions:\\n1. Apply bacitracin over the steri-strips.  \\n2. Cut non-stick pad (Telfa) to cover the steri-strips\\n3. Apply tape (hypafix) over the non-stick pad\\n4. Change once per day for 5 days\\n5. Shower with bandage on, change bandage after shower\\n\\nPatient is not to engage in any heavy lifting, exercise, hot tub, or swimming for the next 14 days. Should the patient develop any fevers, chills, bleeding, severe pain patient will contact the office immediately.
Home Suture Removal Text: Patient was provided a home suture removal kit and will remove their sutures at home.  If they have any questions or difficulties they will call the office.
Where Do You Want The Question To Include Opioid Counseling Located?: Case Summary Tab
Information: Selecting Yes will display possible errors in your note based on the variables you have selected. This validation is only offered as a suggestion for you. PLEASE NOTE THAT THE VALIDATION TEXT WILL BE REMOVED WHEN YOU FINALIZE YOUR NOTE. IF YOU WANT TO FAX A PRELIMINARY NOTE YOU WILL NEED TO TOGGLE THIS TO 'NO' IF YOU DO NOT WANT IT IN YOUR FAXED NOTE.

## 2024-07-10 ENCOUNTER — APPOINTMENT (OUTPATIENT)
Dept: RADIOLOGY | Facility: MEDICAL CENTER | Age: 69
End: 2024-07-10
Attending: STUDENT IN AN ORGANIZED HEALTH CARE EDUCATION/TRAINING PROGRAM
Payer: MEDICARE

## 2024-07-10 DIAGNOSIS — M25.511 CHRONIC RIGHT SHOULDER PAIN: ICD-10-CM

## 2024-07-10 DIAGNOSIS — G89.29 CHRONIC RIGHT SHOULDER PAIN: ICD-10-CM

## 2024-07-10 PROCEDURE — 73030 X-RAY EXAM OF SHOULDER: CPT | Mod: RT

## 2024-07-18 ENCOUNTER — HOSPITAL ENCOUNTER (OUTPATIENT)
Facility: MEDICAL CENTER | Age: 69
End: 2024-07-18
Attending: DERMATOLOGY
Payer: MEDICARE

## 2024-07-18 ENCOUNTER — APPOINTMENT (RX ONLY)
Dept: URBAN - METROPOLITAN AREA CLINIC 4 | Facility: CLINIC | Age: 69
Setting detail: DERMATOLOGY
End: 2024-07-18

## 2024-07-18 DIAGNOSIS — Z48.817 ENCOUNTER FOR SURGICAL AFTERCARE FOLLOWING SURGERY ON THE SKIN AND SUBCUTANEOUS TISSUE: ICD-10-CM

## 2024-07-18 PROCEDURE — ? PRESCRIPTION

## 2024-07-18 PROCEDURE — ? POST-OP WOUND EVALUATION

## 2024-07-18 PROCEDURE — ? MEDICATION COUNSELING

## 2024-07-18 PROCEDURE — ? ORDER TESTS

## 2024-07-18 RX ORDER — CEPHALEXIN 500 MG/1
1 CAPSULE ORAL TID
Qty: 30 | Refills: 0 | Status: ERX | COMMUNITY
Start: 2024-07-18

## 2024-07-18 RX ADMIN — CEPHALEXIN 1: 500 CAPSULE ORAL at 00:00

## 2024-07-18 ASSESSMENT — LOCATION DETAILED DESCRIPTION DERM: LOCATION DETAILED: LEFT LATERAL DISTAL CALF

## 2024-07-18 ASSESSMENT — LOCATION SIMPLE DESCRIPTION DERM: LOCATION SIMPLE: LEFT LOWER LEG

## 2024-07-18 ASSESSMENT — LOCATION ZONE DERM: LOCATION ZONE: LEG

## 2024-07-18 NOTE — PROCEDURE: ORDER TESTS
Billing Type: Third-Party Bill
Expected Date Of Service: 07/18/2024
Lab Facility: [id number]
Bill For Surgical Tray: no
Performing Laboratory: [id number]

## 2024-07-18 NOTE — PROCEDURE: MEDICATION COUNSELING
Prednisone Counseling:  I discussed with the patient the risks of prolonged use of prednisone including but not limited to weight gain, insomnia, osteoporosis, mood changes, diabetes, susceptibility to infection, glaucoma and high blood pressure.  In cases where prednisone use is prolonged, patients should be monitored with blood pressure checks, serum glucose levels and an eye exam.  Additionally, the patient may need to be placed on GI prophylaxis, PCP prophylaxis, and calcium and vitamin D supplementation and/or a bisphosphonate.  The patient verbalized understanding of the proper use and the possible adverse effects of prednisone.  All of the patient's questions and concerns were addressed.
Carac Pregnancy And Lactation Text: This medication is Pregnancy Category X and contraindicated in pregnancy and in women who may become pregnant. It is unknown if this medication is excreted in breast milk.
Wartpeel Counseling:  I discussed with the patient the risks of Wartpeel including but not limited to erythema, scaling, itching, weeping, crusting, and pain.
Sski Pregnancy And Lactation Text: This medication is Pregnancy Category D and isn't considered safe during pregnancy. It is excreted in breast milk.
Rinvoq Pregnancy And Lactation Text: Based on animal studies, Rinvoq may cause embryo-fetal harm when administered to pregnant women.  The medication should not be used in pregnancy.  Breastfeeding is not recommended during treatment and for 6 days after the last dose.
Enbrel Counseling:  I discussed with the patient the risks of etanercept including but not limited to myelosuppression, immunosuppression, autoimmune hepatitis, demyelinating diseases, lymphoma, and infections.  The patient understands that monitoring is required including a PPD at baseline and must alert us or the primary physician if symptoms of infection or other concerning signs are noted.
Opzelura Counseling:  I discussed with the patient the risks of Opzelura including but not limited to nasopharngitis, bronchitis, ear infection, eosinophila, hives, diarrhea, folliculitis, tonsillitis, and rhinorrhea.  Taken orally, this medication has been linked to serious infections; higher rate of mortality; malignancy and lymphoproliferative disorders; major adverse cardiovascular events; thrombosis; thrombocytopenia, anemia, and neutropenia; and lipid elevations.
Dutasteride Pregnancy And Lactation Text: This medication is absolutely contraindicated in women, especially during pregnancy and breast feeding. Feminization of male fetuses is possible if taking while pregnant.
Clindamycin Pregnancy And Lactation Text: This medication can be used in pregnancy if certain situations. Clindamycin is also present in breast milk.
Doxepin Counseling:  Patient advised that the medication is sedating and not to drive a car after taking this medication. Patient informed of potential adverse effects including but not limited to dry mouth, urinary retention, and blurry vision.  The patient verbalized understanding of the proper use and possible adverse effects of doxepin.  All of the patient's questions and concerns were addressed.
Hydroxychloroquine Counseling:  I discussed with the patient that a baseline ophthalmologic exam is needed at the start of therapy and every year thereafter while on therapy. A CBC may also be warranted for monitoring.  The side effects of this medication were discussed with the patient, including but not limited to agranulocytosis, aplastic anemia, seizures, rashes, retinopathy, and liver toxicity. Patient instructed to call the office should any adverse effect occur.  The patient verbalized understanding of the proper use and possible adverse effects of Plaquenil.  All the patient's questions and concerns were addressed.
Hydroquinone Pregnancy And Lactation Text: This medication has not been assigned a Pregnancy Risk Category but animal studies failed to show danger with the topical medication. It is unknown if the medication is excreted in breast milk.
High Dose Vitamin A Counseling: Side effects reviewed, pt to contact office should one occur.
Zyclara Pregnancy And Lactation Text: This medication is Pregnancy Category C. It is unknown if this medication is excreted in breast milk.
Infliximab Pregnancy And Lactation Text: This medication is Pregnancy Category B and is considered safe during pregnancy. It is unknown if this medication is excreted in breast milk.
Rifampin Pregnancy And Lactation Text: This medication is Pregnancy Category C and it isn't know if it is safe during pregnancy. It is also excreted in breast milk and should not be used if you are breast feeding.
Adbry Pregnancy And Lactation Text: It is unknown if this medication will adversely affect pregnancy or breast feeding.
Hydroxychloroquine Pregnancy And Lactation Text: This medication has been shown to cause fetal harm but it isn't assigned a Pregnancy Risk Category. There are small amounts excreted in breast milk.
Topical Clindamycin Pregnancy And Lactation Text: This medication is Pregnancy Category B and is considered safe during pregnancy. It is unknown if it is excreted in breast milk.
Oral Minoxidil Counseling- I discussed with the patient the risks of oral minoxidil including but not limited to shortness of breath, swelling of the feet or ankles, dizziness, lightheadedness, unwanted hair growth and allergic reaction.  The patient verbalized understanding of the proper use and possible adverse effects of oral minoxidil.  All of the patient's questions and concerns were addressed.
Opzelura Pregnancy And Lactation Text: There is insufficient data to evaluate drug-associated risk for major birth defects, miscarriage, or other adverse maternal or fetal outcomes.  There is a pregnancy registry that monitors pregnancy outcomes in pregnant persons exposed to the medication during pregnancy.  It is unknown if this medication is excreted in breast milk.  Do not breastfeed during treatment and for about 4 weeks after the last dose.
Azathioprine Pregnancy And Lactation Text: This medication is Pregnancy Category D and isn't considered safe during pregnancy. It is unknown if this medication is excreted in breast milk.
Itraconazole Counseling:  I discussed with the patient the risks of itraconazole including but not limited to liver damage, nausea/vomiting, neuropathy, and severe allergy.  The patient understands that this medication is best absorbed when taken with acidic beverages such as non-diet cola or ginger ale.  The patient understands that monitoring is required including baseline LFTs and repeat LFTs at intervals.  The patient understands that they are to contact us or the primary physician if concerning signs are noted.
Solaraze Counseling:  I discussed with the patient the risks of Solaraze including but not limited to erythema, scaling, itching, weeping, crusting, and pain.
Finasteride Male Counseling: Finasteride Counseling:  I discussed with the patient the risks of use of finasteride including but not limited to decreased libido, decreased ejaculate volume, gynecomastia, and depression. Women should not handle medication.  All of the patient's questions and concerns were addressed.
Cellcept Counseling:  I discussed with the patient the risks of mycophenolate mofetil including but not limited to infection/immunosuppression, GI upset, hypokalemia, hypercholesterolemia, bone marrow suppression, lymphoproliferative disorders, malignancy, GI ulceration/bleed/perforation, colitis, interstitial lung disease, kidney failure, progressive multifocal leukoencephalopathy, and birth defects.  The patient understands that monitoring is required including a baseline creatinine and regular CBC testing. In addition, patient must alert us immediately if symptoms of infection or other concerning signs are noted.
Doxepin Pregnancy And Lactation Text: This medication is Pregnancy Category C and it isn't known if it is safe during pregnancy. It is also excreted in breast milk and breast feeding isn't recommended.
Rituxan Counseling:  I discussed with the patient the risks of Rituxan infusions. Side effects can include infusion reactions, severe drug rashes including mucocutaneous reactions, reactivation of latent hepatitis and other infections and rarely progressive multifocal leukoencephalopathy.  All of the patient's questions and concerns were addressed.
Topical Ketoconazole Counseling: Patient counseled that this medication may cause skin irritation or allergic reactions.  In the event of skin irritation, the patient was advised to reduce the amount of the drug applied or use it less frequently.   The patient verbalized understanding of the proper use and possible adverse effects of ketoconazole.  All of the patient's questions and concerns were addressed.
Prednisone Pregnancy And Lactation Text: This medication is Pregnancy Category C and it isn't know if it is safe during pregnancy. This medication is excreted in breast milk.
High Dose Vitamin A Pregnancy And Lactation Text: High dose vitamin A therapy is contraindicated during pregnancy and breast feeding.
Erivedge Counseling- I discussed with the patient the risks of Erivedge including but not limited to nausea, vomiting, diarrhea, constipation, weight loss, changes in the sense of taste, decreased appetite, muscle spasms, and hair loss.  The patient verbalized understanding of the proper use and possible adverse effects of Erivedge.  All of the patient's questions and concerns were addressed.
Sotyktu Counseling:  I discussed the most common side effects of Sotyktu including: common cold, sore throat, sinus infections, cold sores, canker sores, folliculitis, and acne.  I also discussed more serious side effects of Sotyktu including but not limited to: serious allergic reactions; increased risk for infections such as TB; cancers such as lymphomas; rhabdomyolysis and elevated CPK; and elevated triglycerides and liver enzymes. 
Thalidomide Counseling: I discussed with the patient the risks of thalidomide including but not limited to birth defects, anxiety, weakness, chest pain, dizziness, cough and severe allergy.
Imiquimod Counseling:  I discussed with the patient the risks of imiquimod including but not limited to erythema, scaling, itching, weeping, crusting, and pain.  Patient understands that the inflammatory response to imiquimod is variable from person to person and was educated regarded proper titration schedule.  If flu-like symptoms develop, patient knows to discontinue the medication and contact us.
Doxycycline Counseling:  Patient counseled regarding possible photosensitivity and increased risk for sunburn.  Patient instructed to avoid sunlight, if possible.  When exposed to sunlight, patients should wear protective clothing, sunglasses, and sunscreen.  The patient was instructed to call the office immediately if the following severe adverse effects occur:  hearing changes, easy bruising/bleeding, severe headache, or vision changes.  The patient verbalized understanding of the proper use and possible adverse effects of doxycycline.  All of the patient's questions and concerns were addressed.
Calcipotriene Counseling:  I discussed with the patient the risks of calcipotriene including but not limited to erythema, scaling, itching, and irritation.
Xelselvinz Pregnancy And Lactation Text: This medication is Pregnancy Category D and is not considered safe during pregnancy.  The risk during breast feeding is also uncertain.
Rituxan Pregnancy And Lactation Text: This medication is Pregnancy Category C and it isn't know if it is safe during pregnancy. It is unknown if this medication is excreted in breast milk but similar antibodies are known to be excreted.
Itraconazole Pregnancy And Lactation Text: This medication is Pregnancy Category C and it isn't know if it is safe during pregnancy. It is also excreted in breast milk.
Solaraze Pregnancy And Lactation Text: This medication is Pregnancy Category B and is considered safe. There is some data to suggest avoiding during the third trimester. It is unknown if this medication is excreted in breast milk.
Sarecycline Counseling: Patient advised regarding possible photosensitivity and discoloration of the teeth, skin, lips, tongue and gums.  Patient instructed to avoid sunlight, if possible.  When exposed to sunlight, patients should wear protective clothing, sunglasses, and sunscreen.  The patient was instructed to call the office immediately if the following severe adverse effects occur:  hearing changes, easy bruising/bleeding, severe headache, or vision changes.  The patient verbalized understanding of the proper use and possible adverse effects of sarecycline.  All of the patient's questions and concerns were addressed.
Cantharidin Counseling:  I discussed with the patient the risks of Cantharidin including but not limited to pain, redness, burning, itching, and blistering.
Bimzelx Counseling:  I discussed with the patient the risks of Bimzelx including but not limited to depression, immunosuppression, allergic reactions and infections.  The patient understands that monitoring is required including a PPD at baseline and must alert us or the primary physician if symptoms of infection or other concerning signs are noted.
Erivedge Pregnancy And Lactation Text: This medication is Pregnancy Category X and is absolutely contraindicated during pregnancy. It is unknown if it is excreted in breast milk.
Calcipotriene Pregnancy And Lactation Text: The use of this medication during pregnancy or lactation is not recommended as there is insufficient data.
Taltz Counseling: I discussed with the patient the risks of ixekizumab including but not limited to immunosuppression, serious infections, worsening of inflammatory bowel disease and drug reactions.  The patient understands that monitoring is required including a PPD at baseline and must alert us or the primary physician if symptoms of infection or other concerning signs are noted.
Picato Counseling:  I discussed with the patient the risks of Picato including but not limited to erythema, scaling, itching, weeping, crusting, and pain.
Opioid Counseling: I discussed with the patient the potential side effects of opioids including but not limited to addiction, altered mental status, and depression. I stressed avoiding alcohol, benzodiazepines, muscle relaxants and sleep aids unless specifically okayed by a physician. The patient verbalized understanding of the proper use and possible adverse effects of opioids. All of the patient's questions and concerns were addressed. They were instructed to flush the remaining pills down the toilet if they did not need them for pain.
Oral Minoxidil Pregnancy And Lactation Text: This medication should only be used when clearly needed if you are pregnant, attempting to become pregnant or breast feeding.
Hydroxyzine Counseling: Patient advised that the medication is sedating and not to drive a car after taking this medication.  Patient informed of potential adverse effects including but not limited to dry mouth, urinary retention, and blurry vision.  The patient verbalized understanding of the proper use and possible adverse effects of hydroxyzine.  All of the patient's questions and concerns were addressed.
Low Dose Naltrexone Counseling- I discussed with the patient the potential risks and side effects of low dose naltrexone including but not limited to: more vivid dreams, headaches, nausea, vomiting, abdominal pain, fatigue, dizziness, and anxiety.
Taltz Pregnancy And Lactation Text: The risk during pregnancy and breastfeeding is uncertain with this medication.
Otezla Counseling: The side effects of Otezla were discussed with the patient, including but not limited to worsening or new depression, weight loss, diarrhea, nausea, upper respiratory tract infection, and headache. Patient instructed to call the office should any adverse effect occur.  The patient verbalized understanding of the proper use and possible adverse effects of Otezla.  All the patient's questions and concerns were addressed.
Cibinqo Counseling: I discussed with the patient the risks of Cibinqo therapy including but not limited to common cold, nausea, headache, cold sores, increased blood CPK levels, dizziness, UTIs, fatigue, acne, and vomitting. Live vaccines should be avoided.  This medication has been linked to serious infections; higher rate of mortality; malignancy and lymphoproliferative disorders; major adverse cardiovascular events; thrombosis; thrombocytopenia and lymphopenia; lipid elevations; and retinal detachment.
Aklief counseling:  Patient advised to apply a pea-sized amount only at bedtime and wait 30 minutes after washing their face before applying.  If too drying, patient may add a non-comedogenic moisturizer.  The most commonly reported side effects including irritation, redness, scaling, dryness, stinging, burning, itching, and increased risk of sunburn.  The patient verbalized understanding of the proper use and possible adverse effects of retinoids.  All of the patient's questions and concerns were addressed.
Opioid Pregnancy And Lactation Text: These medications can lead to premature delivery and should be avoided during pregnancy. These medications are also present in breast milk in small amounts.
Azithromycin Counseling:  I discussed with the patient the risks of azithromycin including but not limited to GI upset, allergic reaction, drug rash, diarrhea, and yeast infections.
Cantharidin Pregnancy And Lactation Text: This medication has not been proven safe during pregnancy. It is unknown if this medication is excreted in breast milk.
Drysol Counseling:  I discussed with the patient the risks of drysol/aluminum chloride including but not limited to skin rash, itching, irritation, burning.
Humira Counseling:  I discussed with the patient the risks of adalimumab including but not limited to myelosuppression, immunosuppression, autoimmune hepatitis, demyelinating diseases, lymphoma, and serious infections.  The patient understands that monitoring is required including a PPD at baseline and must alert us or the primary physician if symptoms of infection or other concerning signs are noted.
Ketoconazole Counseling:   Patient counseled regarding improving absorption with orange juice.  Adverse effects include but are not limited to breast enlargement, headache, diarrhea, nausea, upset stomach, liver function test abnormalities, taste disturbance, and stomach pain.  There is a rare possibility of liver failure that can occur when taking ketoconazole. The patient understands that monitoring of LFTs may be required, especially at baseline. The patient verbalized understanding of the proper use and possible adverse effects of ketoconazole.  All of the patient's questions and concerns were addressed.
Sotyktu Pregnancy And Lactation Text: There is insufficient data to evaluate whether or not Sotyktu is safe to use during pregnancy.   It is not known if Sotyktu passes into breast milk and whether or not it is safe to use when breastfeeding.  
Soolantra Counseling: I discussed with the patients the risks of topial Soolantra. This is a medicine which decreases the number of mites and inflammation in the skin. You experience burning, stinging, eye irritation or allergic reactions.  Please call our office if you develop any problems from using this medication.
Sarecycline Pregnancy And Lactation Text: This medication is Pregnancy Category D and not consider safe during pregnancy. It is also excreted in breast milk.
Doxycycline Pregnancy And Lactation Text: This medication is Pregnancy Category D and not consider safe during pregnancy. It is also excreted in breast milk but is considered safe for shorter treatment courses.
Winlevi Counseling:  I discussed with the patient the risks of topical clascoterone including but not limited to erythema, scaling, itching, and stinging. Patient voiced their understanding.
Bimzelx Pregnancy And Lactation Text: This medication crosses the placenta and the safety is uncertain during pregnancy. It is unknown if this medication is present in breast milk.
Finasteride Female Counseling: Finasteride Counseling:  I discussed with the patient the risks of use of finasteride including but not limited to decreased libido and sexual dysfunction. Explained the teratogenic nature of the medication and stressed the importance of not getting pregnant during treatment. All of the patient's questions and concerns were addressed.
5-Fu Counseling: 5-Fluorouracil Counseling:  I discussed with the patient the risks of 5-fluorouracil including but not limited to erythema, scaling, itching, weeping, crusting, and pain.
Libtayo Counseling- I discussed with the patient the risks of Libtayo including but not limited to nausea, vomiting, diarrhea, and bone or muscle pain.  The patient verbalized understanding of the proper use and possible adverse effects of Libtayo.  All of the patient's questions and concerns were addressed.
Hydroxyzine Pregnancy And Lactation Text: This medication is not safe during pregnancy and should not be taken. It is also excreted in breast milk and breast feeding isn't recommended.
Dapsone Counseling: I discussed with the patient the risks of dapsone including but not limited to hemolytic anemia, agranulocytosis, rashes, methemoglobinemia, kidney failure, peripheral neuropathy, headaches, GI upset, and liver toxicity.  Patients who start dapsone require monitoring including baseline LFTs and weekly CBCs for the first month, then every month thereafter.  The patient verbalized understanding of the proper use and possible adverse effects of dapsone.  All of the patient's questions and concerns were addressed.
Xeljanz Counseling: I discussed with the patient the risks of Xeljanz therapy including increased risk of infection, liver issues, headache, diarrhea, or cold symptoms. Live vaccines should be avoided. They were instructed to call if they have any problems.
Tranexamic Acid Counseling:  Patient advised of the small risk of bleeding problems with tranexamic acid. They were also instructed to call if they developed any nausea, vomiting or diarrhea. All of the patient's questions and concerns were addressed.
Erythromycin Counseling:  I discussed with the patient the risks of erythromycin including but not limited to GI upset, allergic reaction, drug rash, diarrhea, increase in liver enzymes, and yeast infections.
Winlevi Pregnancy And Lactation Text: This medication is considered safe during pregnancy and breastfeeding.
Low Dose Naltrexone Pregnancy And Lactation Text: Naltrexone is pregnancy category C.  There have been no adequate and well-controlled studies in pregnant women.  It should be used in pregnancy only if the potential benefit justifies the potential risk to the fetus.   Limited data indicates that naltrexone is minimally excreted into breastmilk.
Siliq Counseling:  I discussed with the patient the risks of Siliq including but not limited to new or worsening depression, suicidal thoughts and behavior, immunosuppression, malignancy, posterior leukoencephalopathy syndrome, and serious infections.  The patient understands that monitoring is required including a PPD at baseline and must alert us or the primary physician if symptoms of infection or other concerning signs are noted. There is also a special program designed to monitor depression which is required with Siliq.
Tetracycline Counseling: Patient counseled regarding possible photosensitivity and increased risk for sunburn.  Patient instructed to avoid sunlight, if possible.  When exposed to sunlight, patients should wear protective clothing, sunglasses, and sunscreen.  The patient was instructed to call the office immediately if the following severe adverse effects occur:  hearing changes, easy bruising/bleeding, severe headache, or vision changes.  The patient verbalized understanding of the proper use and possible adverse effects of tetracycline.  All of the patient's questions and concerns were addressed. Patient understands to avoid pregnancy while on therapy due to potential birth defects.
Cimzia Counseling:  I discussed with the patient the risks of Cimzia including but not limited to immunosuppression, allergic reactions and infections.  The patient understands that monitoring is required including a PPD at baseline and must alert us or the primary physician if symptoms of infection or other concerning signs are noted.
Niacinamide Counseling: I recommended taking niacin or niacinamide, also know as vitamin B3, twice daily. Recent evidence suggests that taking vitamin B3 (500 mg twice daily) can reduce the risk of actinic keratoses and non-melanoma skin cancers. Side effects of vitamin B3 include flushing and headache.
Klisyri Counseling:  I discussed with the patient the risks of Klisyri including but not limited to erythema, scaling, itching, weeping, crusting, and pain.
Ketoconazole Pregnancy And Lactation Text: This medication is Pregnancy Category C and it isn't know if it is safe during pregnancy. It is also excreted in breast milk and breast feeding isn't recommended.
Drysol Pregnancy And Lactation Text: This medication is considered safe during pregnancy and breast feeding.
Soolantra Pregnancy And Lactation Text: This medication is Pregnancy Category C. This medication is considered safe during breast feeding.
Finasteride Pregnancy And Lactation Text: This medication is absolutely contraindicated during pregnancy. It is unknown if it is excreted in breast milk.
Acitretin Counseling:  I discussed with the patient the risks of acitretin including but not limited to hair loss, dry lips/skin/eyes, liver damage, hyperlipidemia, depression/suicidal ideation, photosensitivity.  Serious rare side effects can include but are not limited to pancreatitis, pseudotumor cerebri, bony changes, clot formation/stroke/heart attack.  Patient understands that alcohol is contraindicated since it can result in liver toxicity and significantly prolong the elimination of the drug by many years.
Topical Metronidazole Counseling: Metronidazole is a topical antibiotic medication. You may experience burning, stinging, redness, or allergic reactions.  Please call our office if you develop any problems from using this medication.
Aklief Pregnancy And Lactation Text: It is unknown if this medication is safe to use during pregnancy.  It is unknown if this medication is excreted in breast milk.  Breastfeeding women should use the topical cream on the smallest area of the skin for the shortest time needed while breastfeeding.  Do not apply to nipple and areola.
Otezla Pregnancy And Lactation Text: This medication is Pregnancy Category C and it isn't known if it is safe during pregnancy. It is unknown if it is excreted in breast milk.
Cyclophosphamide Counseling:  I discussed with the patient the risks of cyclophosphamide including but not limited to hair loss, hormonal abnormalities, decreased fertility, abdominal pain, diarrhea, nausea and vomiting, bone marrow suppression and infection. The patient understands that monitoring is required while taking this medication.
Tremfya Counseling: I discussed with the patient the risks of guselkumab including but not limited to immunosuppression, serious infections, worsening of inflammatory bowel disease and drug reactions.  The patient understands that monitoring is required including a PPD at baseline and must alert us or the primary physician if symptoms of infection or other concerning signs are noted.
Azithromycin Pregnancy And Lactation Text: This medication is considered safe during pregnancy and is also secreted in breast milk.
Cibinqo Pregnancy And Lactation Text: It is unknown if this medication will adversely affect pregnancy or breast feeding.  You should not take this medication if you are currently pregnant or planning a pregnancy or while breastfeeding.
VTAMA Counseling: I discussed with the patient that VTAMA is not for use in the eyes, mouth or mouth. They should call the office if they develop any signs of allergic reactions to VTAMA. The patient verbalized understanding of the proper use and possible adverse effects of VTAMA.  All of the patient's questions and concerns were addressed.
Tranexamic Acid Pregnancy And Lactation Text: It is unknown if this medication is safe during pregnancy or breast feeding.
Libtayo Pregnancy And Lactation Text: This medication is contraindicated in pregnancy and when breast feeding.
Hyrimoz Counseling:  I discussed with the patient the risks of adalimumab including but not limited to myelosuppression, immunosuppression, autoimmune hepatitis, demyelinating diseases, lymphoma, and serious infections.  The patient understands that monitoring is required including a PPD at baseline and must alert us or the primary physician if symptoms of infection or other concerning signs are noted.
Acitretin Pregnancy And Lactation Text: This medication is Pregnancy Category X and should not be given to women who are pregnant or may become pregnant in the future. This medication is excreted in breast milk.
Protopic Counseling: Patient may experience a mild burning sensation during topical application. Protopic is not approved in children less than 2 years of age. There have been case reports of hematologic and skin malignancies in patients using topical calcineurin inhibitors although causality is questionable.
Birth Control Pills Counseling: Birth Control Pill Counseling: I discussed with the patient the potential side effects of OCPs including but not limited to increased risk of stroke, heart attack, thrombophlebitis, deep venous thrombosis, hepatic adenomas, breast changes, GI upset, headaches, and depression.  The patient verbalized understanding of the proper use and possible adverse effects of OCPs. All of the patient's questions and concerns were addressed.
Azelaic Acid Counseling: Patient counseled that medicine may cause skin irritation and to avoid applying near the eyes.  In the event of skin irritation, the patient was advised to reduce the amount of the drug applied or use it less frequently.   The patient verbalized understanding of the proper use and possible adverse effects of azelaic acid.  All of the patient's questions and concerns were addressed.
Topical Metronidazole Pregnancy And Lactation Text: This medication is Pregnancy Category B and considered safe during pregnancy.  It is also considered safe to use while breastfeeding.
Cyclophosphamide Pregnancy And Lactation Text: This medication is Pregnancy Category D and it isn't considered safe during pregnancy. This medication is excreted in breast milk.
Klisyri Pregnancy And Lactation Text: It is unknown if this medication can harm a developing fetus or if it is excreted in breast milk.
Albendazole Counseling:  I discussed with the patient the risks of albendazole including but not limited to cytopenia, kidney damage, nausea/vomiting and severe allergy.  The patient understands that this medication is being used in an off-label manner.
Arava Counseling:  Patient counseled regarding adverse effects of Arava including but not limited to nausea, vomiting, abnormalities in liver function tests. Patients may develop mouth sores, rash, diarrhea, and abnormalities in blood counts. The patient understands that monitoring is required including LFTs and blood counts.  There is a rare possibility of scarring of the liver and lung problems that can occur when taking methotrexate. Persistent nausea, loss of appetite, pale stools, dark urine, cough, and shortness of breath should be reported immediately. Patient advised to discontinue Arava treatment and consult with a physician prior to attempting conception. The patient will have to undergo a treatment to eliminate Arava from the body prior to conception.
Erythromycin Pregnancy And Lactation Text: This medication is Pregnancy Category B and is considered safe during pregnancy. It is also excreted in breast milk.
Dapsone Pregnancy And Lactation Text: This medication is Pregnancy Category C and is not considered safe during pregnancy or breast feeding.
Simponi Counseling:  I discussed with the patient the risks of golimumab including but not limited to myelosuppression, immunosuppression, autoimmune hepatitis, demyelinating diseases, lymphoma, and serious infections.  The patient understands that monitoring is required including a PPD at baseline and must alert us or the primary physician if symptoms of infection or other concerning signs are noted.
Niacinamide Pregnancy And Lactation Text: These medications are considered safe during pregnancy.
Cimzia Pregnancy And Lactation Text: This medication crosses the placenta but can be considered safe in certain situations. Cimzia may be excreted in breast milk.
Oxybutynin Counseling:  I discussed with the patient the risks of oxybutynin including but not limited to skin rash, drowsiness, dry mouth, difficulty urinating, and blurred vision.
Litfulo Counseling: I discussed with the patient the risks of Litfulo therapy including but not limited to upper respiratory tract infections, shingles, cold sores, and nausea. Live vaccines should be avoided.  This medication has been linked to serious infections; higher rate of mortality; malignancy and lymphoproliferative disorders; major adverse cardiovascular events; thrombosis; gastrointestinal perforations; neutropenia; lymphopenia; anemia; liver enzyme elevations; and lipid elevations.
Protopic Pregnancy And Lactation Text: This medication is Pregnancy Category C. It is unknown if this medication is excreted in breast milk when applied topically.
Bactrim Counseling:  I discussed with the patient the risks of sulfa antibiotics including but not limited to GI upset, allergic reaction, drug rash, diarrhea, dizziness, photosensitivity, and yeast infections.  Rarely, more serious reactions can occur including but not limited to aplastic anemia, agranulocytosis, methemoglobinemia, blood dyscrasias, liver or kidney failure, lung infiltrates or desquamative/blistering drug rashes.
Elidel Counseling: Patient may experience a mild burning sensation during topical application. Elidel is not approved in children less than 2 years of age. There have been case reports of hematologic and skin malignancies in patients using topical calcineurin inhibitors although causality is questionable.
Terbinafine Counseling: Patient counseling regarding adverse effects of terbinafine including but not limited to headache, diarrhea, rash, upset stomach, liver function test abnormalities, itching, taste/smell disturbance, nausea, abdominal pain, and flatulence.  There is a rare possibility of liver failure that can occur when taking terbinafine.  The patient understands that a baseline LFT and kidney function test may be required. The patient verbalized understanding of the proper use and possible adverse effects of terbinafine.  All of the patient's questions and concerns were addressed.
Topical Retinoid counseling:  Patient advised to apply a pea-sized amount only at bedtime and wait 30 minutes after washing their face before applying.  If too drying, patient may add a non-comedogenic moisturizer. The patient verbalized understanding of the proper use and possible adverse effects of retinoids.  All of the patient's questions and concerns were addressed.
Cyclosporine Counseling:  I discussed with the patient the risks of cyclosporine including but not limited to hypertension, gingival hyperplasia,myelosuppression, immunosuppression, liver damage, kidney damage, neurotoxicity, lymphoma, and serious infections. The patient understands that monitoring is required including baseline blood pressure, CBC, CMP, lipid panel and uric acid, and then 1-2 times monthly CMP and blood pressure.
Bexarotene Counseling:  I discussed with the patient the risks of bexarotene including but not limited to hair loss, dry lips/skin/eyes, liver abnormalities, hyperlipidemia, pancreatitis, depression/suicidal ideation, photosensitivity, drug rash/allergic reactions, hypothyroidism, anemia, leukopenia, infection, cataracts, and teratogenicity.  Patient understands that they will need regular blood tests to check lipid profile, liver function tests, white blood cell count, thyroid function tests and pregnancy test if applicable.
Bactrim Pregnancy And Lactation Text: This medication is Pregnancy Category D and is known to cause fetal risk.  It is also excreted in breast milk.
Topical Steroids Counseling: I discussed with the patient that prolonged use of topical steroids can result in the increased appearance of superficial blood vessels (telangiectasias), lightening (hypopigmentation) and thinning of the skin (atrophy).  Patient understands to avoid using high potency steroids in skin folds, the groin or the face.  The patient verbalized understanding of the proper use and possible adverse effects of topical steroids.  All of the patient's questions and concerns were addressed.
Gabapentin Counseling: I discussed with the patient the risks of gabapentin including but not limited to dizziness, somnolence, fatigue and ataxia.
Vtama Pregnancy And Lactation Text: It is unknown if this medication can cause problems during pregnancy and breastfeeding.
Valtrex Counseling: I discussed with the patient the risks of valacyclovir including but not limited to kidney damage, nausea, vomiting and severe allergy.  The patient understands that if the infection seems to be worsening or is not improving, they are to call.
Metronidazole Counseling:  I discussed with the patient the risks of metronidazole including but not limited to seizures, nausea/vomiting, a metallic taste in the mouth, nausea/vomiting and severe allergy.
Odomzo Counseling- I discussed with the patient the risks of Odomzo including but not limited to nausea, vomiting, diarrhea, constipation, weight loss, changes in the sense of taste, decreased appetite, muscle spasms, and hair loss.  The patient verbalized understanding of the proper use and possible adverse effects of Odomzo.  All of the patient's questions and concerns were addressed.
Birth Control Pills Pregnancy And Lactation Text: This medication should be avoided if pregnant and for the first 30 days post-partum.
Albendazole Pregnancy And Lactation Text: This medication is Pregnancy Category C and it isn't known if it is safe during pregnancy. It is also excreted in breast milk.
Minoxidil Counseling: Minoxidil is a topical medication which can increase blood flow where it is applied. It is uncertain how this medication increases hair growth. Side effects are uncommon and include stinging and allergic reactions.
Clofazimine Counseling:  I discussed with the patient the risks of clofazimine including but not limited to skin and eye pigmentation, liver damage, nausea/vomiting, gastrointestinal bleeding and allergy.
Terbinafine Pregnancy And Lactation Text: This medication is Pregnancy Category B and is considered safe during pregnancy. It is also excreted in breast milk and breast feeding isn't recommended.
Gabapentin Pregnancy And Lactation Text: This medication is Pregnancy Category C and isn't considered safe during pregnancy. It is excreted in breast milk.
Cosentyx Counseling:  I discussed with the patient the risks of Cosentyx including but not limited to worsening of Crohn's disease, immunosuppression, allergic reactions and infections.  The patient understands that monitoring is required including a PPD at baseline and must alert us or the primary physician if symptoms of infection or other concerning signs are noted.
Litfulo Pregnancy And Lactation Text: Based on animal studies, Lifulo may cause embryo-fetal harm when administered to pregnant women.  The medication should not be used in pregnancy.  Breastfeeding is not recommended during treatment.
Fluconazole Counseling:  Patient counseled regarding adverse effects of fluconazole including but not limited to headache, diarrhea, nausea, upset stomach, liver function test abnormalities, taste disturbance, and stomach pain.  There is a rare possibility of liver failure that can occur when taking fluconazole.  The patient understands that monitoring of LFTs and kidney function test may be required, especially at baseline. The patient verbalized understanding of the proper use and possible adverse effects of fluconazole.  All of the patient's questions and concerns were addressed.
Nsaids Counseling: NSAID Counseling: I discussed with the patient that NSAIDs should be taken with food. Prolonged use of NSAIDs can result in the development of stomach ulcers.  Patient advised to stop taking NSAIDs if abdominal pain occurs.  The patient verbalized understanding of the proper use and possible adverse effects of NSAIDs.  All of the patient's questions and concerns were addressed.
Qbrexza Counseling:  I discussed with the patient the risks of Qbrexza including but not limited to headache, mydriasis, blurred vision, dry eyes, nasal dryness, dry mouth, dry throat, dry skin, urinary hesitation, and constipation.  Local skin reactions including erythema, burning, stinging, and itching can also occur.
Topical Steroids Applications Pregnancy And Lactation Text: Most topical steroids are considered safe to use during pregnancy and lactation.  Any topical steroid applied to the breast or nipple should be washed off before breastfeeding.
Olumiant Counseling: I discussed with the patient the risks of Olumiant therapy including but not limited to upper respiratory tract infections, shingles, cold sores, and nausea. Live vaccines should be avoided.  This medication has been linked to serious infections; higher rate of mortality; malignancy and lymphoproliferative disorders; major adverse cardiovascular events; thrombosis; gastrointestinal perforations; neutropenia; lymphopenia; anemia; liver enzyme elevations; and lipid elevations.
Ivermectin Counseling:  Patient instructed to take medication on an empty stomach with a full glass of water.  Patient informed of potential adverse effects including but not limited to nausea, diarrhea, dizziness, itching, and swelling of the extremities or lymph nodes.  The patient verbalized understanding of the proper use and possible adverse effects of ivermectin.  All of the patient's questions and concerns were addressed.
Cephalexin Counseling: I counseled the patient regarding use of cephalexin as an antibiotic for prophylactic and/or therapeutic purposes. Cephalexin (commonly prescribed under brand name Keflex) is a cephalosporin antibiotic which is active against numerous classes of bacteria, including most skin bacteria. Side effects may include nausea, diarrhea, gastrointestinal upset, rash, hives, yeast infections, and in rare cases, hepatitis, kidney disease, seizures, fever, confusion, neurologic symptoms, and others. Patients with severe allergies to penicillin medications are cautioned that there is about a 10% incidence of cross-reactivity with cephalosporins. When possible, patients with penicillin allergies should use alternatives to cephalosporins for antibiotic therapy.
Skyrizi Counseling: I discussed with the patient the risks of risankizumab-rzaa including but not limited to immunosuppression, and serious infections.  The patient understands that monitoring is required including a PPD at baseline and must alert us or the primary physician if symptoms of infection or other concerning signs are noted.
Metronidazole Pregnancy And Lactation Text: This medication is Pregnancy Category B and considered safe during pregnancy.  It is also excreted in breast milk.
Eucrisa Counseling: Patient may experience a mild burning sensation during topical application. Eucrisa is not approved in children less than 2 years of age.
Ilumya Counseling: I discussed with the patient the risks of tildrakizumab including but not limited to immunosuppression, malignancy, posterior leukoencephalopathy syndrome, and serious infections.  The patient understands that monitoring is required including a PPD at baseline and must alert us or the primary physician if symptoms of infection or other concerning signs are noted.
Spironolactone Counseling: Patient advised regarding risks of diarrhea, abdominal pain, hyperkalemia, birth defects (for female patients), liver toxicity and renal toxicity. The patient may need blood work to monitor liver and kidney function and potassium levels while on therapy. The patient verbalized understanding of the proper use and possible adverse effects of spironolactone.  All of the patient's questions and concerns were addressed.
Valtrex Pregnancy And Lactation Text: this medication is Pregnancy Category B and is considered safe during pregnancy. This medication is not directly found in breast milk but it's metabolite acyclovir is present.
Tazorac Counseling:  Patient advised that medication is irritating and drying.  Patient may need to apply sparingly and wash off after an hour before eventually leaving it on overnight.  The patient verbalized understanding of the proper use and possible adverse effects of tazorac.  All of the patient's questions and concerns were addressed.
Bexarotene Pregnancy And Lactation Text: This medication is Pregnancy Category X and should not be given to women who are pregnant or may become pregnant. This medication should not be used if you are breast feeding.
Zoryve Counseling:  I discussed with the patient that Zoryve is not for use in the eyes, mouth or vagina. The most commonly reported side effects include diarrhea, headache, insomnia, application site pain, upper respiratory tract infections, and urinary tract infections.  All of the patient's questions and concerns were addressed.
Propranolol Counseling:  I discussed with the patient the risks of propranolol including but not limited to low heart rate, low blood pressure, low blood sugar, restlessness and increased cold sensitivity. They should call the office if they experience any of these side effects.
Benzoyl Peroxide Counseling: Patient counseled that medicine may cause skin irritation and bleach clothing.  In the event of skin irritation, the patient was advised to reduce the amount of the drug applied or use it less frequently.   The patient verbalized understanding of the proper use and possible adverse effects of benzoyl peroxide.  All of the patient's questions and concerns were addressed.
Use Enhanced Medication Counseling?: No
Isotretinoin Counseling: Patient should get monthly blood tests, not donate blood, not drive at night if vision affected, not share medication, and not undergo elective surgery for 6 months after tx completed. Side effects reviewed, pt to contact office should one occur.
Qbrexza Pregnancy And Lactation Text: There is no available data on Qbrexza use in pregnant women.  There is no available data on Qbrexza use in lactation.
Spironolactone Pregnancy And Lactation Text: This medication can cause feminization of the male fetus and should be avoided during pregnancy. The active metabolite is also found in breast milk.
Benzoyl Peroxide Pregnancy And Lactation Text: This medication is Pregnancy Category C. It is unknown if benzoyl peroxide is excreted in breast milk.
Detail Level: Simple
Minocycline Counseling: Patient advised regarding possible photosensitivity and discoloration of the teeth, skin, lips, tongue and gums.  Patient instructed to avoid sunlight, if possible.  When exposed to sunlight, patients should wear protective clothing, sunglasses, and sunscreen.  The patient was instructed to call the office immediately if the following severe adverse effects occur:  hearing changes, easy bruising/bleeding, severe headache, or vision changes.  The patient verbalized understanding of the proper use and possible adverse effects of minocycline.  All of the patient's questions and concerns were addressed.
Quinolones Counseling:  I discussed with the patient the risks of fluoroquinolones including but not limited to GI upset, allergic reaction, drug rash, diarrhea, dizziness, photosensitivity, yeast infections, liver function test abnormalities, tendonitis/tendon rupture.
Propranolol Pregnancy And Lactation Text: This medication is Pregnancy Category C and it isn't known if it is safe during pregnancy. It is excreted in breast milk.
Nsaids Pregnancy And Lactation Text: These medications are considered safe up to 30 weeks gestation. It is excreted in breast milk.
Mirvaso Counseling: Mirvaso is a topical medication which can decrease superficial blood flow where applied. Side effects are uncommon and include stinging, redness and allergic reactions.
Cimetidine Counseling:  I discussed with the patient the risks of Cimetidine including but not limited to gynecomastia, headache, diarrhea, nausea, drowsiness, arrhythmias, pancreatitis, skin rashes, psychosis, bone marrow suppression and kidney toxicity.
Glycopyrrolate Counseling:  I discussed with the patient the risks of glycopyrrolate including but not limited to skin rash, drowsiness, dry mouth, difficulty urinating, and blurred vision.
Dupixent Counseling: I discussed with the patient the risks of dupilumab including but not limited to eye infection and irritation, cold sores, injection site reactions, worsening of asthma, allergic reactions and increased risk of parasitic infection.  Live vaccines should be avoided while taking dupilumab. Dupilumab will also interact with certain medications such as warfarin and cyclosporine. The patient understands that monitoring is required and they must alert us or the primary physician if symptoms of infection or other concerning signs are noted.
Cephalexin Pregnancy And Lactation Text: This medication is Pregnancy Category B and considered safe during pregnancy.  It is also excreted in breast milk but can be used safely for shorter doses.
Olanzapine Counseling- I discussed with the patient the common side effects of olanzapine including but are not limited to: lack of energy, dry mouth, increased appetite, sleepiness, tremor, constipation, dizziness, changes in behavior, or restlessness.  Explained that teenagers are more likely to experience headaches, abdominal pain, pain in the arms or legs, tiredness, and sleepiness.  Serious side effects include but are not limited: increased risk of death in elderly patients who are confused, have memory loss, or dementia-related psychosis; hyperglycemia; increased cholesterol and triglycerides; and weight gain.
Colchicine Counseling:  Patient counseled regarding adverse effects including but not limited to stomach upset (nausea, vomiting, stomach pain, or diarrhea).  Patient instructed to limit alcohol consumption while taking this medication.  Colchicine may reduce blood counts especially with prolonged use.  The patient understands that monitoring of kidney function and blood counts may be required, especially at baseline. The patient verbalized understanding of the proper use and possible adverse effects of colchicine.  All of the patient's questions and concerns were addressed.
Rhofade Counseling: Rhofade is a topical medication which can decrease superficial blood flow where applied. Side effects are uncommon and include stinging, redness and allergic reactions.
Topical Sulfur Applications Counseling: Topical Sulfur Counseling: Patient counseled that this medication may cause skin irritation or allergic reactions.  In the event of skin irritation, the patient was advised to reduce the amount of the drug applied or use it less frequently.   The patient verbalized understanding of the proper use and possible adverse effects of topical sulfur application.  All of the patient's questions and concerns were addressed.
Methotrexate Counseling:  Patient counseled regarding adverse effects of methotrexate including but not limited to nausea, vomiting, abnormalities in liver function tests. Patients may develop mouth sores, rash, diarrhea, and abnormalities in blood counts. The patient understands that monitoring is required including LFT's and blood counts.  There is a rare possibility of scarring of the liver and lung problems that can occur when taking methotrexate. Persistent nausea, loss of appetite, pale stools, dark urine, cough, and shortness of breath should be reported immediately. Patient advised to discontinue methotrexate treatment at least three months before attempting to become pregnant.  I discussed the need for folate supplements while taking methotrexate.  These supplements can decrease side effects during methotrexate treatment. The patient verbalized understanding of the proper use and possible adverse effects of methotrexate.  All of the patient's questions and concerns were addressed.
Tazorac Pregnancy And Lactation Text: This medication is not safe during pregnancy. It is unknown if this medication is excreted in breast milk.
Dutasteride Male Counseling: Dutasteride Counseling:  I discussed with the patient the risks of use of dutasteride including but not limited to decreased libido, decreased ejaculate volume, and gynecomastia. Women who can become pregnant should not handle medication.  All of the patient's questions and concerns were addressed.
Olumiant Pregnancy And Lactation Text: Based on animal studies, Olumiant may cause embryo-fetal harm when administered to pregnant women.  The medication should not be used in pregnancy.  Breastfeeding is not recommended during treatment.
Zyclara Counseling:  I discussed with the patient the risks of imiquimod including but not limited to erythema, scaling, itching, weeping, crusting, and pain.  Patient understands that the inflammatory response to imiquimod is variable from person to person and was educated regarded proper titration schedule.  If flu-like symptoms develop, patient knows to discontinue the medication and contact us.
Griseofulvin Counseling:  I discussed with the patient the risks of griseofulvin including but not limited to photosensitivity, cytopenia, liver damage, nausea/vomiting and severe allergy.  The patient understands that this medication is best absorbed when taken with a fatty meal (e.g., ice cream or french fries).
Topical Sulfur Applications Pregnancy And Lactation Text: This medication is Pregnancy Category C and has an unknown safety profile during pregnancy. It is unknown if this topical medication is excreted in breast milk.
Methotrexate Pregnancy And Lactation Text: This medication is Pregnancy Category X and is known to cause fetal harm. This medication is excreted in breast milk.
Xolair Counseling:  Patient informed of potential adverse effects including but not limited to fever, muscle aches, rash and allergic reactions.  The patient verbalized understanding of the proper use and possible adverse effects of Xolair.  All of the patient's questions and concerns were addressed.
Infliximab Counseling:  I discussed with the patient the risks of infliximab including but not limited to myelosuppression, immunosuppression, autoimmune hepatitis, demyelinating diseases, lymphoma, and serious infections.  The patient understands that monitoring is required including a PPD at baseline and must alert us or the primary physician if symptoms of infection or other concerning signs are noted.
Isotretinoin Pregnancy And Lactation Text: This medication is Pregnancy Category X and is considered extremely dangerous during pregnancy. It is unknown if it is excreted in breast milk.
Mirvaso Pregnancy And Lactation Text: This medication has not been assigned a Pregnancy Risk Category. It is unknown if the medication is excreted in breast milk.
Glycopyrrolate Pregnancy And Lactation Text: This medication is Pregnancy Category B and is considered safe during pregnancy. It is unknown if it is excreted breast milk.
Rifampin Counseling: I discussed with the patient the risks of rifampin including but not limited to liver damage, kidney damage, red-orange body fluids, nausea/vomiting and severe allergy.
Azathioprine Counseling:  I discussed with the patient the risks of azathioprine including but not limited to myelosuppression, immunosuppression, hepatotoxicity, lymphoma, and infections.  The patient understands that monitoring is required including baseline LFTs, Creatinine, possible TPMP genotyping and weekly CBCs for the first month and then every 2 weeks thereafter.  The patient verbalized understanding of the proper use and possible adverse effects of azathioprine.  All of the patient's questions and concerns were addressed.
Hydroquinone Counseling:  Patient advised that medication may result in skin irritation, lightening (hypopigmentation), dryness, and burning.  In the event of skin irritation, the patient was advised to reduce the amount of the drug applied or use it less frequently.  Rarely, spots that are treated with hydroquinone can become darker (pseudoochronosis).  Should this occur, patient instructed to stop medication and call the office. The patient verbalized understanding of the proper use and possible adverse effects of hydroquinone.  All of the patient's questions and concerns were addressed.
Stelara Counseling:  I discussed with the patient the risks of ustekinumab including but not limited to immunosuppression, malignancy, posterior leukoencephalopathy syndrome, and serious infections.  The patient understands that monitoring is required including a PPD at baseline and must alert us or the primary physician if symptoms of infection or other concerning signs are noted.
Xolair Pregnancy And Lactation Text: This medication is Pregnancy Category B and is considered safe during pregnancy. This medication is excreted in breast milk.
Griseofulvin Pregnancy And Lactation Text: This medication is Pregnancy Category X and is known to cause serious birth defects. It is unknown if this medication is excreted in breast milk but breast feeding should be avoided.
Dupixent Pregnancy And Lactation Text: This medication likely crosses the placenta but the risk for the fetus is uncertain. This medication is excreted in breast milk.
Dutasteride Female Counseling: Dutasteride Counseling:  I discussed with the patient the risks of use of dutasteride including but not limited to decreased libido and sexual dysfunction. Explained the teratogenic nature of the medication and stressed the importance of not getting pregnant during treatment. All of the patient's questions and concerns were addressed.
Rinvoq Counseling: I discussed with the patient the risks of Rinvoq therapy including but not limited to upper respiratory tract infections, shingles, cold sores, bronchitis, nausea, cough, fever, acne, and headache. Live vaccines should be avoided.  This medication has been linked to serious infections; higher rate of mortality; malignancy and lymphoproliferative disorders; major adverse cardiovascular events; thrombosis; thrombocytopenia, anemia, and neutropenia; lipid elevations; liver enzyme elevations; and gastrointestinal perforations.
SSKI Counseling:  I discussed with the patient the risks of SSKI including but not limited to thyroid abnormalities, metallic taste, GI upset, fever, headache, acne, arthralgias, paraesthesias, lymphadenopathy, easy bleeding, arrhythmias, and allergic reaction.
Carac Counseling:  I discussed with the patient the risks of Carac including but not limited to erythema, scaling, itching, weeping, crusting, and pain.
Clindamycin Counseling: I counseled the patient regarding use of clindamycin as an antibiotic for prophylactic and/or therapeutic purposes. Clindamycin is active against numerous classes of bacteria, including skin bacteria. Side effects may include nausea, diarrhea, gastrointestinal upset, rash, hives, yeast infections, and in rare cases, colitis.
Adbry Counseling: I discussed with the patient the risks of tralokinumab including but not limited to eye infection and irritation, cold sores, injection site reactions, worsening of asthma, allergic reactions and increased risk of parasitic infection.  Live vaccines should be avoided while taking tralokinumab. The patient understands that monitoring is required and they must alert us or the primary physician if symptoms of infection or other concerning signs are noted.
Olanzapine Pregnancy And Lactation Text: This medication is pregnancy category C.   There are no adequate and well controlled trials with olanzapine in pregnant females.  Olanzapine should be used during pregnancy only if the potential benefit justifies the potential risk to the fetus.   In a study in lactating healthy women, olanzapine was excreted in breast milk.  It is recommended that women taking olanzapine should not breast feed.
Topical Clindamycin Counseling: Patient counseled that this medication may cause skin irritation or allergic reactions.  In the event of skin irritation, the patient was advised to reduce the amount of the drug applied or use it less frequently.   The patient verbalized understanding of the proper use and possible adverse effects of clindamycin.  All of the patient's questions and concerns were addressed.

## 2024-07-18 NOTE — PROCEDURE: POST-OP WOUND EVALUATION
Detail Level: Detailed
Add 79225 Cpt? (Important Note: In 2017 The Use Of 52812 Is Being Tracked By Cms To Determine Future Global Period Reimbursement For Global Periods): no
Wound Evaluated By (Optional): Berto
Wound Diameter In Cm(Optional): 0
Wound Crusting?: clean
Sutures?: intact
Wound Edema?: mild
Wound Color?: pink

## 2024-07-23 ENCOUNTER — APPOINTMENT (RX ONLY)
Dept: URBAN - METROPOLITAN AREA CLINIC 4 | Facility: CLINIC | Age: 69
Setting detail: DERMATOLOGY
End: 2024-07-23

## 2024-07-23 ENCOUNTER — HOSPITAL ENCOUNTER (OUTPATIENT)
Facility: MEDICAL CENTER | Age: 69
End: 2024-07-23
Attending: DERMATOLOGY
Payer: MEDICARE

## 2024-07-23 DIAGNOSIS — Z48.817 ENCOUNTER FOR SURGICAL AFTERCARE FOLLOWING SURGERY ON THE SKIN AND SUBCUTANEOUS TISSUE: ICD-10-CM

## 2024-07-23 PROCEDURE — ? POST-OP WOUND EVALUATION

## 2024-07-23 PROCEDURE — ? MEDICATION COUNSELING

## 2024-07-23 PROCEDURE — ? PRESCRIPTION MEDICATION MANAGEMENT

## 2024-07-23 PROCEDURE — 87205 SMEAR GRAM STAIN: CPT

## 2024-07-23 PROCEDURE — 87070 CULTURE OTHR SPECIMN AEROBIC: CPT

## 2024-07-23 PROCEDURE — 87077 CULTURE AEROBIC IDENTIFY: CPT

## 2024-07-23 PROCEDURE — ? ORDER TESTS

## 2024-07-23 PROCEDURE — 87075 CULTR BACTERIA EXCEPT BLOOD: CPT

## 2024-07-23 ASSESSMENT — LOCATION SIMPLE DESCRIPTION DERM: LOCATION SIMPLE: LEFT LOWER LEG

## 2024-07-23 ASSESSMENT — LOCATION ZONE DERM: LOCATION ZONE: LEG

## 2024-07-23 ASSESSMENT — LOCATION DETAILED DESCRIPTION DERM: LOCATION DETAILED: LEFT LATERAL DISTAL CALF

## 2024-07-23 NOTE — PROCEDURE: POST-OP WOUND EVALUATION
Detail Level: Detailed
Add 65045 Cpt? (Important Note: In 2017 The Use Of 87303 Is Being Tracked By Cms To Determine Future Global Period Reimbursement For Global Periods): no
Wound Evaluated By (Optional): Dr. Thomson
Wound Diameter In Cm(Optional): 0
Wound Crusting?: clean
Wound Discharge?: minimal discharge
Sutures?: intact
Wound Edema?: mild
Wound Color?: pink

## 2024-07-23 NOTE — PROCEDURE: ORDER TESTS
Expected Date Of Service: 07/23/2024
Lab Facility: [id number]
Billing Type: Third-Party Bill
Performing Laboratory: [id number]
Bill For Surgical Tray: no

## 2024-07-23 NOTE — PROCEDURE: MEDICATION COUNSELING
Dutasteride Female Counseling: Dutasteride Counseling:  I discussed with the patient the risks of use of dutasteride including but not limited to decreased libido and sexual dysfunction. Explained the teratogenic nature of the medication and stressed the importance of not getting pregnant during treatment. All of the patient's questions and concerns were addressed.
Wartpeel Pregnancy And Lactation Text: This medication is Pregnancy Category X and contraindicated in pregnancy and in women who may become pregnant. It is unknown if this medication is excreted in breast milk.
Infliximab Counseling:  I discussed with the patient the risks of infliximab including but not limited to myelosuppression, immunosuppression, autoimmune hepatitis, demyelinating diseases, lymphoma, and serious infections.  The patient understands that monitoring is required including a PPD at baseline and must alert us or the primary physician if symptoms of infection or other concerning signs are noted.
Protopic Counseling: Patient may experience a mild burning sensation during topical application. Protopic is not approved in children less than 2 years of age. There have been case reports of hematologic and skin malignancies in patients using topical calcineurin inhibitors although causality is questionable.
Otezla Pregnancy And Lactation Text: This medication is Pregnancy Category C and it isn't known if it is safe during pregnancy. It is unknown if it is excreted in breast milk.
Azithromycin Pregnancy And Lactation Text: This medication is considered safe during pregnancy and is also secreted in breast milk.
Valtrex Counseling: I discussed with the patient the risks of valacyclovir including but not limited to kidney damage, nausea, vomiting and severe allergy.  The patient understands that if the infection seems to be worsening or is not improving, they are to call.
Dapsone Counseling: I discussed with the patient the risks of dapsone including but not limited to hemolytic anemia, agranulocytosis, rashes, methemoglobinemia, kidney failure, peripheral neuropathy, headaches, GI upset, and liver toxicity.  Patients who start dapsone require monitoring including baseline LFTs and weekly CBCs for the first month, then every month thereafter.  The patient verbalized understanding of the proper use and possible adverse effects of dapsone.  All of the patient's questions and concerns were addressed.
Benzoyl Peroxide Pregnancy And Lactation Text: This medication is Pregnancy Category C. It is unknown if benzoyl peroxide is excreted in breast milk.
Sotyktu Counseling:  I discussed the most common side effects of Sotyktu including: common cold, sore throat, sinus infections, cold sores, canker sores, folliculitis, and acne.  I also discussed more serious side effects of Sotyktu including but not limited to: serious allergic reactions; increased risk for infections such as TB; cancers such as lymphomas; rhabdomyolysis and elevated CPK; and elevated triglycerides and liver enzymes. 
Skyrizi Pregnancy And Lactation Text: The risk during pregnancy and breastfeeding is uncertain with this medication.
Doxepin Pregnancy And Lactation Text: This medication is Pregnancy Category C and it isn't known if it is safe during pregnancy. It is also excreted in breast milk and breast feeding isn't recommended.
Itraconazole Pregnancy And Lactation Text: This medication is Pregnancy Category C and it isn't know if it is safe during pregnancy. It is also excreted in breast milk.
Bactrim Counseling:  I discussed with the patient the risks of sulfa antibiotics including but not limited to GI upset, allergic reaction, drug rash, diarrhea, dizziness, photosensitivity, and yeast infections.  Rarely, more serious reactions can occur including but not limited to aplastic anemia, agranulocytosis, methemoglobinemia, blood dyscrasias, liver or kidney failure, lung infiltrates or desquamative/blistering drug rashes.
Valtrex Pregnancy And Lactation Text: this medication is Pregnancy Category B and is considered safe during pregnancy. This medication is not directly found in breast milk but it's metabolite acyclovir is present.
Carac Counseling:  I discussed with the patient the risks of Carac including but not limited to erythema, scaling, itching, weeping, crusting, and pain.
Dapsone Pregnancy And Lactation Text: This medication is Pregnancy Category C and is not considered safe during pregnancy or breast feeding.
Azathioprine Counseling:  I discussed with the patient the risks of azathioprine including but not limited to myelosuppression, immunosuppression, hepatotoxicity, lymphoma, and infections.  The patient understands that monitoring is required including baseline LFTs, Creatinine, possible TPMP genotyping and weekly CBCs for the first month and then every 2 weeks thereafter.  The patient verbalized understanding of the proper use and possible adverse effects of azathioprine.  All of the patient's questions and concerns were addressed.
Sarecycline Counseling: Patient advised regarding possible photosensitivity and discoloration of the teeth, skin, lips, tongue and gums.  Patient instructed to avoid sunlight, if possible.  When exposed to sunlight, patients should wear protective clothing, sunglasses, and sunscreen.  The patient was instructed to call the office immediately if the following severe adverse effects occur:  hearing changes, easy bruising/bleeding, severe headache, or vision changes.  The patient verbalized understanding of the proper use and possible adverse effects of sarecycline.  All of the patient's questions and concerns were addressed.
Niacinamide Counseling: I recommended taking niacin or niacinamide, also know as vitamin B3, twice daily. Recent evidence suggests that taking vitamin B3 (500 mg twice daily) can reduce the risk of actinic keratoses and non-melanoma skin cancers. Side effects of vitamin B3 include flushing and headache.
Topical Ketoconazole Counseling: Patient counseled that this medication may cause skin irritation or allergic reactions.  In the event of skin irritation, the patient was advised to reduce the amount of the drug applied or use it less frequently.   The patient verbalized understanding of the proper use and possible adverse effects of ketoconazole.  All of the patient's questions and concerns were addressed.
Klisyri Pregnancy And Lactation Text: It is unknown if this medication can harm a developing fetus or if it is excreted in breast milk.
Erythromycin Pregnancy And Lactation Text: This medication is Pregnancy Category B and is considered safe during pregnancy. It is also excreted in breast milk.
Isotretinoin Pregnancy And Lactation Text: This medication is Pregnancy Category X and is considered extremely dangerous during pregnancy. It is unknown if it is excreted in breast milk.
Dupixent Pregnancy And Lactation Text: This medication likely crosses the placenta but the risk for the fetus is uncertain. This medication is excreted in breast milk.
Xolair Pregnancy And Lactation Text: This medication is Pregnancy Category B and is considered safe during pregnancy. This medication is excreted in breast milk.
Elidel Counseling: Patient may experience a mild burning sensation during topical application. Elidel is not approved in children less than 2 years of age. There have been case reports of hematologic and skin malignancies in patients using topical calcineurin inhibitors although causality is questionable.
Opioid Counseling: I discussed with the patient the potential side effects of opioids including but not limited to addiction, altered mental status, and depression. I stressed avoiding alcohol, benzodiazepines, muscle relaxants and sleep aids unless specifically okayed by a physician. The patient verbalized understanding of the proper use and possible adverse effects of opioids. All of the patient's questions and concerns were addressed. They were instructed to flush the remaining pills down the toilet if they did not need them for pain.
Solaraze Pregnancy And Lactation Text: This medication is Pregnancy Category B and is considered safe. There is some data to suggest avoiding during the third trimester. It is unknown if this medication is excreted in breast milk.
Topical Ketoconazole Pregnancy And Lactation Text: This medication is Pregnancy Category B and is considered safe during pregnancy. It is unknown if it is excreted in breast milk.
Sarecycline Pregnancy And Lactation Text: This medication is Pregnancy Category D and not consider safe during pregnancy. It is also excreted in breast milk.
Cibinqo Counseling: I discussed with the patient the risks of Cibinqo therapy including but not limited to common cold, nausea, headache, cold sores, increased blood CPK levels, dizziness, UTIs, fatigue, acne, and vomitting. Live vaccines should be avoided.  This medication has been linked to serious infections; higher rate of mortality; malignancy and lymphoproliferative disorders; major adverse cardiovascular events; thrombosis; thrombocytopenia and lymphopenia; lipid elevations; and retinal detachment.
Protopic Pregnancy And Lactation Text: This medication is Pregnancy Category C. It is unknown if this medication is excreted in breast milk when applied topically.
Opioid Pregnancy And Lactation Text: These medications can lead to premature delivery and should be avoided during pregnancy. These medications are also present in breast milk in small amounts.
Azathioprine Pregnancy And Lactation Text: This medication is Pregnancy Category D and isn't considered safe during pregnancy. It is unknown if this medication is excreted in breast milk.
Infliximab Pregnancy And Lactation Text: This medication is Pregnancy Category B and is considered safe during pregnancy. It is unknown if this medication is excreted in breast milk.
High Dose Vitamin A Counseling: Side effects reviewed, pt to contact office should one occur.
Hydroxyzine Counseling: Patient advised that the medication is sedating and not to drive a car after taking this medication.  Patient informed of potential adverse effects including but not limited to dry mouth, urinary retention, and blurry vision.  The patient verbalized understanding of the proper use and possible adverse effects of hydroxyzine.  All of the patient's questions and concerns were addressed.
Spevigo Counseling: I discussed with the patient the risks of Spevigo including but not limited to fatigue, nasuea, vomiting, headache, pruritus, urinary tract infection, an infusion related reactions.  The patient understands that monitoring is required including screening for tuberculosis at baseline and yearly screening thereafter while continuing Spevigo therapy. They should contact us if symptoms of infection or other concerning signs are noted.
Adbry Counseling: I discussed with the patient the risks of tralokinumab including but not limited to eye infection and irritation, cold sores, injection site reactions, worsening of asthma, allergic reactions and increased risk of parasitic infection.  Live vaccines should be avoided while taking tralokinumab. The patient understands that monitoring is required and they must alert us or the primary physician if symptoms of infection or other concerning signs are noted.
Winlevi Counseling:  I discussed with the patient the risks of topical clascoterone including but not limited to erythema, scaling, itching, and stinging. Patient voiced their understanding.
Oxybutynin Counseling:  I discussed with the patient the risks of oxybutynin including but not limited to skin rash, drowsiness, dry mouth, difficulty urinating, and blurred vision.
Dutasteride Pregnancy And Lactation Text: This medication is absolutely contraindicated in women, especially during pregnancy and breast feeding. Feminization of male fetuses is possible if taking while pregnant.
Sotyktu Pregnancy And Lactation Text: There is insufficient data to evaluate whether or not Sotyktu is safe to use during pregnancy.   It is not known if Sotyktu passes into breast milk and whether or not it is safe to use when breastfeeding.  
Hydroxyzine Pregnancy And Lactation Text: This medication is not safe during pregnancy and should not be taken. It is also excreted in breast milk and breast feeding isn't recommended.
Propranolol Pregnancy And Lactation Text: This medication is Pregnancy Category C and it isn't known if it is safe during pregnancy. It is excreted in breast milk.
Cyclophosphamide Counseling:  I discussed with the patient the risks of cyclophosphamide including but not limited to hair loss, hormonal abnormalities, decreased fertility, abdominal pain, diarrhea, nausea and vomiting, bone marrow suppression and infection. The patient understands that monitoring is required while taking this medication.
Erivedge Counseling- I discussed with the patient the risks of Erivedge including but not limited to nausea, vomiting, diarrhea, constipation, weight loss, changes in the sense of taste, decreased appetite, muscle spasms, and hair loss.  The patient verbalized understanding of the proper use and possible adverse effects of Erivedge.  All of the patient's questions and concerns were addressed.
Spevigo Pregnancy And Lactation Text: The risk during pregnancy and breastfeeding is uncertain with this medication. This medication does cross the placenta. It is unknown if this medication is found in breast milk.
Adbry Pregnancy And Lactation Text: It is unknown if this medication will adversely affect pregnancy or breast feeding.
Enbrel Counseling:  I discussed with the patient the risks of etanercept including but not limited to myelosuppression, immunosuppression, autoimmune hepatitis, demyelinating diseases, lymphoma, and infections.  The patient understands that monitoring is required including a PPD at baseline and must alert us or the primary physician if symptoms of infection or other concerning signs are noted.
Metronidazole Counseling:  I discussed with the patient the risks of metronidazole including but not limited to seizures, nausea/vomiting, a metallic taste in the mouth, nausea/vomiting and severe allergy.
Soolantra Counseling: I discussed with the patients the risks of topial Soolantra. This is a medicine which decreases the number of mites and inflammation in the skin. You experience burning, stinging, eye irritation or allergic reactions.  Please call our office if you develop any problems from using this medication.
Minoxidil Counseling: Minoxidil is a topical medication which can increase blood flow where it is applied. It is uncertain how this medication increases hair growth. Side effects are uncommon and include stinging and allergic reactions.
Niacinamide Pregnancy And Lactation Text: These medications are considered safe during pregnancy.
Ketoconazole Counseling:   Patient counseled regarding improving absorption with orange juice.  Adverse effects include but are not limited to breast enlargement, headache, diarrhea, nausea, upset stomach, liver function test abnormalities, taste disturbance, and stomach pain.  There is a rare possibility of liver failure that can occur when taking ketoconazole. The patient understands that monitoring of LFTs may be required, especially at baseline. The patient verbalized understanding of the proper use and possible adverse effects of ketoconazole.  All of the patient's questions and concerns were addressed.
Elidel Pregnancy And Lactation Text: This medication is Pregnancy Category C. It is unknown if this medication is excreted in breast milk.
Bactrim Pregnancy And Lactation Text: This medication is Pregnancy Category D and is known to cause fetal risk.  It is also excreted in breast milk.
Gabapentin Counseling: I discussed with the patient the risks of gabapentin including but not limited to dizziness, somnolence, fatigue and ataxia.
Use Enhanced Medication Counseling?: No
Soolantra Pregnancy And Lactation Text: This medication is Pregnancy Category C. This medication is considered safe during breast feeding.
Cellcept Counseling:  I discussed with the patient the risks of mycophenolate mofetil including but not limited to infection/immunosuppression, GI upset, hypokalemia, hypercholesterolemia, bone marrow suppression, lymphoproliferative disorders, malignancy, GI ulceration/bleed/perforation, colitis, interstitial lung disease, kidney failure, progressive multifocal leukoencephalopathy, and birth defects.  The patient understands that monitoring is required including a baseline creatinine and regular CBC testing. In addition, patient must alert us immediately if symptoms of infection or other concerning signs are noted.
Metronidazole Pregnancy And Lactation Text: This medication is Pregnancy Category B and considered safe during pregnancy.  It is also excreted in breast milk.
Ketoconazole Pregnancy And Lactation Text: This medication is Pregnancy Category C and it isn't know if it is safe during pregnancy. It is also excreted in breast milk and breast feeding isn't recommended.
Minoxidil Pregnancy And Lactation Text: This medication has not been assigned a Pregnancy Risk Category but animal studies failed to show danger with the topical medication. It is unknown if the medication is excreted in breast milk.
Cephalexin Counseling: I counseled the patient regarding use of cephalexin as an antibiotic for prophylactic and/or therapeutic purposes. Cephalexin (commonly prescribed under brand name Keflex) is a cephalosporin antibiotic which is active against numerous classes of bacteria, including most skin bacteria. Side effects may include nausea, diarrhea, gastrointestinal upset, rash, hives, yeast infections, and in rare cases, hepatitis, kidney disease, seizures, fever, confusion, neurologic symptoms, and others. Patients with severe allergies to penicillin medications are cautioned that there is about a 10% incidence of cross-reactivity with cephalosporins. When possible, patients with penicillin allergies should use alternatives to cephalosporins for antibiotic therapy.
High Dose Vitamin A Pregnancy And Lactation Text: High dose vitamin A therapy is contraindicated during pregnancy and breast feeding.
Nsaids Counseling: NSAID Counseling: I discussed with the patient that NSAIDs should be taken with food. Prolonged use of NSAIDs can result in the development of stomach ulcers.  Patient advised to stop taking NSAIDs if abdominal pain occurs.  The patient verbalized understanding of the proper use and possible adverse effects of NSAIDs.  All of the patient's questions and concerns were addressed.
Xeljanz Counseling: I discussed with the patient the risks of Xeljanz therapy including increased risk of infection, liver issues, headache, diarrhea, or cold symptoms. Live vaccines should be avoided. They were instructed to call if they have any problems.
Winlevi Pregnancy And Lactation Text: This medication is considered safe during pregnancy and breastfeeding.
Tetracycline Counseling: Patient counseled regarding possible photosensitivity and increased risk for sunburn.  Patient instructed to avoid sunlight, if possible.  When exposed to sunlight, patients should wear protective clothing, sunglasses, and sunscreen.  The patient was instructed to call the office immediately if the following severe adverse effects occur:  hearing changes, easy bruising/bleeding, severe headache, or vision changes.  The patient verbalized understanding of the proper use and possible adverse effects of tetracycline.  All of the patient's questions and concerns were addressed. Patient understands to avoid pregnancy while on therapy due to potential birth defects.
Rituxan Counseling:  I discussed with the patient the risks of Rituxan infusions. Side effects can include infusion reactions, severe drug rashes including mucocutaneous reactions, reactivation of latent hepatitis and other infections and rarely progressive multifocal leukoencephalopathy.  All of the patient's questions and concerns were addressed.
Finasteride Male Counseling: Finasteride Counseling:  I discussed with the patient the risks of use of finasteride including but not limited to decreased libido, decreased ejaculate volume, gynecomastia, and depression. Women should not handle medication.  All of the patient's questions and concerns were addressed.
Cibinqo Pregnancy And Lactation Text: It is unknown if this medication will adversely affect pregnancy or breast feeding.  You should not take this medication if you are currently pregnant or planning a pregnancy or while breastfeeding.
Topical Metronidazole Counseling: Metronidazole is a topical antibiotic medication. You may experience burning, stinging, redness, or allergic reactions.  Please call our office if you develop any problems from using this medication.
Cyclophosphamide Pregnancy And Lactation Text: This medication is Pregnancy Category D and it isn't considered safe during pregnancy. This medication is excreted in breast milk.
VTAMA Counseling: I discussed with the patient that VTAMA is not for use in the eyes, mouth or mouth. They should call the office if they develop any signs of allergic reactions to VTAMA. The patient verbalized understanding of the proper use and possible adverse effects of VTAMA.  All of the patient's questions and concerns were addressed.
Bimzelx Counseling:  I discussed with the patient the risks of Bimzelx including but not limited to depression, immunosuppression, allergic reactions and infections.  The patient understands that monitoring is required including a PPD at baseline and must alert us or the primary physician if symptoms of infection or other concerning signs are noted.
Erivedge Pregnancy And Lactation Text: This medication is Pregnancy Category X and is absolutely contraindicated during pregnancy. It is unknown if it is excreted in breast milk.
Xelselvinz Pregnancy And Lactation Text: This medication is Pregnancy Category D and is not considered safe during pregnancy.  The risk during breast feeding is also uncertain.
Gabapentin Pregnancy And Lactation Text: This medication is Pregnancy Category C and isn't considered safe during pregnancy. It is excreted in breast milk.
Eucrisa Counseling: Patient may experience a mild burning sensation during topical application. Eucrisa is not approved in children less than 2 years of age.
Arava Counseling:  Patient counseled regarding adverse effects of Arava including but not limited to nausea, vomiting, abnormalities in liver function tests. Patients may develop mouth sores, rash, diarrhea, and abnormalities in blood counts. The patient understands that monitoring is required including LFTs and blood counts.  There is a rare possibility of scarring of the liver and lung problems that can occur when taking methotrexate. Persistent nausea, loss of appetite, pale stools, dark urine, cough, and shortness of breath should be reported immediately. Patient advised to discontinue Arava treatment and consult with a physician prior to attempting conception. The patient will have to undergo a treatment to eliminate Arava from the body prior to conception.
Stelara Counseling:  I discussed with the patient the risks of ustekinumab including but not limited to immunosuppression, malignancy, posterior leukoencephalopathy syndrome, and serious infections.  The patient understands that monitoring is required including a PPD at baseline and must alert us or the primary physician if symptoms of infection or other concerning signs are noted.
SSKI Counseling:  I discussed with the patient the risks of SSKI including but not limited to thyroid abnormalities, metallic taste, GI upset, fever, headache, acne, arthralgias, paraesthesias, lymphadenopathy, easy bleeding, arrhythmias, and allergic reaction.
Calcipotriene Counseling:  I discussed with the patient the risks of calcipotriene including but not limited to erythema, scaling, itching, and irritation.
Glycopyrrolate Counseling:  I discussed with the patient the risks of glycopyrrolate including but not limited to skin rash, drowsiness, dry mouth, difficulty urinating, and blurred vision.
Terbinafine Counseling: Patient counseling regarding adverse effects of terbinafine including but not limited to headache, diarrhea, rash, upset stomach, liver function test abnormalities, itching, taste/smell disturbance, nausea, abdominal pain, and flatulence.  There is a rare possibility of liver failure that can occur when taking terbinafine.  The patient understands that a baseline LFT and kidney function test may be required. The patient verbalized understanding of the proper use and possible adverse effects of terbinafine.  All of the patient's questions and concerns were addressed.
Mirvaso Counseling: Mirvaso is a topical medication which can decrease superficial blood flow where applied. Side effects are uncommon and include stinging, redness and allergic reactions.
Minocycline Counseling: Patient advised regarding possible photosensitivity and discoloration of the teeth, skin, lips, tongue and gums.  Patient instructed to avoid sunlight, if possible.  When exposed to sunlight, patients should wear protective clothing, sunglasses, and sunscreen.  The patient was instructed to call the office immediately if the following severe adverse effects occur:  hearing changes, easy bruising/bleeding, severe headache, or vision changes.  The patient verbalized understanding of the proper use and possible adverse effects of minocycline.  All of the patient's questions and concerns were addressed.
Humira Counseling:  I discussed with the patient the risks of adalimumab including but not limited to myelosuppression, immunosuppression, autoimmune hepatitis, demyelinating diseases, lymphoma, and serious infections.  The patient understands that monitoring is required including a PPD at baseline and must alert us or the primary physician if symptoms of infection or other concerning signs are noted.
Litfulo Counseling: I discussed with the patient the risks of Litfulo therapy including but not limited to upper respiratory tract infections, shingles, cold sores, and nausea. Live vaccines should be avoided.  This medication has been linked to serious infections; higher rate of mortality; malignancy and lymphoproliferative disorders; major adverse cardiovascular events; thrombosis; gastrointestinal perforations; neutropenia; lymphopenia; anemia; liver enzyme elevations; and lipid elevations.
Rituxan Pregnancy And Lactation Text: This medication is Pregnancy Category C and it isn't know if it is safe during pregnancy. It is unknown if this medication is excreted in breast milk but similar antibodies are known to be excreted.
Detail Level: Simple
Finasteride Female Counseling: Finasteride Counseling:  I discussed with the patient the risks of use of finasteride including but not limited to decreased libido and sexual dysfunction. Explained the teratogenic nature of the medication and stressed the importance of not getting pregnant during treatment. All of the patient's questions and concerns were addressed.
Propranolol Counseling:  I discussed with the patient the risks of propranolol including but not limited to low heart rate, low blood pressure, low blood sugar, restlessness and increased cold sensitivity. They should call the office if they experience any of these side effects.
Topical Metronidazole Pregnancy And Lactation Text: This medication is Pregnancy Category B and considered safe during pregnancy.  It is also considered safe to use while breastfeeding.
Nsaids Pregnancy And Lactation Text: These medications are considered safe up to 30 weeks gestation. It is excreted in breast milk.
Topical Retinoid counseling:  Patient advised to apply a pea-sized amount only at bedtime and wait 30 minutes after washing their face before applying.  If too drying, patient may add a non-comedogenic moisturizer. The patient verbalized understanding of the proper use and possible adverse effects of retinoids.  All of the patient's questions and concerns were addressed.
Finasteride Pregnancy And Lactation Text: This medication is absolutely contraindicated during pregnancy. It is unknown if it is excreted in breast milk.
Litfulo Pregnancy And Lactation Text: Based on animal studies, Lifulo may cause embryo-fetal harm when administered to pregnant women.  The medication should not be used in pregnancy.  Breastfeeding is not recommended during treatment.
Siliq Counseling:  I discussed with the patient the risks of Siliq including but not limited to new or worsening depression, suicidal thoughts and behavior, immunosuppression, malignancy, posterior leukoencephalopathy syndrome, and serious infections.  The patient understands that monitoring is required including a PPD at baseline and must alert us or the primary physician if symptoms of infection or other concerning signs are noted. There is also a special program designed to monitor depression which is required with Siliq.
Vtama Pregnancy And Lactation Text: It is unknown if this medication can cause problems during pregnancy and breastfeeding.
Bimzelx Pregnancy And Lactation Text: This medication crosses the placenta and the safety is uncertain during pregnancy. It is unknown if this medication is present in breast milk.
Libtayo Counseling- I discussed with the patient the risks of Libtayo including but not limited to nausea, vomiting, diarrhea, and bone or muscle pain.  The patient verbalized understanding of the proper use and possible adverse effects of Libtayo.  All of the patient's questions and concerns were addressed.
Cephalexin Pregnancy And Lactation Text: This medication is Pregnancy Category B and considered safe during pregnancy.  It is also excreted in breast milk but can be used safely for shorter doses.
Cyclosporine Counseling:  I discussed with the patient the risks of cyclosporine including but not limited to hypertension, gingival hyperplasia,myelosuppression, immunosuppression, liver damage, kidney damage, neurotoxicity, lymphoma, and serious infections. The patient understands that monitoring is required including baseline blood pressure, CBC, CMP, lipid panel and uric acid, and then 1-2 times monthly CMP and blood pressure.
Calcipotriene Pregnancy And Lactation Text: The use of this medication during pregnancy or lactation is not recommended as there is insufficient data.
Aklief counseling:  Patient advised to apply a pea-sized amount only at bedtime and wait 30 minutes after washing their face before applying.  If too drying, patient may add a non-comedogenic moisturizer.  The most commonly reported side effects including irritation, redness, scaling, dryness, stinging, burning, itching, and increased risk of sunburn.  The patient verbalized understanding of the proper use and possible adverse effects of retinoids.  All of the patient's questions and concerns were addressed.
Sski Pregnancy And Lactation Text: This medication is Pregnancy Category D and isn't considered safe during pregnancy. It is excreted in breast milk.
Acitretin Counseling:  I discussed with the patient the risks of acitretin including but not limited to hair loss, dry lips/skin/eyes, liver damage, hyperlipidemia, depression/suicidal ideation, photosensitivity.  Serious rare side effects can include but are not limited to pancreatitis, pseudotumor cerebri, bony changes, clot formation/stroke/heart attack.  Patient understands that alcohol is contraindicated since it can result in liver toxicity and significantly prolong the elimination of the drug by many years.
Qbrexza Counseling:  I discussed with the patient the risks of Qbrexza including but not limited to headache, mydriasis, blurred vision, dry eyes, nasal dryness, dry mouth, dry throat, dry skin, urinary hesitation, and constipation.  Local skin reactions including erythema, burning, stinging, and itching can also occur.
Clindamycin Counseling: I counseled the patient regarding use of clindamycin as an antibiotic for prophylactic and/or therapeutic purposes. Clindamycin is active against numerous classes of bacteria, including skin bacteria. Side effects may include nausea, diarrhea, gastrointestinal upset, rash, hives, yeast infections, and in rare cases, colitis.
Cantharidin Counseling:  I discussed with the patient the risks of Cantharidin including but not limited to pain, redness, burning, itching, and blistering.
Taltz Counseling: I discussed with the patient the risks of ixekizumab including but not limited to immunosuppression, serious infections, worsening of inflammatory bowel disease and drug reactions.  The patient understands that monitoring is required including a PPD at baseline and must alert us or the primary physician if symptoms of infection or other concerning signs are noted.
Fluconazole Counseling:  Patient counseled regarding adverse effects of fluconazole including but not limited to headache, diarrhea, nausea, upset stomach, liver function test abnormalities, taste disturbance, and stomach pain.  There is a rare possibility of liver failure that can occur when taking fluconazole.  The patient understands that monitoring of LFTs and kidney function test may be required, especially at baseline. The patient verbalized understanding of the proper use and possible adverse effects of fluconazole.  All of the patient's questions and concerns were addressed.
Olanzapine Counseling- I discussed with the patient the common side effects of olanzapine including but are not limited to: lack of energy, dry mouth, increased appetite, sleepiness, tremor, constipation, dizziness, changes in behavior, or restlessness.  Explained that teenagers are more likely to experience headaches, abdominal pain, pain in the arms or legs, tiredness, and sleepiness.  Serious side effects include but are not limited: increased risk of death in elderly patients who are confused, have memory loss, or dementia-related psychosis; hyperglycemia; increased cholesterol and triglycerides; and weight gain.
Terbinafine Pregnancy And Lactation Text: This medication is Pregnancy Category B and is considered safe during pregnancy. It is also excreted in breast milk and breast feeding isn't recommended.
Mirvaso Pregnancy And Lactation Text: This medication has not been assigned a Pregnancy Risk Category. It is unknown if the medication is excreted in breast milk.
Topical Steroids Counseling: I discussed with the patient that prolonged use of topical steroids can result in the increased appearance of superficial blood vessels (telangiectasias), lightening (hypopigmentation) and thinning of the skin (atrophy).  Patient understands to avoid using high potency steroids in skin folds, the groin or the face.  The patient verbalized understanding of the proper use and possible adverse effects of topical steroids.  All of the patient's questions and concerns were addressed.
Glycopyrrolate Pregnancy And Lactation Text: This medication is Pregnancy Category B and is considered safe during pregnancy. It is unknown if it is excreted breast milk.
Hydroquinone Counseling:  Patient advised that medication may result in skin irritation, lightening (hypopigmentation), dryness, and burning.  In the event of skin irritation, the patient was advised to reduce the amount of the drug applied or use it less frequently.  Rarely, spots that are treated with hydroquinone can become darker (pseudoochronosis).  Should this occur, patient instructed to stop medication and call the office. The patient verbalized understanding of the proper use and possible adverse effects of hydroquinone.  All of the patient's questions and concerns were addressed.
Olanzapine Pregnancy And Lactation Text: This medication is pregnancy category C.   There are no adequate and well controlled trials with olanzapine in pregnant females.  Olanzapine should be used during pregnancy only if the potential benefit justifies the potential risk to the fetus.   In a study in lactating healthy women, olanzapine was excreted in breast milk.  It is recommended that women taking olanzapine should not breast feed.
Topical Steroids Applications Pregnancy And Lactation Text: Most topical steroids are considered safe to use during pregnancy and lactation.  Any topical steroid applied to the breast or nipple should be washed off before breastfeeding.
Olumiant Counseling: I discussed with the patient the risks of Olumiant therapy including but not limited to upper respiratory tract infections, shingles, cold sores, and nausea. Live vaccines should be avoided.  This medication has been linked to serious infections; higher rate of mortality; malignancy and lymphoproliferative disorders; major adverse cardiovascular events; thrombosis; gastrointestinal perforations; neutropenia; lymphopenia; anemia; liver enzyme elevations; and lipid elevations.
Cantharidin Pregnancy And Lactation Text: This medication has not been proven safe during pregnancy. It is unknown if this medication is excreted in breast milk.
Opzelura Counseling:  I discussed with the patient the risks of Opzelura including but not limited to nasopharngitis, bronchitis, ear infection, eosinophila, hives, diarrhea, folliculitis, tonsillitis, and rhinorrhea.  Taken orally, this medication has been linked to serious infections; higher rate of mortality; malignancy and lymphoproliferative disorders; major adverse cardiovascular events; thrombosis; thrombocytopenia, anemia, and neutropenia; and lipid elevations.
Aklief Pregnancy And Lactation Text: It is unknown if this medication is safe to use during pregnancy.  It is unknown if this medication is excreted in breast milk.  Breastfeeding women should use the topical cream on the smallest area of the skin for the shortest time needed while breastfeeding.  Do not apply to nipple and areola.
Cimzia Counseling:  I discussed with the patient the risks of Cimzia including but not limited to immunosuppression, allergic reactions and infections.  The patient understands that monitoring is required including a PPD at baseline and must alert us or the primary physician if symptoms of infection or other concerning signs are noted.
Clofazimine Counseling:  I discussed with the patient the risks of clofazimine including but not limited to skin and eye pigmentation, liver damage, nausea/vomiting, gastrointestinal bleeding and allergy.
Thalidomide Counseling: I discussed with the patient the risks of thalidomide including but not limited to birth defects, anxiety, weakness, chest pain, dizziness, cough and severe allergy.
Cyclosporine Pregnancy And Lactation Text: This medication is Pregnancy Category C and it isn't know if it is safe during pregnancy. This medication is excreted in breast milk.
Zoryve Counseling:  I discussed with the patient that Zoryve is not for use in the eyes, mouth or vagina. The most commonly reported side effects include diarrhea, headache, insomnia, application site pain, upper respiratory tract infections, and urinary tract infections.  All of the patient's questions and concerns were addressed.
Birth Control Pills Counseling: Birth Control Pill Counseling: I discussed with the patient the potential side effects of OCPs including but not limited to increased risk of stroke, heart attack, thrombophlebitis, deep venous thrombosis, hepatic adenomas, breast changes, GI upset, headaches, and depression.  The patient verbalized understanding of the proper use and possible adverse effects of OCPs. All of the patient's questions and concerns were addressed.
Libtayo Pregnancy And Lactation Text: This medication is contraindicated in pregnancy and when breast feeding.
Azelaic Acid Counseling: Patient counseled that medicine may cause skin irritation and to avoid applying near the eyes.  In the event of skin irritation, the patient was advised to reduce the amount of the drug applied or use it less frequently.   The patient verbalized understanding of the proper use and possible adverse effects of azelaic acid.  All of the patient's questions and concerns were addressed.
Odomzo Counseling- I discussed with the patient the risks of Odomzo including but not limited to nausea, vomiting, diarrhea, constipation, weight loss, changes in the sense of taste, decreased appetite, muscle spasms, and hair loss.  The patient verbalized understanding of the proper use and possible adverse effects of Odomzo.  All of the patient's questions and concerns were addressed.
Methotrexate Counseling:  Patient counseled regarding adverse effects of methotrexate including but not limited to nausea, vomiting, abnormalities in liver function tests. Patients may develop mouth sores, rash, diarrhea, and abnormalities in blood counts. The patient understands that monitoring is required including LFT's and blood counts.  There is a rare possibility of scarring of the liver and lung problems that can occur when taking methotrexate. Persistent nausea, loss of appetite, pale stools, dark urine, cough, and shortness of breath should be reported immediately. Patient advised to discontinue methotrexate treatment at least three months before attempting to become pregnant.  I discussed the need for folate supplements while taking methotrexate.  These supplements can decrease side effects during methotrexate treatment. The patient verbalized understanding of the proper use and possible adverse effects of methotrexate.  All of the patient's questions and concerns were addressed.
Cimzia Pregnancy And Lactation Text: This medication crosses the placenta but can be considered safe in certain situations. Cimzia may be excreted in breast milk.
Hyrimoz Counseling:  I discussed with the patient the risks of adalimumab including but not limited to myelosuppression, immunosuppression, autoimmune hepatitis, demyelinating diseases, lymphoma, and serious infections.  The patient understands that monitoring is required including a PPD at baseline and must alert us or the primary physician if symptoms of infection or other concerning signs are noted.
Quinolones Counseling:  I discussed with the patient the risks of fluoroquinolones including but not limited to GI upset, allergic reaction, drug rash, diarrhea, dizziness, photosensitivity, yeast infections, liver function test abnormalities, tendonitis/tendon rupture.
Tazorac Counseling:  Patient advised that medication is irritating and drying.  Patient may need to apply sparingly and wash off after an hour before eventually leaving it on overnight.  The patient verbalized understanding of the proper use and possible adverse effects of tazorac.  All of the patient's questions and concerns were addressed.
Hydroxychloroquine Counseling:  I discussed with the patient that a baseline ophthalmologic exam is needed at the start of therapy and every year thereafter while on therapy. A CBC may also be warranted for monitoring.  The side effects of this medication were discussed with the patient, including but not limited to agranulocytosis, aplastic anemia, seizures, rashes, retinopathy, and liver toxicity. Patient instructed to call the office should any adverse effect occur.  The patient verbalized understanding of the proper use and possible adverse effects of Plaquenil.  All the patient's questions and concerns were addressed.
Clindamycin Pregnancy And Lactation Text: This medication can be used in pregnancy if certain situations. Clindamycin is also present in breast milk.
Acitretin Pregnancy And Lactation Text: This medication is Pregnancy Category X and should not be given to women who are pregnant or may become pregnant in the future. This medication is excreted in breast milk.
5-Fu Counseling: 5-Fluorouracil Counseling:  I discussed with the patient the risks of 5-fluorouracil including but not limited to erythema, scaling, itching, weeping, crusting, and pain.
Albendazole Counseling:  I discussed with the patient the risks of albendazole including but not limited to cytopenia, kidney damage, nausea/vomiting and severe allergy.  The patient understands that this medication is being used in an off-label manner.
Qbrexza Pregnancy And Lactation Text: There is no available data on Qbrexza use in pregnant women.  There is no available data on Qbrexza use in lactation.
Tazorac Pregnancy And Lactation Text: This medication is not safe during pregnancy. It is unknown if this medication is excreted in breast milk.
Doxycycline Counseling:  Patient counseled regarding possible photosensitivity and increased risk for sunburn.  Patient instructed to avoid sunlight, if possible.  When exposed to sunlight, patients should wear protective clothing, sunglasses, and sunscreen.  The patient was instructed to call the office immediately if the following severe adverse effects occur:  hearing changes, easy bruising/bleeding, severe headache, or vision changes.  The patient verbalized understanding of the proper use and possible adverse effects of doxycycline.  All of the patient's questions and concerns were addressed.
Bexarotene Counseling:  I discussed with the patient the risks of bexarotene including but not limited to hair loss, dry lips/skin/eyes, liver abnormalities, hyperlipidemia, pancreatitis, depression/suicidal ideation, photosensitivity, drug rash/allergic reactions, hypothyroidism, anemia, leukopenia, infection, cataracts, and teratogenicity.  Patient understands that they will need regular blood tests to check lipid profile, liver function tests, white blood cell count, thyroid function tests and pregnancy test if applicable.
Opzelura Pregnancy And Lactation Text: There is insufficient data to evaluate drug-associated risk for major birth defects, miscarriage, or other adverse maternal or fetal outcomes.  There is a pregnancy registry that monitors pregnancy outcomes in pregnant persons exposed to the medication during pregnancy.  It is unknown if this medication is excreted in breast milk.  Do not breastfeed during treatment and for about 4 weeks after the last dose.
Oral Minoxidil Counseling- I discussed with the patient the risks of oral minoxidil including but not limited to shortness of breath, swelling of the feet or ankles, dizziness, lightheadedness, unwanted hair growth and allergic reaction.  The patient verbalized understanding of the proper use and possible adverse effects of oral minoxidil.  All of the patient's questions and concerns were addressed.
Cimetidine Counseling:  I discussed with the patient the risks of Cimetidine including but not limited to gynecomastia, headache, diarrhea, nausea, drowsiness, arrhythmias, pancreatitis, skin rashes, psychosis, bone marrow suppression and kidney toxicity.
Simponi Counseling:  I discussed with the patient the risks of golimumab including but not limited to myelosuppression, immunosuppression, autoimmune hepatitis, demyelinating diseases, lymphoma, and serious infections.  The patient understands that monitoring is required including a PPD at baseline and must alert us or the primary physician if symptoms of infection or other concerning signs are noted.
Birth Control Pills Pregnancy And Lactation Text: This medication should be avoided if pregnant and for the first 30 days post-partum.
Topical Sulfur Applications Counseling: Topical Sulfur Counseling: Patient counseled that this medication may cause skin irritation or allergic reactions.  In the event of skin irritation, the patient was advised to reduce the amount of the drug applied or use it less frequently.   The patient verbalized understanding of the proper use and possible adverse effects of topical sulfur application.  All of the patient's questions and concerns were addressed.
Olumiant Pregnancy And Lactation Text: Based on animal studies, Olumiant may cause embryo-fetal harm when administered to pregnant women.  The medication should not be used in pregnancy.  Breastfeeding is not recommended during treatment.
Spironolactone Counseling: Patient advised regarding risks of diarrhea, abdominal pain, hyperkalemia, birth defects (for female patients), liver toxicity and renal toxicity. The patient may need blood work to monitor liver and kidney function and potassium levels while on therapy. The patient verbalized understanding of the proper use and possible adverse effects of spironolactone.  All of the patient's questions and concerns were addressed.
Methotrexate Pregnancy And Lactation Text: This medication is Pregnancy Category X and is known to cause fetal harm. This medication is excreted in breast milk.
Zyclara Counseling:  I discussed with the patient the risks of imiquimod including but not limited to erythema, scaling, itching, weeping, crusting, and pain.  Patient understands that the inflammatory response to imiquimod is variable from person to person and was educated regarded proper titration schedule.  If flu-like symptoms develop, patient knows to discontinue the medication and contact us.
Cosentyx Counseling:  I discussed with the patient the risks of Cosentyx including but not limited to worsening of Crohn's disease, immunosuppression, allergic reactions and infections.  The patient understands that monitoring is required including a PPD at baseline and must alert us or the primary physician if symptoms of infection or other concerning signs are noted.
Albendazole Pregnancy And Lactation Text: This medication is Pregnancy Category C and it isn't known if it is safe during pregnancy. It is also excreted in breast milk.
Rhofade Counseling: Rhofade is a topical medication which can decrease superficial blood flow where applied. Side effects are uncommon and include stinging, redness and allergic reactions.
Griseofulvin Counseling:  I discussed with the patient the risks of griseofulvin including but not limited to photosensitivity, cytopenia, liver damage, nausea/vomiting and severe allergy.  The patient understands that this medication is best absorbed when taken with a fatty meal (e.g., ice cream or french fries).
Imiquimod Counseling:  I discussed with the patient the risks of imiquimod including but not limited to erythema, scaling, itching, weeping, crusting, and pain.  Patient understands that the inflammatory response to imiquimod is variable from person to person and was educated regarded proper titration schedule.  If flu-like symptoms develop, patient knows to discontinue the medication and contact us.
Hydroxychloroquine Pregnancy And Lactation Text: This medication has been shown to cause fetal harm but it isn't assigned a Pregnancy Risk Category. There are small amounts excreted in breast milk.
Tremfya Counseling: I discussed with the patient the risks of guselkumab including but not limited to immunosuppression, serious infections, worsening of inflammatory bowel disease and drug reactions.  The patient understands that monitoring is required including a PPD at baseline and must alert us or the primary physician if symptoms of infection or other concerning signs are noted.
Colchicine Counseling:  Patient counseled regarding adverse effects including but not limited to stomach upset (nausea, vomiting, stomach pain, or diarrhea).  Patient instructed to limit alcohol consumption while taking this medication.  Colchicine may reduce blood counts especially with prolonged use.  The patient understands that monitoring of kidney function and blood counts may be required, especially at baseline. The patient verbalized understanding of the proper use and possible adverse effects of colchicine.  All of the patient's questions and concerns were addressed.
Azelaic Acid Pregnancy And Lactation Text: This medication is considered safe during pregnancy and breast feeding.
Tranexamic Acid Counseling:  Patient advised of the small risk of bleeding problems with tranexamic acid. They were also instructed to call if they developed any nausea, vomiting or diarrhea. All of the patient's questions and concerns were addressed.
Picato Counseling:  I discussed with the patient the risks of Picato including but not limited to erythema, scaling, itching, weeping, crusting, and pain.
Doxycycline Pregnancy And Lactation Text: This medication is Pregnancy Category D and not consider safe during pregnancy. It is also excreted in breast milk but is considered safe for shorter treatment courses.
Griseofulvin Pregnancy And Lactation Text: This medication is Pregnancy Category X and is known to cause serious birth defects. It is unknown if this medication is excreted in breast milk but breast feeding should be avoided.
Bexarotene Pregnancy And Lactation Text: This medication is Pregnancy Category X and should not be given to women who are pregnant or may become pregnant. This medication should not be used if you are breast feeding.
Low Dose Naltrexone Counseling- I discussed with the patient the potential risks and side effects of low dose naltrexone including but not limited to: more vivid dreams, headaches, nausea, vomiting, abdominal pain, fatigue, dizziness, and anxiety.
Rinvoq Counseling: I discussed with the patient the risks of Rinvoq therapy including but not limited to upper respiratory tract infections, shingles, cold sores, bronchitis, nausea, cough, fever, acne, and headache. Live vaccines should be avoided.  This medication has been linked to serious infections; higher rate of mortality; malignancy and lymphoproliferative disorders; major adverse cardiovascular events; thrombosis; thrombocytopenia, anemia, and neutropenia; lipid elevations; liver enzyme elevations; and gastrointestinal perforations.
Topical Sulfur Applications Pregnancy And Lactation Text: This medication is Pregnancy Category C and has an unknown safety profile during pregnancy. It is unknown if this topical medication is excreted in breast milk.
Rifampin Counseling: I discussed with the patient the risks of rifampin including but not limited to liver damage, kidney damage, red-orange body fluids, nausea/vomiting and severe allergy.
Oral Minoxidil Pregnancy And Lactation Text: This medication should only be used when clearly needed if you are pregnant, attempting to become pregnant or breast feeding.
Ilumya Counseling: I discussed with the patient the risks of tildrakizumab including but not limited to immunosuppression, malignancy, posterior leukoencephalopathy syndrome, and serious infections.  The patient understands that monitoring is required including a PPD at baseline and must alert us or the primary physician if symptoms of infection or other concerning signs are noted.
Topical Clindamycin Counseling: Patient counseled that this medication may cause skin irritation or allergic reactions.  In the event of skin irritation, the patient was advised to reduce the amount of the drug applied or use it less frequently.   The patient verbalized understanding of the proper use and possible adverse effects of clindamycin.  All of the patient's questions and concerns were addressed.
Wartpeel Counseling:  I discussed with the patient the risks of Wartpeel including but not limited to erythema, scaling, itching, weeping, crusting, and pain.
Rinvoq Pregnancy And Lactation Text: Based on animal studies, Rinvoq may cause embryo-fetal harm when administered to pregnant women.  The medication should not be used in pregnancy.  Breastfeeding is not recommended during treatment and for 6 days after the last dose.
Tranexamic Acid Pregnancy And Lactation Text: It is unknown if this medication is safe during pregnancy or breast feeding.
Ivermectin Counseling:  Patient instructed to take medication on an empty stomach with a full glass of water.  Patient informed of potential adverse effects including but not limited to nausea, diarrhea, dizziness, itching, and swelling of the extremities or lymph nodes.  The patient verbalized understanding of the proper use and possible adverse effects of ivermectin.  All of the patient's questions and concerns were addressed.
Drysol Counseling:  I discussed with the patient the risks of drysol/aluminum chloride including but not limited to skin rash, itching, irritation, burning.
Prednisone Counseling:  I discussed with the patient the risks of prolonged use of prednisone including but not limited to weight gain, insomnia, osteoporosis, mood changes, diabetes, susceptibility to infection, glaucoma and high blood pressure.  In cases where prednisone use is prolonged, patients should be monitored with blood pressure checks, serum glucose levels and an eye exam.  Additionally, the patient may need to be placed on GI prophylaxis, PCP prophylaxis, and calcium and vitamin D supplementation and/or a bisphosphonate.  The patient verbalized understanding of the proper use and the possible adverse effects of prednisone.  All of the patient's questions and concerns were addressed.
Doxepin Counseling:  Patient advised that the medication is sedating and not to drive a car after taking this medication. Patient informed of potential adverse effects including but not limited to dry mouth, urinary retention, and blurry vision.  The patient verbalized understanding of the proper use and possible adverse effects of doxepin.  All of the patient's questions and concerns were addressed.
Skyrizi Counseling: I discussed with the patient the risks of risankizumab-rzaa including but not limited to immunosuppression, and serious infections.  The patient understands that monitoring is required including a PPD at baseline and must alert us or the primary physician if symptoms of infection or other concerning signs are noted.
Azithromycin Counseling:  I discussed with the patient the risks of azithromycin including but not limited to GI upset, allergic reaction, drug rash, diarrhea, and yeast infections.
Spironolactone Pregnancy And Lactation Text: This medication can cause feminization of the male fetus and should be avoided during pregnancy. The active metabolite is also found in breast milk.
Benzoyl Peroxide Counseling: Patient counseled that medicine may cause skin irritation and bleach clothing.  In the event of skin irritation, the patient was advised to reduce the amount of the drug applied or use it less frequently.   The patient verbalized understanding of the proper use and possible adverse effects of benzoyl peroxide.  All of the patient's questions and concerns were addressed.
Isotretinoin Counseling: Patient should get monthly blood tests, not donate blood, not drive at night if vision affected, not share medication, and not undergo elective surgery for 6 months after tx completed. Side effects reviewed, pt to contact office should one occur.
Klisyri Counseling:  I discussed with the patient the risks of Klisyri including but not limited to erythema, scaling, itching, weeping, crusting, and pain.
Erythromycin Counseling:  I discussed with the patient the risks of erythromycin including but not limited to GI upset, allergic reaction, drug rash, diarrhea, increase in liver enzymes, and yeast infections.
Dupixent Counseling: I discussed with the patient the risks of dupilumab including but not limited to eye infection and irritation, cold sores, injection site reactions, worsening of asthma, allergic reactions and increased risk of parasitic infection.  Live vaccines should be avoided while taking dupilumab. Dupilumab will also interact with certain medications such as warfarin and cyclosporine. The patient understands that monitoring is required and they must alert us or the primary physician if symptoms of infection or other concerning signs are noted.
Xolair Counseling:  Patient informed of potential adverse effects including but not limited to fever, muscle aches, rash and allergic reactions.  The patient verbalized understanding of the proper use and possible adverse effects of Xolair.  All of the patient's questions and concerns were addressed.
Itraconazole Counseling:  I discussed with the patient the risks of itraconazole including but not limited to liver damage, nausea/vomiting, neuropathy, and severe allergy.  The patient understands that this medication is best absorbed when taken with acidic beverages such as non-diet cola or ginger ale.  The patient understands that monitoring is required including baseline LFTs and repeat LFTs at intervals.  The patient understands that they are to contact us or the primary physician if concerning signs are noted.
Dutasteride Male Counseling: Dutasteride Counseling:  I discussed with the patient the risks of use of dutasteride including but not limited to decreased libido, decreased ejaculate volume, and gynecomastia. Women who can become pregnant should not handle medication.  All of the patient's questions and concerns were addressed.
Otezla Counseling: The side effects of Otezla were discussed with the patient, including but not limited to worsening or new depression, weight loss, diarrhea, nausea, upper respiratory tract infection, and headache. Patient instructed to call the office should any adverse effect occur.  The patient verbalized understanding of the proper use and possible adverse effects of Otezla.  All the patient's questions and concerns were addressed.
Low Dose Naltrexone Pregnancy And Lactation Text: Naltrexone is pregnancy category C.  There have been no adequate and well-controlled studies in pregnant women.  It should be used in pregnancy only if the potential benefit justifies the potential risk to the fetus.   Limited data indicates that naltrexone is minimally excreted into breastmilk.
Solaraze Counseling:  I discussed with the patient the risks of Solaraze including but not limited to erythema, scaling, itching, weeping, crusting, and pain.
Rifampin Pregnancy And Lactation Text: This medication is Pregnancy Category C and it isn't know if it is safe during pregnancy. It is also excreted in breast milk and should not be used if you are breast feeding.

## 2024-07-23 NOTE — PROCEDURE: PRESCRIPTION MEDICATION MANAGEMENT
Continue Regimen: - cephalexin 500 mg capsule TID\\nSig: Take one capsule three times daily for 10 days\\n\\n- pt has a few days left on antibiotic.
Render In Strict Bullet Format?: No
Detail Level: Simple

## 2024-07-24 LAB
GRAM STN SPEC: NORMAL
SIGNIFICANT IND 70042: NORMAL
SITE SITE: NORMAL
SOURCE SOURCE: NORMAL

## 2024-07-26 LAB
BACTERIA WND AEROBE CULT: NORMAL
GRAM STN SPEC: NORMAL
SIGNIFICANT IND 70042: NORMAL
SITE SITE: NORMAL
SOURCE SOURCE: NORMAL

## 2024-07-27 LAB
BACTERIA SPEC ANAEROBE CULT: NORMAL
SIGNIFICANT IND 70042: NORMAL
SITE SITE: NORMAL
SOURCE SOURCE: NORMAL

## 2024-07-30 ENCOUNTER — APPOINTMENT (RX ONLY)
Dept: URBAN - METROPOLITAN AREA CLINIC 4 | Facility: CLINIC | Age: 69
Setting detail: DERMATOLOGY
End: 2024-07-30

## 2024-07-30 DIAGNOSIS — Z48.817 ENCOUNTER FOR SURGICAL AFTERCARE FOLLOWING SURGERY ON THE SKIN AND SUBCUTANEOUS TISSUE: ICD-10-CM

## 2024-07-30 PROBLEM — C44.722 SQUAMOUS CELL CARCINOMA OF SKIN OF RIGHT LOWER LIMB, INCLUDING HIP: Status: ACTIVE | Noted: 2024-07-30

## 2024-07-30 PROCEDURE — ? POST-OP WOUND EVALUATION

## 2024-07-30 PROCEDURE — ? ADDITIONAL NOTES

## 2024-07-30 PROCEDURE — ? COUNSELING

## 2024-07-30 PROCEDURE — ? DIAGNOSIS COMMENT

## 2024-07-30 PROCEDURE — ? PHOTO-DOCUMENTATION

## 2024-07-30 ASSESSMENT — LOCATION ZONE DERM: LOCATION ZONE: LEG

## 2024-07-30 ASSESSMENT — LOCATION DETAILED DESCRIPTION DERM: LOCATION DETAILED: LEFT DISTAL LATERAL CALF

## 2024-07-30 ASSESSMENT — LOCATION SIMPLE DESCRIPTION DERM: LOCATION SIMPLE: LEFT CALF

## 2024-07-30 NOTE — PROCEDURE: POST-OP WOUND EVALUATION
Body Location Override (Optional): left lateral distal calf
Detail Level: Detailed
Quality 357: Surgical Site Infection (Ssi): No surgical site infection
Wound Evaluated By (Optional): Dr. Thomson
Wound Diameter In Cm(Optional): 0
Wound Crusting?: clean
Sutures?: intact
Wound Edema?: mild
Wound Drainage?: serous drainage
Wound Color?: pink
Patient To Follow-Up With?: our clinic
Follow Up Units (Optional): 1
Follow Up Time Frame (Optional): months

## 2024-07-30 NOTE — PROCEDURE: ADDITIONAL NOTES
Additional Notes: Discussed multiple treatment options in depth. We will re-check the site in August for possible MTX Vrs. C&D with pathology Vrs. Excision.
Render Risk Assessment In Note?: no
Detail Level: Detailed

## 2024-07-30 NOTE — PROCEDURE: DIAGNOSIS COMMENT
Render Risk Assessment In Note?: no
Detail Level: Detailed
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

## 2024-08-20 ENCOUNTER — APPOINTMENT (RX ONLY)
Dept: URBAN - METROPOLITAN AREA CLINIC 4 | Facility: CLINIC | Age: 69
Setting detail: DERMATOLOGY
End: 2024-08-20

## 2024-08-20 DIAGNOSIS — I48.0 PAROXYSMAL ATRIAL FIBRILLATION (HCC): ICD-10-CM

## 2024-08-20 DIAGNOSIS — L57.8 OTHER SKIN CHANGES DUE TO CHRONIC EXPOSURE TO NONIONIZING RADIATION: ICD-10-CM

## 2024-08-20 PROBLEM — C44.722 SQUAMOUS CELL CARCINOMA OF SKIN OF RIGHT LOWER LIMB, INCLUDING HIP: Status: ACTIVE | Noted: 2024-08-20

## 2024-08-20 PROCEDURE — ? COUNSELING

## 2024-08-20 PROCEDURE — 99212 OFFICE O/P EST SF 10 MIN: CPT | Mod: 25

## 2024-08-20 PROCEDURE — ? BIOPSY BY SHAVE METHOD

## 2024-08-20 PROCEDURE — 11102 TANGNTL BX SKIN SINGLE LES: CPT

## 2024-08-20 RX ORDER — DILTIAZEM HYDROCHLORIDE 120 MG/1
120 CAPSULE, EXTENDED RELEASE ORAL
Qty: 90 CAPSULE | Refills: 1 | OUTPATIENT
Start: 2024-08-20

## 2024-08-20 ASSESSMENT — LOCATION SIMPLE DESCRIPTION DERM
LOCATION SIMPLE: LEFT PRETIBIAL REGION
LOCATION SIMPLE: RIGHT PRETIBIAL REGION
LOCATION SIMPLE: RIGHT FOREHEAD
LOCATION SIMPLE: RIGHT CHEEK

## 2024-08-20 ASSESSMENT — LOCATION DETAILED DESCRIPTION DERM
LOCATION DETAILED: RIGHT DISTAL PRETIBIAL REGION
LOCATION DETAILED: RIGHT MEDIAL FOREHEAD
LOCATION DETAILED: LEFT PROXIMAL PRETIBIAL REGION
LOCATION DETAILED: RIGHT PROXIMAL PRETIBIAL REGION
LOCATION DETAILED: LEFT DISTAL PRETIBIAL REGION
LOCATION DETAILED: RIGHT INFERIOR CENTRAL MALAR CHEEK
LOCATION DETAILED: RIGHT INFERIOR FOREHEAD
LOCATION DETAILED: RIGHT SUPERIOR MEDIAL FOREHEAD
LOCATION DETAILED: RIGHT SUPERIOR MEDIAL BUCCAL CHEEK

## 2024-08-20 ASSESSMENT — LOCATION ZONE DERM
LOCATION ZONE: FACE
LOCATION ZONE: LEG

## 2024-08-20 NOTE — PROCEDURE: BIOPSY BY SHAVE METHOD
Detail Level: Detailed
Depth Of Biopsy: dermis
Was A Bandage Applied: Yes
Size Of Lesion In Cm: 0.6
X Size Of Lesion In Cm: 0.5
Accession #: [id number]
Biopsy Type: H and E
Biopsy Method: Dermablade
Anesthesia Type: 0.5% lidocaine without epinephrine
Additional Anesthesia Volume In Cc (Will Not Render If 0): 0
Hemostasis: Drysol and Electrocautery
Wound Care: Bacitracin
Dressing: bandage
Destruction After The Procedure: No
Type Of Destruction Used: Electrodesiccation
Curettage Text: The wound bed was treated with curettage after the biopsy was performed.
Cryotherapy Text: The wound bed was treated with cryotherapy after the biopsy was performed.
Electrodesiccation Text: The wound bed was treated with electrodesiccation after the biopsy was performed.
Electrodesiccation And Curettage Text: The wound bed was treated with electrodesiccation and curettage after the biopsy was performed.
Silver Nitrate Text: The wound bed was treated with silver nitrate after the biopsy was performed.
Lab: [id number]
Lab Facility: 
Path Notes (To The Dermatopathologist): Rebiopsy of location B
Consent: Written consent was obtained and risks were reviewed including but not limited to scarring, infection, bleeding, scabbing, incomplete removal, nerve damage and allergy to anesthesia.
Post-Care Instructions: I reviewed with the patient in detail post-care instructions. Patient is to keep the biopsy site dry overnight, and then apply bacitracin twice daily until healed. Patient may apply hydrogen peroxide soaks to remove any crusting.
Notification Instructions: Patient will be notified of biopsy results. However, patient instructed to call the office if not contacted within 2 weeks.
Billing Type: Third-Party Bill
Information: Selecting Yes will display possible errors in your note based on the variables you have selected. This validation is only offered as a suggestion for you. PLEASE NOTE THAT THE VALIDATION TEXT WILL BE REMOVED WHEN YOU FINALIZE YOUR NOTE. IF YOU WANT TO FAX A PRELIMINARY NOTE YOU WILL NEED TO TOGGLE THIS TO 'NO' IF YOU DO NOT WANT IT IN YOUR FAXED NOTE.

## 2024-09-06 ENCOUNTER — APPOINTMENT (RX ONLY)
Dept: URBAN - METROPOLITAN AREA CLINIC 4 | Facility: CLINIC | Age: 69
Setting detail: DERMATOLOGY
End: 2024-09-06

## 2024-09-06 DIAGNOSIS — D22 MELANOCYTIC NEVI: ICD-10-CM

## 2024-09-06 DIAGNOSIS — D485 NEOPLASM OF UNCERTAIN BEHAVIOR OF SKIN: ICD-10-CM

## 2024-09-06 DIAGNOSIS — L82.1 OTHER SEBORRHEIC KERATOSIS: ICD-10-CM

## 2024-09-06 DIAGNOSIS — Z85.828 PERSONAL HISTORY OF OTHER MALIGNANT NEOPLASM OF SKIN: ICD-10-CM

## 2024-09-06 DIAGNOSIS — D18.0 HEMANGIOMA: ICD-10-CM

## 2024-09-06 DIAGNOSIS — Z71.89 OTHER SPECIFIED COUNSELING: ICD-10-CM

## 2024-09-06 DIAGNOSIS — L81.4 OTHER MELANIN HYPERPIGMENTATION: ICD-10-CM

## 2024-09-06 DIAGNOSIS — L57.0 ACTINIC KERATOSIS: ICD-10-CM

## 2024-09-06 PROBLEM — D18.01 HEMANGIOMA OF SKIN AND SUBCUTANEOUS TISSUE: Status: ACTIVE | Noted: 2024-09-06

## 2024-09-06 PROBLEM — D22.5 MELANOCYTIC NEVI OF TRUNK: Status: ACTIVE | Noted: 2024-09-06

## 2024-09-06 PROBLEM — D48.5 NEOPLASM OF UNCERTAIN BEHAVIOR OF SKIN: Status: ACTIVE | Noted: 2024-09-06

## 2024-09-06 PROCEDURE — ? ADDITIONAL NOTES

## 2024-09-06 PROCEDURE — ? COUNSELING

## 2024-09-06 PROCEDURE — 99213 OFFICE O/P EST LOW 20 MIN: CPT | Mod: 25

## 2024-09-06 PROCEDURE — 11102 TANGNTL BX SKIN SINGLE LES: CPT

## 2024-09-06 PROCEDURE — 17000 DESTRUCT PREMALG LESION: CPT | Mod: 59

## 2024-09-06 PROCEDURE — ? LIQUID NITROGEN

## 2024-09-06 PROCEDURE — ? BIOPSY BY SHAVE METHOD

## 2024-09-06 PROCEDURE — ? SUNSCREEN RECOMMENDATIONS

## 2024-09-06 ASSESSMENT — LOCATION DETAILED DESCRIPTION DERM
LOCATION DETAILED: EPIGASTRIC SKIN
LOCATION DETAILED: LEFT LATERAL DISTAL PRETIBIAL REGION
LOCATION DETAILED: RIGHT MEDIAL SUPERIOR CHEST
LOCATION DETAILED: RIGHT LATERAL PROXIMAL PRETIBIAL REGION
LOCATION DETAILED: RIGHT POSTERIOR SHOULDER
LOCATION DETAILED: RIGHT PROXIMAL PRETIBIAL REGION
LOCATION DETAILED: SUPERIOR LUMBAR SPINE
LOCATION DETAILED: INFERIOR THORACIC SPINE
LOCATION DETAILED: LEFT DISTAL ULNAR DORSAL FOREARM
LOCATION DETAILED: LEFT LATERAL DISTAL PRETIBIAL REGION
LOCATION DETAILED: LEFT POSTERIOR SHOULDER

## 2024-09-06 ASSESSMENT — LOCATION SIMPLE DESCRIPTION DERM
LOCATION SIMPLE: UPPER BACK
LOCATION SIMPLE: RIGHT PRETIBIAL REGION
LOCATION SIMPLE: LEFT PRETIBIAL REGION
LOCATION SIMPLE: CHEST
LOCATION SIMPLE: LEFT FOREARM
LOCATION SIMPLE: LEFT PRETIBIAL REGION
LOCATION SIMPLE: ABDOMEN
LOCATION SIMPLE: RIGHT PRETIBIAL REGION
LOCATION SIMPLE: RIGHT SHOULDER
LOCATION SIMPLE: LEFT SHOULDER
LOCATION SIMPLE: LOWER BACK

## 2024-09-06 ASSESSMENT — LOCATION ZONE DERM
LOCATION ZONE: ARM
LOCATION ZONE: TRUNK
LOCATION ZONE: LEG
LOCATION ZONE: LEG

## 2024-09-06 NOTE — PROCEDURE: ADDITIONAL NOTES
Additional Notes: Discussed using topical chemo cream when patient will not be in the sun as frequently.
Render Risk Assessment In Note?: no
Detail Level: Simple

## 2024-09-06 NOTE — PROCEDURE: LIQUID NITROGEN
Detail Level: Simple
Application Tool (Optional): Liquid Nitrogen Sprayer
Aperture Size (Optional): C
Render Post-Care Instructions In Note?: no
Consent: The patient's consent was obtained including but not limited to risks of crusting, scabbing, blistering, scarring, darker or lighter pigmentary change, recurrence, incomplete removal and infection.
Post-Care Instructions: I reviewed with the patient in detail post-care instructions. Patient is to wear sunprotection, and avoid picking at any of the treated lesions. Pt may apply Vaseline to crusted or scabbing areas.
Show Aperture Variable?: Yes
Number Of Freeze-Thaw Cycles: 2 freeze-thaw cycles
Duration Of Freeze Thaw-Cycle (Seconds): 2

## 2024-09-10 ENCOUNTER — TELEPHONE (OUTPATIENT)
Dept: CARDIOLOGY | Facility: MEDICAL CENTER | Age: 69
End: 2024-09-10
Payer: MEDICARE

## 2024-09-10 NOTE — TELEPHONE ENCOUNTER
Phone Number Called: [phone]    Call outcome: Spoke to patient regarding message below.    Message: Called to inform patient of JS recommendations. Patient stated she would prefer to wait to change anything until her appointment next week with JS. Patient educated to call the office with any changes. Patient verbalized understanding.

## 2024-09-10 NOTE — TELEPHONE ENCOUNTER
ANNE    Caller:  Missy Stuart    Reported Symptoms:   Reporting daily nose bleeds     Recent Blood Pressure/Heart Rate Reading:   N/A    Callback Number: [phone]    Thank you,   Karin MORSE

## 2024-09-10 NOTE — TELEPHONE ENCOUNTER
Phone Number Called: [phone]    Call outcome: Spoke to patient regarding message below.    Message: Called to discuss patient nose bleeds. Patient stated they started last Friday and have been happening everyday. Come on anytime of day and she reports no other symptoms. Reports that she never usually has nosebleeds. Patient stated she hasn't had any allergies problems. Patient still taking her Xarelto daily. Patient stated she has noticed no other bleeding.     JS- Please advise, patient reporting new nose bleeds. Started Friday. Patient reports they come on anytime of day. She stated she usually doesn't have nosebleeds. Asking if she needs to do anything different. Still taking her Xarelto.

## 2024-09-10 NOTE — TELEPHONE ENCOUNTER
BERTA Montero.  You12 minutes ago (3:42 PM)       We could try Eliquis 5 mg BID and see if she tolerates that better. We can re-visit [facility] when I see her next week.    In the mean time I recommend use of an over the counter nasal gel and humidifier. We can refer to ENT if she would like as well.    Thank you,    ANNE

## 2024-09-17 NOTE — PROGRESS NOTES
Chief Complaint   Patient presents with    Atrial Fibrillation     F/V Dx:Paroxysmal atrial fibrillation (HCC)        Subjective     Missy Stuart is a 69 y.o. female patient of Dr. Lim who presents today for follow up regarding her pAF.     Patient was last seen by myself on 6/19/2024. Recent Zio showed 36% AF burden, patient was symptomatic with palpitations.  She was maintaining normal sinus rhythm on flecainide with low-dose diltiazem.    Other past medical history is pertinent for hypothyroidism, Covid infection in January, HLD and anticoagulated with Xarelto.     Today patient states that she is overall doing well. She did have 4-5 days of frequent nosebleeds. She used some Vaseline in her nose at night and she has not had a nosebleed recently. Otherwise she is tolerating OAC well. Her HR and BP have been well controlled. Has some questions about LIMA-C and ablation.     Past Medical History:   Diagnosis Date    Cataract     removed bilat    Cervical pain 02/28/2020    neck, 0-1/10    COVID-19 virus infection 01/19/2024    Dysfunction of right eustachian tube 11/05/2019    Discussed creating slight pressure in the eustachian tube to open them up and allow the ears to drain.  Discussed continuing nasal spray.  As well as adding an allergy medication such as loratadine to her regimen.  I did not see any fluid in the ear on exam but there still could be fluid in there.  We discussed all of this in the visit today.    Elevated LDL cholesterol level 03/07/2019    Eosinophilic esophagitis 12/11/2020    Epigastric abdominal pain 10/13/2023    Hyperlipidemia 02/28/2020    on no meds at this time    Hypothyroidism 11/19/2018    Restless legs 11/19/2018     Past Surgical History:   Procedure Laterality Date    EYE LESION EXCISION Left 3/2/2020    Procedure: EXCISION, LESION, EYE - UPPER LID W/POSS PLACEMENT OF MINI MONKA STENT;  Surgeon: Jaime Pastrana M.D.;  Location: SURGERY SAME DAY [facility];  Service:  "Ophthalmology    CATARACT PHACO WITH IOL  2014    Performed by Fadi Morales M.D. at SURGERY SAME DAY [facility]    TUBE & ECTOPIC PREG., REMOVAL  1987    THYROIDECTOMY  1983    partial    OTHER ORTHOPEDIC SURGERY  [id number]    neck fusion    DE BREAST AUGMENTATION WITH IMPLANT   & 2018     Family History   Problem Relation Age of Onset    Diabetes Mother     Stroke Maternal Grandfather      Social History     Socioeconomic History    Marital status:      Spouse name: Not on file    Number of children: Not on file    Years of education: Not on file    Highest education level: Not on file   Occupational History    Not on file   Tobacco Use    Smoking status: Former     Current packs/day: 0.00     Average packs/day: 0.5 packs/day for 8.0 years (4.0 ttl pk-yrs)     Types: Cigarettes     Start date: 1980     Quit date: 1988     Years since quittin.7    Smokeless tobacco: Never    Tobacco comments:     quit in her 20s    Vaping Use    Vaping status: Never Used   Substance and Sexual Activity    Alcohol use: Not Currently    Drug use: No    Sexual activity: Yes     Partners: Male     Comment:    Other Topics Concern    Not on file   Social History Narrative    Retired dental hygiene      Social Determinants of Health     Financial Resource Strain: Not on file   Food Insecurity: Not on file   Transportation Needs: Not on file   Physical Activity: Not on file   Stress: Not on file   Social Connections: Not on file   Intimate Partner Violence: Not on file   Housing Stability: Not on file     Allergies   Allergen Reactions    Hydromorphone Hives    Codeine Vomiting and Nausea    Fentanyl Vomiting and Nausea    Doxycycline Unspecified     \"stiff joints\"    Hydrocodone Unspecified     Sweating      Oxycodone Vomiting     Outpatient Encounter Medications as of 2024   Medication Sig Dispense Refill    rivaroxaban (XARELTO) 20 MG Tab tablet Take 1 Tablet by mouth with dinner. 90 Tablet " 3    omeprazole (PRILOSEC) 20 MG delayed-release capsule Take 20 mg by mouth every day.      flecainide (TAMBOCOR) 50 MG tablet Take 1 Tablet by mouth 2 times a day. 180 Tablet 1    dilTIAZem CD (CARDIZEM CD) 120 MG CAPSULE SR 24 HR Take 1 Capsule by mouth every day. 90 Capsule 1    fluticasone (FLONASE) 50 MCG/ACT nasal spray ADMINISTER 2 SPRAYS INTO AFFECTED NOSTRIL(S) AT BEDTIME. 48 mL 3    estradiol (ESTRACE) 0.1 MG/GM vaginal cream Insert 0.5 g into the vagina two times a week. 42.5 g 3    diclofenac sodium (VOLTAREN ARTHRITIS PAIN) 1 % Gel Apply 2 g topically 3 times a day as needed (neck pain).      levothyroxine (SYNTHROID) 88 MCG Tab Take 1 Tablet by mouth every morning on an empty stomach. 90 Tablet 3    ROPINIRole (REQUIP) 0.5 MG Tab Take 0.5 mg by mouth every evening.      carisoprodol (SOMA) 350 MG Tab Take 350 mg by mouth 1 time a day as needed.      baclofen (LIORESAL) 10 MG Tab Take 10-20 mg by mouth 1 time a day as needed (PAIN).      gabapentin (NEURONTIN) 300 MG Cap Take 300 mg by mouth 3 times a day.      Omega-3 Fatty Acids (OMEGA 3 PO) Take 1 Capsule by mouth every day.      MAGNESIUM PO Take 1 Tablet by mouth every day.      Cholecalciferol (VITAMIN D3 PO) Take 1 Tablet by mouth every day.      B Complex Vitamins (B COMPLEX PO) Take 1 Tablet by mouth every day.      [DISCONTINUED] rivaroxaban (XARELTO) 20 MG Tab tablet Take 1 Tablet by mouth with dinner. 90 Tablet 3     No facility-administered encounter medications on file as of 9/18/2024.     Review of Systems   Constitutional:  Negative for malaise/fatigue and weight loss.   HENT:  Positive for nosebleeds.    Respiratory:  Negative for shortness of breath.    Cardiovascular:  Negative for chest pain, palpitations, orthopnea, claudication, leg swelling and PND.   Neurological:  Negative for dizziness and weakness.   All other systems reviewed and are negative.             Objective     BP 96/54 (BP Location: Left arm, Patient Position:  "Sitting, BP Cuff Size: Adult)   Pulse 81   Resp 16   Ht 1.753 m (5' 9\")   Wt 56.3 kg (124 lb 3.2 oz)   LMP  (LMP Unknown)   SpO2 99%   BMI 18.34 kg/m²     Physical Exam  Constitutional:       General: She is not in acute distress.     Appearance: She is well-developed.   HENT:      Head: Normocephalic.   Eyes:      Extraocular Movements: Extraocular movements intact.   Neck:      Vascular: No carotid bruit or JVD.   Cardiovascular:      Rate and Rhythm: Normal rate and regular rhythm.      Heart sounds: Normal heart sounds.   Pulmonary:      Effort: No respiratory distress.      Breath sounds: Normal breath sounds.   Musculoskeletal:      Cervical back: Normal range of motion.      Right lower leg: No edema.      Left lower leg: No edema.   Skin:     General: Skin is warm and dry.   Neurological:      Mental Status: She is alert and oriented to person, place, and time.   Psychiatric:         Behavior: Behavior normal.            TTE 1/20/2024:  Left Ventricle  Normal left ventricular chamber size. Normal left ventricular wall thickness. Hyperdynamic left ventricular systolic function. The ejection fraction is measured to be 79 % by Schmidt's biplane.   Diastolic function is difficult to assess with arrhythmia.     Right Ventricle  Normal right ventricular size and systolic function.     Right Atrium  Normal right atrial size. Normal inferior vena cava size and inspiratory collapse.     Left Atrium  Mildly dilated left atrium. Left atrial volume index is 36 mL/sq m.     Mitral Valve  Structurally normal mitral valve without significant stenosis or regurgitation.     Aortic Valve  The aortic valve appears trileaflet. Aortic valve sclerosis without significant stenosis. No aortic insufficiency.     Tricuspid Valve  Structurally normal tricuspid valve. No tricuspid stenosis. Mild tricuspid regurgitation. Right atrial pressure is estimated to be 3 mmHg. Estimated right ventricular systolic pressure is 30 mmHg.   "   Pulmonic Valve  The pulmonic valve is not well visualized.     Pericardium  Normal pericardium without effusion.     Aorta  Normal aortic root for body surface area. The ascending aorta diameter is  3.2cm.    Chest CT-CTA 1/19/2024:  IMPRESSION:     1.  No evidence of pulmonary embolus.     2.  Mild atherosclerotic change aorta and coronary arteries.     3.  Fatty change of the liver.       Assessment & Plan     1. Paroxysmal atrial fibrillation (HCC)  EKG    rivaroxaban (XARELTO) 20 MG Tab tablet      2. Hypercoagulable state due to paroxysmal atrial fibrillation (HCC)        3. Anticoagulated        4. Coronary artery calcification seen on CAT scan        5. Encounter for monitoring flecainide therapy        6. Acquired hypothyroidism                Medical Decision Making: Today's Assessment/Status/Plan:   pAF:  - 36% burden on recent Zio, symptomatic with palpitations.   - Maintaining NSR. On Flecainide 50 mg BID and Diltiazem 120 mg daily. BP borderline today, patient feeling well, low threshold to stop and start low dose BB.   - Discussed risks and benefits for RFA/PFA, patient would like to think about and bring her  to her next apt for further discussion.   - Continue OAC with Xarelto 20 mg daily, patient is currently tolerating well with no bleeding problems. LIMA-C was discussed due to recent history of epistaxis.     High Risk Medication (Flecainide):  - Recent low risk cardiac PET in June.   - TTE in January showed preserved LVEF, no significant valvular abnormality.   - Stable CrCl on last chem panel in January.   - Stable interval measurements on EKG today.     Hypothyroidism:  - TSH WNL in January, repeat pending.   - On synthroid 88 mcg daily.    Coronary Artery Calcification on Chest CTA:  - Denies anginal symptoms however admits to not being significantly active.   - LDL 92, . Will re-visit statin at next apt with her .       Patient will follow up as scheduled below or earlier  if needed. Encouraged patient to contact our office should any questions or concerns arise in the mean time.     Future Appointments   Date Time Provider Department Center   10/2/2024 10:30 AM Raul Jauregui, PT [facility]   10/7/2024  1:30 PM Raul Jauregui, PT [facility]   10/9/2024  9:45 AM Raul Jauregui, PT [facility]   10/14/2024  9:45 AM Raul Jauregui, PT [facility]   10/16/2024  9:45 AM Raul Jauregui, PT [facility]   10/21/2024  9:45 AM Raul Jauregui, PT [facility]   10/23/2024  9:45 AM Raul Jauregui, PT [facility]   10/28/2024  9:45 AM Raul Jauregui, PT [facility]   10/30/2024  9:45 AM Raul Jauregui, PT [facility]   11/4/2024  9:45 AM Raul Jauregui, PT [facility]   11/6/2024  9:45 AM Raul Jauregui, PT [facility]   11/14/2024  7:45 AM BERTA Montero. [facility] None   11/27/2024  9:00 AM Sandra Carrion D.O. [facility]

## 2024-09-18 ENCOUNTER — OFFICE VISIT (OUTPATIENT)
Dept: CARDIOLOGY | Facility: MEDICAL CENTER | Age: 69
End: 2024-09-18
Attending: NURSE PRACTITIONER
Payer: MEDICARE

## 2024-09-18 VITALS
HEIGHT: 69 IN | SYSTOLIC BLOOD PRESSURE: 96 MMHG | RESPIRATION RATE: 16 BRPM | BODY MASS INDEX: 18.4 KG/M2 | HEART RATE: 81 BPM | OXYGEN SATURATION: 99 % | DIASTOLIC BLOOD PRESSURE: 54 MMHG | WEIGHT: 124.2 LBS

## 2024-09-18 DIAGNOSIS — Z51.81 ENCOUNTER FOR MONITORING FLECAINIDE THERAPY: ICD-10-CM

## 2024-09-18 DIAGNOSIS — I48.0 PAROXYSMAL ATRIAL FIBRILLATION (HCC): ICD-10-CM

## 2024-09-18 DIAGNOSIS — I48.0 HYPERCOAGULABLE STATE DUE TO PAROXYSMAL ATRIAL FIBRILLATION (HCC): ICD-10-CM

## 2024-09-18 DIAGNOSIS — Z79.01 ANTICOAGULATED: ICD-10-CM

## 2024-09-18 DIAGNOSIS — E03.9 ACQUIRED HYPOTHYROIDISM: ICD-10-CM

## 2024-09-18 DIAGNOSIS — Z79.899 ENCOUNTER FOR MONITORING FLECAINIDE THERAPY: ICD-10-CM

## 2024-09-18 DIAGNOSIS — D68.69 HYPERCOAGULABLE STATE DUE TO PAROXYSMAL ATRIAL FIBRILLATION (HCC): ICD-10-CM

## 2024-09-18 DIAGNOSIS — I25.10 CORONARY ARTERY CALCIFICATION SEEN ON CAT SCAN: ICD-10-CM

## 2024-09-18 LAB — EKG IMPRESSION: NORMAL

## 2024-09-18 PROCEDURE — 3074F SYST BP LT 130 MM HG: CPT | Performed by: NURSE PRACTITIONER

## 2024-09-18 PROCEDURE — 93010 ELECTROCARDIOGRAM REPORT: CPT | Performed by: INTERNAL MEDICINE

## 2024-09-18 PROCEDURE — 99213 OFFICE O/P EST LOW 20 MIN: CPT | Performed by: NURSE PRACTITIONER

## 2024-09-18 PROCEDURE — 3078F DIAST BP <80 MM HG: CPT | Performed by: NURSE PRACTITIONER

## 2024-09-18 PROCEDURE — 93005 ELECTROCARDIOGRAM TRACING: CPT | Performed by: NURSE PRACTITIONER

## 2024-09-18 PROCEDURE — 99214 OFFICE O/P EST MOD 30 MIN: CPT | Performed by: NURSE PRACTITIONER

## 2024-09-18 ASSESSMENT — ENCOUNTER SYMPTOMS
CLAUDICATION: 0
PND: 0
SHORTNESS OF BREATH: 0
DIZZINESS: 0
ORTHOPNEA: 0
PALPITATIONS: 0
WEAKNESS: 0
WEIGHT LOSS: 0

## 2024-09-18 ASSESSMENT — FIBROSIS 4 INDEX: FIB4 SCORE: 1.28

## 2024-09-24 ENCOUNTER — TELEPHONE (OUTPATIENT)
Dept: MEDICAL GROUP | Facility: LAB | Age: 69
End: 2024-09-24
Payer: MEDICARE

## 2024-09-24 DIAGNOSIS — M85.89 OSTEOPENIA OF MULTIPLE SITES: ICD-10-CM

## 2024-09-24 DIAGNOSIS — M21.619 BUNION: ICD-10-CM

## 2024-09-24 NOTE — TELEPHONE ENCOUNTER
Patient called and left this a voicemail regarding her appointment.  Patient is inquiring on getting a DEXA ordered and also a referral to podiatry regarding her bunions.  Patient does have an appointment scheduled for 12-5

## 2024-09-25 NOTE — TELEPHONE ENCOUNTER
Phone Number Called: [phone] (home)      Call outcome: Spoke to patient regarding message below.    Message: Called and spoke with patient regarding DEXA order and also referrals

## 2024-10-02 ENCOUNTER — APPOINTMENT (OUTPATIENT)
Dept: PHYSICAL THERAPY | Facility: MEDICAL CENTER | Age: 69
End: 2024-10-02
Attending: STUDENT IN AN ORGANIZED HEALTH CARE EDUCATION/TRAINING PROGRAM
Payer: MEDICARE

## 2024-10-07 ENCOUNTER — APPOINTMENT (OUTPATIENT)
Dept: PHYSICAL THERAPY | Facility: MEDICAL CENTER | Age: 69
End: 2024-10-07
Attending: STUDENT IN AN ORGANIZED HEALTH CARE EDUCATION/TRAINING PROGRAM
Payer: MEDICARE

## 2024-10-07 ENCOUNTER — TELEPHONE (OUTPATIENT)
Dept: CARDIOLOGY | Facility: MEDICAL CENTER | Age: 69
End: 2024-10-07
Payer: MEDICARE

## 2024-10-07 DIAGNOSIS — I48.0 PAROXYSMAL ATRIAL FIBRILLATION (HCC): ICD-10-CM

## 2024-10-09 ENCOUNTER — APPOINTMENT (OUTPATIENT)
Dept: PHYSICAL THERAPY | Facility: MEDICAL CENTER | Age: 69
End: 2024-10-09
Attending: STUDENT IN AN ORGANIZED HEALTH CARE EDUCATION/TRAINING PROGRAM
Payer: MEDICARE

## 2024-10-13 DIAGNOSIS — G25.81 RESTLESS LEGS: ICD-10-CM

## 2024-10-14 ENCOUNTER — TELEPHONE (OUTPATIENT)
Dept: CARDIOLOGY | Facility: MEDICAL CENTER | Age: 69
End: 2024-10-14
Payer: MEDICARE

## 2024-10-14 ENCOUNTER — APPOINTMENT (OUTPATIENT)
Dept: PHYSICAL THERAPY | Facility: MEDICAL CENTER | Age: 69
End: 2024-10-14
Attending: STUDENT IN AN ORGANIZED HEALTH CARE EDUCATION/TRAINING PROGRAM
Payer: MEDICARE

## 2024-10-14 RX ORDER — ROPINIROLE 0.5 MG/1
0.5-1 TABLET, FILM COATED ORAL
Qty: 180 TABLET | Refills: 3 | Status: SHIPPED | OUTPATIENT
Start: 2024-10-14

## 2024-10-15 PROBLEM — M21.612 BUNION, LEFT FOOT: Status: ACTIVE | Noted: 2024-10-14

## 2024-10-16 ENCOUNTER — APPOINTMENT (OUTPATIENT)
Dept: PHYSICAL THERAPY | Facility: MEDICAL CENTER | Age: 69
End: 2024-10-16
Attending: STUDENT IN AN ORGANIZED HEALTH CARE EDUCATION/TRAINING PROGRAM
Payer: MEDICARE

## 2024-10-21 ENCOUNTER — APPOINTMENT (OUTPATIENT)
Dept: PHYSICAL THERAPY | Facility: MEDICAL CENTER | Age: 69
End: 2024-10-21
Attending: STUDENT IN AN ORGANIZED HEALTH CARE EDUCATION/TRAINING PROGRAM
Payer: MEDICARE

## 2024-10-23 ENCOUNTER — APPOINTMENT (OUTPATIENT)
Dept: PHYSICAL THERAPY | Facility: MEDICAL CENTER | Age: 69
End: 2024-10-23
Attending: STUDENT IN AN ORGANIZED HEALTH CARE EDUCATION/TRAINING PROGRAM
Payer: MEDICARE

## 2024-10-28 ENCOUNTER — APPOINTMENT (OUTPATIENT)
Dept: PHYSICAL THERAPY | Facility: MEDICAL CENTER | Age: 69
End: 2024-10-28
Attending: STUDENT IN AN ORGANIZED HEALTH CARE EDUCATION/TRAINING PROGRAM
Payer: MEDICARE

## 2024-10-30 ENCOUNTER — APPOINTMENT (OUTPATIENT)
Dept: PHYSICAL THERAPY | Facility: MEDICAL CENTER | Age: 69
End: 2024-10-30
Attending: STUDENT IN AN ORGANIZED HEALTH CARE EDUCATION/TRAINING PROGRAM
Payer: MEDICARE

## 2024-11-04 ENCOUNTER — APPOINTMENT (OUTPATIENT)
Dept: PHYSICAL THERAPY | Facility: MEDICAL CENTER | Age: 69
End: 2024-11-04
Attending: STUDENT IN AN ORGANIZED HEALTH CARE EDUCATION/TRAINING PROGRAM
Payer: MEDICARE

## 2024-11-04 DIAGNOSIS — I48.0 PAROXYSMAL ATRIAL FIBRILLATION (HCC): ICD-10-CM

## 2024-11-05 RX ORDER — DILTIAZEM HYDROCHLORIDE 120 MG/1
120 CAPSULE, EXTENDED RELEASE ORAL
Qty: 90 CAPSULE | Refills: 2 | Status: SHIPPED | OUTPATIENT
Start: 2024-11-05 | End: 2024-11-14 | Stop reason: SDUPTHER

## 2024-11-05 NOTE — TELEPHONE ENCOUNTER
Is the patient due for a refill? Yes    Was the patient seen the past year? Yes    Date of last office visit: 9/18/2024    Does the patient have an upcoming appointment?  Yes   If yes, When? 11/14/2024    Provider to refill:ANNE    Does the patient have [facility] and need 100-day supply? (This applies to ALL medications) Patient does not have SCP

## 2024-11-06 ENCOUNTER — APPOINTMENT (OUTPATIENT)
Dept: PHYSICAL THERAPY | Facility: MEDICAL CENTER | Age: 69
End: 2024-11-06
Attending: STUDENT IN AN ORGANIZED HEALTH CARE EDUCATION/TRAINING PROGRAM
Payer: MEDICARE

## 2024-11-06 NOTE — PROGRESS NOTES
Chief Complaint   Patient presents with    Atrial Fibrillation     F/v Dx:Paroxysmal atrial fibrillation (HCC)       Subjective     Missy Stuart is a 69 y.o. female patient of Dr. Lim who presents today with her  Ed for follow up regarding her pAF.     Patient was last seen by myself on 9/18/2024. LIMA-C and ablation was discussed. Patient wished to think about it. She was maintaining NSR on Flecainide.     Other past medical history is pertinent for hypothyroidism, Covid infection in January, HLD and anticoagulated with Xarelto.     Today patient states that she is overall doing well. She is having frequent daily episodes of short bursts of a fluttering sensation in her chest that slightly take her breath away, these last only a few seconds. Her SBP has been elevated in the AM in the 140's-150's, seem to go back down by night time. She is tolerating OAC well with no bleeding problems.     Past Medical History:   Diagnosis Date    Cataract     removed bilat    Cervical pain 02/28/2020    neck, 0-1/10    COVID-19 virus infection 01/19/2024    Dysfunction of right eustachian tube 11/05/2019    Discussed creating slight pressure in the eustachian tube to open them up and allow the ears to drain.  Discussed continuing nasal spray.  As well as adding an allergy medication such as loratadine to her regimen.  I did not see any fluid in the ear on exam but there still could be fluid in there.  We discussed all of this in the visit today.    Elevated LDL cholesterol level 03/07/2019    Eosinophilic esophagitis 12/11/2020    Epigastric abdominal pain 10/13/2023    Hyperlipidemia 02/28/2020    on no meds at this time    Hypothyroidism 11/19/2018    Restless legs 11/19/2018     Past Surgical History:   Procedure Laterality Date    EYE LESION EXCISION Left 3/2/2020    Procedure: EXCISION, LESION, EYE - UPPER LID W/POSS PLACEMENT OF MINI MONKA STENT;  Surgeon: Jaime Pastrana M.D.;  Location: SURGERY SAME DAY [facility]  "ORS;  Service: Ophthalmology    CATARACT PHACO WITH IOL  2014    Performed by Fadi Morales M.D. at SURGERY SAME DAY [facility]    TUBE & ECTOPIC PREG., REMOVAL  1987    THYROIDECTOMY  1983    partial    OTHER ORTHOPEDIC SURGERY  [id number]    neck fusion    VT BREAST AUGMENTATION WITH IMPLANT   & 2018     Family History   Problem Relation Age of Onset    Diabetes Mother     Stroke Maternal Grandfather      Social History     Socioeconomic History    Marital status:      Spouse name: Not on file    Number of children: Not on file    Years of education: Not on file    Highest education level: Not on file   Occupational History    Not on file   Tobacco Use    Smoking status: Former     Current packs/day: 0.00     Average packs/day: 0.5 packs/day for 8.0 years (4.0 ttl pk-yrs)     Types: Cigarettes     Start date: 1980     Quit date: 1988     Years since quittin.8    Smokeless tobacco: Never    Tobacco comments:     quit in her 20s    Vaping Use    Vaping status: Never Used   Substance and Sexual Activity    Alcohol use: Not Currently    Drug use: No    Sexual activity: Yes     Partners: Male     Comment:    Other Topics Concern    Not on file   Social History Narrative    Retired dental hygiene      Social Drivers of Health     Financial Resource Strain: Not on file   Food Insecurity: Not on file   Transportation Needs: Not on file   Physical Activity: Not on file   Stress: Not on file   Social Connections: Not on file   Intimate Partner Violence: Not on file   Housing Stability: Not on file     Allergies   Allergen Reactions    Hydromorphone Hives    Codeine Vomiting and Nausea    Fentanyl Vomiting and Nausea    Doxycycline Unspecified     \"stiff joints\"    Hydrocodone Unspecified     Sweating      Oxycodone Vomiting     Outpatient Encounter Medications as of 2024   Medication Sig Dispense Refill    flecainide (TAMBOCOR) 50 MG tablet TAKE 1 TABLET BY MOUTH TWICE A  " Tablet 0    diltiazem (TIAZAC) 120 MG SR capsule TAKE 1 CAPSULE BY MOUTH EVERY DAY 90 Capsule 2    ROPINIRole (REQUIP) 0.5 MG Tab TAKE 1 TO 2 TABLETS BY MOUTH AT BEDTIME 180 Tablet 3    rivaroxaban (XARELTO) 20 MG Tab tablet Take 1 Tablet by mouth with dinner. 90 Tablet 1    fluticasone (FLONASE) 50 MCG/ACT nasal spray ADMINISTER 2 SPRAYS INTO AFFECTED NOSTRIL(S) AT BEDTIME. 48 mL 3    diclofenac sodium (VOLTAREN ARTHRITIS PAIN) 1 % Gel Apply 2 g topically 3 times a day as needed (neck pain).      levothyroxine (SYNTHROID) 88 MCG Tab Take 1 Tablet by mouth every morning on an empty stomach. 90 Tablet 3    carisoprodol (SOMA) 350 MG Tab Take 350 mg by mouth 1 time a day as needed.      baclofen (LIORESAL) 10 MG Tab Take 10-20 mg by mouth 1 time a day as needed (PAIN).      gabapentin (NEURONTIN) 300 MG Cap Take 300 mg by mouth 3 times a day.      Omega-3 Fatty Acids (OMEGA 3 PO) Take 1 Capsule by mouth every day.      MAGNESIUM PO Take 1 Tablet by mouth every day.      Cholecalciferol (VITAMIN D3 PO) Take 1 Tablet by mouth every day.      B Complex Vitamins (B COMPLEX PO) Take 1 Tablet by mouth every day.      [DISCONTINUED] omeprazole (PRILOSEC) 20 MG delayed-release capsule Take 20 mg by mouth every day. (Patient not taking: Reported on 11/14/2024)      [DISCONTINUED] flecainide (TAMBOCOR) 50 MG tablet Take 1 Tablet by mouth 2 times a day. 180 Tablet 1    [DISCONTINUED] dilTIAZem CD (CARDIZEM CD) 120 MG CAPSULE SR 24 HR Take 1 Capsule by mouth every day. 90 Capsule 1    estradiol (ESTRACE) 0.1 MG/GM vaginal cream Insert 0.5 g into the vagina two times a week. (Patient not taking: Reported on 11/14/2024) 42.5 g 3     No facility-administered encounter medications on file as of 11/14/2024.     Review of Systems   Constitutional:  Negative for malaise/fatigue and weight loss.   Respiratory:  Negative for shortness of breath.    Cardiovascular:  Positive for palpitations. Negative for chest pain, orthopnea,  "claudication, leg swelling and PND.   Neurological:  Negative for dizziness and weakness.   All other systems reviewed and are negative.             Objective     /64 (BP Location: Left arm, Patient Position: Sitting, BP Cuff Size: Adult)   Pulse 80   Resp 12   Ht 1.753 m (5' 9\")   Wt 56.2 kg (124 lb)   LMP  (LMP Unknown)   SpO2 99%   BMI 18.31 kg/m²     Physical Exam  Constitutional:       General: She is not in acute distress.     Appearance: She is well-developed.   HENT:      Head: Normocephalic.   Eyes:      Extraocular Movements: Extraocular movements intact.   Neck:      Vascular: No carotid bruit or JVD.   Cardiovascular:      Rate and Rhythm: Normal rate and regular rhythm.      Heart sounds: Normal heart sounds.   Pulmonary:      Effort: No respiratory distress.      Breath sounds: Normal breath sounds.   Musculoskeletal:      Cervical back: Normal range of motion.      Right lower leg: No edema.      Left lower leg: No edema.   Skin:     General: Skin is warm and dry.   Neurological:      Mental Status: She is alert and oriented to person, place, and time.   Psychiatric:         Behavior: Behavior normal.            TTE 1/20/2024:  Left Ventricle  Normal left ventricular chamber size. Normal left ventricular wall thickness. Hyperdynamic left ventricular systolic function. The ejection fraction is measured to be 79 % by Schmidt's biplane.   Diastolic function is difficult to assess with arrhythmia.     Right Ventricle  Normal right ventricular size and systolic function.     Right Atrium  Normal right atrial size. Normal inferior vena cava size and inspiratory collapse.     Left Atrium  Mildly dilated left atrium. Left atrial volume index is 36 mL/sq m.     Mitral Valve  Structurally normal mitral valve without significant stenosis or regurgitation.     Aortic Valve  The aortic valve appears trileaflet. Aortic valve sclerosis without significant stenosis. No aortic insufficiency.     Tricuspid " Valve  Structurally normal tricuspid valve. No tricuspid stenosis. Mild tricuspid regurgitation. Right atrial pressure is estimated to be 3 mmHg. Estimated right ventricular systolic pressure is 30 mmHg.     Pulmonic Valve  The pulmonic valve is not well visualized.     Pericardium  Normal pericardium without effusion.     Aorta  Normal aortic root for body surface area. The ascending aorta diameter is  3.2cm.    Chest CT-CTA 1/19/2024:  IMPRESSION:     1.  No evidence of pulmonary embolus.     2.  Mild atherosclerotic change aorta and coronary arteries.     3.  Fatty change of the liver.       Assessment & Plan     1. Paroxysmal atrial fibrillation (HCC)  EKG      2. Hypercoagulable state due to paroxysmal atrial fibrillation (HCC)        3. Anticoagulated        4. Coronary artery calcification seen on CAT scan        5. Encounter for monitoring flecainide therapy        6. Acquired hypothyroidism        7. Hypertension, unspecified type        8. Mixed hyperlipidemia              Medical Decision Making: Today's Assessment/Status/Plan:   pAF:  - 36% burden on recent Zio, symptomatic with palpitations.   - Maintaining NSR overall.  - Continue Flecainide 50 mg BID.  - Increased Diltiazem to 120 mg BID to see if this helps her palpitations. Can do cardiac monitor if continues or worsens.   - Discussed risks and benefits for RFA/PFA with patient and her .    - Continue OAC with Xarelto 20 mg daily, patient is currently tolerating well with no bleeding problems.     High Risk Medication (Flecainide):  - Recent low risk cardiac PET in June.   - TTE in January showed preserved LVEF, no significant valvular abnormality.   - Stable CrCl on last chem panel in January.   - Stable interval measurements on EKG today.     Hypothyroidism:  - TSH WNL in January, repeat pending.   - On synthroid 88 mcg daily.    Coronary Artery Calcification on Chest CTA:  - Denies anginal symptoms however admits to not being significantly  active.   - LDL 92, . Will re-visit statin at next apt with her .       Patient will follow up as scheduled below or earlier if needed. Encouraged patient to contact our office should any questions or concerns arise in the mean time.     Future Appointments   Date Time Provider Department Center   12/3/2024  8:15 AM JASBIR GOMEZ [facility]   12/5/2024  8:40 AM Sandra Carrion D.O. [facility]   12/18/2024  3:00 PM [facility]   12/18/2024  3:35 PM [facility]   2/20/2025  7:45 AM JAKE Montero [facility] None

## 2024-11-09 DIAGNOSIS — I48.0 PAROXYSMAL ATRIAL FIBRILLATION (HCC): ICD-10-CM

## 2024-11-11 RX ORDER — FLECAINIDE ACETATE 50 MG/1
50 TABLET ORAL 2 TIMES DAILY
Qty: 180 TABLET | Refills: 0 | Status: SHIPPED | OUTPATIENT
Start: 2024-11-11

## 2024-11-14 ENCOUNTER — OFFICE VISIT (OUTPATIENT)
Dept: CARDIOLOGY | Facility: MEDICAL CENTER | Age: 69
End: 2024-11-14
Attending: NURSE PRACTITIONER
Payer: MEDICARE

## 2024-11-14 VITALS
RESPIRATION RATE: 12 BRPM | SYSTOLIC BLOOD PRESSURE: 102 MMHG | DIASTOLIC BLOOD PRESSURE: 64 MMHG | BODY MASS INDEX: 18.37 KG/M2 | WEIGHT: 124 LBS | HEIGHT: 69 IN | OXYGEN SATURATION: 99 % | HEART RATE: 80 BPM

## 2024-11-14 DIAGNOSIS — I25.10 CORONARY ARTERY CALCIFICATION SEEN ON CAT SCAN: ICD-10-CM

## 2024-11-14 DIAGNOSIS — D68.69 HYPERCOAGULABLE STATE DUE TO PAROXYSMAL ATRIAL FIBRILLATION (HCC): ICD-10-CM

## 2024-11-14 DIAGNOSIS — Z79.01 ANTICOAGULATED: ICD-10-CM

## 2024-11-14 DIAGNOSIS — Z79.899 ENCOUNTER FOR MONITORING FLECAINIDE THERAPY: ICD-10-CM

## 2024-11-14 DIAGNOSIS — E03.9 ACQUIRED HYPOTHYROIDISM: ICD-10-CM

## 2024-11-14 DIAGNOSIS — I48.0 HYPERCOAGULABLE STATE DUE TO PAROXYSMAL ATRIAL FIBRILLATION (HCC): ICD-10-CM

## 2024-11-14 DIAGNOSIS — I48.0 PAROXYSMAL ATRIAL FIBRILLATION (HCC): ICD-10-CM

## 2024-11-14 DIAGNOSIS — I10 HYPERTENSION, UNSPECIFIED TYPE: ICD-10-CM

## 2024-11-14 DIAGNOSIS — E78.2 MIXED HYPERLIPIDEMIA: ICD-10-CM

## 2024-11-14 DIAGNOSIS — Z51.81 ENCOUNTER FOR MONITORING FLECAINIDE THERAPY: ICD-10-CM

## 2024-11-14 PROCEDURE — 93005 ELECTROCARDIOGRAM TRACING: CPT | Performed by: NURSE PRACTITIONER

## 2024-11-14 PROCEDURE — 3074F SYST BP LT 130 MM HG: CPT | Performed by: NURSE PRACTITIONER

## 2024-11-14 PROCEDURE — 3078F DIAST BP <80 MM HG: CPT | Performed by: NURSE PRACTITIONER

## 2024-11-14 PROCEDURE — 99214 OFFICE O/P EST MOD 30 MIN: CPT | Performed by: NURSE PRACTITIONER

## 2024-11-14 PROCEDURE — 99213 OFFICE O/P EST LOW 20 MIN: CPT | Performed by: NURSE PRACTITIONER

## 2024-11-14 RX ORDER — DILTIAZEM HYDROCHLORIDE 120 MG/1
120 CAPSULE, EXTENDED RELEASE ORAL 2 TIMES DAILY
Qty: 180 CAPSULE | Refills: 2 | Status: SHIPPED | OUTPATIENT
Start: 2024-11-14

## 2024-11-14 ASSESSMENT — FIBROSIS 4 INDEX: FIB4 SCORE: 1.28

## 2024-11-14 ASSESSMENT — ENCOUNTER SYMPTOMS
SHORTNESS OF BREATH: 0
WEIGHT LOSS: 0
ORTHOPNEA: 0
DIZZINESS: 0
PND: 0
CLAUDICATION: 0
PALPITATIONS: 1
WEAKNESS: 0

## 2024-11-19 LAB — EKG IMPRESSION: NORMAL

## 2024-11-19 PROCEDURE — 93010 ELECTROCARDIOGRAM REPORT: CPT | Performed by: INTERNAL MEDICINE

## 2024-12-03 ENCOUNTER — HOSPITAL ENCOUNTER (OUTPATIENT)
Dept: RADIOLOGY | Facility: MEDICAL CENTER | Age: 69
End: 2024-12-03
Attending: STUDENT IN AN ORGANIZED HEALTH CARE EDUCATION/TRAINING PROGRAM
Payer: MEDICARE

## 2024-12-03 ENCOUNTER — OFFICE VISIT (OUTPATIENT)
Dept: MEDICAL GROUP | Facility: LAB | Age: 69
End: 2024-12-03
Payer: MEDICARE

## 2024-12-03 VITALS
HEART RATE: 72 BPM | RESPIRATION RATE: 20 BRPM | HEIGHT: 69 IN | DIASTOLIC BLOOD PRESSURE: 62 MMHG | WEIGHT: 127 LBS | TEMPERATURE: 97.8 F | OXYGEN SATURATION: 99 % | SYSTOLIC BLOOD PRESSURE: 98 MMHG | BODY MASS INDEX: 18.81 KG/M2

## 2024-12-03 DIAGNOSIS — Z12.11 ENCOUNTER FOR SCREENING FOR MALIGNANT NEOPLASM OF COLON: ICD-10-CM

## 2024-12-03 DIAGNOSIS — M70.61 TROCHANTERIC BURSITIS OF RIGHT HIP: ICD-10-CM

## 2024-12-03 DIAGNOSIS — M85.89 OSTEOPENIA OF MULTIPLE SITES: ICD-10-CM

## 2024-12-03 DIAGNOSIS — M54.50 CHRONIC BILATERAL LOW BACK PAIN WITHOUT SCIATICA: ICD-10-CM

## 2024-12-03 DIAGNOSIS — M25.551 CHRONIC PAIN OF RIGHT HIP: ICD-10-CM

## 2024-12-03 DIAGNOSIS — Z00.00 MEDICARE ANNUAL WELLNESS VISIT, SUBSEQUENT: Primary | ICD-10-CM

## 2024-12-03 DIAGNOSIS — G89.29 CHRONIC PAIN OF RIGHT HIP: ICD-10-CM

## 2024-12-03 DIAGNOSIS — G89.29 CHRONIC BILATERAL LOW BACK PAIN WITHOUT SCIATICA: ICD-10-CM

## 2024-12-03 PROBLEM — R12 HEARTBURN: Status: RESOLVED | Noted: 2024-05-24 | Resolved: 2024-12-03

## 2024-12-03 PROCEDURE — 3078F DIAST BP <80 MM HG: CPT | Performed by: STUDENT IN AN ORGANIZED HEALTH CARE EDUCATION/TRAINING PROGRAM

## 2024-12-03 PROCEDURE — G0439 PPPS, SUBSEQ VISIT: HCPCS | Performed by: STUDENT IN AN ORGANIZED HEALTH CARE EDUCATION/TRAINING PROGRAM

## 2024-12-03 PROCEDURE — 77080 DXA BONE DENSITY AXIAL: CPT

## 2024-12-03 PROCEDURE — 3074F SYST BP LT 130 MM HG: CPT | Performed by: STUDENT IN AN ORGANIZED HEALTH CARE EDUCATION/TRAINING PROGRAM

## 2024-12-03 ASSESSMENT — FIBROSIS 4 INDEX: FIB4 SCORE: 1.28

## 2024-12-03 ASSESSMENT — PATIENT HEALTH QUESTIONNAIRE - PHQ9: CLINICAL INTERPRETATION OF PHQ2 SCORE: 0

## 2024-12-03 ASSESSMENT — ENCOUNTER SYMPTOMS: GENERAL WELL-BEING: GOOD

## 2024-12-03 ASSESSMENT — ACTIVITIES OF DAILY LIVING (ADL): BATHING_REQUIRES_ASSISTANCE: 0

## 2024-12-04 ENCOUNTER — PATIENT MESSAGE (OUTPATIENT)
Dept: CARDIOLOGY | Facility: MEDICAL CENTER | Age: 69
End: 2024-12-04
Payer: MEDICARE

## 2024-12-04 NOTE — PATIENT INSTRUCTIONS
Vaccines due that can be received only at pharmacy:  COVID    For bone health:  Vitamin D3 800IU daily  Calcium 1200mg/day (diet best, 600mg twice a day)    Foam roller (6inch 3ft, firm)    Labs end of January (thyroid, blood counts)
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
## 2024-12-05 ENCOUNTER — APPOINTMENT (OUTPATIENT)
Dept: MEDICAL GROUP | Facility: LAB | Age: 69
End: 2024-12-05
Payer: MEDICARE

## 2024-12-06 ENCOUNTER — PATIENT MESSAGE (OUTPATIENT)
Dept: MEDICAL GROUP | Facility: LAB | Age: 69
End: 2024-12-06
Payer: MEDICARE

## 2024-12-17 ENCOUNTER — PHYSICAL THERAPY (OUTPATIENT)
Dept: PHYSICAL THERAPY | Facility: MEDICAL CENTER | Age: 69
End: 2024-12-17
Attending: STUDENT IN AN ORGANIZED HEALTH CARE EDUCATION/TRAINING PROGRAM
Payer: MEDICARE

## 2024-12-17 DIAGNOSIS — M54.50 CHRONIC RIGHT-SIDED LOW BACK PAIN WITHOUT SCIATICA: ICD-10-CM

## 2024-12-17 DIAGNOSIS — M25.551 RIGHT HIP PAIN: ICD-10-CM

## 2024-12-17 DIAGNOSIS — G89.29 CHRONIC RIGHT-SIDED LOW BACK PAIN WITHOUT SCIATICA: ICD-10-CM

## 2024-12-17 PROCEDURE — 97162 PT EVAL MOD COMPLEX 30 MIN: CPT

## 2024-12-17 PROCEDURE — 97110 THERAPEUTIC EXERCISES: CPT

## 2024-12-17 ASSESSMENT — ENCOUNTER SYMPTOMS
EXACERBATED BY: LIFTING
EXACERBATED BY: BENDING
PAIN TIMING: IN THE EVENING
QUALITY: BURNING
PAIN SCALE AT HIGHEST: 5
PAIN SCALE AT LOWEST: 1
EXACERBATED BY: PROLONGED SITTING
PAIN TIMING: DURING SLEEP
QUALITY: TINGLING
ALLEVIATING FACTORS: PAIN MEDICATION
PAIN SCALE: 4
QUALITY: NUMBNESS
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
## 2024-12-18 ENCOUNTER — APPOINTMENT (OUTPATIENT)
Dept: RADIOLOGY | Facility: MEDICAL CENTER | Age: 69
End: 2024-12-18
Attending: STUDENT IN AN ORGANIZED HEALTH CARE EDUCATION/TRAINING PROGRAM
Payer: MEDICARE

## 2025-01-02 ENCOUNTER — PHYSICAL THERAPY (OUTPATIENT)
Dept: PHYSICAL THERAPY | Facility: MEDICAL CENTER | Age: 70
End: 2025-01-02
Attending: STUDENT IN AN ORGANIZED HEALTH CARE EDUCATION/TRAINING PROGRAM
Payer: MEDICARE

## 2025-01-02 DIAGNOSIS — G89.29 CHRONIC RIGHT-SIDED LOW BACK PAIN WITHOUT SCIATICA: ICD-10-CM

## 2025-01-02 DIAGNOSIS — M54.50 CHRONIC RIGHT-SIDED LOW BACK PAIN WITHOUT SCIATICA: ICD-10-CM

## 2025-01-02 PROCEDURE — 97110 THERAPEUTIC EXERCISES: CPT

## 2025-01-02 NOTE — OP THERAPY DAILY TREATMENT
Outpatient Physical Therapy  DAILY TREATMENT     [facility] Outpatient Physical Therapy  [address]  [place] [address]  Phone:  [phone]  Fax:  [phone]    Date: 01/02/2025    Patient: Missy Stuart  YOB: 1955  MRN: [id number]     Time Calculation    Start time: 0812  Stop time: 0851 Time Calculation (min): 39 minutes         Chief Complaint: Back Problem    Visit #: 2    SUBJECTIVE:  Pt reports the marching bridge did cause some neck pain likely d/t her hx of cervical fusion.             Therapeutic Exercises (CPT 57929):     1. Marching bridge, Terminated d/t neck pain    2. Sidelying hip abd, x10, HEP    3. Standing hip abd, Pink TB x10 B/L, HEP    4. Standing hip extension, Pink TB x10 B/L, HEP    5. Lateral band walk, NT    6. lateral step up, x10 B/L    7. wall squat, x10 (5 second holds), HEP      Therapeutic Exercise Summary: Pt was educated today regarding performance of updated HEP and given emailed instructions for HEP.       Time-based treatments/modalities:    Physical Therapy Timed Treatment Charges  Therapeutic exercise minutes (CPT 18824): 39 minutes          ASSESSMENT:   Response to treatment: Pt had good tolerance for today's PT session. Pt demonstrated good understanding of expectations for updated HEP. Pt will continue to benefit from skilled PT to address aforementioned deficits.      PLAN/RECOMMENDATIONS:   Plan for treatment: therapy treatment to continue next visit.  Planned interventions for next visit: continue with current treatment.

## 2025-01-07 ENCOUNTER — PHYSICAL THERAPY (OUTPATIENT)
Dept: PHYSICAL THERAPY | Facility: MEDICAL CENTER | Age: 70
End: 2025-01-07
Attending: STUDENT IN AN ORGANIZED HEALTH CARE EDUCATION/TRAINING PROGRAM
Payer: MEDICARE

## 2025-01-07 ENCOUNTER — TELEPHONE (OUTPATIENT)
Dept: CARDIOLOGY | Facility: MEDICAL CENTER | Age: 70
End: 2025-01-07
Payer: MEDICARE

## 2025-01-07 DIAGNOSIS — M25.551 RIGHT HIP PAIN: ICD-10-CM

## 2025-01-07 DIAGNOSIS — M54.50 CHRONIC RIGHT-SIDED LOW BACK PAIN WITHOUT SCIATICA: ICD-10-CM

## 2025-01-07 DIAGNOSIS — G89.29 CHRONIC RIGHT-SIDED LOW BACK PAIN WITHOUT SCIATICA: ICD-10-CM

## 2025-01-07 PROCEDURE — 97140 MANUAL THERAPY 1/> REGIONS: CPT

## 2025-01-07 PROCEDURE — 97110 THERAPEUTIC EXERCISES: CPT

## 2025-01-07 NOTE — OP THERAPY DAILY TREATMENT
Outpatient Physical Therapy  DAILY TREATMENT     [facility] Outpatient Physical Therapy  [address]  [place] [address]  Phone:  [phone]  Fax:  [phone]    Date: 01/07/2025    Patient: Missy Stuart  YOB: 1955  MRN: [id number]     Time Calculation    Start time: 0819  Stop time: 0858 Time Calculation (min): 39 minutes         Chief Complaint: Hip Problem and Back Problem    Visit #: 3    SUBJECTIVE:  Pt reports she was quite stiff this morning upon waking.             Therapeutic Exercises (CPT 01667):     1. Marching bridge, x12    3. Standing hip abd, Pink TB x10 B/L, HEP    4. Standing hip extension, Pink TB x10 B/L, HEP    6. lateral step up, x10 B/L    7. wall squat, NT, HEP    8. Clamshell, BTB 2x10, HEP    9. nustep, 5 mins, warm up      Therapeutic Exercise Summary: Pt was educated today regarding performance of updated HEP and given emailed instructions for HEP.     Therapeutic Treatments and Modalities:     1. Manual Therapy (CPT 61256), R hip, Lateral and inferior belted glides (grade II/III), Long axis traction (grade III)    Time-based treatments/modalities:        ASSESSMENT:   Response to treatment: Pt had good tolerance for today's PT session. Pt will continue to benefit from skilled PT to address aforementioned deficits.      PLAN/RECOMMENDATIONS:   Plan for treatment: therapy treatment to continue next visit.  Planned interventions for next visit: continue with current treatment.

## 2025-01-09 ENCOUNTER — PHYSICAL THERAPY (OUTPATIENT)
Dept: PHYSICAL THERAPY | Facility: MEDICAL CENTER | Age: 70
End: 2025-01-09
Attending: STUDENT IN AN ORGANIZED HEALTH CARE EDUCATION/TRAINING PROGRAM
Payer: MEDICARE

## 2025-01-09 DIAGNOSIS — G89.29 CHRONIC RIGHT-SIDED LOW BACK PAIN WITHOUT SCIATICA: ICD-10-CM

## 2025-01-09 DIAGNOSIS — M54.50 CHRONIC RIGHT-SIDED LOW BACK PAIN WITHOUT SCIATICA: ICD-10-CM

## 2025-01-09 PROCEDURE — 97110 THERAPEUTIC EXERCISES: CPT

## 2025-01-09 NOTE — OP THERAPY DAILY TREATMENT
Outpatient Physical Therapy  DAILY TREATMENT     [facility] Outpatient Physical Therapy  [address]  [place] [address]  Phone:  [phone]  Fax:  [phone]    Date: 01/09/2025    Patient: Missy Stuart  YOB: 1955  MRN: [id number]     Time Calculation    Start time: 0900  Stop time: 0945 Time Calculation (min): 45 minutes         Chief Complaint: Back Problem    Visit #: 4    SUBJECTIVE:  Pt reports she feels there has been about 50% improvement since onset of PT.             Therapeutic Exercises (CPT 64062):     1. Marching bridge, x12    3. Standing hip abd, Pink TB x10 B/L, HEP    4. Standing hip extension, Pink TB x10 B/L, HEP    6. lateral step up, x10 B/L    7. wall squat, HEP    8. Clamshell, BTB 2x10, HEP    9. Bike, 5 mins    10. Step down, 2x8      Time-based treatments/modalities:    Physical Therapy Timed Treatment Charges  Therapeutic exercise minutes (CPT 33707): 45 minutes        ASSESSMENT:   Response to treatment: Pt had good tolerance for today's PT session. Pt will continue to benefit from skilled PT to address aforementioned deficits.      PLAN/RECOMMENDATIONS:   Plan for treatment: therapy treatment to continue next visit.  Planned interventions for next visit: continue with current treatment.          ED MD

## 2025-01-13 ENCOUNTER — TELEPHONE (OUTPATIENT)
Dept: CARDIOLOGY | Facility: MEDICAL CENTER | Age: 70
End: 2025-01-13
Payer: MEDICARE

## 2025-01-13 NOTE — TELEPHONE ENCOUNTER
Last OV: 11.14.2024  Proposed Surgery: Colonoscopy w Bx  Surgery Date: 2.24.2025  Requesting Office Name: [facility] ivette Qiu  Fax Number: [phone]  Preference of Location (default is surgery center unless specified by Cardiologist or [facility])  Prior Clearance Addressed: No    Is this a general clearance? YES   Anticoags/Antiplatelets: Xarelto  Anticoags/Antiplatelet managed by Cardiology? YES    Outstanding Cardiac Imaging : No  Ablation, Cardioversion, Stent, Cardiac Devices, Catheterization, Watchman: No  TAVR/Valve, Mitral Clip, Watchman (including open heart),: N/A   Recent Cardiac Hospitalization: No            When: Greater than 3 months since hospitalization   History (cardiac history):   Past Medical History:   Diagnosis Date    Cataract     removed bilat    Cervical pain 02/28/2020    neck, 0-1/10    COVID-19 virus infection 01/19/2024    Dysfunction of right eustachian tube 11/05/2019    Discussed creating slight pressure in the eustachian tube to open them up and allow the ears to drain.  Discussed continuing nasal spray.  As well as adding an allergy medication such as loratadine to her regimen.  I did not see any fluid in the ear on exam but there still could be fluid in there.  We discussed all of this in the visit today.    Elevated LDL cholesterol level 03/07/2019    Eosinophilic esophagitis 12/11/2020    Epigastric abdominal pain 10/13/2023    Heartburn 05/24/2024    Hyperlipidemia 02/28/2020    on no meds at this time    Hypothyroidism 11/19/2018    Restless legs 11/19/2018           Is this a dental clearance? NO  Ablation, Cardioversion, Watchman, Stents, Cath, Devices within the last 3 months? No   If yes- Send dental wait letter, do not forward to provider for review.     TAVR / Valve, Mitral clip within the last 6 months? No  If yes- Send dental wait letter, do not forward to provider for review.     If completing a general clearance, continue per protocol.           Surgical  Clearance Letter Sent: YES   **Scan clearance request letter into [facility].**

## 2025-01-13 NOTE — LETTER
PROCEDURE/SURGERY CLEARANCE FORM      Encounter Date: 1/13/2025    Patient: Missy Stuart  YOB: 1955    CARDIOLOGIST:  BERTA Montero.    REFERRING DOCTOR:  No ref. provider found    Procedure/Surgery: Colonoscopy w Bx    Dear Surgeon or Proceduralist,      Thank you for your request for cardiac stratification of our mutual patient Missy Stuart 1955. We have reviewed their [facility] records; and to the best of our understanding this patient has not had stenting, ablation, watchman, cardiothoracic surgery or hospitalization for cardiovascular reasons in the past 6 months.  Missy Stuart has been seen within the past 15 months and is considered to have non-modifiable cardiac risk for this low-risk procedure/surgery. They may proceed from a cardiovascular standpoint and may hold their antiplatelet/anticoagulation as briefly as possible. Please have patient resume this medication when hemodynamically stable to do so.     Aspirin or Prasugrel   - hold 7 days prior to procedure/surgery, resume when hemodynamically stable      Clopidrogrel or Ticagrelor  - hold 7 days for all neurological procedures, hold 5 days prior to all other procedure/surgery,  resume when hemodynamically stable     Warfarin - hold 7 days for all neurological procedures, hold 5 days prior to all other procedure/surgery and coordinate with [facility] ([phone]) INR testing and dose management.      Pradaxa/Xarelto/Eliquis/Savesya - hold 1 day prior to procedure for low bleeding risk procedure, 2 days for high bleeding risk procedure, or consider holding 3 days or longer for patients with reduced kidney function (CrCl <30mL/min) or spinal/cranial surgeries/procedures.      If they have a mechanical heart valve, please coordinate with [facility] ([phone]) the proper management of their anticoagulant in the periprocedural or perioperative period.      Some patients  have higher risk for cardiovascular complications or holding medication. If our patient has had prior complications of holding antiplatelet or anticoagulants in the past and we have seen them after these events, we have addressed these concerns with the patient. They are at an unknown degree of increased risk for recurrent complication.  You may hold anticoagulation/antiplatelets for the procedure or surgery if the benefits of the procedure or surgery outweigh this nonmodifiable risk.      If Missy Stuart 1955 has new symptoms of heart failure decompensation, unstable arrythmia, or angina please reach out and we will assess the patient.      If you have other patient-specific concerns, please feel free to reach out to the patient's cardiologist directly at [phone].     Thank you,       [facility]      BERTA Montero.  Electronically Signed

## 2025-01-14 ENCOUNTER — PHYSICAL THERAPY (OUTPATIENT)
Dept: PHYSICAL THERAPY | Facility: MEDICAL CENTER | Age: 70
End: 2025-01-14
Attending: STUDENT IN AN ORGANIZED HEALTH CARE EDUCATION/TRAINING PROGRAM
Payer: MEDICARE

## 2025-01-14 DIAGNOSIS — M54.50 CHRONIC RIGHT-SIDED LOW BACK PAIN WITHOUT SCIATICA: ICD-10-CM

## 2025-01-14 DIAGNOSIS — G89.29 CHRONIC RIGHT-SIDED LOW BACK PAIN WITHOUT SCIATICA: ICD-10-CM

## 2025-01-14 PROCEDURE — 97140 MANUAL THERAPY 1/> REGIONS: CPT

## 2025-01-14 PROCEDURE — 97110 THERAPEUTIC EXERCISES: CPT

## 2025-01-14 NOTE — OP THERAPY DAILY TREATMENT
"  Outpatient Physical Therapy  DAILY TREATMENT     [facility] Outpatient Physical Therapy  [address]  [place] [address]  Phone:  [phone]  Fax:  [phone]    Date: 01/14/2025    Patient: Missy Stuart  YOB: 1955  MRN: [id number]     Time Calculation    Start time: 0858  Stop time: 0946 Time Calculation (min): 48 minutes         Chief Complaint: Hip Problem and Back Problem    Visit #: 5    SUBJECTIVE:  Pt reports her back has been feeling better, however does report that her hip has been keeping her up at night d/t soreness/pain/discomfort (R hip).      OBJECTIVE:  Current objective measures:   Oswestry Disability Assessment Total Score: 22  WOMAC Grand Total: 18.75       Therapeutic Exercises (CPT 89213):     1. Marching bridge, x12    3. Standing hip abd, Green TB x15 B/L, HEP    4. Standing hip extension, Green TB x15 B/L, HEP    5. BKFO, x15, HEP    6. Wall slide squat, x10 (5\" holds)    7. Lateral step up, x12 B/L, HEP    8. Clamshell, BTB 2x10, HEP    Therapeutic Treatments and Modalities:     1. Manual Therapy (CPT 90566), R hip, Long axis traction (belted - grade 2/3), Lateral femoral acetabular glide with belt (hoooklying - grade 2/3), inferior femoral acetabular glide (hip passive at 90 degrees, belted, grade 2/3)    Time-based treatments/modalities:    Physical Therapy Timed Treatment Charges  Manual therapy minutes (CPT 89021): 12 minutes  Therapeutic exercise minutes (CPT 31348): 34 minutes      Pain rating (1-10) before treatment:  2    ASSESSMENT:   Response to treatment: See progress    PLAN/RECOMMENDATIONS:   Plan for treatment: therapy treatment to continue next visit.  Planned interventions for next visit: continue with current treatment.         "

## 2025-01-14 NOTE — OP THERAPY PROGRESS SUMMARY
Outpatient Physical Therapy  PROGRESS SUMMARY NOTE      [facility] Outpatient Physical Therapy  [address]  [place] [address]  Phone:  [phone]  Fax:  [phone]    Date of Visit: 01/14/2025    Patient: Missy Stuart  YOB: 1955  MRN: [id number]     Referring Provider: Sandra Carrion D.O.  [address]  [address]  [place] [address]   Referring Diagnosis Pain in right hip [M25.551];Other chronic pain [G89.29];Trochanteric bursitis, right hip [M70.61];Low back pain, unspecified [M54.50]     Visit Diagnoses     ICD-10-CM   1. Chronic right-sided low back pain without sciatica  M54.50    G89.29       Rehab Potential: good    Progress Report Period: 12/17/24-1/14/25    Functional Assessment Used  Oswestry Disability Assessment Total Score: 22  WOMAC Grand Total: 18.75       Objective Findings and Assessment:   Patient progression towards goals: Short Term Goals:   1. Pt will achieve 20 or lower on Oswestry (Not achieved)  2. Pt will achieve 15 or lower on WOMAC (Not achieved)  3. Pt will report being able to walk greater than 1 mile pain free or near pain free (2/10 or lower on numeric pain rating scale) (not achieved)  4. Pt will demonstrate 10x modified deadlift with 10lbs pain free or near pain free (2/10 or lower on numeric pain rating scale) (not achieved)         Long Term Goals:  (not achieved)  1. Pt will achieve 12 or lower on oswestry  2. Pt will achieve 10 or lower on WOMAC  3. Pt will demonstrate bobbi curl 10x with 15 lbs pain free or near pain free (2/10 or lower on numeric pain rating scale)  4. Pt will report 50% or greater improvement of symptoms      Objective findings and assessment details: Pt has not achieved any short term goals at this time, however, she has made fair progress per her functional scores. Pt does also subjectively report improvement of her back pain and demonstrate appropriate strength progression with  regard to hip exercises. Pt will continue to benefit from skilled PT to address aforementioned deficits.     Goals:   Short Term Goals:   1. Pt will achieve 20 or lower on Oswestry   2. Pt will achieve 15 or lower on WOMAC   3. Pt will report being able to walk greater than 1 mile pain free or near pain free (2/10 or lower on numeric pain rating scale)  4. Pt will demonstrate 10x modified deadlift with 10lbs pain free or near pain free (2/10 or lower on numeric pain rating scale)   Short term goal time span:  2-4 weeks      Long Term Goals:    1. Pt will achieve 12 or lower on oswestry  2. Pt will achieve 10 or lower on WOMAC  3. Pt will demonstrate bobbi curl 10x with 15 lbs pain free or near pain free (2/10 or lower on numeric pain rating scale)  4. Pt will report 50% or greater improvement of symptoms  Long term goal time span:  6-8 weeks    Plan:   Planned therapy interventions:  Neuromuscular Re-education (CPT 82673), Manual Therapy (CPT 43937), Therapeutic Activities (CPT 59231) and Therapeutic Exercise (CPT 96273)  Frequency:  2x week  Duration in weeks:  8      Referring provider co-signature:  I have reviewed this plan of care and my co-signature certifies the need for services.     Certification Period: 01/14/2025 to 03/15/25    Physician Signature: ________________________________ Date: ______________

## 2025-01-16 ENCOUNTER — PHYSICAL THERAPY (OUTPATIENT)
Dept: PHYSICAL THERAPY | Facility: MEDICAL CENTER | Age: 70
End: 2025-01-16
Attending: STUDENT IN AN ORGANIZED HEALTH CARE EDUCATION/TRAINING PROGRAM
Payer: MEDICARE

## 2025-01-16 DIAGNOSIS — M54.50 CHRONIC RIGHT-SIDED LOW BACK PAIN WITHOUT SCIATICA: ICD-10-CM

## 2025-01-16 DIAGNOSIS — G89.29 CHRONIC RIGHT-SIDED LOW BACK PAIN WITHOUT SCIATICA: ICD-10-CM

## 2025-01-16 PROCEDURE — 97110 THERAPEUTIC EXERCISES: CPT

## 2025-01-16 NOTE — OP THERAPY DAILY TREATMENT
"  Outpatient Physical Therapy  DAILY TREATMENT     [facility] Outpatient Physical Therapy  [address]  [place] [address]  Phone:  [phone]  Fax:  [phone]    Date: 01/16/2025    Patient: Missy Stuart  YOB: 1955  MRN: [id number]     Time Calculation    Start time: 0904  Stop time: 0947 Time Calculation (min): 43 minutes         Chief Complaint: Back Problem    Visit #: 6    SUBJECTIVE:  Pt reports her back is doing ok today.             Therapeutic Exercises (CPT 66313):     1. Marching bridge, 2x12, HEP    3. Standing hip abd, Green TB x15 B/L, HEP    4. Standing hip extension, Green TB x15 B/L, HEP    5. BKFO, x15, HEP    6. Wall slide squat, x10 (5\" holds)    7. Lateral step up, x12 B/L, HEP    8. Clamshell, BTB 2x10, HEP    9. Sidelying hip abd, 2x12, HEP    10. Nustep, 5 mins, warm up      Time-based treatments/modalities:    Physical Therapy Timed Treatment Charges  Therapeutic exercise minutes (CPT 48224): 43 minutes      Pain rating (1-10) before treatment:  2    ASSESSMENT:   Response to treatment: Pt had good tolerance for today's PT session. Pt will continue to benefit from skilled PT to address aforementioned deficits.      PLAN/RECOMMENDATIONS:   Plan for treatment: therapy treatment to continue next visit.  Planned interventions for next visit: continue with current treatment.         "

## 2025-01-21 ENCOUNTER — PHYSICAL THERAPY (OUTPATIENT)
Dept: PHYSICAL THERAPY | Facility: MEDICAL CENTER | Age: 70
End: 2025-01-21
Attending: STUDENT IN AN ORGANIZED HEALTH CARE EDUCATION/TRAINING PROGRAM
Payer: MEDICARE

## 2025-01-21 DIAGNOSIS — M54.50 CHRONIC RIGHT-SIDED LOW BACK PAIN WITHOUT SCIATICA: ICD-10-CM

## 2025-01-21 DIAGNOSIS — G89.29 CHRONIC RIGHT-SIDED LOW BACK PAIN WITHOUT SCIATICA: ICD-10-CM

## 2025-01-21 PROCEDURE — 97110 THERAPEUTIC EXERCISES: CPT

## 2025-01-21 NOTE — OP THERAPY DAILY TREATMENT
Outpatient Physical Therapy  DAILY TREATMENT     [facility]  [address]  [place] [address]  Phone:  [phone]  Fax:  [phone]    Date: 01/21/2025    Patient: Missy Stuart  YOB: 1955  MRN: [id number]     Time Calculation    Start time: 0905  Stop time: 0941 Time Calculation (min): 36 minutes         Chief Complaint: Back Problem    Visit #: 7    SUBJECTIVE:  Pt reports she has been having pain that has been interrupting her sleep at night. Pt reports she has noticed significant improvement of back mobility.             Therapeutic Exercises (CPT 71630):     1. Marching bridge, 2x12, HEP    3. Standing hip abd, Green TB x15 B/L, HEP    4. Standing hip extension, Green TB x15 B/L, HEP    5. BKFO, x15, HEP    6. Wall slide squat, NT    8. Clamshell, BTB 2x10, HEP    9. Sidelying hip abd, 2x12, HEP    10. Nustep, 9 mins, warm up      Time-based treatments/modalities:    Physical Therapy Timed Treatment Charges  Therapeutic exercise minutes (CPT 38467): 36 minutes      Pain rating (1-10) before treatment:  3    ASSESSMENT:   Response to treatment: Pt had good tolerance for today's PT session. Pt may benefit from further diagnostic imaging and potential referral to ortho to address night pain. I am suspicious of possible greater trochanteric bursitis. Pt will continue to benefit from skilled PT to address aforementioned deficits.      PLAN/RECOMMENDATIONS:   Plan for treatment: therapy treatment to continue next visit.  Planned interventions for next visit: continue with current treatment.

## 2025-01-23 ENCOUNTER — PHYSICAL THERAPY (OUTPATIENT)
Dept: PHYSICAL THERAPY | Facility: MEDICAL CENTER | Age: 70
End: 2025-01-23
Attending: STUDENT IN AN ORGANIZED HEALTH CARE EDUCATION/TRAINING PROGRAM
Payer: MEDICARE

## 2025-01-23 DIAGNOSIS — G89.29 CHRONIC RIGHT-SIDED LOW BACK PAIN WITHOUT SCIATICA: ICD-10-CM

## 2025-01-23 DIAGNOSIS — M54.50 CHRONIC RIGHT-SIDED LOW BACK PAIN WITHOUT SCIATICA: ICD-10-CM

## 2025-01-23 PROCEDURE — 97110 THERAPEUTIC EXERCISES: CPT

## 2025-01-23 NOTE — OP THERAPY DAILY TREATMENT
"  Outpatient Physical Therapy  DAILY TREATMENT     [facility]patient Physical Therapy  [address]  [place] [address]  Phone:  [phone]  Fax:  [phone]    Date: 01/23/2025    Patient: Missy Stuart  YOB: 1955  MRN: [id number]     Time Calculation    Start time: 0857  Stop time: 0935 Time Calculation (min): 38 minutes         Chief Complaint: Hip Problem and Back Problem    Visit #: 8    SUBJECTIVE:  Pt reports her sleep was slightly better last night.             Therapeutic Exercises (CPT 11804):     1. Marching bridge, NT, HEP    3. Standing hip abd, Blue TB x15 B/L, HEP    4. Standing hip extension, NT, HEP    5. BKFO, x15, HEP    6. Standing lateral hip stretch, x8, HEP    7. Sidelying lateral hip stretch over EOB, x5 (20\" holds), HEP    8. Clamshell, BTB 2x10, HEP    9. Sidelying hip abd, 2x12 2# ankle weight, HEP    10. Nustep, 9 mins, warm up      Time-based treatments/modalities:    Physical Therapy Timed Treatment Charges  Therapeutic exercise minutes (CPT 65138): 38 minutes        ASSESSMENT:   Response to treatment: Pt continues to progress well with current POC. Today's session focused on addressing stretching for the lateral hip to address hip pointer pain and increase tolerance in lateral hip. Pt will continue to benefit from skilled PT to address aforementioned deficits.     PLAN/RECOMMENDATIONS:   Plan for treatment: therapy treatment to continue next visit.  Planned interventions for next visit: continue with current treatment.         "

## 2025-01-28 ENCOUNTER — APPOINTMENT (OUTPATIENT)
Dept: PHYSICAL THERAPY | Facility: MEDICAL CENTER | Age: 70
End: 2025-01-28
Attending: STUDENT IN AN ORGANIZED HEALTH CARE EDUCATION/TRAINING PROGRAM
Payer: MEDICARE

## 2025-01-30 ENCOUNTER — PHYSICAL THERAPY (OUTPATIENT)
Dept: PHYSICAL THERAPY | Facility: MEDICAL CENTER | Age: 70
End: 2025-01-30
Attending: STUDENT IN AN ORGANIZED HEALTH CARE EDUCATION/TRAINING PROGRAM
Payer: MEDICARE

## 2025-01-30 DIAGNOSIS — M54.50 CHRONIC RIGHT-SIDED LOW BACK PAIN WITHOUT SCIATICA: ICD-10-CM

## 2025-01-30 DIAGNOSIS — G89.29 CHRONIC RIGHT-SIDED LOW BACK PAIN WITHOUT SCIATICA: ICD-10-CM

## 2025-01-30 PROCEDURE — 97110 THERAPEUTIC EXERCISES: CPT

## 2025-01-30 NOTE — OP THERAPY DAILY TREATMENT
"  Outpatient Physical Therapy  DAILY TREATMENT     [facility] Outpatient Physical Therapy  [address]  [place] [address]  Phone:  [phone]  Fax:  [phone]    Date: 01/30/2025    Patient: Missy Stuart  YOB: 1955  MRN: [id number]     Time Calculation    Start time: 0900  Stop time: 0941 Time Calculation (min): 41 minutes         Chief Complaint: Hip Problem and Back Problem    Visit #: 9    SUBJECTIVE:  Pt reports she overall feels there has been between 50-70% overall improvement.             Therapeutic Exercises (CPT 15715):     3. Standing hip abd, Blue TB x15 B/L, HEP    4. Standing hip extension, HEP    6. Standing lateral hip stretch, x5 (10\" holds), HEP    7. Sidelying lateral hip stretch over EOB, x5 (20\" holds), HEP    8. Clamshell, BTB 2x10, HEP    9. Sidelying hip abd, 2x12 3# ankle weight, HEP    10. Nustep, 6 mins, warm up      Time-based treatments/modalities:    Physical Therapy Timed Treatment Charges  Therapeutic exercise minutes (CPT 98740): 41 minutes      Pain rating (1-10) before treatment:  2    ASSESSMENT:   Response to treatment: Pt had good tolerance for today's PT session. Pt continues to improve appropriately. Recommendations were made today for pt to have 3-4 week follow up given current progress. Pt will continue to benefit from skilled PT to address aforementioned deficits.      PLAN/RECOMMENDATIONS:   Plan for treatment: therapy treatment to continue next visit.  Planned interventions for next visit: continue with current treatment.         "

## 2025-02-04 ENCOUNTER — APPOINTMENT (OUTPATIENT)
Dept: PHYSICAL THERAPY | Facility: MEDICAL CENTER | Age: 70
End: 2025-02-04
Attending: STUDENT IN AN ORGANIZED HEALTH CARE EDUCATION/TRAINING PROGRAM
Payer: MEDICARE

## 2025-02-07 DIAGNOSIS — I48.0 PAROXYSMAL ATRIAL FIBRILLATION (HCC): ICD-10-CM

## 2025-02-07 RX ORDER — FLECAINIDE ACETATE 50 MG/1
50 TABLET ORAL 2 TIMES DAILY
Qty: 180 TABLET | Refills: 1 | Status: SHIPPED | OUTPATIENT
Start: 2025-02-07

## 2025-02-10 ENCOUNTER — HOSPITAL ENCOUNTER (OUTPATIENT)
Dept: RADIOLOGY | Facility: MEDICAL CENTER | Age: 70
End: 2025-02-10
Attending: STUDENT IN AN ORGANIZED HEALTH CARE EDUCATION/TRAINING PROGRAM
Payer: MEDICARE

## 2025-02-10 DIAGNOSIS — Z12.31 ENCOUNTER FOR SCREENING MAMMOGRAM FOR BREAST CANCER: ICD-10-CM

## 2025-02-10 DIAGNOSIS — Z12.39 ENCOUNTER FOR BREAST CANCER SCREENING USING NON-MAMMOGRAM MODALITY: ICD-10-CM

## 2025-02-10 DIAGNOSIS — R92.343 EXTREMELY DENSE TISSUE OF BOTH BREASTS ON MAMMOGRAPHY: ICD-10-CM

## 2025-02-10 PROCEDURE — 77067 SCR MAMMO BI INCL CAD: CPT

## 2025-02-10 PROCEDURE — 76641 ULTRASOUND BREAST COMPLETE: CPT

## 2025-02-11 DIAGNOSIS — R92.8 ABNORMAL ULTRASOUND OF BREAST: ICD-10-CM

## 2025-02-12 ENCOUNTER — APPOINTMENT (OUTPATIENT)
Dept: PHYSICAL THERAPY | Facility: MEDICAL CENTER | Age: 70
End: 2025-02-12
Attending: STUDENT IN AN ORGANIZED HEALTH CARE EDUCATION/TRAINING PROGRAM
Payer: MEDICARE

## 2025-02-13 ENCOUNTER — HOSPITAL ENCOUNTER (OUTPATIENT)
Dept: RADIOLOGY | Facility: MEDICAL CENTER | Age: 70
End: 2025-02-13
Attending: STUDENT IN AN ORGANIZED HEALTH CARE EDUCATION/TRAINING PROGRAM
Payer: MEDICARE

## 2025-02-13 DIAGNOSIS — R92.8 ABNORMAL ULTRASOUND OF BREAST: ICD-10-CM

## 2025-02-13 PROCEDURE — 76642 ULTRASOUND BREAST LIMITED: CPT | Mod: RT

## 2025-02-14 ENCOUNTER — RESULTS FOLLOW-UP (OUTPATIENT)
Dept: MEDICAL GROUP | Facility: LAB | Age: 70
End: 2025-02-14

## 2025-02-14 DIAGNOSIS — R09.81 NASAL CONGESTION: ICD-10-CM

## 2025-02-18 RX ORDER — FLUTICASONE PROPIONATE 50 MCG
2 SPRAY, SUSPENSION (ML) NASAL
Qty: 48 ML | Refills: 3 | Status: SHIPPED | OUTPATIENT
Start: 2025-02-18

## 2025-02-20 ENCOUNTER — APPOINTMENT (OUTPATIENT)
Dept: PHYSICAL THERAPY | Facility: MEDICAL CENTER | Age: 70
End: 2025-02-20
Attending: STUDENT IN AN ORGANIZED HEALTH CARE EDUCATION/TRAINING PROGRAM
Payer: MEDICARE

## 2025-02-20 ENCOUNTER — OFFICE VISIT (OUTPATIENT)
Dept: CARDIOLOGY | Facility: MEDICAL CENTER | Age: 70
End: 2025-02-20
Attending: NURSE PRACTITIONER
Payer: MEDICARE

## 2025-02-20 VITALS
HEIGHT: 69 IN | RESPIRATION RATE: 14 BRPM | WEIGHT: 124 LBS | OXYGEN SATURATION: 99 % | BODY MASS INDEX: 18.37 KG/M2 | HEART RATE: 73 BPM | SYSTOLIC BLOOD PRESSURE: 110 MMHG | DIASTOLIC BLOOD PRESSURE: 68 MMHG

## 2025-02-20 DIAGNOSIS — Z51.81 ENCOUNTER FOR MONITORING FLECAINIDE THERAPY: ICD-10-CM

## 2025-02-20 DIAGNOSIS — E78.2 MIXED HYPERLIPIDEMIA: ICD-10-CM

## 2025-02-20 DIAGNOSIS — I48.0 PAROXYSMAL ATRIAL FIBRILLATION (HCC): ICD-10-CM

## 2025-02-20 DIAGNOSIS — R05.1 ACUTE COUGH: ICD-10-CM

## 2025-02-20 DIAGNOSIS — E03.9 ACQUIRED HYPOTHYROIDISM: ICD-10-CM

## 2025-02-20 DIAGNOSIS — D68.69 HYPERCOAGULABLE STATE DUE TO PAROXYSMAL ATRIAL FIBRILLATION (HCC): ICD-10-CM

## 2025-02-20 DIAGNOSIS — Z79.899 ENCOUNTER FOR MONITORING FLECAINIDE THERAPY: ICD-10-CM

## 2025-02-20 DIAGNOSIS — Z79.01 ANTICOAGULATED: ICD-10-CM

## 2025-02-20 DIAGNOSIS — I48.0 HYPERCOAGULABLE STATE DUE TO PAROXYSMAL ATRIAL FIBRILLATION (HCC): ICD-10-CM

## 2025-02-20 DIAGNOSIS — I10 HYPERTENSION, UNSPECIFIED TYPE: ICD-10-CM

## 2025-02-20 DIAGNOSIS — I25.10 CORONARY ARTERY CALCIFICATION SEEN ON CAT SCAN: ICD-10-CM

## 2025-02-20 LAB — EKG IMPRESSION: NORMAL

## 2025-02-20 PROCEDURE — 93005 ELECTROCARDIOGRAM TRACING: CPT | Mod: TC | Performed by: NURSE PRACTITIONER

## 2025-02-20 PROCEDURE — 99214 OFFICE O/P EST MOD 30 MIN: CPT | Performed by: NURSE PRACTITIONER

## 2025-02-20 PROCEDURE — 99213 OFFICE O/P EST LOW 20 MIN: CPT | Performed by: NURSE PRACTITIONER

## 2025-02-20 RX ORDER — BENZONATATE 100 MG/1
100 CAPSULE ORAL 3 TIMES DAILY PRN
Qty: 30 CAPSULE | Refills: 0 | Status: SHIPPED | OUTPATIENT
Start: 2025-02-20

## 2025-02-20 ASSESSMENT — ENCOUNTER SYMPTOMS
PND: 0
PALPITATIONS: 0
SHORTNESS OF BREATH: 1
WEIGHT LOSS: 0
DIZZINESS: 0
CLAUDICATION: 0
ORTHOPNEA: 0
WEAKNESS: 0

## 2025-02-20 ASSESSMENT — FIBROSIS 4 INDEX: FIB4 SCORE: 1.28

## 2025-02-20 NOTE — PROGRESS NOTES
Chief Complaint   Patient presents with    Atrial Fibrillation     F/v Dx:Paroxysmal atrial fibrillation (HCC)       Subjective     Missy Stuart is a 69 y.o. female patient of Dr. Lim who presents today with her  Ed for follow up regarding her pAF.     Patient was last seen by myself on 11/14/2024. She was having some short bursts of palpitations. Her Diltiazem was increased to 120 mg BID from daily.     Other past medical history is pertinent for hypothyroidism, Covid infection in January, HLD and anticoagulated with Xarelto.     Today patient states that she is overall doing well. She has been having some increased SOB at rest and with exertion and a dry cough that feels as though it is coming from her upper airway. Does not believe that she has had any recurrent AF. She is tolerating OAC well with out any bleeding problems. BP has been well controlled.       Past Medical History:   Diagnosis Date    Cataract     removed bilat    Cervical pain 02/28/2020    neck, 0-1/10    COVID-19 virus infection 01/19/2024    Dysfunction of right eustachian tube 11/05/2019    Discussed creating slight pressure in the eustachian tube to open them up and allow the ears to drain.  Discussed continuing nasal spray.  As well as adding an allergy medication such as loratadine to her regimen.  I did not see any fluid in the ear on exam but there still could be fluid in there.  We discussed all of this in the visit today.    Elevated LDL cholesterol level 03/07/2019    Eosinophilic esophagitis 12/11/2020    Epigastric abdominal pain 10/13/2023    Heartburn 05/24/2024    Hyperlipidemia 02/28/2020    on no meds at this time    Hypothyroidism 11/19/2018    Restless legs 11/19/2018     Past Surgical History:   Procedure Laterality Date    EYE LESION EXCISION Left 3/2/2020    Procedure: EXCISION, LESION, EYE - UPPER LID W/POSS PLACEMENT OF MINI MONKA STENT;  Surgeon: Jaime Pastrana M.D.;  Location: SURGERY SAME DAY [facility]  "ORS;  Service: Ophthalmology    CATARACT PHACO WITH IOL  2014    Performed by Fadi Morales M.D. at SURGERY SAME DAY [facility]    TUBE & ECTOPIC PREG., REMOVAL  1987    THYROIDECTOMY  1983    partial    OTHER ORTHOPEDIC SURGERY  2000-6078    neck fusion    OH BREAST AUGMENTATION WITH IMPLANT   & 2018     Family History   Problem Relation Age of Onset    Diabetes Mother     Stroke Maternal Grandfather      Social History     Socioeconomic History    Marital status:      Spouse name: Not on file    Number of children: Not on file    Years of education: Not on file    Highest education level: Not on file   Occupational History    Not on file   Tobacco Use    Smoking status: Former     Current packs/day: 0.00     Average packs/day: 0.5 packs/day for 8.0 years (4.0 ttl pk-yrs)     Types: Cigarettes     Start date: 1980     Quit date: 1988     Years since quittin.1    Smokeless tobacco: Never    Tobacco comments:     quit in her 20s    Vaping Use    Vaping status: Never Used   Substance and Sexual Activity    Alcohol use: Yes     Comment: wine every 5 days if that, not more than 2    Drug use: No    Sexual activity: Not Currently     Partners: Male     Comment:    Other Topics Concern    Not on file   Social History Narrative    Retired dental hygiene      Social Drivers of Health     Financial Resource Strain: Not on file   Food Insecurity: Not on file   Transportation Needs: Not on file   Physical Activity: Not on file   Stress: Not on file   Social Connections: Not on file   Intimate Partner Violence: Not on file   Housing Stability: Not on file     Allergies   Allergen Reactions    Hydromorphone Hives    Codeine Vomiting and Nausea    Fentanyl Vomiting and Nausea    Doxycycline Unspecified     \"stiff joints\"    Hydrocodone Unspecified     Sweating      Oxycodone Vomiting     Outpatient Encounter Medications as of 2025   Medication Sig Dispense Refill    fluticasone " "(FLONASE) 50 MCG/ACT nasal spray ADMINISTER 2 SPRAYS INTO AFFECTED NOSTRIL(S) AT BEDTIME. 48 mL 3    flecainide (TAMBOCOR) 50 MG tablet TAKE 1 TABLET BY MOUTH TWICE A  Tablet 1    psyllium (METAMUCIL) 51.7 % Pack Take 1 Packet by mouth every day.      diltiazem (TIAZAC) 120 MG SR capsule Take 1 Capsule by mouth 2 times a day. 180 Capsule 2    ROPINIRole (REQUIP) 0.5 MG Tab TAKE 1 TO 2 TABLETS BY MOUTH AT BEDTIME 180 Tablet 3    rivaroxaban (XARELTO) 20 MG Tab tablet Take 1 Tablet by mouth with dinner. 90 Tablet 1    diclofenac sodium (VOLTAREN ARTHRITIS PAIN) 1 % Gel Apply 2 g topically 3 times a day as needed (neck pain).      levothyroxine (SYNTHROID) 88 MCG Tab Take 1 Tablet by mouth every morning on an empty stomach. 90 Tablet 3    baclofen (LIORESAL) 10 MG Tab Take 10-20 mg by mouth 1 time a day as needed (PAIN).      gabapentin (NEURONTIN) 300 MG Cap Take 300 mg by mouth 3 times a day.      Omega-3 Fatty Acids (OMEGA 3 PO) Take 1 Capsule by mouth every day.      MAGNESIUM PO Take 1 Tablet by mouth every day.      Cholecalciferol (VITAMIN D3 PO) Take 1 Tablet by mouth every day.      B Complex Vitamins (B COMPLEX PO) Take 1 Tablet by mouth every day.      [DISCONTINUED] carisoprodol (SOMA) 350 MG Tab Take 350 mg by mouth 1 time a day as needed. (Patient not taking: Reported on 2/20/2025)       No facility-administered encounter medications on file as of 2/20/2025.     Review of Systems   Constitutional:  Negative for malaise/fatigue and weight loss.   Respiratory:  Positive for shortness of breath.    Cardiovascular:  Negative for chest pain, palpitations, orthopnea, claudication, leg swelling and PND.   Neurological:  Negative for dizziness and weakness.   All other systems reviewed and are negative.             Objective     /68 (BP Location: Left arm, Patient Position: Sitting, BP Cuff Size: Adult)   Pulse 73   Resp 14   Ht 1.753 m (5' 9\")   Wt 56.2 kg (124 lb)   LMP  (LMP Unknown)   SpO2 " 99%   BMI 18.31 kg/m²     Physical Exam  Constitutional:       General: She is not in acute distress.     Appearance: She is well-developed.   HENT:      Head: Normocephalic.   Eyes:      Extraocular Movements: Extraocular movements intact.   Neck:      Vascular: No carotid bruit or JVD.   Cardiovascular:      Rate and Rhythm: Normal rate and regular rhythm.      Heart sounds: Normal heart sounds.   Pulmonary:      Effort: No respiratory distress.      Breath sounds: Normal breath sounds.   Musculoskeletal:      Cervical back: Normal range of motion.      Right lower leg: No edema.      Left lower leg: No edema.   Skin:     General: Skin is warm and dry.   Neurological:      Mental Status: She is alert and oriented to person, place, and time.   Psychiatric:         Behavior: Behavior normal.          TTE 1/20/2024:  Left Ventricle  Normal left ventricular chamber size. Normal left ventricular wall thickness. Hyperdynamic left ventricular systolic function. The ejection fraction is measured to be 79 % by Schmidt's biplane.   Diastolic function is difficult to assess with arrhythmia.     Right Ventricle  Normal right ventricular size and systolic function.     Right Atrium  Normal right atrial size. Normal inferior vena cava size and inspiratory collapse.     Left Atrium  Mildly dilated left atrium. Left atrial volume index is 36 mL/sq m.     Mitral Valve  Structurally normal mitral valve without significant stenosis or regurgitation.     Aortic Valve  The aortic valve appears trileaflet. Aortic valve sclerosis without significant stenosis. No aortic insufficiency.     Tricuspid Valve  Structurally normal tricuspid valve. No tricuspid stenosis. Mild tricuspid regurgitation. Right atrial pressure is estimated to be 3 mmHg. Estimated right ventricular systolic pressure is 30 mmHg.     Pulmonic Valve  The pulmonic valve is not well visualized.     Pericardium  Normal pericardium without effusion.     Aorta  Normal  aortic root for body surface area. The ascending aorta diameter is  3.2cm.    Chest CT-CTA 1/19/2024:  IMPRESSION:     1.  No evidence of pulmonary embolus.     2.  Mild atherosclerotic change aorta and coronary arteries.     3.  Fatty change of the liver.       Assessment & Plan     1. Paroxysmal atrial fibrillation (HCC)  EKG      2. Coronary artery calcification seen on CAT scan        3. Hypertension, unspecified type        4. Hypercoagulable state due to paroxysmal atrial fibrillation (HCC)        5. Encounter for monitoring flecainide therapy        6. Mixed hyperlipidemia        7. Anticoagulated        8. Acquired hypothyroidism                Medical Decision Making: Today's Assessment/Status/Plan:   pAF:  - 36% burden on recent Zio, symptomatic with palpitations.   - Maintaining NSR overall.  - Continue Flecainide 50 mg BID and Diltiazem 120 mg BID.   - Continue OAC with Xarelto 20 mg daily, patient is currently tolerating well with no bleeding problems. Repeat CBC ordered.     High Risk Medication (Flecainide):  - Recent low risk cardiac PET in June 2024.   - TTE in January showed preserved LVEF, no significant valvular abnormality.   - Stable CrCl on last chem panel in January 2024. Repeat CMP ordered.   - Stable interval measurements on EKG today.     Hypothyroidism:  - TSH WNL in January 2024, repeat ordered  - On synthroid 88 mcg daily.    Mild Coronary Artery Calcification on Chest CTA:  - LDL 92, . Repeat lipid panel ordered.     - Discussed LDL goals with known atherosclerosis. Risks and benefits of stating therapy discussed. Patient would like to hold off for now.     SOB and Cough:  - Low suspicion cardiac in etiology due to recent normal TTE and Cardiac PET. Possibly allergies and/or post nasal drip. Patient will try OTC antihistamine. Tessalon Perles ordered. Patient will also discuss with her PCP. Discussed PFT's if SOB persists.       Patient will follow up as scheduled below or  earlier if needed. Encouraged patient to contact our office should any questions or concerns arise in the mean time.     Future Appointments   Date Time Provider Department Center   2/27/2025  7:30 AM MAGO Egan   6/3/2025  7:00 AM Sandra Carrion D.O. [facility]   8/6/2025  7:45 AM JAKE Montero None

## 2025-02-27 ENCOUNTER — PHYSICAL THERAPY (OUTPATIENT)
Dept: PHYSICAL THERAPY | Facility: MEDICAL CENTER | Age: 70
End: 2025-02-27
Attending: STUDENT IN AN ORGANIZED HEALTH CARE EDUCATION/TRAINING PROGRAM
Payer: MEDICARE

## 2025-02-27 DIAGNOSIS — G89.29 CHRONIC RIGHT-SIDED LOW BACK PAIN WITHOUT SCIATICA: ICD-10-CM

## 2025-02-27 DIAGNOSIS — M54.50 CHRONIC RIGHT-SIDED LOW BACK PAIN WITHOUT SCIATICA: ICD-10-CM

## 2025-02-27 PROCEDURE — 97530 THERAPEUTIC ACTIVITIES: CPT

## 2025-02-27 PROCEDURE — 97110 THERAPEUTIC EXERCISES: CPT

## 2025-02-27 NOTE — OP THERAPY DAILY TREATMENT
Outpatient Physical Therapy  DAILY TREATMENT     [facility] Outpatient Physical Therapy  [address]  [place] [address]  Phone:  [phone]  Fax:  [phone]    Date: 02/27/2025    Patient: Missy Stuart  YOB: 1955  MRN: [id number]     Time Calculation    Start time: 0730  Stop time: 0813 Time Calculation (min): 43 minutes         Chief Complaint: Back Problem    Visit #: 10    SUBJECTIVE:  Pt reports she feels about 85% overall improvement since onset of PT.    OBJECTIVE:  Current objective measures:   Oswestry Disability Assessment Total Score: 16  WOMAC Grand Total: 11.46       Therapeutic Exercises (CPT 05403):     3. Standing hip abd, Purple TB x15 B/L, HEP    4. Standing hip extension, Purple TB x15 B/L, HEP    6. Seated lumbar stretch, x10, HEP    7. Modified deadlift, 10# 2x12, HEP    10. Nustep, 6 mins, warm up    Therapeutic Treatments and Modalities:     1. Therapeutic Activities (CPT 75822), education/progress    Therapeutic Treatment and Modalities Summary: Progress testing: modified deadlifts    WOMAC/Oswestry    Time-based treatments/modalities:    Physical Therapy Timed Treatment Charges  Therapeutic activity minutes (CPT 82843): 25 minutes  Therapeutic exercise minutes (CPT 79310): 16 minutes      ASSESSMENT:   Response to treatment: See progress    PLAN/RECOMMENDATIONS:   Plan for treatment: therapy treatment to continue next visit.  Planned interventions for next visit: continue with current treatment.

## 2025-02-27 NOTE — OP THERAPY PROGRESS SUMMARY
Outpatient Physical Therapy  PROGRESS SUMMARY NOTE      [facility] Outpatient Physical Therapy  [address]  [place] [address]  Phone:  [phone]  Fax:  [phone]    Date of Visit: 02/27/2025    Patient: Missy Stuart  YOB: 1955  MRN: [id number]     Referring Provider: Sandra Carrion D.O.  [address]  [address]  [place] [address]   Referring Diagnosis Low back pain, unspecified [M54.50];Trochanteric bursitis, right hip [M70.61];Pain in right hip [M25.551];Other chronic pain [G89.29]     Visit Diagnoses     ICD-10-CM   1. Chronic right-sided low back pain without sciatica  M54.50    G89.29       Rehab Potential: good    Progress Report Period: 1/14/25-2/27/25    Functional Assessment Used  Oswestry Disability Assessment Total Score: 16  WOMAC Grand Total: 11.46       Objective Findings and Assessment:   Patient progression towards goals: Short Term Goals:   1. Pt will achieve 20 or lower on Oswestry (achieved)  2. Pt will achieve 15 or lower on WOMAC (Achieved)  3. Pt will report being able to walk greater than 1 mile pain free or near pain free (2/10 or lower on numeric pain rating scale) (achieved)  4. Pt will demonstrate 10x modified deadlift with 10lbs pain free or near pain free (2/10 or lower on numeric pain rating scale) (achieved)      Long Term Goals:    1. Pt will achieve 12 or lower on oswestry (not achieved)  2. Pt will achieve 10 or lower on WOMAC (not achieved)  3. Pt will demonstrate bobbi curl 10x with 15 lbs pain free or near pain free (2/10 or lower on numeric pain rating scale) (not achieved)  4. Pt will report 50% or greater improvement of symptoms (achieved)      Objective findings and assessment details: Pt has achieved all of her short term goals, indicating appropriate progress to date with current POC. Pt was educated today regarding progression toward independence with self maintenance/management through  continued PT exercise programming. Pt will benefit from 1-2 more PT appts to ensure appropriate discharge status.     Goals:   Short Term Goals:   1. Pt will achieve 12 or lower on oswestry   2. Pt will achieve 10 or lower on WOMAC    Short term goal time span:  2-4 weeks      Long Term Goals:    1. Pt will demonstrate bobbi curl 10x with 15 lbs pain free or near pain free (2/10 or lower on numeric pain rating scale)  Long term goal time span:  4-6 weeks    Plan:   Planned therapy interventions:  Neuromuscular Re-education (CPT 21918), Therapeutic Exercise (CPT 21079) and Therapeutic Activities (CPT 99826)  Frequency:  2x month  Duration in weeks:  6      Referring provider co-signature:  I have reviewed this plan of care and my co-signature certifies the need for services.     Certification Period: 02/27/2025 to 04/10/25    Physician Signature: ________________________________ Date: ______________

## 2025-03-03 LAB — EKG IMPRESSION: NORMAL

## 2025-03-13 NOTE — Clinical Note
REFERRAL APPROVAL NOTICE         Sent on March 13, 2025                   Missy Kimfide  [address] [place] [address]                   Dear Ms. Kimfide,    After a careful review of the medical information and benefit coverage, [facility] has processed your referral. See below for additional details.    If applicable, you must be actively enrolled with your insurance for coverage of the authorized service. If you have any questions regarding your coverage, please contact your insurance directly.    REFERRAL INFORMATION   Referral #:  [id number]  Referred-To Provider    Referred-By Provider:  Otolaryngology    BRE Valera JOSHUA C      [address]  [place] [address]  [phone] [address] [place] [id number] [phone]    Referral Start Date:  03/07/2025  Referral End Date:   03/06/2026             SCHEDULING  If you do not already have an appointment, please call [phone] to make an appointment.     MORE INFORMATION  If you do not already have a [employer] account, sign up at: [email or web address]  You can access your medical information, make appointments, see lab results, billing information, and more.  If you have questions regarding this referral, please contact  the [facility] Referrals department at:             [phone]. Monday - Friday 8:00AM - 5:00PM.     Sincerely,    [facility]

## 2025-03-15 DIAGNOSIS — E03.9 HYPOTHYROIDISM, UNSPECIFIED TYPE: ICD-10-CM

## 2025-03-17 RX ORDER — LEVOTHYROXINE SODIUM 88 UG/1
88 TABLET ORAL
Qty: 90 TABLET | Refills: 0 | Status: SHIPPED | OUTPATIENT
Start: 2025-03-17

## 2025-06-03 ENCOUNTER — OFFICE VISIT (OUTPATIENT)
Dept: MEDICAL GROUP | Facility: LAB | Age: 70
End: 2025-06-03
Payer: MEDICARE

## 2025-06-03 VITALS
DIASTOLIC BLOOD PRESSURE: 58 MMHG | HEIGHT: 69 IN | TEMPERATURE: 97 F | OXYGEN SATURATION: 98 % | SYSTOLIC BLOOD PRESSURE: 112 MMHG | BODY MASS INDEX: 17.94 KG/M2 | HEART RATE: 70 BPM | WEIGHT: 121.1 LBS | RESPIRATION RATE: 16 BRPM

## 2025-06-03 DIAGNOSIS — D75.89 MACROCYTOSIS WITHOUT ANEMIA: Primary | ICD-10-CM

## 2025-06-03 DIAGNOSIS — E03.9 ACQUIRED HYPOTHYROIDISM: ICD-10-CM

## 2025-06-03 DIAGNOSIS — D72.819 LEUKOPENIA, UNSPECIFIED TYPE: ICD-10-CM

## 2025-06-03 PROCEDURE — 3074F SYST BP LT 130 MM HG: CPT | Performed by: STUDENT IN AN ORGANIZED HEALTH CARE EDUCATION/TRAINING PROGRAM

## 2025-06-03 PROCEDURE — 3078F DIAST BP <80 MM HG: CPT | Performed by: STUDENT IN AN ORGANIZED HEALTH CARE EDUCATION/TRAINING PROGRAM

## 2025-06-03 PROCEDURE — 99214 OFFICE O/P EST MOD 30 MIN: CPT | Performed by: STUDENT IN AN ORGANIZED HEALTH CARE EDUCATION/TRAINING PROGRAM

## 2025-06-03 ASSESSMENT — FIBROSIS 4 INDEX: FIB4 SCORE: 1.3

## 2025-06-03 ASSESSMENT — ENCOUNTER SYMPTOMS
COUGH: 0
WEIGHT LOSS: 0
ABDOMINAL PAIN: 0
BACK PAIN: 1
DIARRHEA: 0
NAUSEA: 0
FALLS: 0
FEVER: 0
PALPITATIONS: 0
VOMITING: 0
SHORTNESS OF BREATH: 0
CHILLS: 0
NECK PAIN: 1

## 2025-06-03 ASSESSMENT — PATIENT HEALTH QUESTIONNAIRE - PHQ9: CLINICAL INTERPRETATION OF PHQ2 SCORE: 0

## 2025-06-03 NOTE — PROGRESS NOTES
"Verbal consent was acquired by the patient to use [employer] ambient listening note generation during this visit Yes.    Subjective:     Chief Complaint   Patient presents with    Follow-Up       HPI:     History of Present Illness  The patient is a 70-year-old female who presents for follow-up.    She reports experiencing neck discomfort and has scheduled facet joint injections and ablation procedures. Additionally, she received an injection in her bursa, which provided relief for approximately 5 months. A repeat injection is planned in the coming weeks. She has not been adhering to her physical therapy regimen for her hips at home.    She had a consultation with a hematologist last year, but no significant concerns were raised at that time. Her white blood cell count was mildly low, and her red blood cells were large, but she was not anemic and did not have any abnormal proteins. She has not had her B12 or folic acid levels checked in over a year. She consumes alcohol, with an average intake of 5 servings per week.    Her atrial fibrillation is well-managed with diltiazem. She reports no episodes of heartburn or falls. She is currently on gabapentin and Xarelto.    Her restless leg syndrome is under control with low-dose ropinirole.    SOCIAL HISTORY  She drinks alcohol, approximately 5 servings per week.    Review of Systems   Constitutional:  Negative for chills, fever, malaise/fatigue and weight loss.   Respiratory:  Negative for cough and shortness of breath.    Cardiovascular:  Negative for chest pain and palpitations.   Gastrointestinal:  Negative for abdominal pain, diarrhea, nausea and vomiting.   Musculoskeletal:  Positive for back pain, joint pain and neck pain. Negative for falls.   Skin:  Negative for rash.       Objective:     Exam:  /58 (BP Location: Right arm, Patient Position: Sitting, BP Cuff Size: Adult)   Pulse 70   Temp 36.1 °C (97 °F) (Temporal)   Resp 16   Ht 1.753 m (5' 9\")   Wt " 54.9 kg (121 lb 1.6 oz)   LMP  (LMP Unknown)   SpO2 98%   BMI 17.88 kg/m²  Body mass index is 17.88 kg/m².    Physical Exam  Vitals reviewed.   Constitutional:       General: She is not in acute distress.     Appearance: Normal appearance. She is not ill-appearing.   HENT:      Head: Normocephalic and atraumatic.      Nose: Nose normal.      Mouth/Throat:      Mouth: Mucous membranes are moist.   Eyes:      Conjunctiva/sclera: Conjunctivae normal.   Cardiovascular:      Rate and Rhythm: Normal rate and regular rhythm.      Heart sounds: Normal heart sounds. No murmur heard.  Pulmonary:      Effort: Pulmonary effort is normal. No respiratory distress.      Breath sounds: Normal breath sounds. No stridor. No wheezing, rhonchi or rales.   Musculoskeletal:      Cervical back: Normal range of motion and neck supple. No rigidity or tenderness.      Right lower leg: No edema.      Left lower leg: No edema.   Skin:     General: Skin is warm and dry.   Neurological:      General: No focal deficit present.      Mental Status: She is alert and oriented to person, place, and time.   Psychiatric:         Mood and Affect: Mood normal.         Behavior: Behavior normal.         Thought Content: Thought content normal.       Reviewed most recent/relevant labs.    Assessment & Plan:     70 y.o. female with the following -     1. Macrocytosis without anemia  2. Leukopenia, unspecified type  Mild without neutropenia or anemia. Macrocytosis last year had worsened. Patient never followed up with hematology after completing labs last May. Has a current CBC ordered by other provider, will trend B12/folic acid. Denies excess ETOH currently. Plan pending results.   - FOLATE; Future  - VITAMIN B12; Future    3. Acquired hypothyroidism  Chronic, controlled but overdue to trend.  Has lab work with TSH.  Continue levothyroxine 88 mcg daily pending results.    Return in about 6 months (around 12/4/2025), or if symptoms worsen or fail to  improve, for Annual Medicare Visit.    Please note that this dictation was created using voice recognition software. I have made every reasonable attempt to correct obvious errors, but I expect that there are errors of grammar and possibly content that I did not discover before finalizing the note.

## 2025-06-03 NOTE — PATIENT INSTRUCTIONS
After 12/3 (annual)    fasting labs due    Ideal blood pressure <130/80.  If <100 for the top number we may be over treating.  Severe range >180/120, with symptoms=ER

## 2025-07-28 ENCOUNTER — APPOINTMENT (OUTPATIENT)
Dept: RADIOLOGY | Facility: MEDICAL CENTER | Age: 70
End: 2025-07-28
Attending: NURSE PRACTITIONER
Payer: MEDICARE

## 2025-07-30 DIAGNOSIS — E03.9 HYPOTHYROIDISM, UNSPECIFIED TYPE: ICD-10-CM

## 2025-07-30 RX ORDER — LEVOTHYROXINE SODIUM 88 UG/1
88 TABLET ORAL
Qty: 90 TABLET | Refills: 0 | Status: SHIPPED | OUTPATIENT
Start: 2025-07-30

## 2025-07-31 DIAGNOSIS — I48.0 PAROXYSMAL ATRIAL FIBRILLATION (HCC): ICD-10-CM

## 2025-07-31 RX ORDER — FLECAINIDE ACETATE 50 MG/1
50 TABLET ORAL 2 TIMES DAILY
Qty: 180 TABLET | Refills: 0 | Status: SHIPPED | OUTPATIENT
Start: 2025-07-31

## 2025-08-05 ENCOUNTER — HOSPITAL ENCOUNTER (OUTPATIENT)
Facility: MEDICAL CENTER | Age: 70
End: 2025-08-05
Attending: STUDENT IN AN ORGANIZED HEALTH CARE EDUCATION/TRAINING PROGRAM
Payer: MEDICARE

## 2025-08-05 DIAGNOSIS — D72.819 LEUKOPENIA, UNSPECIFIED TYPE: ICD-10-CM

## 2025-08-05 DIAGNOSIS — D75.89 MACROCYTOSIS WITHOUT ANEMIA: ICD-10-CM

## 2025-08-05 LAB
FOLATE SERPL-MCNC: 17.8 NG/ML
VIT B12 SERPL-MCNC: 458 PG/ML (ref 211–911)

## 2025-08-05 PROCEDURE — 82746 ASSAY OF FOLIC ACID SERUM: CPT

## 2025-08-05 PROCEDURE — 36415 COLL VENOUS BLD VENIPUNCTURE: CPT

## 2025-08-05 PROCEDURE — 82607 VITAMIN B-12: CPT

## (undated) DEVICE — SUCTION INSTRUMENT YANKAUER BULBOUS TIP W/O VENT (50EA/CA)

## (undated) DEVICE — TUBE CONNECTING SUCTION - CLEAR PLASTIC STERILE 72 IN (50EA/CA)

## (undated) DEVICE — APPLICATOR COTTONTIP 3 IN - STERILE (10EA/PK 100PK/CA)

## (undated) DEVICE — MASK AIRWAY FLEXIBLE SINGLE-USE SIZE 3 CHILDREN (10EA/BX)

## (undated) DEVICE — WATER IRRIGATION STERILE 1000ML (12EA/CA)

## (undated) DEVICE — SUTURE 5-0 VICRYL D/A S-14 18 (12PK/BX)"

## (undated) DEVICE — SUTURE 7-0 VICRYL TG140-8 (12PK/BX)

## (undated) DEVICE — SENSOR SPO2 NEO LNCS ADHESIVE (20/BX) SEE USER NOTES

## (undated) DEVICE — ELECTRODE 850 FOAM ADHESIVE - HYDROGEL RADIOTRNSPRNT (50/PK)

## (undated) DEVICE — SET EXTENSION WITH 2 PORTS (48EA/CA) ***PART #2C8610 IS A SUBSTITUTE*****

## (undated) DEVICE — SET LEADWIRE 5 LEAD BEDSIDE DISPOSABLE ECG (1SET OF 5/EA)

## (undated) DEVICE — SUTURE GENERAL

## (undated) DEVICE — CANISTER SUCTION 3000ML MECHANICAL FILTER AUTO SHUTOFF MEDI-VAC NONSTERILE LF DISP  (40EA/CA)

## (undated) DEVICE — SYRINGE 30 ML LL (56/BX)

## (undated) DEVICE — GOWN SURGEONS X-LARGE - DISP. (30/CA)

## (undated) DEVICE — CATHETER IV 20 GA X 1-1/4 ---SURG.& SDS ONLY--- (50EA/BX)

## (undated) DEVICE — CANISTER SUCTION RIGID RED 1500CC (40EA/CA)

## (undated) DEVICE — PEN SKIN MARKER W/RULER - (50EA/BX)

## (undated) DEVICE — KIT  I.V. START (100EA/CA)

## (undated) DEVICE — HEAD HOLDER JUNIOR/ADULT

## (undated) DEVICE — TUBING CLEARLINK DUO-VENT - C-FLO (48EA/CA)

## (undated) DEVICE — LACTATED RINGERS INJ 1000 ML - (14EA/CA 60CA/PF)

## (undated) DEVICE — KIT ANESTHESIA W/CIRCUIT & 3/LT BAG W/FILTER (20EA/CA)